# Patient Record
Sex: FEMALE | Race: BLACK OR AFRICAN AMERICAN | Employment: PART TIME
[De-identification: names, ages, dates, MRNs, and addresses within clinical notes are randomized per-mention and may not be internally consistent; named-entity substitution may affect disease eponyms.]

---

## 2017-01-12 ENCOUNTER — OFFICE VISIT (OUTPATIENT)
Dept: BARIATRICS/WEIGHT MGMT | Facility: CLINIC | Age: 57
End: 2017-01-12

## 2017-01-12 VITALS
WEIGHT: 231 LBS | HEART RATE: 67 BPM | BODY MASS INDEX: 46.57 KG/M2 | SYSTOLIC BLOOD PRESSURE: 136 MMHG | HEIGHT: 59 IN | DIASTOLIC BLOOD PRESSURE: 78 MMHG

## 2017-01-12 DIAGNOSIS — I10 ESSENTIAL HYPERTENSION: Primary | ICD-10-CM

## 2017-01-12 DIAGNOSIS — J45.20 MILD INTERMITTENT ASTHMA WITHOUT COMPLICATION: ICD-10-CM

## 2017-01-12 PROCEDURE — 99204 OFFICE O/P NEW MOD 45 MIN: CPT | Performed by: SURGERY

## 2017-01-12 RX ORDER — FLUTICASONE PROPIONATE 50 MCG
1 SPRAY, SUSPENSION (ML) NASAL DAILY
COMMUNITY
End: 2019-05-07 | Stop reason: SDUPTHER

## 2017-01-12 RX ORDER — SULFAMETHOXAZOLE AND TRIMETHOPRIM 800; 160 MG/1; MG/1
TABLET ORAL
Refills: 0 | COMMUNITY
Start: 2016-12-15 | End: 2017-01-12 | Stop reason: ALTCHOICE

## 2017-01-23 ENCOUNTER — TELEPHONE (OUTPATIENT)
Dept: BARIATRICS/WEIGHT MGMT | Facility: CLINIC | Age: 57
End: 2017-01-23

## 2017-01-23 DIAGNOSIS — G47.33 OSA (OBSTRUCTIVE SLEEP APNEA): Primary | ICD-10-CM

## 2017-01-24 ENCOUNTER — NURSE ONLY (OUTPATIENT)
Dept: BARIATRICS/WEIGHT MGMT | Facility: CLINIC | Age: 57
End: 2017-01-24

## 2017-01-24 VITALS — BODY MASS INDEX: 47.26 KG/M2 | WEIGHT: 234 LBS

## 2017-01-24 DIAGNOSIS — E66.01 MORBID OBESITY DUE TO EXCESS CALORIES (HCC): Primary | ICD-10-CM

## 2017-01-27 ENCOUNTER — NURSE ONLY (OUTPATIENT)
Dept: BARIATRICS/WEIGHT MGMT | Facility: CLINIC | Age: 57
End: 2017-01-27

## 2017-01-27 DIAGNOSIS — E66.01 MORBID OBESITY DUE TO EXCESS CALORIES (HCC): Primary | ICD-10-CM

## 2017-02-06 ENCOUNTER — NURSE ONLY (OUTPATIENT)
Dept: BARIATRICS/WEIGHT MGMT | Facility: CLINIC | Age: 57
End: 2017-02-06

## 2017-02-06 DIAGNOSIS — E66.01 MORBID OBESITY DUE TO EXCESS CALORIES (HCC): Primary | ICD-10-CM

## 2017-02-09 ENCOUNTER — TELEPHONE (OUTPATIENT)
Dept: BARIATRICS/WEIGHT MGMT | Facility: CLINIC | Age: 57
End: 2017-02-09

## 2017-02-22 ENCOUNTER — OFFICE VISIT (OUTPATIENT)
Dept: PODIATRY | Facility: CLINIC | Age: 57
End: 2017-02-22

## 2017-02-22 VITALS
RESPIRATION RATE: 19 BRPM | HEART RATE: 68 BPM | SYSTOLIC BLOOD PRESSURE: 171 MMHG | HEIGHT: 59 IN | BODY MASS INDEX: 46.57 KG/M2 | WEIGHT: 231 LBS | DIASTOLIC BLOOD PRESSURE: 80 MMHG

## 2017-02-22 DIAGNOSIS — B35.1 DERMATOPHYTOSIS OF NAIL: Primary | ICD-10-CM

## 2017-02-22 PROCEDURE — 11721 DEBRIDE NAIL 6 OR MORE: CPT | Performed by: PODIATRIST

## 2017-02-28 ENCOUNTER — OFFICE VISIT (OUTPATIENT)
Dept: BARIATRICS/WEIGHT MGMT | Facility: CLINIC | Age: 57
End: 2017-02-28

## 2017-02-28 VITALS
BODY MASS INDEX: 46.97 KG/M2 | HEART RATE: 68 BPM | RESPIRATION RATE: 16 BRPM | SYSTOLIC BLOOD PRESSURE: 118 MMHG | DIASTOLIC BLOOD PRESSURE: 74 MMHG | WEIGHT: 233 LBS | HEIGHT: 59 IN

## 2017-02-28 DIAGNOSIS — E66.01 OBESITY, MORBID, BMI 40.0-49.9 (HCC): ICD-10-CM

## 2017-02-28 DIAGNOSIS — G47.30 SLEEP APNEA, UNSPECIFIED TYPE: ICD-10-CM

## 2017-02-28 DIAGNOSIS — J45.909 UNCOMPLICATED ASTHMA, UNSPECIFIED ASTHMA SEVERITY: ICD-10-CM

## 2017-02-28 PROBLEM — L50.9 URTICARIA: Status: ACTIVE | Noted: 2017-02-28

## 2017-02-28 PROCEDURE — 99213 OFFICE O/P EST LOW 20 MIN: CPT | Performed by: NURSE PRACTITIONER

## 2017-02-28 RX ORDER — LORATADINE 10 MG/1
10 TABLET ORAL DAILY
Refills: 0 | COMMUNITY
Start: 2017-02-22 | End: 2019-05-07 | Stop reason: SDUPTHER

## 2017-03-02 DIAGNOSIS — G47.33 OSA (OBSTRUCTIVE SLEEP APNEA): Primary | ICD-10-CM

## 2017-03-28 ENCOUNTER — OFFICE VISIT (OUTPATIENT)
Dept: BARIATRICS/WEIGHT MGMT | Age: 57
End: 2017-03-28
Payer: MEDICARE

## 2017-03-28 VITALS
RESPIRATION RATE: 18 BRPM | HEART RATE: 82 BPM | BODY MASS INDEX: 47.17 KG/M2 | DIASTOLIC BLOOD PRESSURE: 78 MMHG | HEIGHT: 59 IN | SYSTOLIC BLOOD PRESSURE: 126 MMHG | WEIGHT: 234 LBS

## 2017-03-28 DIAGNOSIS — E66.01 OBESITY, MORBID, BMI 40.0-49.9 (HCC): ICD-10-CM

## 2017-03-28 DIAGNOSIS — G47.30 SLEEP APNEA, UNSPECIFIED TYPE: ICD-10-CM

## 2017-03-28 DIAGNOSIS — J45.909 UNCOMPLICATED ASTHMA, UNSPECIFIED ASTHMA SEVERITY: ICD-10-CM

## 2017-03-28 DIAGNOSIS — I10 ESSENTIAL HYPERTENSION: Primary | ICD-10-CM

## 2017-03-28 PROCEDURE — 99213 OFFICE O/P EST LOW 20 MIN: CPT | Performed by: NURSE PRACTITIONER

## 2017-04-25 ENCOUNTER — TELEPHONE (OUTPATIENT)
Dept: BARIATRICS/WEIGHT MGMT | Age: 57
End: 2017-04-25

## 2017-05-03 ENCOUNTER — OFFICE VISIT (OUTPATIENT)
Dept: PODIATRY | Age: 57
End: 2017-05-03
Payer: MEDICARE

## 2017-05-03 VITALS
WEIGHT: 230 LBS | DIASTOLIC BLOOD PRESSURE: 80 MMHG | HEIGHT: 59 IN | SYSTOLIC BLOOD PRESSURE: 145 MMHG | BODY MASS INDEX: 46.37 KG/M2 | HEART RATE: 74 BPM

## 2017-05-03 DIAGNOSIS — M77.31 CALCANEAL SPUR OF RIGHT FOOT: ICD-10-CM

## 2017-05-03 DIAGNOSIS — M77.32 CALCANEAL SPUR OF LEFT FOOT: ICD-10-CM

## 2017-05-03 DIAGNOSIS — M79.605 BILATERAL LOWER EXTREMITY PAIN: Primary | ICD-10-CM

## 2017-05-03 DIAGNOSIS — M79.604 BILATERAL LOWER EXTREMITY PAIN: Primary | ICD-10-CM

## 2017-05-03 DIAGNOSIS — B35.1 DERMATOPHYTOSIS OF NAIL: ICD-10-CM

## 2017-05-03 PROCEDURE — 99213 OFFICE O/P EST LOW 20 MIN: CPT | Performed by: PODIATRIST

## 2017-05-03 PROCEDURE — 11721 DEBRIDE NAIL 6 OR MORE: CPT | Performed by: PODIATRIST

## 2017-05-17 ENCOUNTER — OFFICE VISIT (OUTPATIENT)
Dept: PODIATRY | Age: 57
End: 2017-05-17
Payer: MEDICARE

## 2017-05-17 VITALS
BODY MASS INDEX: 46.37 KG/M2 | DIASTOLIC BLOOD PRESSURE: 86 MMHG | HEIGHT: 59 IN | HEART RATE: 68 BPM | WEIGHT: 230 LBS | SYSTOLIC BLOOD PRESSURE: 177 MMHG

## 2017-05-17 DIAGNOSIS — S86.112A RUPTURE OF LEFT POSTERIOR TIBIALIS TENDON, INITIAL ENCOUNTER: Primary | ICD-10-CM

## 2017-05-17 PROCEDURE — 99213 OFFICE O/P EST LOW 20 MIN: CPT | Performed by: PODIATRIST

## 2017-05-26 ENCOUNTER — HOSPITAL ENCOUNTER (OUTPATIENT)
Dept: MRI IMAGING | Age: 57
Discharge: HOME | End: 2017-05-26
Payer: MEDICARE

## 2017-05-26 DIAGNOSIS — S86.112A RUPTURE OF LEFT POSTERIOR TIBIALIS TENDON, INITIAL ENCOUNTER: ICD-10-CM

## 2017-05-26 PROCEDURE — 73718 MRI LOWER EXTREMITY W/O DYE: CPT

## 2017-07-26 ENCOUNTER — OFFICE VISIT (OUTPATIENT)
Dept: PODIATRY | Age: 57
End: 2017-07-26
Payer: MEDICARE

## 2017-07-26 VITALS
HEART RATE: 57 BPM | DIASTOLIC BLOOD PRESSURE: 80 MMHG | HEIGHT: 59 IN | SYSTOLIC BLOOD PRESSURE: 161 MMHG | WEIGHT: 230 LBS | BODY MASS INDEX: 46.37 KG/M2

## 2017-07-26 DIAGNOSIS — S86.112A RUPTURE OF POSTERIOR TIBIALIS TENDON, LEFT, INITIAL ENCOUNTER: Primary | ICD-10-CM

## 2017-07-26 PROCEDURE — 99213 OFFICE O/P EST LOW 20 MIN: CPT | Performed by: PODIATRIST

## 2017-07-26 PROCEDURE — L2116 AFO TIBIAL FRACTURE RIGID: HCPCS | Performed by: PODIATRIST

## 2017-08-09 ENCOUNTER — OFFICE VISIT (OUTPATIENT)
Dept: PODIATRY | Age: 57
End: 2017-08-09
Payer: MEDICARE

## 2017-08-09 VITALS
SYSTOLIC BLOOD PRESSURE: 154 MMHG | HEART RATE: 78 BPM | DIASTOLIC BLOOD PRESSURE: 82 MMHG | BODY MASS INDEX: 46.37 KG/M2 | HEIGHT: 59 IN | WEIGHT: 230 LBS

## 2017-08-09 DIAGNOSIS — S86.112A RUPTURE OF POSTERIOR TIBIALIS TENDON, LEFT, INITIAL ENCOUNTER: Primary | ICD-10-CM

## 2017-08-09 PROCEDURE — 99213 OFFICE O/P EST LOW 20 MIN: CPT | Performed by: PODIATRIST

## 2017-08-25 ENCOUNTER — HOSPITAL ENCOUNTER (OUTPATIENT)
Dept: PREADMISSION TESTING | Age: 57
Discharge: HOME | End: 2017-08-25
Payer: MEDICARE

## 2017-08-25 ENCOUNTER — HOSPITAL ENCOUNTER (OUTPATIENT)
Age: 57
Discharge: HOME | End: 2017-08-25
Payer: MEDICARE

## 2017-08-25 VITALS
SYSTOLIC BLOOD PRESSURE: 156 MMHG | DIASTOLIC BLOOD PRESSURE: 73 MMHG | BODY MASS INDEX: 48.89 KG/M2 | RESPIRATION RATE: 18 BRPM | HEIGHT: 59 IN | HEART RATE: 73 BPM | OXYGEN SATURATION: 97 % | WEIGHT: 242.51 LBS

## 2017-08-25 LAB
ABSOLUTE EOS #: 0.3 K/UL (ref 0–0.4)
ABSOLUTE LYMPH #: 2.2 K/UL (ref 1–4.8)
ABSOLUTE MONO #: 0.4 K/UL (ref 0.2–0.8)
ANION GAP SERPL CALCULATED.3IONS-SCNC: 9 MMOL/L (ref 9–17)
BASOPHILS # BLD: 1 %
BASOPHILS ABSOLUTE: 0.1 K/UL (ref 0–0.2)
BUN BLDV-MCNC: 12 MG/DL (ref 6–20)
CHLORIDE BLD-SCNC: 100 MMOL/L (ref 98–107)
CO2: 31 MMOL/L (ref 20–31)
CREAT SERPL-MCNC: 0.62 MG/DL (ref 0.5–0.9)
DIFFERENTIAL TYPE: ABNORMAL
EOSINOPHILS RELATIVE PERCENT: 4 %
GFR AFRICAN AMERICAN: >60 ML/MIN
GFR NON-AFRICAN AMERICAN: >60 ML/MIN
GFR SERPL CREATININE-BSD FRML MDRD: NORMAL ML/MIN/{1.73_M2}
GFR SERPL CREATININE-BSD FRML MDRD: NORMAL ML/MIN/{1.73_M2}
HCT VFR BLD CALC: 40.9 % (ref 36–46)
HEMOGLOBIN: 13.6 G/DL (ref 12–16)
LYMPHOCYTES # BLD: 26 %
MCH RBC QN AUTO: 27.8 PG (ref 26–34)
MCHC RBC AUTO-ENTMCNC: 33.4 G/DL (ref 31–37)
MCV RBC AUTO: 83.2 FL (ref 80–100)
MONOCYTES # BLD: 5 %
PDW BLD-RTO: 16.3 % (ref 11.5–14.5)
PLATELET # BLD: 326 K/UL (ref 130–400)
PLATELET ESTIMATE: ABNORMAL
PMV BLD AUTO: 7.2 FL (ref 6–12)
POTASSIUM SERPL-SCNC: 4.4 MMOL/L (ref 3.7–5.3)
RBC # BLD: 4.92 M/UL (ref 4–5.2)
RBC # BLD: ABNORMAL 10*6/UL
SEG NEUTROPHILS: 64 %
SEGMENTED NEUTROPHILS ABSOLUTE COUNT: 5.5 K/UL (ref 1.8–7.7)
SODIUM BLD-SCNC: 140 MMOL/L (ref 135–144)
WBC # BLD: 8.6 K/UL (ref 3.5–11)
WBC # BLD: ABNORMAL 10*3/UL

## 2017-08-25 PROCEDURE — 36415 COLL VENOUS BLD VENIPUNCTURE: CPT

## 2017-08-25 PROCEDURE — 82565 ASSAY OF CREATININE: CPT

## 2017-08-25 PROCEDURE — 80051 ELECTROLYTE PANEL: CPT

## 2017-08-25 PROCEDURE — 85025 COMPLETE CBC W/AUTO DIFF WBC: CPT

## 2017-08-25 PROCEDURE — 84520 ASSAY OF UREA NITROGEN: CPT

## 2017-08-25 PROCEDURE — 93005 ELECTROCARDIOGRAM TRACING: CPT

## 2017-08-25 ASSESSMENT — PAIN DESCRIPTION - PROGRESSION: CLINICAL_PROGRESSION: GRADUALLY WORSENING

## 2017-08-25 ASSESSMENT — PAIN DESCRIPTION - PAIN TYPE: TYPE: CHRONIC PAIN

## 2017-08-25 ASSESSMENT — PAIN DESCRIPTION - LOCATION: LOCATION: FOOT

## 2017-08-25 ASSESSMENT — PAIN SCALES - GENERAL: PAINLEVEL_OUTOF10: 10

## 2017-08-25 ASSESSMENT — PAIN DESCRIPTION - ORIENTATION: ORIENTATION: LEFT

## 2017-08-25 ASSESSMENT — PAIN DESCRIPTION - FREQUENCY: FREQUENCY: CONTINUOUS

## 2017-08-25 ASSESSMENT — PAIN DESCRIPTION - DESCRIPTORS: DESCRIPTORS: ACHING;SHOOTING;SHARP;DULL

## 2017-08-26 LAB
EKG ATRIAL RATE: 62 BPM
EKG P AXIS: 20 DEGREES
EKG P-R INTERVAL: 152 MS
EKG Q-T INTERVAL: 404 MS
EKG QRS DURATION: 88 MS
EKG QTC CALCULATION (BAZETT): 410 MS
EKG R AXIS: 0 DEGREES
EKG T AXIS: 21 DEGREES
EKG VENTRICULAR RATE: 62 BPM

## 2017-09-07 ENCOUNTER — ANESTHESIA EVENT (OUTPATIENT)
Dept: OPERATING ROOM | Age: 57
End: 2017-09-07
Payer: MEDICARE

## 2017-09-08 ENCOUNTER — ANESTHESIA (OUTPATIENT)
Dept: OPERATING ROOM | Age: 57
End: 2017-09-08
Payer: MEDICARE

## 2017-09-08 ENCOUNTER — HOSPITAL ENCOUNTER (OUTPATIENT)
Age: 57
Setting detail: OUTPATIENT SURGERY
Discharge: HOME | End: 2017-09-08
Attending: PODIATRIST | Admitting: PODIATRIST
Payer: MEDICARE

## 2017-09-08 VITALS — SYSTOLIC BLOOD PRESSURE: 114 MMHG | TEMPERATURE: 92.7 F | OXYGEN SATURATION: 95 % | DIASTOLIC BLOOD PRESSURE: 62 MMHG

## 2017-09-08 VITALS
SYSTOLIC BLOOD PRESSURE: 125 MMHG | RESPIRATION RATE: 16 BRPM | TEMPERATURE: 97.9 F | HEIGHT: 59 IN | DIASTOLIC BLOOD PRESSURE: 62 MMHG | WEIGHT: 242.51 LBS | BODY MASS INDEX: 48.89 KG/M2 | HEART RATE: 65 BPM | OXYGEN SATURATION: 98 %

## 2017-09-08 DIAGNOSIS — Z98.890 STATUS POST LEFT FOOT SURGERY: Primary | ICD-10-CM

## 2017-09-08 PROCEDURE — 3600000012 HC SURGERY LEVEL 2 ADDTL 15MIN: Performed by: PODIATRIST

## 2017-09-08 PROCEDURE — 2500000003 HC RX 250 WO HCPCS: Performed by: PODIATRIST

## 2017-09-08 PROCEDURE — 7100000000 HC PACU RECOVERY - FIRST 15 MIN: Performed by: PODIATRIST

## 2017-09-08 PROCEDURE — 6360000002 HC RX W HCPCS: Performed by: ANESTHESIOLOGY

## 2017-09-08 PROCEDURE — 2580000003 HC RX 258: Performed by: ANESTHESIOLOGY

## 2017-09-08 PROCEDURE — 7100000011 HC PHASE II RECOVERY - ADDTL 15 MIN: Performed by: PODIATRIST

## 2017-09-08 PROCEDURE — 6360000002 HC RX W HCPCS: Performed by: PODIATRIST

## 2017-09-08 PROCEDURE — 97116 GAIT TRAINING THERAPY: CPT

## 2017-09-08 PROCEDURE — 7100000001 HC PACU RECOVERY - ADDTL 15 MIN: Performed by: PODIATRIST

## 2017-09-08 PROCEDURE — 3700000001 HC ADD 15 MINUTES (ANESTHESIA): Performed by: PODIATRIST

## 2017-09-08 PROCEDURE — C9290 INJ, BUPIVACAINE LIPOSOME: HCPCS | Performed by: PODIATRIST

## 2017-09-08 PROCEDURE — 7100000010 HC PHASE II RECOVERY - FIRST 15 MIN: Performed by: PODIATRIST

## 2017-09-08 PROCEDURE — G8979 MOBILITY GOAL STATUS: HCPCS

## 2017-09-08 PROCEDURE — 6360000002 HC RX W HCPCS: Performed by: NURSE ANESTHETIST, CERTIFIED REGISTERED

## 2017-09-08 PROCEDURE — 2500000003 HC RX 250 WO HCPCS: Performed by: NURSE ANESTHETIST, CERTIFIED REGISTERED

## 2017-09-08 PROCEDURE — 3600000002 HC SURGERY LEVEL 2 BASE: Performed by: PODIATRIST

## 2017-09-08 PROCEDURE — 2580000003 HC RX 258: Performed by: NURSE ANESTHETIST, CERTIFIED REGISTERED

## 2017-09-08 PROCEDURE — 3700000000 HC ANESTHESIA ATTENDED CARE: Performed by: PODIATRIST

## 2017-09-08 PROCEDURE — 97162 PT EVAL MOD COMPLEX 30 MIN: CPT

## 2017-09-08 PROCEDURE — G8978 MOBILITY CURRENT STATUS: HCPCS

## 2017-09-08 RX ORDER — SODIUM CHLORIDE, SODIUM LACTATE, POTASSIUM CHLORIDE, CALCIUM CHLORIDE 600; 310; 30; 20 MG/100ML; MG/100ML; MG/100ML; MG/100ML
INJECTION, SOLUTION INTRAVENOUS CONTINUOUS PRN
Status: DISCONTINUED | OUTPATIENT
Start: 2017-09-08 | End: 2017-09-08 | Stop reason: SDUPTHER

## 2017-09-08 RX ORDER — CLINDAMYCIN HYDROCHLORIDE 300 MG/1
300 CAPSULE ORAL 3 TIMES DAILY
Qty: 15 CAPSULE | Refills: 0 | Status: SHIPPED | OUTPATIENT
Start: 2017-09-08 | End: 2017-09-18

## 2017-09-08 RX ORDER — DEXAMETHASONE SODIUM PHOSPHATE 10 MG/ML
INJECTION INTRAMUSCULAR; INTRAVENOUS PRN
Status: DISCONTINUED | OUTPATIENT
Start: 2017-09-08 | End: 2017-09-08 | Stop reason: SDUPTHER

## 2017-09-08 RX ORDER — ONDANSETRON 2 MG/ML
4 INJECTION INTRAMUSCULAR; INTRAVENOUS
Status: COMPLETED | OUTPATIENT
Start: 2017-09-08 | End: 2017-09-08

## 2017-09-08 RX ORDER — SODIUM CHLORIDE 9 MG/ML
INJECTION, SOLUTION INTRAVENOUS CONTINUOUS
Status: DISCONTINUED | OUTPATIENT
Start: 2017-09-09 | End: 2017-09-08

## 2017-09-08 RX ORDER — CLINDAMYCIN PHOSPHATE 900 MG/50ML
900 INJECTION INTRAVENOUS ONCE
Status: COMPLETED | OUTPATIENT
Start: 2017-09-08 | End: 2017-09-08

## 2017-09-08 RX ORDER — FENTANYL CITRATE 50 UG/ML
INJECTION, SOLUTION INTRAMUSCULAR; INTRAVENOUS PRN
Status: DISCONTINUED | OUTPATIENT
Start: 2017-09-08 | End: 2017-09-08 | Stop reason: SDUPTHER

## 2017-09-08 RX ORDER — FENTANYL CITRATE 50 UG/ML
25 INJECTION, SOLUTION INTRAMUSCULAR; INTRAVENOUS EVERY 5 MIN PRN
Status: DISCONTINUED | OUTPATIENT
Start: 2017-09-08 | End: 2017-09-08 | Stop reason: HOSPADM

## 2017-09-08 RX ORDER — DIPHENHYDRAMINE HYDROCHLORIDE 50 MG/ML
12.5 INJECTION INTRAMUSCULAR; INTRAVENOUS
Status: DISCONTINUED | OUTPATIENT
Start: 2017-09-08 | End: 2017-09-08 | Stop reason: HOSPADM

## 2017-09-08 RX ORDER — MIDAZOLAM HYDROCHLORIDE 1 MG/ML
INJECTION INTRAMUSCULAR; INTRAVENOUS PRN
Status: DISCONTINUED | OUTPATIENT
Start: 2017-09-08 | End: 2017-09-08 | Stop reason: SDUPTHER

## 2017-09-08 RX ORDER — LIDOCAINE HYDROCHLORIDE 10 MG/ML
INJECTION, SOLUTION EPIDURAL; INFILTRATION; INTRACAUDAL; PERINEURAL PRN
Status: DISCONTINUED | OUTPATIENT
Start: 2017-09-08 | End: 2017-09-08 | Stop reason: HOSPADM

## 2017-09-08 RX ORDER — HYDROMORPHONE HCL 110MG/55ML
0.5 PATIENT CONTROLLED ANALGESIA SYRINGE INTRAVENOUS EVERY 5 MIN PRN
Status: DISCONTINUED | OUTPATIENT
Start: 2017-09-08 | End: 2017-09-08 | Stop reason: HOSPADM

## 2017-09-08 RX ORDER — MEPERIDINE HYDROCHLORIDE 25 MG/ML
12.5 INJECTION INTRAMUSCULAR; INTRAVENOUS; SUBCUTANEOUS EVERY 5 MIN PRN
Status: DISCONTINUED | OUTPATIENT
Start: 2017-09-08 | End: 2017-09-08 | Stop reason: HOSPADM

## 2017-09-08 RX ORDER — ONDANSETRON 2 MG/ML
INJECTION INTRAMUSCULAR; INTRAVENOUS PRN
Status: DISCONTINUED | OUTPATIENT
Start: 2017-09-08 | End: 2017-09-08 | Stop reason: SDUPTHER

## 2017-09-08 RX ORDER — SODIUM CHLORIDE 0.9 % (FLUSH) 0.9 %
10 SYRINGE (ML) INJECTION PRN
Status: DISCONTINUED | OUTPATIENT
Start: 2017-09-08 | End: 2017-09-08 | Stop reason: HOSPADM

## 2017-09-08 RX ORDER — LIDOCAINE HYDROCHLORIDE 10 MG/ML
1 INJECTION, SOLUTION EPIDURAL; INFILTRATION; INTRACAUDAL; PERINEURAL
Status: DISCONTINUED | OUTPATIENT
Start: 2017-09-09 | End: 2017-09-08 | Stop reason: HOSPADM

## 2017-09-08 RX ORDER — SODIUM CHLORIDE, SODIUM LACTATE, POTASSIUM CHLORIDE, CALCIUM CHLORIDE 600; 310; 30; 20 MG/100ML; MG/100ML; MG/100ML; MG/100ML
INJECTION, SOLUTION INTRAVENOUS CONTINUOUS
Status: DISCONTINUED | OUTPATIENT
Start: 2017-09-09 | End: 2017-09-08 | Stop reason: HOSPADM

## 2017-09-08 RX ORDER — LIDOCAINE HYDROCHLORIDE 20 MG/ML
INJECTION, SOLUTION INFILTRATION; PERINEURAL PRN
Status: DISCONTINUED | OUTPATIENT
Start: 2017-09-08 | End: 2017-09-08 | Stop reason: SDUPTHER

## 2017-09-08 RX ORDER — SODIUM CHLORIDE 0.9 % (FLUSH) 0.9 %
10 SYRINGE (ML) INJECTION EVERY 12 HOURS SCHEDULED
Status: DISCONTINUED | OUTPATIENT
Start: 2017-09-08 | End: 2017-09-08 | Stop reason: HOSPADM

## 2017-09-08 RX ORDER — PROPOFOL 10 MG/ML
INJECTION, EMULSION INTRAVENOUS PRN
Status: DISCONTINUED | OUTPATIENT
Start: 2017-09-08 | End: 2017-09-08 | Stop reason: SDUPTHER

## 2017-09-08 RX ADMIN — FENTANYL CITRATE 100 MCG: 50 INJECTION, SOLUTION INTRAMUSCULAR; INTRAVENOUS at 11:30

## 2017-09-08 RX ADMIN — CLINDAMYCIN PHOSPHATE 900 MG: 18 INJECTION, SOLUTION INTRAMUSCULAR; INTRAVENOUS at 11:34

## 2017-09-08 RX ADMIN — FENTANYL CITRATE 50 MCG: 50 INJECTION, SOLUTION INTRAMUSCULAR; INTRAVENOUS at 12:30

## 2017-09-08 RX ADMIN — SODIUM CHLORIDE, POTASSIUM CHLORIDE, SODIUM LACTATE AND CALCIUM CHLORIDE: 600; 310; 30; 20 INJECTION, SOLUTION INTRAVENOUS at 08:06

## 2017-09-08 RX ADMIN — SODIUM CHLORIDE, POTASSIUM CHLORIDE, SODIUM LACTATE AND CALCIUM CHLORIDE: 600; 310; 30; 20 INJECTION, SOLUTION INTRAVENOUS at 11:24

## 2017-09-08 RX ADMIN — FENTANYL CITRATE 50 MCG: 50 INJECTION, SOLUTION INTRAMUSCULAR; INTRAVENOUS at 11:48

## 2017-09-08 RX ADMIN — MIDAZOLAM HYDROCHLORIDE 2 MG: 1 INJECTION, SOLUTION INTRAMUSCULAR; INTRAVENOUS at 11:24

## 2017-09-08 RX ADMIN — FENTANYL CITRATE 50 MCG: 50 INJECTION, SOLUTION INTRAMUSCULAR; INTRAVENOUS at 12:45

## 2017-09-08 RX ADMIN — ONDANSETRON 4 MG: 2 INJECTION, SOLUTION INTRAMUSCULAR; INTRAVENOUS at 12:20

## 2017-09-08 RX ADMIN — PROPOFOL 20 MG: 10 INJECTION, EMULSION INTRAVENOUS at 11:48

## 2017-09-08 RX ADMIN — PROPOFOL 180 MG: 10 INJECTION, EMULSION INTRAVENOUS at 11:30

## 2017-09-08 RX ADMIN — HYDROMORPHONE HYDROCHLORIDE 0.5 MG: 2 INJECTION, SOLUTION INTRAMUSCULAR; INTRAVENOUS; SUBCUTANEOUS at 13:46

## 2017-09-08 RX ADMIN — DEXAMETHASONE SODIUM PHOSPHATE 4 MG: 10 INJECTION INTRAMUSCULAR; INTRAVENOUS at 11:43

## 2017-09-08 RX ADMIN — ONDANSETRON 4 MG: 2 INJECTION INTRAMUSCULAR; INTRAVENOUS at 14:49

## 2017-09-08 RX ADMIN — LIDOCAINE HYDROCHLORIDE 60 MG: 20 INJECTION, SOLUTION INFILTRATION; PERINEURAL at 11:30

## 2017-09-08 RX ADMIN — FENTANYL CITRATE 50 MCG: 50 INJECTION, SOLUTION INTRAMUSCULAR; INTRAVENOUS at 11:36

## 2017-09-08 ASSESSMENT — PAIN DESCRIPTION - ORIENTATION: ORIENTATION: LEFT

## 2017-09-08 ASSESSMENT — PAIN SCALES - GENERAL
PAINLEVEL_OUTOF10: 9
PAINLEVEL_OUTOF10: 10

## 2017-09-08 ASSESSMENT — PAIN - FUNCTIONAL ASSESSMENT: PAIN_FUNCTIONAL_ASSESSMENT: 0-10

## 2017-09-08 ASSESSMENT — PAIN DESCRIPTION - DESCRIPTORS: DESCRIPTORS: SHARP

## 2017-09-08 ASSESSMENT — PAIN DESCRIPTION - LOCATION: LOCATION: FOOT

## 2017-09-08 ASSESSMENT — PAIN DESCRIPTION - PAIN TYPE: TYPE: SURGICAL PAIN

## 2017-09-08 NOTE — BRIEF OP NOTE
Brief Postoperative Note  ______________________________________________________________    Patient: Ivis Orzoco  YOB: 1960  MRN: [id number]  Date of Procedure: 9/8/2017    Pre-Op Diagnosis: POSTERIOR TIBIAl tenosynovitis    Post-Op Diagnosis: Same       Procedure(s):  LEFT POSTERIOR TIBIAL TENDON REPAIR WITH LEFT TENOLYSIS   Application posterior splint, L    Anesthesia: General and local, 10 cc 1% lidocaine plain. Surgeon(s):  Tiesha Guerra DPM     Assistant:  MARIELA [address] PGY3  KATHY Dotson PGY2    Staff:  Adolfo Scrub: Joon Franks  Scrub Person First: Vanessa Gomez     Estimated Blood Loss: < 5 cc    Complications: None    Specimens:   * No specimens in log *    Implants:  * No implants in log *      Drains:           Findings: Degenerative tendon proximal to insertion. Injectables: 12 cc 1.3% Exparel    Materials: 2-0 fiberwire, 4-0 monocryl, 3-0 prolene    Hemostasis: L PTT @ 300 mmHg x 70 minutes.      Lisbet Quintana DPM  Date: 9/8/2017  Time: 1:07 PM
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
## 2017-09-08 NOTE — IP AVS SNAPSHOT
After Visit Summary  (Discharge Instructions)    Medication List for Home    Based on the information you provided to us as well as any changes during this visit, the following is your updated medication list.  Compare this with your prescription bottles at home. If you have any questions or concerns, contact your primary care physician's office. Daily Medication List (This medication list can be shared with any healthcare provider who is helping you manage your medications)      There are NEW medications for you. START taking them after you leave the hospital        Last Dose    Next Dose Due AM NOON PM NIGHT    clindamycin 300 MG capsule   Commonly known as:  CLEOCIN   Take 1 capsule by mouth 3 times daily for 10 days                                           These are medications you told us you were taking at home, CONTINUE taking them after you leave the hospital        Last Dose    Next Dose Due AM NOON PM NIGHT    ACETAMINOPHEN-CODEINE #3 PO   Take 1 tablet by mouth every 12 hours                                         albuterol sulfate  (90 Base) MCG/ACT inhaler   Commonly known as:  PROVENTIL HFA   Inhale 2 puffs into the lungs every 4 hours as needed for Wheezing or Shortness of Breath (Space out to every 6 hours as symptoms improve). Space out to every 6 hours as symptoms improve.                                          antipyrine-benzocaine 54-14 MG/ML otic solution   Commonly known as:  AURALGAN   Place 3 drops into the left ear 4 times daily as needed for Pain                                         fluticasone 50 MCG/ACT nasal spray   Commonly known as:  FLONASE   1 spray by Nasal route daily                                         hydrOXYzine 25 MG tablet   Commonly known as:  ATARAX   Take 25 mg by mouth daily as needed for Itching                                         JOBST KNEE HIGH COMPRESSION SM Misc Dispense Bilateral Jobst Below Knee 20-30mmHg compression stockings                                         loratadine 10 MG tablet   Commonly known as:  CLARITIN   Take 10 mg by mouth daily                                         losartan-hydrochlorothiazide 100-25 MG per tablet   Commonly known as:  HYZAAR   Take 1 tablet by mouth daily 1/2 pill daily                                         QVAR 80 MCG/ACT inhaler   Generic drug:  beclomethasone   Inhale 2 puffs into the lungs as needed                                         vitamin D 53192 units Caps capsule   Commonly known as:  ERGOCALCIFEROL   Take 50,000 Units by mouth once a week                                              Where to Get Your Medications      You can get these medications from any pharmacy     Bring a paper prescription for each of these medications     clindamycin 300 MG capsule               Allergies as of 9/8/2017        Reactions    Latex     Asa [Aspirin]     Motrin [Ibuprofen]     Pcn [Penicillins]       Immunizations as of 9/8/2017     No immunizations on file. Last Vitals          Most Recent Value    Temp  98.1 °F (36.7 °C)    Pulse  64    Resp  21    BP  137/72         After Visit Summary    This summary was created for you. Thank you for entrusting your care to us. The following information includes details about your hospital/visit stay along with steps you should take to help with your recovery once you leave the hospital.  In this packet, you will find information about the topics listed below:    · Instructions about your medications including a list of your home medications  · A summary of your hospital visit  · Follow-up appointments once you have left the hospital  · Your care plan at home      You may receive a survey regarding the care you received during your stay. Your input is valuable to us. We encourage you to complete and return your survey in the envelope provided. · Do not lift or move heavy objects  · Do not drive until cleared by your physician    Bandage and Wound Care Instructions:  · Keep bandage clean and dry  · Do not shower or bathe the operative extremity  · Do not remove the bandage (unless otherwise directed)   · Do not attempt to put anything between the cast or dressing and your skin, some itching is normal.    Ice and Elevation:  · Elevate operative extremity as much as possible to reduce swelling and discomfort. · Elevate with 2 pillows at or above the level of the heart for the first 72 hours. · Ice:  [facility] dispensed insulated ice bag over the bandage 20 minutes of every hour while awake for the first 72 hours. You may ice behind the knee as well. Special Instructions: Call your doctor immediately if you develop any of the following. · Fever over 100 degrees by mouth - take your temperature daily until your first follow up visit. · Pain not relieved by medication ordered  · Swelling, increased redness, warmth, or hardness around operative area. · Numb, tingling or cold toes. · Toe(s) become white or bluish  · Bandage becomes wet, soiled, or blood soaked (small amount of bleeding may be normal)  · Increased or progressive drainage from surgical area. Follow up instructions: You will need to follow up with your doctor in the next 5 to 7 days. Call  when you get home to make an appointment. Call your doctor's office if you have any questions or concerns. Discharge Instructions following Anesthesia or Sedation  Do not drive, use any equipment or machinery, make any important decisions or sign any legal papers for 24 hours following anesthesia/sedation. Do not drink any alcoholic beverages for 24 hours following anesthesia/sedation and while taking prescription pain medication. If you develop uncontrolled vomiting or nausea or a severe headache notify your physician.           Labs and Other Follow-ups after Discharge

## 2017-09-08 NOTE — PROGRESS NOTES
Physical Therapy    Facility/Department: [facility]  Initial Assessment    NAME: Hector Chin  : 1960  MRN: [id number]    Date of Service: 2017    Patient Diagnosis(es):   Patient Active Problem List    Diagnosis Date Noted    Obesity, morbid, BMI 40.0-49.9 ([facility] 2017    Urticaria 2017    Hypertension     Sleep apnea     Asthma        Past Medical History:   Diagnosis Date    Asthma     Chest pain     occurs every other day, pt states they occur when she doesn't eat right or forget to take her bp med, occurs left side of chest with no radiation, pt states \"it's new\" and has not had any testing done or seen anyone for it.  Constipation     History of kidney stones     Hypertension     Obesity, morbid, BMI 40.0-49.9 ([facility] 2017    Sleep apnea     does not use a machine     Past Surgical History:   Procedure Laterality Date    CHOLECYSTECTOMY      FOOT TENDON SURGERY Left 2017    posterior repair    LITHOTRIPSY      REPAIR TENDONS LEG Left 2017    LEFT POSTERIOR TIBIAL TENDON REPAIR WITH LEFT TENOLYSIS  performed by Sherman Han DPM at [address] [place] [address]         Restrictions     Vision/Hearing        Subjective             Orientation       Social/Functional History  Social/Functional History  Lives With: Family (aunt; sister next door)  Type of Home: House  Home Layout: Multi-level (Pt. lives in basement;  4 steps to enter from outside with one rail. She has her own apt.  there with kitchenette, full bathroom, etc.  States she has purchased food for 2 months.  )  Home Access: Stairs to enter with rails  Entrance Stairs - Number of Steps: 4  Entrance Stairs - Rails: Left  Bathroom Shower/Tub: Walk-in shower  Bathroom Equipment: Shower chair  Home Equipment: Rolling walker (picking up after d/c)  ADL Assistance: Independent  Homemaking Assistance: Independent  Ambulation Assistance: Independent  Transfer Assistance: Independent  Objective          AROM RLE (degrees)  RLE AROM: WFL  AROM LLE (degrees)  LLE AROM : WFL  AROM RUE (degrees)  RUE AROM : WFL  AROM LUE (degrees)  LUE AROM : WFL  Strength RLE  Strength RLE: WFL  Strength LLE  Strength LLE: WFL  Comment: ankle casted  Strength RUE  Strength RUE: WFL  Strength LUE  Strength LUE: WFL        Bed mobility  Comment: up in chair  Transfers  Sit to Stand: Contact guard assistance  Stand to sit: Contact guard assistance  Bed to Chair: Contact guard assistance  Comment: toilet transfer with RW, CGA  Ambulation  Ambulation?: Yes  Ambulation 1  Surface: level tile  Device: Rolling Walker  Assistance: Contact guard assistance  Quality of Gait: good maintainance NWB LLE. Distance: 10 ft. x 3; transfers x 2     Balance  Posture: Good  Sitting - Static: Good  Sitting - Dynamic: Good  Standing - Static: Good; - (with RW)        Assessment   Body structures, Functions, Activity limitations: Decreased functional mobility ; Decreased endurance;Decreased balance  Prognosis: Excellent  Decision Making: Medium Complexity  REQUIRES PT FOLLOW UP: Yes  Activity Tolerance  Activity Tolerance: Patient Tolerated treatment well     Discharge Recommendations:  Home with assist PRN      Plan   Plan  Times per week: d.c  Safety Devices  Type of devices: Gait belt, Patient at risk for falls, Left in chair    G-Code  PT G-Codes  Functional Assessment Tool Used: [place] FOM  Score: 18  Functional Limitation: Mobility: Walking and moving around  Mobility: Walking and Moving Around Current Status ():  At least 20 percent but less than 40 percent impaired, limited or restricted  Mobility: Walking and Moving Around Goal Status (): 0 percent impaired, limited or restricted    OutComes Score                                             Goals  Patient Goals   Patient goals : d/c home       Therapy Time   Individual Concurrent Group Co-treatment   Time In 1365         Time Out [facility], PT

## 2017-09-08 NOTE — IP AVS SNAPSHOT
Patient Information     Patient Name DOB Duard Hodgkin 1960         This is your updated medication list to keep with you all times      TAKE these medications     ACETAMINOPHEN-CODEINE #3 PO       albuterol sulfate  (90 Base) MCG/ACT inhaler   Commonly known as:  PROVENTIL HFA   Inhale 2 puffs into the lungs every 4 hours as needed for Wheezing or Shortness of Breath (Space out to every 6 hours as symptoms improve). Space out to every 6 hours as symptoms improve.        antipyrine-benzocaine 54-14 MG/ML otic solution   Commonly known as:  AURALGAN       clindamycin 300 MG capsule   Commonly known as:  CLEOCIN   Take 1 capsule by mouth 3 times daily for 10 days       fluticasone 50 MCG/ACT nasal spray   Commonly known as:  FLONASE       hydrOXYzine 25 MG tablet   Commonly known as:  ATARAX       JOBST KNEE HIGH COMPRESSION SM Misc   Dispense Bilateral Jobst Below Knee 20-30mmHg compression stockings       loratadine 10 MG tablet   Commonly known as:  CLARITIN       losartan-hydrochlorothiazide 100-25 MG per tablet   Commonly known as:  HYZAAR       QVAR 80 MCG/ACT inhaler   Generic drug:  beclomethasone       vitamin D 88498 units Caps capsule   Commonly known as:  ERGOCALCIFEROL

## 2017-10-09 ENCOUNTER — OFFICE VISIT (OUTPATIENT)
Dept: PODIATRY | Age: 57
End: 2017-10-09

## 2017-10-09 VITALS
WEIGHT: 230 LBS | TEMPERATURE: 98.1 F | BODY MASS INDEX: 46.37 KG/M2 | SYSTOLIC BLOOD PRESSURE: 169 MMHG | DIASTOLIC BLOOD PRESSURE: 81 MMHG | HEIGHT: 59 IN | HEART RATE: 68 BPM

## 2017-10-09 DIAGNOSIS — Z98.890 POSTOPERATIVE STATE: Primary | ICD-10-CM

## 2017-10-09 PROCEDURE — 99024 POSTOP FOLLOW-UP VISIT: CPT | Performed by: PODIATRIST

## 2017-10-24 ENCOUNTER — HOSPITAL ENCOUNTER (OUTPATIENT)
Dept: PHYSICAL THERAPY | Facility: CLINIC | Age: 57
Setting detail: THERAPIES SERIES
Discharge: HOME | End: 2017-10-24
Payer: MEDICARE

## 2017-10-24 PROCEDURE — 97016 VASOPNEUMATIC DEVICE THERAPY: CPT

## 2017-10-24 PROCEDURE — 97161 PT EVAL LOW COMPLEX 20 MIN: CPT

## 2017-10-24 PROCEDURE — 97110 THERAPEUTIC EXERCISES: CPT
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
## 2017-10-26 ENCOUNTER — HOSPITAL ENCOUNTER (OUTPATIENT)
Dept: PHYSICAL THERAPY | Facility: CLINIC | Age: 57
Setting detail: THERAPIES SERIES
Discharge: HOME | End: 2017-10-26
Payer: MEDICARE

## 2017-10-26 PROCEDURE — 97110 THERAPEUTIC EXERCISES: CPT

## 2017-10-26 PROCEDURE — 97016 VASOPNEUMATIC DEVICE THERAPY: CPT

## 2017-10-31 ENCOUNTER — HOSPITAL ENCOUNTER (OUTPATIENT)
Dept: PHYSICAL THERAPY | Facility: CLINIC | Age: 57
Setting detail: THERAPIES SERIES
Discharge: HOME | End: 2017-10-31
Payer: MEDICARE

## 2017-10-31 NOTE — FLOWSHEET NOTE
[] Reginald Mendoza        Outpatient Physical                Therapy       [address]       Phone: [phone]       Fax: [phone] [x] [facility] at [address] [place] [address]       Phone: [phone]       Fax: [phone] [] Fadi Lion [facility]      for [facility]     [address] [place] [address]      Phone: [phone]      Fax:  [phone]     Physical Therapy Cancel/No Show note    Date: 10/31/2017  Patient: Gato Jo  : 1960  MRN: [id number]    Cancels/No Shows to date:     For today's appointment patient:  [x]  Cancelled  []  Rescheduled appointment  []  No-show     Reason given by patient:  [x]  Patient ill  []  Conflicting appointment  []  No transportation    []  Conflict with work  []  No reason given  []  Weather related  []  Other:     Comments: Patient is sick but confirmed next appt.       Electronically signed by: Elly Kee

## 2017-11-02 ENCOUNTER — HOSPITAL ENCOUNTER (OUTPATIENT)
Dept: PHYSICAL THERAPY | Facility: CLINIC | Age: 57
Setting detail: THERAPIES SERIES
Discharge: HOME | End: 2017-11-02
Payer: MEDICARE

## 2017-11-02 PROCEDURE — 97016 VASOPNEUMATIC DEVICE THERAPY: CPT

## 2017-11-02 PROCEDURE — 97110 THERAPEUTIC EXERCISES: CPT

## 2017-11-06 ENCOUNTER — OFFICE VISIT (OUTPATIENT)
Dept: PODIATRY | Age: 57
End: 2017-11-06
Payer: MEDICARE

## 2017-11-06 VITALS
HEART RATE: 79 BPM | HEIGHT: 59 IN | DIASTOLIC BLOOD PRESSURE: 89 MMHG | BODY MASS INDEX: 46.37 KG/M2 | SYSTOLIC BLOOD PRESSURE: 172 MMHG | WEIGHT: 230 LBS

## 2017-11-06 DIAGNOSIS — B35.1 DERMATOPHYTOSIS OF NAIL: ICD-10-CM

## 2017-11-06 DIAGNOSIS — M76.822 POSTERIOR TIBIAL TENDONITIS, LEFT: ICD-10-CM

## 2017-11-06 DIAGNOSIS — Z98.890 POST-OPERATIVE STATE: Primary | ICD-10-CM

## 2017-11-06 PROCEDURE — 99024 POSTOP FOLLOW-UP VISIT: CPT | Performed by: PODIATRIST

## 2017-11-06 PROCEDURE — 11721 DEBRIDE NAIL 6 OR MORE: CPT | Performed by: PODIATRIST

## 2017-11-06 RX ORDER — ACETAMINOPHEN AND CODEINE PHOSPHATE 300; 30 MG/1; MG/1
TABLET ORAL
Refills: 0 | Status: ON HOLD | COMMUNITY
Start: 2017-08-17 | End: 2018-06-29 | Stop reason: HOSPADM

## 2017-11-06 RX ORDER — NEOMYCIN SULFATE, POLYMYXIN B SULFATE AND HYDROCORTISONE 10; 3.5; 1 MG/ML; MG/ML; [USP'U]/ML
SUSPENSION/ DROPS AURICULAR (OTIC)
Refills: 0 | COMMUNITY
Start: 2017-08-17 | End: 2021-09-28 | Stop reason: ALTCHOICE

## 2017-11-06 NOTE — PROGRESS NOTES
[facility] Podiatry  Post Operative Note  Chief Complaint   Patient presents with    Post-Op Check           Subjective: Sandy Tian is a 56 y.o. female who presents to the office today 6week(s)  S/P left posterior tibial tendon repair for correction of tear in that tendon. She is also complaining of toenail pain as they are long, thickened and discolored with pain in her shoes. Problem List Items Addressed This Visit     None      Visit Diagnoses    None. . Patient relates pain is Present and it has improved. Pain is rated 5 out of 10 and is described as constant. Currently denies F/C/N/V. Patient is taking pain medications as prescribed and is controlling pain. Physical Examination:  Incision is coapted, sutures/steri-strips are intact. Minimal bleeding post operatively. Edema present. No erythema. No Pus. Operative correction is satisfactory. :Nails 1-10 are brittle and thickened, elongated, yellow, dystrophic with subungual debris. Nails are painful 1-10 with direct palpation      Assessment: Sandy Tian is status post left posterior tibial tendon dysfuctino repair of tendon  Normal post operative course. Doing well    1. Post-operative state  27639 - [place] DEBRIDEMENT OF NAILS, 6 OR MORE    Handicap placard    CANCELED: Handicap placard   2. Posterior tibial tendonitis, left  97501 - [place] DEBRIDEMENT OF NAILS, 6 OR MORE    Handicap placard    CANCELED: Handicap placard   3. Dermatophytosis of nail  43408 - [place] DEBRIDEMENT OF NAILS, 6 OR MORE         Plan:  Patient examined and evaluated. Current condition and treatment options discussed in detail. Nails 1,2,3,4,5 Right and 1,2,3,4,5 Left were debrided and ground smooth with a dremmel. The patient tolerated the procedure well without apparent complications. Advised pt to she can go back to work for light shift as long as she is in her boot. Verbal and written instructions given to patient. Continue PT thearpy.   Handicap placard was given to the patient  Orders: No orders of the defined types were placed in this encounter. Contact office with any questions/problems/concerns. RTC in 4week(s).

## 2017-11-07 ENCOUNTER — HOSPITAL ENCOUNTER (OUTPATIENT)
Dept: PHYSICAL THERAPY | Facility: CLINIC | Age: 57
Setting detail: THERAPIES SERIES
Discharge: HOME | End: 2017-11-07
Payer: MEDICARE

## 2017-11-07 PROCEDURE — 97016 VASOPNEUMATIC DEVICE THERAPY: CPT

## 2017-11-07 PROCEDURE — 97110 THERAPEUTIC EXERCISES: CPT

## 2017-11-07 NOTE — FLOWSHEET NOTE
[] Cornel Prince       Outpatient Physical        Therapy       [address]       Phone: [phone]       Fax: [phone] [x] [facility] at [address]       Phone: [phone]       Fax: [phone] [] Jona. [address] [place] [address] Health Promotion  [address] [facility]   Phone: [phone]   Fax:  [phone]     Physical Therapy Daily Treatment Note    Date:  2017  Patient Name:  Sanjay Tolentino    :  1960  MRN: [id number]  Physician: Dr. Tracey Talbot: [employer] (30 visits per calendar year)  Medical Diagnosis: left posterior tibial tendon repair                                             Rehab Codes: Z07.131, M25.572, M25.672, R26.2  Onset date: 2017 date of recent repair surgery                                        Next Dr's appt. : 2017  Visit# / total visits:     Cancels/No Shows: 0 / 0    Subjective:    Pain:  [x] Yes  [] No Location: left lateral ankle   Pain Rating: (0-10 scale) 8/10, intermittent \"sharp\" pains  Pain altered Tx:  [] No  [x] Yes  Action:vaso end of treatment    Comments: pt reports she has been trying to stand on it more at home. Comes to PT using rolling walker. Objective:  Modalities:2. .   massage around incision, pt tender at onset of massage but improved as it continued. Also massage distal to proximal along plantar surgace of foot for edema  3. Vaso compression to left ankle, 34 degrees, 15 minutes, low compression, foot elevated     Precautions: WBAT as of   Able to remove the boot as PT feels is appropriate per physician (message in EPIC)    1.    Exercises:  Exercise Reps/ Time Weight/ Level Comments   NuStep 10 min L1 Seat at 7, no boot  started 10/26  Moved seat to 6 when removed the boot after 4 minutes         PROM to left ankle 15 min   DF< PF, IN, EV as tolerated Seferino Trujillo, PT

## 2017-11-09 ENCOUNTER — HOSPITAL ENCOUNTER (OUTPATIENT)
Dept: PHYSICAL THERAPY | Facility: CLINIC | Age: 57
Setting detail: THERAPIES SERIES
Discharge: HOME | End: 2017-11-09
Payer: MEDICARE

## 2017-11-09 PROCEDURE — 97110 THERAPEUTIC EXERCISES: CPT

## 2017-11-09 PROCEDURE — 97016 VASOPNEUMATIC DEVICE THERAPY: CPT

## 2017-11-09 NOTE — FLOWSHEET NOTE
[] [facility]       Outpatient Physical        Therapy       [address]       Phone: [phone]       Fax: [phone] [x] [facility] at [address]       Phone: [phone]       Fax: [phone] [] Jona. [facility] Prom[facility]  [address] [place] [address]   Phone: [phone]   Fax:  [phone]     Physical Therapy Daily Treatment Note    Date:  2017  Patient Name:  Siri Peoples    :  1960  MRN: [id number]  Physician: Dr. Parrish Obrien: [employer] (30 visits per calendar year)  Medical Diagnosis: left posterior tibial tendon repair                                             Rehab Codes: G95.094, M25.572, M25.672, R26.2  Onset date: 2017 date of recent repair surgery                                        Next Dr's appt. : 2017  Visit# / total visits:     Cancels/No Shows: 0 / 0    Subjective:    Pain:  [x] Yes  [] No Location: left lateral ankle   Pain Rating: (0-10 scale) 8/10  Pain altered Tx:  [] No  [x] Yes  Action:vaso end of treatment    Comments: pt reports still having pain and still swollen, enters with boot ans walker. Objective:  Modalities:2. .   massage around incision, pt tender at onset of massage but improved as it continued. Also massage distal to proximal along plantar surgace of foot for edema  3. Vaso compression to left ankle, 34 degrees, 15 minutes, low compression, foot elevated     Precautions: WBAT as of   Able to remove the boot as PT feels is appropriate per physician (message in EPIC)    1.    Exercises:  Exercise Reps/ Time Weight/ Level Comments   NuStep 6 min L1 Seat at 6, no boot  started 10/26  Moved seat to 6 when removed the boot after 4 minutes         PROM to left ankle 15 min   DF< PF, IN, EV as tolerated         BAPS sitting 10xea L3 Added , A with L only [place], PTA

## 2017-11-14 ENCOUNTER — HOSPITAL ENCOUNTER (OUTPATIENT)
Dept: PHYSICAL THERAPY | Facility: CLINIC | Age: 57
Setting detail: THERAPIES SERIES
Discharge: HOME | End: 2017-11-14
Payer: MEDICARE

## 2017-11-14 PROCEDURE — 97110 THERAPEUTIC EXERCISES: CPT

## 2017-11-14 PROCEDURE — 97016 VASOPNEUMATIC DEVICE THERAPY: CPT

## 2017-11-14 NOTE — FLOWSHEET NOTE
11/9, A/P, M/L, CW, CCW   circles                      Toe curls 15x       DF stretch with towel 5x 10 sec hold     Ankle alphabet  1x           T band yellow  Added 10/260   DF 20     PF 20     inv 20     ever  20             Walking in walker with no boot 12'x2  Added 11/14. Try to go farther each visit. Other:       Specific Instructions for next treatment:    Treatment Charges: Mins Units        [x]  Ther Exercise 40 3   []  Manual Therapy     []  Ther Activities     []  Aquatics     [x]  Vasocompression 15 1   []  Other     Total Treatment time 55 4       Assessment: [x] Progressing toward goals. Fair tolerance to exercises today, able to increased some reps and add waling with no boot in the walker as charted. [] No change. [] Other:     STG: (to be met in 9 treatments)  1. ? Pain: to 6/10 maximum  2. ? ROM: Dorsiflexion AROM to neutral, 15 degrees inversion, 5 degrees eversion  3. ? Strength: Ability to walk 150 feet with standard walker and boot on  4. ? Function: demonstrate up and down 3 steps with one railing  5. Independent with Home Exercise Programs     LTG: (to be met in 18 treatments)  1. Walk for 15 minutes without walker or boot. 2. No edema in left foot  3. Functional AROM of left ankle  4. Ability to go up and down 1 flight of steps reciprocal  5. Ability to stand for 15 minutes to cook, groom. 6. Resume driving.      Patient goals: ability to walk again, without a walker or medical boot       Pt. Education:  [x] Yes  [] No  [x] Reviewed Prior HEP/Ed  Method of Education: [x] Verbal  [x] Demo for charted exercises  [] Written  11-7-2017 weight bearing in standing in walker, to bring shoe to PT to start weight bearing in // bars and wt shifting  Comprehension of Education:  [x] Verbalizes understanding. [x] Demonstrates understanding. [x] Needs review. [x] Demonstrates/verbalizes HEP/Ed previously given. Plan: [x] Continue per plan of care.    [] Other:      Time In: 2:35 pm         Time Out: 3:45pm    Electronically signed by:  Ellyn Barrios PTA

## 2017-11-16 ENCOUNTER — HOSPITAL ENCOUNTER (OUTPATIENT)
Dept: PHYSICAL THERAPY | Facility: CLINIC | Age: 57
Setting detail: THERAPIES SERIES
Discharge: HOME | End: 2017-11-16
Payer: MEDICARE

## 2017-11-16 PROCEDURE — 97110 THERAPEUTIC EXERCISES: CPT

## 2017-11-16 PROCEDURE — 97016 VASOPNEUMATIC DEVICE THERAPY: CPT

## 2017-11-16 NOTE — FLOWSHEET NOTE
standing in walker, to bring shoe to PT to start weight bearing in // bars and wt shifting  Comprehension of Education:  [x] Verbalizes understanding. [x] Demonstrates understanding. [x] Needs review. [x] Demonstrates/verbalizes HEP/Ed previously given. Plan: [x] Continue per plan of care.    [] Other:      Time In: 2:15 pm         Time Out: 3:25pm    Electronically signed by:  Ryder Maier PTA

## 2017-11-21 ENCOUNTER — HOSPITAL ENCOUNTER (OUTPATIENT)
Dept: PHYSICAL THERAPY | Facility: CLINIC | Age: 57
Setting detail: THERAPIES SERIES
Discharge: HOME | End: 2017-11-21
Payer: MEDICARE

## 2017-11-21 PROCEDURE — 97110 THERAPEUTIC EXERCISES: CPT

## 2017-11-21 PROCEDURE — 97016 VASOPNEUMATIC DEVICE THERAPY: CPT

## 2017-11-27 ENCOUNTER — OFFICE VISIT (OUTPATIENT)
Dept: PODIATRY | Age: 57
End: 2017-11-27

## 2017-11-27 VITALS
HEIGHT: 59 IN | SYSTOLIC BLOOD PRESSURE: 130 MMHG | WEIGHT: 230 LBS | HEART RATE: 82 BPM | DIASTOLIC BLOOD PRESSURE: 72 MMHG | RESPIRATION RATE: 18 BRPM | BODY MASS INDEX: 46.37 KG/M2

## 2017-11-27 DIAGNOSIS — M76.822 POSTERIOR TIBIAL TENDONITIS, LEFT: ICD-10-CM

## 2017-11-27 DIAGNOSIS — Z98.890 POST-OPERATIVE STATE: Primary | ICD-10-CM

## 2017-11-27 PROCEDURE — 99024 POSTOP FOLLOW-UP VISIT: CPT | Performed by: PODIATRIST

## 2017-11-28 ENCOUNTER — HOSPITAL ENCOUNTER (OUTPATIENT)
Dept: PHYSICAL THERAPY | Facility: CLINIC | Age: 57
Setting detail: THERAPIES SERIES
Discharge: HOME | End: 2017-11-28
Payer: MEDICARE

## 2017-11-28 PROCEDURE — 97016 VASOPNEUMATIC DEVICE THERAPY: CPT

## 2017-11-28 PROCEDURE — 97110 THERAPEUTIC EXERCISES: CPT

## 2017-11-30 ENCOUNTER — HOSPITAL ENCOUNTER (OUTPATIENT)
Dept: PHYSICAL THERAPY | Facility: CLINIC | Age: 57
Setting detail: THERAPIES SERIES
Discharge: HOME | End: 2017-11-30
Payer: MEDICARE

## 2017-11-30 PROCEDURE — 97110 THERAPEUTIC EXERCISES: CPT

## 2017-11-30 PROCEDURE — 97016 VASOPNEUMATIC DEVICE THERAPY: CPT

## 2017-11-30 NOTE — FLOWSHEET NOTE
[] Avinash Kim       Outpatient Physical        Therapy       [address]       Phone: [phone]       Fax: [phone] [x] [facility] at [address]       Phone: [phone]       Fax: [phone] [] Le[place]ia. [facility] Promotion  [address] [place] [address]   Phone: [phone]   Fax:  [phone]     Physical Therapy Daily Treatment Note    Date:  2017  Patient Name:  Eduardo Blanca    :  1960  MRN: [id number]  Physician: Dr. Darin Ramirez: [employer] (30 visits per calendar year)  Medical Diagnosis: left posterior tibial tendon repair                                             Rehab Codes: Q70.721, M25.572, M25.672, R26.2  Onset date: 2017 date of recent repair surgery                                        Next Dr's appt. : ~17  Visit# / total visits: 10/18    Cancels/No Shows: 0 / 0    Subjective:    Pain:  [x] Yes  [] No Location: left lateral ankle   Pain Rating: (0-10 scale) 8.25/10  Pain altered Tx:  [] No  [x] Yes  Action: minimal exercises    Comments: pt reports she is getting pain at night that wakes her up. Enters with a straight cane and wearing the boot today. Objective:  Modalities:2. .   massage around incision, pt tender at onset of massage but improved as it continued. Also massage distal to proximal along plantar surgace of foot for edema-not today  3. Vaso compression to left ankle, 34 degrees, 15 minutes, low compression, foot elevated     Precautions: WBAT as of   Able to remove the boot as PT feels is appropriate per physician (message in EPIC)    1.    Exercises: bolded done   Exercise Reps/ Time Weight/ Level Comments   NuStep 7 min L1 Seat at 5, no boot  started 10/26  Moved seat to 6 when removed the boot after 4 minutes         PROM to left ankle 15 min   DF< PF, IN, EV as tolerated         BAPS sitting 20xea L3 Added 11/7, A with L only 11/9, A/P, M/L, CW, CCW   circles      gastroc stretch at wall 5x  Left foot only on wood slant, holding bars on wall             Toe curls 15x       DF stretch with towel 5x 10 sec hold     Ankle alphabet  1x           T band Red   Added 10/260   DF 20     PF 20     inv 20     ever  20             Walking  with no boot and cane 15'x2  Added 11/14. Try to go farther each visit. Other:  Add step ups next visit as able     11- - left ankle  AROM  RROM  Dorsiflexion   8 degree lag To neutral  Plantarflexion  135  142  Inversion   5  Eversion   20    Specific Instructions for next treatment:    Treatment Charges: Mins Units        [x]  Ther Exercise 30 2   []  Manual Therapy     []  Ther Activities     []  Aquatics     [x]  Vasocompression 15 1   []  Other     Total Treatment time 45 3       Assessment: [] Progressing toward goals. [] No change. [x] Other: pt states the pain is getting worse, she has been trying to walk in a shoe at night. This causes increased pain and swelling. She reports she has been soaking in warm water and salts, she was advised to not do that and elevate and ice instead. She is convinced she can feel the \"rubber band that he put in there\" moving, she is concerned that she \"ripped a stitch out\" when she twisted the ankle in the shower the other day. She reports it hurts when it is out of the boot. She was educated in weaning out of the boot and how being in the boot is halting progress with ROM, strength and function.      STG: (to be met in 9 treatments)   1. ? Pain: to 6/10 maximum, 11/28 unmet  2. ? ROM: Dorsiflexion AROM to neutral, 15 degrees inversion, 5 degrees eversion,11/28  improved, see ranges above  3. ? Strength: Ability to walk 150 feet with standard walker and boot on, met without walker 11/28  4. ? Function: demonstrate up and down 3 steps with one railing  Independent with Home Exercise Programs, met 11/28   LTG: (to be met in 18 treatments)  1. Walk for 15 minutes without walker or boot. 2. No edema in left foot  3. Functional AROM of left ankle  4. Ability to go up and down 1 flight of steps reciprocal  5. Ability to stand for 15 minutes to cook, groom. 6. Resume driving.      Patient goals: ability to walk again, without a walker or medical boot     Pt. Education:  [x] Yes  [] No  [x] Reviewed Prior HEP/Ed  Method of Education: [x] Verbal as charted above  [] Demo   [] Written  11-7-2017 weight bearing in standing in walker, to bring shoe to PT to start weight bearing in // bars and wt shifting  11- discussed increasing standing and walking time without boot, rec she wear athletic shoe on left for support and cushion. She has been walking in home with just sock on. Continue to encourage patient to walk at home, increasing time standing and walking without boot. 11- pt did not bring athletic shoe to PT today, continue to encourage patient to walk without boot at home. Comprehension of Education:  [x] Verbalizes understanding. [x] Demonstrates understanding. [x] Needs review. [x] Demonstrates/verbalizes HEP/Ed previously given. Plan: [x] Continue per plan of care.    [] Other:      Time In: 2:30 pm        Time Out: 3:25 pm    Electronically signed by:  Butch Feliz PTA

## 2017-12-04 ENCOUNTER — OFFICE VISIT (OUTPATIENT)
Dept: PODIATRY | Age: 57
End: 2017-12-04

## 2017-12-04 VITALS — WEIGHT: 230 LBS | BODY MASS INDEX: 46.37 KG/M2 | HEIGHT: 59 IN

## 2017-12-04 DIAGNOSIS — S86.112A RUPTURE OF LEFT POSTERIOR TIBIALIS TENDON, INITIAL ENCOUNTER: Primary | ICD-10-CM

## 2017-12-04 PROCEDURE — G8417 CALC BMI ABV UP PARAM F/U: HCPCS | Performed by: PODIATRIST

## 2017-12-04 PROCEDURE — 3017F COLORECTAL CA SCREEN DOC REV: CPT | Performed by: PODIATRIST

## 2017-12-04 PROCEDURE — 99213 OFFICE O/P EST LOW 20 MIN: CPT | Performed by: PODIATRIST

## 2017-12-04 PROCEDURE — 1036F TOBACCO NON-USER: CPT | Performed by: PODIATRIST

## 2017-12-04 PROCEDURE — G8484 FLU IMMUNIZE NO ADMIN: HCPCS | Performed by: PODIATRIST

## 2017-12-04 PROCEDURE — 3014F SCREEN MAMMO DOC REV: CPT | Performed by: PODIATRIST

## 2017-12-04 PROCEDURE — G8427 DOCREV CUR MEDS BY ELIG CLIN: HCPCS | Performed by: PODIATRIST

## 2017-12-04 NOTE — PROGRESS NOTES
[facility] Podiatry  Post Operative Note  Chief Complaint   Patient presents with    Post-op Problem    Fall    Foot Pain     Left Foot           Subjective: Mariella Miranda is a 56 y.o. female who presents to the office today 7week(s)  S/P post tib tendon repair for correction of torn tendon  States she fell in the shower recently and had pain and is scared that she hurt the surgery  Problem List Items Addressed This Visit     None      Visit Diagnoses    None. . Patient relates pain is Present and increased. Pain is rated 8 out of 10 and is described as constant, moderate. Currently denies F/C/N/V. Patient is taking pain medications as prescribed and is controlling pain. Physical Examination:  Incision is coapted,  Minimal bleeding post operatively. Edema present. No erythema. No Pus. Operative correction is satisfactory. Guarding pain to the left medial ankle and pain with inversion. Assessment: Mariella Miranda is status post 7 weeks post tib tendon repair with recent fall  Normal post operative course. Doing well  No diagnosis found. Plan:  Patient examined and evaluated. Current condition and treatment options discussed in detail. Advised pt to push herself at physical thearpy. She did not cause any new damage as there is no ecchymosis or tenderness when distracted. .  Verbal and written instructions given to patient. Orders: No orders of the defined types were placed in this encounter. Contact office with any questions/problems/concerns. RTC in 3week(s).

## 2017-12-05 ENCOUNTER — HOSPITAL ENCOUNTER (OUTPATIENT)
Dept: PHYSICAL THERAPY | Facility: CLINIC | Age: 57
Setting detail: THERAPIES SERIES
Discharge: HOME | End: 2017-12-05
Payer: MEDICARE

## 2017-12-05 PROCEDURE — 97016 VASOPNEUMATIC DEVICE THERAPY: CPT

## 2017-12-05 PROCEDURE — 97110 THERAPEUTIC EXERCISES: CPT

## 2017-12-05 NOTE — FLOWSHEET NOTE
EPIC)    1. Exercises: bolded done 12/5  Exercise Reps/ Time Weight/ Level Comments   NuStep 7 min L1 Seat at 5,  Wearing [employer] athletic shoes         PROM to left ankle 15 min   DF< PF, IN, EV as tolerated         BAPS sitting 20xea L3 Added 11/7, A with L only 11/9, A/P, M/L, CW, CCW   circles      gastroc stretch at wall 5x  Left foot only on wood slant, holding bars on wall             Toe curls 15x       DF stretch with towel 5x 10 sec hold     Ankle alphabet  1x           T band Red   Added 10/26   DF 20     PF 20     inv 20     ever  20             Walking  with no boot and cane 15'x2  Added 11/14. Try to go farther each visit. Walking with shoes 50 ft x 2  Added 12/5. Antalgic gait, cueing to stand upright, retract shoulders, heel strike observed, minimal toe off and asymmetric wt bearing onto left         // bars      Heel raises 15x15x  Added 12/5   Mini squats   Added 12/5   Other:  Add step ups next visit as able     11- - left ankle  AROM  RROM  Dorsiflexion   8 degree lag To neutral  Plantarflexion  135  142  Inversion   5  Eversion   20    Specific Instructions for next treatment:    Treatment Charges: Mins Units        [x]  Ther Exercise 30 2   []  Manual Therapy     []  Ther Activities     []  Aquatics     [x]  Vasocompression 15 1   []  Other     Total Treatment time 45 3       Assessment: [x] Progressing toward goals. Pt with shoe for left foot for PT today. Continue to educate on walking with shoe, strengthening and pacing self to increase time up on foot while at home. [] No change.       [] Other:     STG: (to be met in 9 treatments)   1. ? Pain: to 6/10 maximum, 11/28 unmet  2. ? ROM: Dorsiflexion AROM to neutral, 15 degrees inversion, 5 degrees eversion,11/28  improved, see ranges above  3. ? Strength: Ability to walk 150 feet with standard walker and boot on, met without walker 11/28  4. ? Function: demonstrate up and down 3 steps with one railing  Independent with Home Exercise Programs, met 11/28   LTG: (to be met in 18 treatments)  1. Walk for 15 minutes without walker or boot. 2. No edema in left foot  3. Functional AROM of left ankle  4. Ability to go up and down 1 flight of steps reciprocal  5. Ability to stand for 15 minutes to cook, groom. 6. Resume driving.      Patient goals: ability to walk again, without a walker or medical boot     Pt. Education:  [x] Yes  [] No  [] Reviewed Prior HEP/Ed  Method of Education: [x] Verbal   [] Demo   [] Written  11-7-2017 weight bearing in standing in walker, to bring shoe to PT to start weight bearing in // bars and wt shifting  11- discussed increasing standing and walking time without boot, rec she wear athletic shoe on left for support and cushion. She has been walking in home with just sock on. Continue to encourage patient to walk at home, increasing time standing and walking without boot. 11- pt did not bring athletic shoe to PT today, continue to encourage patient to walk without boot at home. 12-5-17 pt with shoe today for left foot, ,decreasing use of walker, did not bring to therapy today (it is in the car)    Comprehension of Education:  [x] Verbalizes understanding. [x] Demonstrates understanding. [x] Needs review. [] Demonstrates/verbalizes HEP/Ed previously given. Plan: [x] Continue per plan of care.    [] Other:      Time In: 2:00 pm        Time Out: 2: 55 pm    Electronically signed by:  Payton Robb, PT

## 2017-12-07 ENCOUNTER — HOSPITAL ENCOUNTER (OUTPATIENT)
Dept: PHYSICAL THERAPY | Facility: CLINIC | Age: 57
Setting detail: THERAPIES SERIES
Discharge: HOME | End: 2017-12-07
Payer: MEDICARE

## 2017-12-07 PROCEDURE — 97110 THERAPEUTIC EXERCISES: CPT

## 2017-12-07 PROCEDURE — 97016 VASOPNEUMATIC DEVICE THERAPY: CPT

## 2017-12-07 NOTE — FLOWSHEET NOTE
20xea L3 Added 11/7, A with L only 11/9, A/P, M/L, CW, CCW   circles      gastroc stretch at wall 5x  Left foot only on wood slant, holding bars on wall             Toe curls 15x       DF stretch with towel 5x 10 sec hold     Ankle alphabet  1x           T band Red   Added 10/26   DF 20     PF 20     inv 20     ever  20             Walking  with no boot and cane 15'x2  Added 11/14. Try to go farther each visit. Walking with shoes 50 ft x 2  Added 12/5. Antalgic gait, cueing to stand upright, retract shoulders, heel strike observed, minimal toe off and asymmetric wt bearing onto left, half done with cane today, pt using a step to pattern with the cane. // bars      Heel raises 15x15x  Added 12/5   Mini squats 15x  Added 12/5         step ups  15x 2\" In //   Step downs  15x 2\" Max compensation to avoid knee flexion          Other:  Add step ups next visit as able     11- - left ankle  AROM  RROM  Dorsiflexion   8 degree lag To neutral  Plantarflexion  135  142  Inversion   5  Eversion   20    Specific Instructions for next treatment:    Treatment Charges: Mins Units        [x]  Ther Exercise 40 3   []  Manual Therapy     []  Ther Activities     []  Aquatics     [x]  Vasocompression 15 1   []  Other     Total Treatment time 55 3       Assessment: [x] Progressing toward goals. Pt with minimal effort today with exercises, max encouragement to complete and to complete with corredt technique. She states she is not good with pain and complains that \"it is hurting\" mostly with FWB activities. [] No change.       [] Other:     STG: (to be met in 9 treatments)   1. ? Pain: to 6/10 maximum, 11/28 unmet  2. ? ROM: Dorsiflexion AROM to neutral, 15 degrees inversion, 5 degrees eversion,11/28  improved, see ranges above  3. ? Strength: Ability to walk 150 feet with standard walker and boot on, met without walker 11/28  4. ? Function: demonstrate up and down 3 steps with one railing  Independent with Home Exercise Programs, met 11/28   LTG: (to be met in 18 treatments)  1. Walk for 15 minutes without walker or boot. 2. No edema in left foot  3. Functional AROM of left ankle  4. Ability to go up and down 1 flight of steps reciprocal  5. Ability to stand for 15 minutes to cook, groom. 6. Resume driving.      Patient goals: ability to walk again, without a walker or medical boot     Pt. Education:  [x] Yes  [] No  [] Reviewed Prior HEP/Ed  Method of Education: [x] Verbal   [x] Demo  steps  [] Written  11-7-2017 weight bearing in standing in walker, to bring shoe to PT to start weight bearing in // bars and wt shifting  11- discussed increasing standing and walking time without boot, rec she wear athletic shoe on left for support and cushion. She has been walking in home with just sock on. Continue to encourage patient to walk at home, increasing time standing and walking without boot. 11- pt did not bring athletic shoe to PT today, continue to encourage patient to walk without boot at home. 12-5-17 pt with shoe today for left foot, ,decreasing use of walker, did not bring to therapy today (it is in the car)    Comprehension of Education:  [x] Verbalizes understanding. [x] Demonstrates understanding. [x] Needs review. [x] Demonstrates/verbalizes HEP/Ed previously given. Plan: [x] Continue per plan of care.    [] Other:      Time In: 2:30 pm        Time Out: 3:50 pm    Electronically signed by:  Eduardo Vernon PTA

## 2017-12-12 ENCOUNTER — HOSPITAL ENCOUNTER (OUTPATIENT)
Dept: PHYSICAL THERAPY | Facility: CLINIC | Age: 57
Setting detail: THERAPIES SERIES
Discharge: HOME | End: 2017-12-12
Payer: MEDICARE

## 2017-12-12 PROCEDURE — 97116 GAIT TRAINING THERAPY: CPT

## 2017-12-12 PROCEDURE — 97016 VASOPNEUMATIC DEVICE THERAPY: CPT

## 2017-12-12 PROCEDURE — 97110 THERAPEUTIC EXERCISES: CPT

## 2017-12-12 NOTE — FLOWSHEET NOTE
[] Humera Melvin       Outpatient Physical        Therapy       [address]       Phone: [phone]       Fax: [phone] [x] [facility] at [address]       Phone: [phone]       Fax: [phone] [] [facility]. [facility]  [address] [facility]   Phone: [phone]   Fax:  [phone]     Physical Therapy Daily Treatment Note    Date:  2017  Patient Name:  Queen Sarthak    :  1960  MRN: [id number]  Physician: Dr. Jony Ross: [employer] (30 visits per calendar year)  Medical Diagnosis: left posterior tibial tendon repair                                             Rehab Codes: N10.232, M25.572, M25.672, R26.2  Onset date: 2017 date of recent repair surgery                                        Next Dr's appt. : ~17  Visit# / total visits:     Cancels/No Shows: 0 / 0    Subjective:    Pain:  [x] Yes  [] No Location: left lateral ankle   Pain Rating: (0-10 scale) \"8.2\"/10  Pain altered Tx:  [] No  [x] Yes  Action: minimal exercises    Comments: pt reports she is still waking up at night due to pain. She now thinks she has nerve damage and she is not healing due to her age, and her bones are getting weaker. Objective:  Modalities:2. .   massage around incision, pt tender at onset of massage but improved as it continued. Also massage distal to proximal along plantar surgace of foot for edema-not today  3. Vaso compression to left ankle, 34 degrees, 15 minutes, low compression, foot elevated     Precautions: WBAT as of   Able to remove the boot as PT feels is appropriate per physician (message in EPIC)    1.    Exercises: bolded done   Exercise Reps/ Time Weight/ Level Comments   NuStep 5 min L1 Seat at 5,  Wearing [employer] athletic shoes         PROM to left ankle 15 min   DF< PF, IN, EV as edema in left foot  3. Functional AROM of left ankle  4. Ability to go up and down 1 flight of steps reciprocal  5. Ability to stand for 15 minutes to cook, groom. 6. Resume driving.      Patient goals: ability to walk again, without a walker or medical boot     Pt. Education:  [x] Yes  [] No  [] Reviewed Prior HEP/Ed  Method of Education: [x] Verbal   [x] Demo  WB activities   [] Written  11-7-2017 weight bearing in standing in walker, to bring shoe to PT to start weight bearing in // bars and wt shifting  11- discussed increasing standing and walking time without boot, rec she wear athletic shoe on left for support and cushion. She has been walking in home with just sock on. Continue to encourage patient to walk at home, increasing time standing and walking without boot. 11- pt did not bring athletic shoe to PT today, continue to encourage patient to walk without boot at home. 12-5-17 pt with shoe today for left foot, ,decreasing use of walker, did not bring to therapy today (it is in the car)    Comprehension of Education:  [x] Verbalizes understanding. [x] Demonstrates understanding. [x] Needs review. [x] Demonstrates/verbalizes HEP/Ed previously given. Plan: [x] Continue per plan of care.    [] Other:      Time In: 2:00 pm        Time Out: 3:15 pm    Electronically signed by:  Jaylyn Nash PTA

## 2017-12-21 ENCOUNTER — APPOINTMENT (OUTPATIENT)
Dept: PHYSICAL THERAPY | Facility: CLINIC | Age: 57
End: 2017-12-21
Payer: MEDICARE

## 2017-12-26 ENCOUNTER — HOSPITAL ENCOUNTER (OUTPATIENT)
Dept: PHYSICAL THERAPY | Facility: CLINIC | Age: 57
Setting detail: THERAPIES SERIES
Discharge: HOME | End: 2017-12-26
Payer: MEDICARE

## 2017-12-26 NOTE — FLOWSHEET NOTE
[] Ying Whittaker        Outpatient Physical                Therapy       [address]       Phone: [phone]       Fax: [phone] [] [facility] at [address]       Phone: [phone]       Fax: [phone] [x] [place]ona. [address] [place] [address]     [address] [facility]      Phone: [phone]      Fax:  [phone]     Physical Therapy Cancel/No Show note    Date: 2017  Patient: Sparkle Bell  : 1960  MRN: [id number]    Cancels/No Shows to date:    For today's appointment patient:  [x]  Cancelled  []  Rescheduled appointment  []  No-show     Reason given by patient:  []  Patient ill  []  Conflicting appointment  []  No transportation    []  Conflict with work  []  No reason given  []  Weather related  [x]  Other:      Comments: Pt arrived to therapy and decided she was not going to do the aquatic therapy d/t her extreme fear of water. She stated was not happy about the recommendation but also did not voice her fear concerns to her PT. Explained to pt that I would be by her side the entire time and explained the benefits of aquatic therapy from her injury stand point. She cont to deny tx with statement \" I do not feel comfortable and will not do this\". She has a doctors appt tomorrow which she plans on voicing her concerns.      []  Next appointment was confirmed    Electronically signed by: Linda Trejo PTA

## 2017-12-27 ENCOUNTER — OFFICE VISIT (OUTPATIENT)
Dept: PODIATRY | Age: 57
End: 2017-12-27

## 2017-12-27 VITALS — HEIGHT: 59 IN | WEIGHT: 230 LBS | BODY MASS INDEX: 46.37 KG/M2

## 2017-12-27 DIAGNOSIS — S86.112A RUPTURE OF POSTERIOR TIBIALIS TENDON, LEFT, INITIAL ENCOUNTER: ICD-10-CM

## 2017-12-27 DIAGNOSIS — Z98.890 POSTOPERATIVE STATE: Primary | ICD-10-CM

## 2017-12-27 PROCEDURE — 99024 POSTOP FOLLOW-UP VISIT: CPT | Performed by: PODIATRIST

## 2017-12-28 ENCOUNTER — APPOINTMENT (OUTPATIENT)
Dept: PHYSICAL THERAPY | Facility: CLINIC | Age: 57
End: 2017-12-28
Payer: MEDICARE

## 2018-01-04 ENCOUNTER — HOSPITAL ENCOUNTER (OUTPATIENT)
Dept: PHYSICAL THERAPY | Facility: CLINIC | Age: 58
Setting detail: THERAPIES SERIES
Discharge: HOME | End: 2018-01-04
Payer: MEDICARE

## 2018-01-04 NOTE — FLOWSHEET NOTE
[] [facility]        Outpatient Physical                Therapy       [address]       Phone: [phone]       Fax: [phone] [x] [facility] at [address]       Phone: [phone]       Fax: [phone] [] [facility].  [address] [place] [address]      Phone: [phone]      Fax:  [phone]     Physical Therapy Cancel/No Show note    Date: 2018  Patient: Marylu Estrella  : 1960  MRN: [id number]    Cancels/No Shows to date: 3/0- updated total 18    For today's appointment patient:  [x]  Cancelled  []  Rescheduled appointment  []  No-show     Reason given by patient:  [x]  Patient ill  []  Conflicting appointment  []  No transportation    []  Conflict with work  []  No reason given  []  Weather related  []  Other:      Comments:  Pt will call to reschedule    []  Next appointment was confirmed    Electronically signed by: Roceal Toscano PTA

## 2018-02-21 ENCOUNTER — HOSPITAL ENCOUNTER (OUTPATIENT)
Dept: MRI IMAGING | Age: 58
Discharge: HOME | End: 2018-02-23
Payer: MEDICARE

## 2018-02-21 ENCOUNTER — HOSPITAL ENCOUNTER (OUTPATIENT)
Dept: MAMMOGRAPHY | Age: 58
Discharge: HOME | End: 2018-02-23
Payer: MEDICARE

## 2018-02-21 DIAGNOSIS — S86.112A RUPTURE OF LEFT POSTERIOR TIBIALIS TENDON, INITIAL ENCOUNTER: ICD-10-CM

## 2018-02-21 DIAGNOSIS — Z12.39 SCREENING BREAST EXAMINATION: ICD-10-CM

## 2018-02-21 PROCEDURE — 73721 MRI JNT OF LWR EXTRE W/O DYE: CPT

## 2018-02-21 PROCEDURE — 77063 BREAST TOMOSYNTHESIS BI: CPT

## 2018-03-01 ENCOUNTER — HOSPITAL ENCOUNTER (OUTPATIENT)
Dept: PHYSICAL THERAPY | Facility: CLINIC | Age: 58
Setting detail: THERAPIES SERIES
Discharge: HOME | End: 2018-03-01

## 2018-03-01 NOTE — DISCHARGE SUMMARY
standard walker and boot on, met without walker 11/28  4. ? Function: demonstrate up and down 3 steps with one railing  Independent with Home Exercise Programs, met 11/28   LTG: (to be met in 18 treatments)  1. Walk for 15 minutes without walker or boot. 2. No edema in left foot  3. Functional AROM of left ankle  4. Ability to go up and down 1 flight of steps reciprocal  5. Ability to stand for 15 minutes to cook, groom. 6. Resume driving.      Patient goals: ability to walk again, without a walker or medical boot    Treatment to Date:  [x] Therapeutic Exercise    [] Modalities:  [] Therapeutic Activity    [] Ultrasound  [] Electrical Stimulation  [x] Gait Training     [x] Massage       [] Lumbar/Cervical Traction  [] Neuromuscular Re-education [x] Cold/hotpack [] Iontophoresis: 4 mg/mL  [x] Instruction in Home Exercise Program                     Dexamethasone Sodium  [x] Manual Therapy             Phosphate 40-80 mAmin  [] Aquatic Therapy                   [x] Vasocompression/    [] Other:             Game Ready    Discharge Status:     [] Pt recovered from conditions. Treatment goals were met. [] Pt received maximum benefit. No further therapy indicated at this time. [] Pt to continue exercise/home instructions independently. [] Therapy interrupted due to:    [] Pt has 2 or more no shows/cancels, is discontinued per our policy. [] Pt has completed prescribed number of treatment sessions. [x] Other: did not schedule further appointments after cancelling        Electronically signed by Niharika Campos PT on 3/1/2018 at 10:43 AM      If you have any questions or concerns, please don't hesitate to call.   Thank you for your referral.

## 2018-03-05 ENCOUNTER — HOSPITAL ENCOUNTER (EMERGENCY)
Age: 58
Discharge: HOME | End: 2018-03-05
Attending: EMERGENCY MEDICINE
Payer: MEDICARE

## 2018-03-05 VITALS
DIASTOLIC BLOOD PRESSURE: 87 MMHG | TEMPERATURE: 98.9 F | SYSTOLIC BLOOD PRESSURE: 183 MMHG | HEIGHT: 64 IN | WEIGHT: 238 LBS | BODY MASS INDEX: 40.63 KG/M2 | RESPIRATION RATE: 18 BRPM | HEART RATE: 82 BPM | OXYGEN SATURATION: 96 %

## 2018-03-05 DIAGNOSIS — S86.112A POSTERIOR TIBIAL TENDON TEAR, TRAUMATIC, LEFT, INITIAL ENCOUNTER: ICD-10-CM

## 2018-03-05 DIAGNOSIS — R42 DIZZINESS: Primary | ICD-10-CM

## 2018-03-05 LAB
-: ABNORMAL
ABSOLUTE EOS #: 0.2 K/UL (ref 0–0.4)
ABSOLUTE IMMATURE GRANULOCYTE: ABNORMAL K/UL (ref 0–0.3)
ABSOLUTE LYMPH #: 2.4 K/UL (ref 1–4.8)
ABSOLUTE MONO #: 0.6 K/UL (ref 0.2–0.8)
AMORPHOUS: ABNORMAL
ANION GAP SERPL CALCULATED.3IONS-SCNC: 14 MMOL/L (ref 9–17)
BACTERIA: ABNORMAL
BASOPHILS # BLD: 0 % (ref 0–2)
BASOPHILS ABSOLUTE: 0 K/UL (ref 0–0.2)
BILIRUBIN URINE: NEGATIVE
BUN BLDV-MCNC: 13 MG/DL (ref 6–20)
BUN/CREAT BLD: 27 (ref 9–20)
CALCIUM SERPL-MCNC: 9.4 MG/DL (ref 8.6–10.4)
CASTS UA: ABNORMAL /LPF
CHLORIDE BLD-SCNC: 100 MMOL/L (ref 98–107)
CO2: 24 MMOL/L (ref 20–31)
COLOR: YELLOW
COMMENT UA: ABNORMAL
CREAT SERPL-MCNC: 0.49 MG/DL (ref 0.5–0.9)
CRYSTALS, UA: ABNORMAL /HPF
DIFFERENTIAL TYPE: ABNORMAL
EKG ATRIAL RATE: 74 BPM
EKG P AXIS: -9 DEGREES
EKG P-R INTERVAL: 132 MS
EKG Q-T INTERVAL: 388 MS
EKG QRS DURATION: 90 MS
EKG QTC CALCULATION (BAZETT): 430 MS
EKG R AXIS: -6 DEGREES
EKG T AXIS: 23 DEGREES
EKG VENTRICULAR RATE: 74 BPM
EOSINOPHILS RELATIVE PERCENT: 2 % (ref 1–4)
EPITHELIAL CELLS UA: ABNORMAL /HPF (ref 0–5)
GFR AFRICAN AMERICAN: >60 ML/MIN
GFR NON-AFRICAN AMERICAN: >60 ML/MIN
GFR SERPL CREATININE-BSD FRML MDRD: ABNORMAL ML/MIN/{1.73_M2}
GFR SERPL CREATININE-BSD FRML MDRD: ABNORMAL ML/MIN/{1.73_M2}
GLUCOSE BLD-MCNC: 86 MG/DL (ref 70–99)
GLUCOSE URINE: NEGATIVE
HCT VFR BLD CALC: 44.3 % (ref 36–46)
HEMOGLOBIN: 14.4 G/DL (ref 12–16)
IMMATURE GRANULOCYTES: ABNORMAL %
KETONES, URINE: ABNORMAL
LEUKOCYTE ESTERASE, URINE: NEGATIVE
LYMPHOCYTES # BLD: 22 % (ref 24–44)
MCH RBC QN AUTO: 27.4 PG (ref 26–34)
MCHC RBC AUTO-ENTMCNC: 32.5 G/DL (ref 31–37)
MCV RBC AUTO: 84.4 FL (ref 80–100)
MONOCYTES # BLD: 5 % (ref 1–7)
MUCUS: ABNORMAL
MYOGLOBIN: 35 NG/ML (ref 25–58)
NITRITE, URINE: NEGATIVE
NRBC AUTOMATED: ABNORMAL PER 100 WBC
OTHER OBSERVATIONS UA: ABNORMAL
PDW BLD-RTO: 15.6 % (ref 11.5–14.5)
PH UA: 5.5 (ref 5–8)
PLATELET # BLD: 335 K/UL (ref 130–400)
PLATELET ESTIMATE: ABNORMAL
PMV BLD AUTO: 7.7 FL (ref 6–12)
POTASSIUM SERPL-SCNC: 4.1 MMOL/L (ref 3.7–5.3)
PROTEIN UA: NEGATIVE
RBC # BLD: 5.25 M/UL (ref 4–5.2)
RBC # BLD: ABNORMAL 10*6/UL
RBC UA: ABNORMAL /HPF (ref 0–2)
RENAL EPITHELIAL, UA: ABNORMAL /HPF
SEG NEUTROPHILS: 71 % (ref 36–66)
SEGMENTED NEUTROPHILS ABSOLUTE COUNT: 7.7 K/UL (ref 1.8–7.7)
SODIUM BLD-SCNC: 138 MMOL/L (ref 135–144)
SPECIFIC GRAVITY UA: 1.02 (ref 1–1.03)
TRICHOMONAS: ABNORMAL
TROPONIN INTERP: NORMAL
TROPONIN T: <0.03 NG/ML
TURBIDITY: ABNORMAL
URINE HGB: ABNORMAL
UROBILINOGEN, URINE: NORMAL
WBC # BLD: 10.9 K/UL (ref 3.5–11)
WBC # BLD: ABNORMAL 10*3/UL
WBC UA: ABNORMAL /HPF (ref 0–5)
YEAST: ABNORMAL

## 2018-03-05 PROCEDURE — 84484 ASSAY OF TROPONIN QUANT: CPT

## 2018-03-05 PROCEDURE — 80048 BASIC METABOLIC PNL TOTAL CA: CPT

## 2018-03-05 PROCEDURE — 83874 ASSAY OF MYOGLOBIN: CPT

## 2018-03-05 PROCEDURE — 93005 ELECTROCARDIOGRAM TRACING: CPT

## 2018-03-05 PROCEDURE — 87086 URINE CULTURE/COLONY COUNT: CPT

## 2018-03-05 PROCEDURE — 85025 COMPLETE CBC W/AUTO DIFF WBC: CPT

## 2018-03-05 PROCEDURE — 81001 URINALYSIS AUTO W/SCOPE: CPT

## 2018-03-05 PROCEDURE — 2580000003 HC RX 258: Performed by: NURSE PRACTITIONER

## 2018-03-05 PROCEDURE — 99284 EMERGENCY DEPT VISIT MOD MDM: CPT

## 2018-03-05 RX ORDER — MECLIZINE HCL 12.5 MG/1
25 TABLET ORAL ONCE
Status: DISCONTINUED | OUTPATIENT
Start: 2018-03-05 | End: 2018-03-05 | Stop reason: HOSPADM

## 2018-03-05 RX ORDER — 0.9 % SODIUM CHLORIDE 0.9 %
1000 INTRAVENOUS SOLUTION INTRAVENOUS ONCE
Status: COMPLETED | OUTPATIENT
Start: 2018-03-05 | End: 2018-03-05

## 2018-03-05 RX ORDER — ACETAMINOPHEN AND CODEINE PHOSPHATE 300; 30 MG/1; MG/1
1 TABLET ORAL 3 TIMES DAILY PRN
Qty: 10 TABLET | Refills: 0 | Status: SHIPPED | OUTPATIENT
Start: 2018-03-05 | End: 2018-03-09

## 2018-03-05 RX ORDER — MECLIZINE HYDROCHLORIDE 25 MG/1
25 TABLET ORAL 3 TIMES DAILY PRN
Qty: 15 TABLET | Refills: 0 | Status: SHIPPED | OUTPATIENT
Start: 2018-03-05 | End: 2018-03-10

## 2018-03-05 RX ADMIN — SODIUM CHLORIDE 1000 ML: 9 INJECTION, SOLUTION INTRAVENOUS at 17:19

## 2018-03-05 ASSESSMENT — PAIN DESCRIPTION - LOCATION: LOCATION: FOOT

## 2018-03-05 ASSESSMENT — ENCOUNTER SYMPTOMS
VOMITING: 0
SHORTNESS OF BREATH: 0
ABDOMINAL PAIN: 0
NAUSEA: 0

## 2018-03-05 ASSESSMENT — PAIN DESCRIPTION - DESCRIPTORS: DESCRIPTORS: ACHING;THROBBING

## 2018-03-05 ASSESSMENT — PAIN DESCRIPTION - PAIN TYPE: TYPE: CHRONIC PAIN

## 2018-03-05 ASSESSMENT — PAIN DESCRIPTION - ORIENTATION: ORIENTATION: LEFT

## 2018-03-05 ASSESSMENT — PAIN SCALES - GENERAL: PAINLEVEL_OUTOF10: 10

## 2018-03-05 NOTE — ED PROVIDER NOTES
oropharynx is clear and moist. Mucous membranes are dry. Eyes: Conjunctivae, EOM and lids are normal. Pupils are equal, round, and reactive to light. Neck: Trachea normal, normal range of motion and full passive range of motion without pain. Neck supple. Cardiovascular: Normal rate, regular rhythm, S1 normal, S2 normal, normal heart sounds, intact distal pulses and normal pulses. Pulses:       Dorsalis pedis pulses are 2+ on the left side. Posterior tibial pulses are 2+ on the left side. Pulmonary/Chest: Effort normal and breath sounds normal.   Abdominal: Soft. Normal appearance and bowel sounds are normal. There is no tenderness. Musculoskeletal: Normal range of motion. Left ankle: She exhibits normal range of motion, no swelling, no ecchymosis, no deformity and normal pulse. Tenderness. Feet:    Patient exhibits normal range of motion with her left ankle. She exhibits pain palpation to the posterior aspect of the ankle. There is no ecchymosis or erythema. DP and PT pulses palpable. Neurological: She is alert and oriented to person, place, and time. She has normal strength and normal reflexes. No cranial nerve deficit or sensory deficit. Skin: Skin is warm, dry and intact. No ecchymosis noted. Psychiatric: She has a normal mood and affect.  Her speech is normal and behavior is normal. Judgment and thought content normal. Cognition and memory are normal.         DIAGNOSTIC RESULTS     EKG: All EKG's are interpreted by the Emergency Department Physician who either signs or Co-signs this chart in the absence of a cardiologist.    EKG interpreted by attending physician    RADIOLOGY:   Non-plain film images such as CT, Ultrasound and MRI are read by the radiologist. Plain radiographic images are visualized and preliminarily interpreted by the emergency physician with the below findings:    Mri Ankle Left Wo Contrast    Result Date: 2/21/2018  EXAMINATION: MRI OF THE LEFT ANKLE

## 2018-03-06 LAB
CULTURE: NORMAL
CULTURE: NORMAL
Lab: NORMAL
SPECIMEN DESCRIPTION: NORMAL
SPECIMEN DESCRIPTION: NORMAL
STATUS: NORMAL

## 2018-03-07 ENCOUNTER — OFFICE VISIT (OUTPATIENT)
Dept: PODIATRY | Age: 58
End: 2018-03-07
Payer: MEDICARE

## 2018-03-07 VITALS — WEIGHT: 230 LBS | BODY MASS INDEX: 46.37 KG/M2 | HEIGHT: 59 IN

## 2018-03-07 DIAGNOSIS — R26.2 DIFFICULTY WALKING: ICD-10-CM

## 2018-03-07 DIAGNOSIS — M66.872 TIBIALIS POSTERIOR TENDON TEAR, NONTRAUMATIC, LEFT: Primary | ICD-10-CM

## 2018-03-07 DIAGNOSIS — B35.1 DERMATOPHYTOSIS OF NAIL: ICD-10-CM

## 2018-03-07 DIAGNOSIS — M79.662 PAIN OF LEFT LOWER LEG: ICD-10-CM

## 2018-03-07 DIAGNOSIS — R60.0 EDEMA OF LOWER EXTREMITY: ICD-10-CM

## 2018-03-07 PROCEDURE — 1036F TOBACCO NON-USER: CPT | Performed by: PODIATRIST

## 2018-03-07 PROCEDURE — 3014F SCREEN MAMMO DOC REV: CPT | Performed by: PODIATRIST

## 2018-03-07 PROCEDURE — G8484 FLU IMMUNIZE NO ADMIN: HCPCS | Performed by: PODIATRIST

## 2018-03-07 PROCEDURE — 3017F COLORECTAL CA SCREEN DOC REV: CPT | Performed by: PODIATRIST

## 2018-03-07 PROCEDURE — 99213 OFFICE O/P EST LOW 20 MIN: CPT | Performed by: PODIATRIST

## 2018-03-07 PROCEDURE — G8417 CALC BMI ABV UP PARAM F/U: HCPCS | Performed by: PODIATRIST

## 2018-03-07 PROCEDURE — G8427 DOCREV CUR MEDS BY ELIG CLIN: HCPCS | Performed by: PODIATRIST

## 2018-03-21 ENCOUNTER — OFFICE VISIT (OUTPATIENT)
Dept: PODIATRY | Age: 58
End: 2018-03-21
Payer: MEDICARE

## 2018-03-21 VITALS — HEIGHT: 59 IN | HEART RATE: 68 BPM | RESPIRATION RATE: 16 BRPM | BODY MASS INDEX: 46.37 KG/M2 | WEIGHT: 230 LBS

## 2018-03-21 DIAGNOSIS — M79.662 PAIN OF LEFT LOWER LEG: ICD-10-CM

## 2018-03-21 DIAGNOSIS — M66.872 TIBIALIS POSTERIOR TENDON TEAR, NONTRAUMATIC, LEFT: Primary | ICD-10-CM

## 2018-03-21 DIAGNOSIS — R60.0 EDEMA OF LOWER EXTREMITY: ICD-10-CM

## 2018-03-21 DIAGNOSIS — R26.2 DIFFICULTY WALKING: ICD-10-CM

## 2018-03-21 PROCEDURE — 3014F SCREEN MAMMO DOC REV: CPT | Performed by: PODIATRIST

## 2018-03-21 PROCEDURE — 99213 OFFICE O/P EST LOW 20 MIN: CPT | Performed by: PODIATRIST

## 2018-03-21 PROCEDURE — G8417 CALC BMI ABV UP PARAM F/U: HCPCS | Performed by: PODIATRIST

## 2018-03-21 PROCEDURE — G8427 DOCREV CUR MEDS BY ELIG CLIN: HCPCS | Performed by: PODIATRIST

## 2018-03-21 PROCEDURE — 1036F TOBACCO NON-USER: CPT | Performed by: PODIATRIST

## 2018-03-21 PROCEDURE — 3017F COLORECTAL CA SCREEN DOC REV: CPT | Performed by: PODIATRIST

## 2018-03-21 PROCEDURE — G8484 FLU IMMUNIZE NO ADMIN: HCPCS | Performed by: PODIATRIST

## 2018-03-21 NOTE — PROGRESS NOTES
[facility] Podiatry  Return Patient     Saskia Costa 56 y.o. female that presents for follow-up of   Chief Complaint   Patient presents with    Foot Injury    Foot Pain       Symptoms began several month(s) ago and are unchanged . Patient relates pain is Present. Pain is rated 8 out of 10 and is described as constant, moderate, severe. Treatments prior to today's visit include: previously podiatry treatment. She has h/o prior primary repair of PT tendon, left foot. She admits to falling again with repeat MRI indicating new tear in PT tendon. Currently denies F/C/N/V. Allergies   Allergen Reactions    Latex     Asa [Aspirin]     Motrin [Ibuprofen]     Pcn [Penicillins]        Past Medical History:   Diagnosis Date    Asthma     Chest pain     occurs every other day, pt states they occur when she doesn't eat right or forget to take her bp med, occurs left side of chest with no radiation, pt states \"it's new\" and has not had any testing done or seen anyone for it.  Constipation     History of kidney stones     Hypertension     Obesity, morbid, BMI 40.0-49.9 ([facility] [address] 2/28/2017    Sleep apnea     does not use a machine       Prior to Admission medications    Medication Sig Start Date End Date Taking?  Authorizing Provider   neomycin-polymyxin-hydrocortisone (CORTISPORIN) 3.5-04542-9 otic suspension instill 4 drops into affected ear  (S) three times a day 8/17/17  Yes Historical Provider, MD   acetaminophen-codeine (TYLENOL #3) 300-30 MG per tablet take 1 tablet by mouth every 12 hours 8/17/17  Yes Historical Provider, MD   ACETAMINOPHEN-CODEINE #3 PO Take 1 tablet by mouth every 12 hours OUT   Yes Historical Provider, MD   loratadine (CLARITIN) 10 MG tablet Take 10 mg by mouth daily 2/22/17  Yes Historical Provider, MD   fluticasone (FLONASE) 50 MCG/ACT nasal spray 1 spray by Nasal route daily   Yes Historical Provider, MD   QVAR 80 MCG/ACT inhaler Inhale 2 puffs into the lungs as needed

## 2018-04-24 DIAGNOSIS — M66.872 TIBIALIS POSTERIOR TENDON TEAR, NONTRAUMATIC, LEFT: Primary | ICD-10-CM

## 2018-04-24 RX ORDER — WHEELCHAIR
1 EACH MISCELLANEOUS ONCE
Qty: 1 EACH | Refills: 0 | Status: SHIPPED | OUTPATIENT
Start: 2018-04-24 | End: 2020-12-03

## 2018-04-27 ENCOUNTER — HOSPITAL ENCOUNTER (OUTPATIENT)
Dept: PREADMISSION TESTING | Age: 58
Discharge: HOME | End: 2018-05-01
Payer: MEDICARE

## 2018-04-27 VITALS
HEIGHT: 59 IN | WEIGHT: 239.64 LBS | TEMPERATURE: 97.9 F | BODY MASS INDEX: 48.31 KG/M2 | DIASTOLIC BLOOD PRESSURE: 86 MMHG | HEART RATE: 66 BPM | RESPIRATION RATE: 18 BRPM | SYSTOLIC BLOOD PRESSURE: 176 MMHG

## 2018-04-27 LAB
ABSOLUTE EOS #: 0.4 K/UL (ref 0–0.4)
ABSOLUTE IMMATURE GRANULOCYTE: ABNORMAL K/UL (ref 0–0.3)
ABSOLUTE LYMPH #: 2.2 K/UL (ref 1–4.8)
ABSOLUTE MONO #: 0.4 K/UL (ref 0.2–0.8)
ANION GAP SERPL CALCULATED.3IONS-SCNC: 9 MMOL/L (ref 9–17)
BASOPHILS # BLD: 1 % (ref 0–2)
BASOPHILS ABSOLUTE: 0 K/UL (ref 0–0.2)
BUN BLDV-MCNC: 14 MG/DL (ref 6–20)
CHLORIDE BLD-SCNC: 102 MMOL/L (ref 98–107)
CO2: 30 MMOL/L (ref 20–31)
CREAT SERPL-MCNC: 0.53 MG/DL (ref 0.5–0.9)
DIFFERENTIAL TYPE: ABNORMAL
EOSINOPHILS RELATIVE PERCENT: 6 % (ref 1–4)
GFR AFRICAN AMERICAN: >60 ML/MIN
GFR NON-AFRICAN AMERICAN: >60 ML/MIN
GFR SERPL CREATININE-BSD FRML MDRD: NORMAL ML/MIN/{1.73_M2}
GFR SERPL CREATININE-BSD FRML MDRD: NORMAL ML/MIN/{1.73_M2}
HCT VFR BLD CALC: 43.7 % (ref 36–46)
HEMOGLOBIN: 14.4 G/DL (ref 12–16)
IMMATURE GRANULOCYTES: ABNORMAL %
LYMPHOCYTES # BLD: 32 % (ref 24–44)
MCH RBC QN AUTO: 27.7 PG (ref 26–34)
MCHC RBC AUTO-ENTMCNC: 32.9 G/DL (ref 31–37)
MCV RBC AUTO: 84.2 FL (ref 80–100)
MONOCYTES # BLD: 6 % (ref 1–7)
NRBC AUTOMATED: ABNORMAL PER 100 WBC
PDW BLD-RTO: 15.9 % (ref 11.5–14.5)
PLATELET # BLD: 336 K/UL (ref 130–400)
PLATELET ESTIMATE: ABNORMAL
PMV BLD AUTO: 7.3 FL (ref 6–12)
POTASSIUM SERPL-SCNC: 4.5 MMOL/L (ref 3.7–5.3)
RBC # BLD: 5.19 M/UL (ref 4–5.2)
RBC # BLD: ABNORMAL 10*6/UL
SEG NEUTROPHILS: 55 % (ref 36–66)
SEGMENTED NEUTROPHILS ABSOLUTE COUNT: 4 K/UL (ref 1.8–7.7)
SODIUM BLD-SCNC: 141 MMOL/L (ref 135–144)
WBC # BLD: 7.1 K/UL (ref 3.5–11)
WBC # BLD: ABNORMAL 10*3/UL

## 2018-04-27 PROCEDURE — 85025 COMPLETE CBC W/AUTO DIFF WBC: CPT

## 2018-04-27 PROCEDURE — 82565 ASSAY OF CREATININE: CPT

## 2018-04-27 PROCEDURE — 36415 COLL VENOUS BLD VENIPUNCTURE: CPT

## 2018-04-27 PROCEDURE — 80051 ELECTROLYTE PANEL: CPT

## 2018-04-27 PROCEDURE — 84520 ASSAY OF UREA NITROGEN: CPT

## 2018-04-27 RX ORDER — MECLIZINE HYDROCHLORIDE 25 MG/1
25 TABLET ORAL 3 TIMES DAILY PRN
Status: ON HOLD | COMMUNITY
End: 2018-06-29 | Stop reason: HOSPADM

## 2018-04-27 ASSESSMENT — PAIN SCALES - GENERAL: PAINLEVEL_OUTOF10: 10

## 2018-04-27 ASSESSMENT — PAIN DESCRIPTION - DESCRIPTORS: DESCRIPTORS: CONSTANT;THROBBING;SHARP

## 2018-04-27 ASSESSMENT — PAIN DESCRIPTION - PAIN TYPE: TYPE: CHRONIC PAIN

## 2018-04-27 ASSESSMENT — PAIN DESCRIPTION - LOCATION: LOCATION: LEG

## 2018-04-27 ASSESSMENT — PAIN DESCRIPTION - PROGRESSION: CLINICAL_PROGRESSION: NOT CHANGED

## 2018-04-27 ASSESSMENT — PAIN DESCRIPTION - ORIENTATION: ORIENTATION: LEFT

## 2018-04-27 ASSESSMENT — PAIN DESCRIPTION - FREQUENCY: FREQUENCY: CONTINUOUS

## 2018-04-27 ASSESSMENT — PAIN DESCRIPTION - ONSET: ONSET: ON-GOING

## 2018-05-09 ENCOUNTER — OFFICE VISIT (OUTPATIENT)
Dept: PODIATRY | Age: 58
End: 2018-05-09
Payer: MEDICARE

## 2018-05-09 VITALS — BODY MASS INDEX: 46.37 KG/M2 | HEIGHT: 59 IN | WEIGHT: 230 LBS

## 2018-05-09 DIAGNOSIS — L30.9 DERMATITIS OF LEFT FOOT: ICD-10-CM

## 2018-05-09 DIAGNOSIS — S90.812A ABRASION OF LEFT FOOT, INITIAL ENCOUNTER: Primary | ICD-10-CM

## 2018-05-09 PROCEDURE — 3017F COLORECTAL CA SCREEN DOC REV: CPT | Performed by: PODIATRIST

## 2018-05-09 PROCEDURE — 1036F TOBACCO NON-USER: CPT | Performed by: PODIATRIST

## 2018-05-09 PROCEDURE — G8417 CALC BMI ABV UP PARAM F/U: HCPCS | Performed by: PODIATRIST

## 2018-05-09 PROCEDURE — G8427 DOCREV CUR MEDS BY ELIG CLIN: HCPCS | Performed by: PODIATRIST

## 2018-05-09 PROCEDURE — 99213 OFFICE O/P EST LOW 20 MIN: CPT | Performed by: PODIATRIST

## 2018-05-09 RX ORDER — EPINEPHRINE 0.3 MG/.3ML
INJECTION SUBCUTANEOUS
Refills: 0 | COMMUNITY
Start: 2018-04-13 | End: 2019-05-07 | Stop reason: SDUPTHER

## 2018-05-24 ENCOUNTER — OFFICE VISIT (OUTPATIENT)
Dept: PODIATRY | Age: 58
End: 2018-05-24
Payer: MEDICARE

## 2018-05-24 VITALS — BODY MASS INDEX: 46.37 KG/M2 | WEIGHT: 230 LBS | HEIGHT: 59 IN

## 2018-05-24 DIAGNOSIS — S86.112A POSTERIOR TIBIAL TENDON TEAR, TRAUMATIC, LEFT, INITIAL ENCOUNTER: Primary | ICD-10-CM

## 2018-05-24 PROCEDURE — 99213 OFFICE O/P EST LOW 20 MIN: CPT | Performed by: PODIATRIST

## 2018-05-24 PROCEDURE — G8427 DOCREV CUR MEDS BY ELIG CLIN: HCPCS | Performed by: PODIATRIST

## 2018-05-24 PROCEDURE — 3017F COLORECTAL CA SCREEN DOC REV: CPT | Performed by: PODIATRIST

## 2018-05-24 PROCEDURE — 1036F TOBACCO NON-USER: CPT | Performed by: PODIATRIST

## 2018-05-24 PROCEDURE — G8417 CALC BMI ABV UP PARAM F/U: HCPCS | Performed by: PODIATRIST

## 2018-05-30 DIAGNOSIS — S86.112A POSTERIOR TIBIAL TENDON TEAR, TRAUMATIC, LEFT, INITIAL ENCOUNTER: Primary | ICD-10-CM

## 2018-06-28 ENCOUNTER — ANESTHESIA EVENT (OUTPATIENT)
Dept: OPERATING ROOM | Age: 58
End: 2018-06-28
Payer: MEDICARE

## 2018-06-29 ENCOUNTER — ANESTHESIA (OUTPATIENT)
Dept: OPERATING ROOM | Age: 58
End: 2018-06-29
Payer: MEDICARE

## 2018-06-29 ENCOUNTER — HOSPITAL ENCOUNTER (OUTPATIENT)
Age: 58
Setting detail: OUTPATIENT SURGERY
Discharge: HOME | End: 2018-06-29
Attending: PODIATRIST | Admitting: PODIATRIST
Payer: MEDICARE

## 2018-06-29 VITALS
SYSTOLIC BLOOD PRESSURE: 112 MMHG | HEART RATE: 88 BPM | OXYGEN SATURATION: 94 % | DIASTOLIC BLOOD PRESSURE: 74 MMHG | TEMPERATURE: 97.9 F | HEIGHT: 59 IN | WEIGHT: 230 LBS | BODY MASS INDEX: 46.37 KG/M2 | RESPIRATION RATE: 18 BRPM

## 2018-06-29 VITALS — TEMPERATURE: 98.1 F | OXYGEN SATURATION: 98 % | DIASTOLIC BLOOD PRESSURE: 65 MMHG | SYSTOLIC BLOOD PRESSURE: 126 MMHG

## 2018-06-29 DIAGNOSIS — Z98.890 STATUS POST LEFT FOOT SURGERY: ICD-10-CM

## 2018-06-29 DIAGNOSIS — G89.18 POSTOPERATIVE PAIN: Primary | ICD-10-CM

## 2018-06-29 LAB
ANION GAP SERPL CALCULATED.3IONS-SCNC: 13 MMOL/L (ref 9–17)
BUN BLDV-MCNC: 14 MG/DL (ref 6–20)
CHLORIDE BLD-SCNC: 101 MMOL/L (ref 98–107)
CO2: 26 MMOL/L (ref 20–31)
CREAT SERPL-MCNC: 0.53 MG/DL (ref 0.5–0.9)
GFR AFRICAN AMERICAN: >60 ML/MIN
GFR NON-AFRICAN AMERICAN: >60 ML/MIN
GFR SERPL CREATININE-BSD FRML MDRD: NORMAL ML/MIN/{1.73_M2}
GFR SERPL CREATININE-BSD FRML MDRD: NORMAL ML/MIN/{1.73_M2}
HCT VFR BLD CALC: 42.4 % (ref 36–46)
HEMOGLOBIN: 13.8 G/DL (ref 12–16)
MCH RBC QN AUTO: 27.6 PG (ref 26–34)
MCHC RBC AUTO-ENTMCNC: 32.5 G/DL (ref 31–37)
MCV RBC AUTO: 85.2 FL (ref 80–100)
NRBC AUTOMATED: ABNORMAL PER 100 WBC
PDW BLD-RTO: 16 % (ref 11.5–14.5)
PLATELET # BLD: 337 K/UL (ref 130–400)
PMV BLD AUTO: 7.4 FL (ref 6–12)
POTASSIUM SERPL-SCNC: 3.8 MMOL/L (ref 3.7–5.3)
RBC # BLD: 4.98 M/UL (ref 4–5.2)
SODIUM BLD-SCNC: 140 MMOL/L (ref 135–144)
WBC # BLD: 9.5 K/UL (ref 3.5–11)

## 2018-06-29 PROCEDURE — 6360000002 HC RX W HCPCS: Performed by: ANESTHESIOLOGY

## 2018-06-29 PROCEDURE — 2500000003 HC RX 250 WO HCPCS: Performed by: NURSE ANESTHETIST, CERTIFIED REGISTERED

## 2018-06-29 PROCEDURE — C9290 INJ, BUPIVACAINE LIPOSOME: HCPCS | Performed by: PODIATRIST

## 2018-06-29 PROCEDURE — 84520 ASSAY OF UREA NITROGEN: CPT

## 2018-06-29 PROCEDURE — 27691 REVISE LOWER LEG TENDON: CPT | Performed by: PODIATRIST

## 2018-06-29 PROCEDURE — 27658 REPAIR OF LEG TENDON EACH: CPT | Performed by: PODIATRIST

## 2018-06-29 PROCEDURE — A6454 SELF-ADHER BAND W>=3" <5"/YD: HCPCS | Performed by: PODIATRIST

## 2018-06-29 PROCEDURE — 2500000003 HC RX 250 WO HCPCS: Performed by: PODIATRIST

## 2018-06-29 PROCEDURE — 3700000001 HC ADD 15 MINUTES (ANESTHESIA): Performed by: PODIATRIST

## 2018-06-29 PROCEDURE — 82565 ASSAY OF CREATININE: CPT

## 2018-06-29 PROCEDURE — 85027 COMPLETE CBC AUTOMATED: CPT

## 2018-06-29 PROCEDURE — 6360000002 HC RX W HCPCS

## 2018-06-29 PROCEDURE — 7100000001 HC PACU RECOVERY - ADDTL 15 MIN: Performed by: PODIATRIST

## 2018-06-29 PROCEDURE — 3700000000 HC ANESTHESIA ATTENDED CARE: Performed by: PODIATRIST

## 2018-06-29 PROCEDURE — 2720000010 HC SURG SUPPLY STERILE: Performed by: PODIATRIST

## 2018-06-29 PROCEDURE — 6360000002 HC RX W HCPCS: Performed by: PODIATRIST

## 2018-06-29 PROCEDURE — 6360000002 HC RX W HCPCS: Performed by: NURSE ANESTHETIST, CERTIFIED REGISTERED

## 2018-06-29 PROCEDURE — 3600000002 HC SURGERY LEVEL 2 BASE: Performed by: PODIATRIST

## 2018-06-29 PROCEDURE — 6370000000 HC RX 637 (ALT 250 FOR IP): Performed by: NURSE ANESTHETIST, CERTIFIED REGISTERED

## 2018-06-29 PROCEDURE — 7100000010 HC PHASE II RECOVERY - FIRST 15 MIN: Performed by: PODIATRIST

## 2018-06-29 PROCEDURE — 7100000000 HC PACU RECOVERY - FIRST 15 MIN: Performed by: PODIATRIST

## 2018-06-29 PROCEDURE — 2500000003 HC RX 250 WO HCPCS

## 2018-06-29 PROCEDURE — 2500000003 HC RX 250 WO HCPCS: Performed by: ANESTHESIOLOGY

## 2018-06-29 PROCEDURE — C1713 ANCHOR/SCREW BN/BN,TIS/BN: HCPCS | Performed by: PODIATRIST

## 2018-06-29 PROCEDURE — 2580000003 HC RX 258: Performed by: NURSE ANESTHETIST, CERTIFIED REGISTERED

## 2018-06-29 PROCEDURE — 76942 ECHO GUIDE FOR BIOPSY: CPT | Performed by: ANESTHESIOLOGY

## 2018-06-29 PROCEDURE — 2580000003 HC RX 258: Performed by: ANESTHESIOLOGY

## 2018-06-29 PROCEDURE — 7100000011 HC PHASE II RECOVERY - ADDTL 15 MIN: Performed by: PODIATRIST

## 2018-06-29 PROCEDURE — 3600000012 HC SURGERY LEVEL 2 ADDTL 15MIN: Performed by: PODIATRIST

## 2018-06-29 PROCEDURE — 80051 ELECTROLYTE PANEL: CPT

## 2018-06-29 DEVICE — ANCHOR SUT L19.1MM DIA5.5MM BIOCOMPOSITE FULL THRD KNOTLESS: Type: IMPLANTABLE DEVICE | Site: FOOT | Status: FUNCTIONAL

## 2018-06-29 DEVICE — IMPLANTABLE DEVICE: Type: IMPLANTABLE DEVICE | Site: FOOT | Status: FUNCTIONAL

## 2018-06-29 RX ORDER — HYDROMORPHONE HCL 110MG/55ML
0.25 PATIENT CONTROLLED ANALGESIA SYRINGE INTRAVENOUS EVERY 5 MIN PRN
Status: DISCONTINUED | OUTPATIENT
Start: 2018-06-29 | End: 2018-06-29 | Stop reason: HOSPADM

## 2018-06-29 RX ORDER — SODIUM CHLORIDE 0.9 % (FLUSH) 0.9 %
10 SYRINGE (ML) INJECTION PRN
Status: CANCELLED | OUTPATIENT
Start: 2018-06-29

## 2018-06-29 RX ORDER — LIDOCAINE HYDROCHLORIDE 20 MG/ML
INJECTION, SOLUTION EPIDURAL; INFILTRATION; INTRACAUDAL; PERINEURAL PRN
Status: DISCONTINUED | OUTPATIENT
Start: 2018-06-29 | End: 2018-06-29 | Stop reason: SDUPTHER

## 2018-06-29 RX ORDER — MIDAZOLAM HYDROCHLORIDE 1 MG/ML
2 INJECTION INTRAMUSCULAR; INTRAVENOUS ONCE
Status: COMPLETED | OUTPATIENT
Start: 2018-06-29 | End: 2018-06-29

## 2018-06-29 RX ORDER — SCOLOPAMINE TRANSDERMAL SYSTEM 1 MG/1
PATCH, EXTENDED RELEASE TRANSDERMAL PRN
Status: DISCONTINUED | OUTPATIENT
Start: 2018-06-29 | End: 2018-06-29 | Stop reason: SDUPTHER

## 2018-06-29 RX ORDER — SCOLOPAMINE TRANSDERMAL SYSTEM 1 MG/1
1 PATCH, EXTENDED RELEASE TRANSDERMAL
Status: DISCONTINUED | OUTPATIENT
Start: 2018-06-29 | End: 2018-06-29 | Stop reason: HOSPADM

## 2018-06-29 RX ORDER — ONDANSETRON 2 MG/ML
4 INJECTION INTRAMUSCULAR; INTRAVENOUS
Status: DISCONTINUED | OUTPATIENT
Start: 2018-06-29 | End: 2018-06-29 | Stop reason: HOSPADM

## 2018-06-29 RX ORDER — LIDOCAINE HYDROCHLORIDE 10 MG/ML
1 INJECTION, SOLUTION EPIDURAL; INFILTRATION; INTRACAUDAL; PERINEURAL
Status: DISCONTINUED | OUTPATIENT
Start: 2018-06-29 | End: 2018-06-29 | Stop reason: HOSPADM

## 2018-06-29 RX ORDER — SODIUM CHLORIDE 9 MG/ML
INJECTION, SOLUTION INTRAVENOUS CONTINUOUS
Status: DISCONTINUED | OUTPATIENT
Start: 2018-06-29 | End: 2018-06-29 | Stop reason: HOSPADM

## 2018-06-29 RX ORDER — ALBUTEROL SULFATE 2.5 MG/3ML
SOLUTION RESPIRATORY (INHALATION)
Status: COMPLETED
Start: 2018-06-29 | End: 2018-06-29

## 2018-06-29 RX ORDER — DOXYCYCLINE HYCLATE 100 MG
100 TABLET ORAL 2 TIMES DAILY
Qty: 14 TABLET | Refills: 0 | Status: SHIPPED | OUTPATIENT
Start: 2018-06-29 | End: 2018-07-06

## 2018-06-29 RX ORDER — FENTANYL CITRATE 50 UG/ML
INJECTION, SOLUTION INTRAMUSCULAR; INTRAVENOUS PRN
Status: DISCONTINUED | OUTPATIENT
Start: 2018-06-29 | End: 2018-06-29 | Stop reason: SDUPTHER

## 2018-06-29 RX ORDER — ONDANSETRON 2 MG/ML
INJECTION INTRAMUSCULAR; INTRAVENOUS PRN
Status: DISCONTINUED | OUTPATIENT
Start: 2018-06-29 | End: 2018-06-29 | Stop reason: SDUPTHER

## 2018-06-29 RX ORDER — SUCCINYLCHOLINE CHLORIDE 20 MG/ML
INJECTION INTRAMUSCULAR; INTRAVENOUS PRN
Status: DISCONTINUED | OUTPATIENT
Start: 2018-06-29 | End: 2018-06-29 | Stop reason: SDUPTHER

## 2018-06-29 RX ORDER — LIDOCAINE HYDROCHLORIDE 10 MG/ML
INJECTION, SOLUTION INFILTRATION; PERINEURAL PRN
Status: DISCONTINUED | OUTPATIENT
Start: 2018-06-29 | End: 2018-06-29 | Stop reason: SDUPTHER

## 2018-06-29 RX ORDER — ALBUTEROL SULFATE 2.5 MG/3ML
2.5 SOLUTION RESPIRATORY (INHALATION) ONCE
Status: COMPLETED | OUTPATIENT
Start: 2018-06-29 | End: 2018-06-29

## 2018-06-29 RX ORDER — ROPIVACAINE HYDROCHLORIDE 5 MG/ML
INJECTION, SOLUTION EPIDURAL; INFILTRATION; PERINEURAL PRN
Status: DISCONTINUED | OUTPATIENT
Start: 2018-06-29 | End: 2018-06-29 | Stop reason: SDUPTHER

## 2018-06-29 RX ORDER — SODIUM CHLORIDE 0.9 % (FLUSH) 0.9 %
10 SYRINGE (ML) INJECTION EVERY 12 HOURS SCHEDULED
Status: DISCONTINUED | OUTPATIENT
Start: 2018-06-29 | End: 2018-06-29 | Stop reason: HOSPADM

## 2018-06-29 RX ORDER — PROMETHAZINE HYDROCHLORIDE 25 MG/ML
6.25 INJECTION, SOLUTION INTRAMUSCULAR; INTRAVENOUS
Status: DISCONTINUED | OUTPATIENT
Start: 2018-06-29 | End: 2018-06-29 | Stop reason: HOSPADM

## 2018-06-29 RX ORDER — ALBUTEROL SULFATE 90 UG/1
AEROSOL, METERED RESPIRATORY (INHALATION) PRN
Status: DISCONTINUED | OUTPATIENT
Start: 2018-06-29 | End: 2018-06-29 | Stop reason: SDUPTHER

## 2018-06-29 RX ORDER — DEXAMETHASONE SODIUM PHOSPHATE 10 MG/ML
INJECTION INTRAMUSCULAR; INTRAVENOUS PRN
Status: DISCONTINUED | OUTPATIENT
Start: 2018-06-29 | End: 2018-06-29 | Stop reason: SDUPTHER

## 2018-06-29 RX ORDER — SODIUM CHLORIDE 0.9 % (FLUSH) 0.9 %
10 SYRINGE (ML) INJECTION EVERY 12 HOURS SCHEDULED
Status: CANCELLED | OUTPATIENT
Start: 2018-06-29

## 2018-06-29 RX ORDER — SODIUM CHLORIDE, SODIUM LACTATE, POTASSIUM CHLORIDE, CALCIUM CHLORIDE 600; 310; 30; 20 MG/100ML; MG/100ML; MG/100ML; MG/100ML
INJECTION, SOLUTION INTRAVENOUS CONTINUOUS PRN
Status: DISCONTINUED | OUTPATIENT
Start: 2018-06-29 | End: 2018-06-29 | Stop reason: SDUPTHER

## 2018-06-29 RX ORDER — SODIUM CHLORIDE, SODIUM LACTATE, POTASSIUM CHLORIDE, CALCIUM CHLORIDE 600; 310; 30; 20 MG/100ML; MG/100ML; MG/100ML; MG/100ML
INJECTION, SOLUTION INTRAVENOUS CONTINUOUS
Status: DISCONTINUED | OUTPATIENT
Start: 2018-06-29 | End: 2018-06-29 | Stop reason: HOSPADM

## 2018-06-29 RX ORDER — HYDROCODONE BITARTRATE AND ACETAMINOPHEN 5; 325 MG/1; MG/1
1 TABLET ORAL EVERY 6 HOURS PRN
Qty: 28 TABLET | Refills: 0 | Status: SHIPPED | OUTPATIENT
Start: 2018-06-29 | End: 2018-07-06

## 2018-06-29 RX ORDER — SODIUM CHLORIDE 0.9 % (FLUSH) 0.9 %
10 SYRINGE (ML) INJECTION PRN
Status: DISCONTINUED | OUTPATIENT
Start: 2018-06-29 | End: 2018-06-29 | Stop reason: HOSPADM

## 2018-06-29 RX ORDER — MIDAZOLAM HYDROCHLORIDE 1 MG/ML
INJECTION INTRAMUSCULAR; INTRAVENOUS PRN
Status: DISCONTINUED | OUTPATIENT
Start: 2018-06-29 | End: 2018-06-29 | Stop reason: SDUPTHER

## 2018-06-29 RX ORDER — EPHEDRINE SULFATE/0.9% NACL/PF 10MG/ML(1)
SYRINGE (ML) INTRAVENOUS PRN
Status: DISCONTINUED | OUTPATIENT
Start: 2018-06-29 | End: 2018-06-29 | Stop reason: SDUPTHER

## 2018-06-29 RX ORDER — FENTANYL CITRATE 50 UG/ML
25 INJECTION, SOLUTION INTRAMUSCULAR; INTRAVENOUS EVERY 5 MIN PRN
Status: DISCONTINUED | OUTPATIENT
Start: 2018-06-29 | End: 2018-06-29 | Stop reason: HOSPADM

## 2018-06-29 RX ORDER — CLINDAMYCIN PHOSPHATE 900 MG/50ML
900 INJECTION INTRAVENOUS ONCE
Status: COMPLETED | OUTPATIENT
Start: 2018-06-29 | End: 2018-06-29

## 2018-06-29 RX ORDER — HYDROMORPHONE HCL 110MG/55ML
0.5 PATIENT CONTROLLED ANALGESIA SYRINGE INTRAVENOUS EVERY 5 MIN PRN
Status: DISCONTINUED | OUTPATIENT
Start: 2018-06-29 | End: 2018-06-29 | Stop reason: HOSPADM

## 2018-06-29 RX ORDER — FENTANYL CITRATE 50 UG/ML
50 INJECTION, SOLUTION INTRAMUSCULAR; INTRAVENOUS EVERY 5 MIN PRN
Status: DISCONTINUED | OUTPATIENT
Start: 2018-06-29 | End: 2018-06-29 | Stop reason: HOSPADM

## 2018-06-29 RX ORDER — PROPOFOL 10 MG/ML
INJECTION, EMULSION INTRAVENOUS PRN
Status: DISCONTINUED | OUTPATIENT
Start: 2018-06-29 | End: 2018-06-29 | Stop reason: SDUPTHER

## 2018-06-29 RX ADMIN — SODIUM CHLORIDE, POTASSIUM CHLORIDE, SODIUM LACTATE AND CALCIUM CHLORIDE: 600; 310; 30; 20 INJECTION, SOLUTION INTRAVENOUS at 09:23

## 2018-06-29 RX ADMIN — LIDOCAINE HYDROCHLORIDE 3 ML: 10 INJECTION, SOLUTION INFILTRATION; PERINEURAL at 07:20

## 2018-06-29 RX ADMIN — SODIUM CHLORIDE, POTASSIUM CHLORIDE, SODIUM LACTATE AND CALCIUM CHLORIDE: 600; 310; 30; 20 INJECTION, SOLUTION INTRAVENOUS at 07:47

## 2018-06-29 RX ADMIN — Medication 2 PUFF: at 08:15

## 2018-06-29 RX ADMIN — CLINDAMYCIN PHOSPHATE 900 MG: 18 INJECTION, SOLUTION INTRAMUSCULAR; INTRAVENOUS at 08:00

## 2018-06-29 RX ADMIN — Medication 500 ML: at 10:28

## 2018-06-29 RX ADMIN — SCOPALAMINE 1 PATCH: 1 PATCH, EXTENDED RELEASE TRANSDERMAL at 08:05

## 2018-06-29 RX ADMIN — PHENYLEPHRINE HYDROCHLORIDE 200 MCG: 10 INJECTION INTRAVENOUS at 08:36

## 2018-06-29 RX ADMIN — ROPIVACAINE HYDROCHLORIDE 40 ML: 5 INJECTION, SOLUTION EPIDURAL; INFILTRATION; PERINEURAL at 07:20

## 2018-06-29 RX ADMIN — DEXAMETHASONE SODIUM PHOSPHATE 10 MG: 10 INJECTION INTRAMUSCULAR; INTRAVENOUS at 08:03

## 2018-06-29 RX ADMIN — PHENYLEPHRINE HYDROCHLORIDE 100 MCG: 10 INJECTION INTRAVENOUS at 08:45

## 2018-06-29 RX ADMIN — MIDAZOLAM HYDROCHLORIDE 2 MG: 1 INJECTION, SOLUTION INTRAMUSCULAR; INTRAVENOUS at 07:47

## 2018-06-29 RX ADMIN — ALBUTEROL SULFATE 2.5 MG: 2.5 SOLUTION RESPIRATORY (INHALATION) at 10:55

## 2018-06-29 RX ADMIN — ROPIVACAINE HYDROCHLORIDE 20 ML: 5 INJECTION, SOLUTION EPIDURAL; INFILTRATION; PERINEURAL at 07:25

## 2018-06-29 RX ADMIN — FENTANYL CITRATE 100 MCG: 50 INJECTION, SOLUTION INTRAMUSCULAR; INTRAVENOUS at 07:49

## 2018-06-29 RX ADMIN — PHENYLEPHRINE HYDROCHLORIDE 100 MCG: 10 INJECTION INTRAVENOUS at 08:33

## 2018-06-29 RX ADMIN — ONDANSETRON 4 MG: 2 INJECTION, SOLUTION INTRAMUSCULAR; INTRAVENOUS at 08:03

## 2018-06-29 RX ADMIN — LIDOCAINE HYDROCHLORIDE 80 MG: 20 INJECTION, SOLUTION EPIDURAL; INFILTRATION; INTRACAUDAL; PERINEURAL at 07:49

## 2018-06-29 RX ADMIN — PROPOFOL 150 MG: 10 INJECTION, EMULSION INTRAVENOUS at 07:49

## 2018-06-29 RX ADMIN — Medication 100 MG: at 07:49

## 2018-06-29 RX ADMIN — Medication 2 PUFF: at 08:16

## 2018-06-29 RX ADMIN — MIDAZOLAM HYDROCHLORIDE 2 MG: 1 INJECTION, SOLUTION INTRAMUSCULAR; INTRAVENOUS at 07:09

## 2018-06-29 RX ADMIN — SODIUM CHLORIDE, POTASSIUM CHLORIDE, SODIUM LACTATE AND CALCIUM CHLORIDE: 600; 310; 30; 20 INJECTION, SOLUTION INTRAVENOUS at 06:49

## 2018-06-29 RX ADMIN — LIDOCAINE HYDROCHLORIDE 2 ML: 10 INJECTION, SOLUTION INFILTRATION; PERINEURAL at 07:25

## 2018-06-29 RX ADMIN — PHENYLEPHRINE HYDROCHLORIDE 200 MCG: 10 INJECTION INTRAVENOUS at 08:10

## 2018-06-29 RX ADMIN — PHENYLEPHRINE HYDROCHLORIDE 200 MCG: 10 INJECTION INTRAVENOUS at 08:25

## 2018-06-29 RX ADMIN — Medication 10 MG: at 08:54

## 2018-06-29 ASSESSMENT — PAIN DESCRIPTION - PAIN TYPE: TYPE: ACUTE PAIN

## 2018-06-29 ASSESSMENT — PULMONARY FUNCTION TESTS
PIF_VALUE: 34
PIF_VALUE: 2
PIF_VALUE: 29
PIF_VALUE: 30
PIF_VALUE: 8
PIF_VALUE: 0
PIF_VALUE: 30
PIF_VALUE: 17
PIF_VALUE: 2
PIF_VALUE: 2
PIF_VALUE: 29
PIF_VALUE: 28
PIF_VALUE: 31
PIF_VALUE: 30
PIF_VALUE: 31
PIF_VALUE: 29
PIF_VALUE: 30
PIF_VALUE: 31
PIF_VALUE: 31
PIF_VALUE: 30
PIF_VALUE: 28
PIF_VALUE: 17
PIF_VALUE: 2
PIF_VALUE: 30
PIF_VALUE: 29
PIF_VALUE: 28
PIF_VALUE: 3
PIF_VALUE: 31
PIF_VALUE: 30
PIF_VALUE: 33
PIF_VALUE: 30
PIF_VALUE: 32
PIF_VALUE: 30
PIF_VALUE: 31
PIF_VALUE: 31
PIF_VALUE: 1
PIF_VALUE: 1
PIF_VALUE: 30
PIF_VALUE: 29
PIF_VALUE: 30
PIF_VALUE: 29
PIF_VALUE: 30
PIF_VALUE: 30
PIF_VALUE: 4
PIF_VALUE: 28
PIF_VALUE: 30
PIF_VALUE: 30
PIF_VALUE: 0
PIF_VALUE: 31
PIF_VALUE: 30
PIF_VALUE: 29
PIF_VALUE: 28
PIF_VALUE: 28
PIF_VALUE: 30
PIF_VALUE: 31
PIF_VALUE: 30
PIF_VALUE: 29
PIF_VALUE: 0
PIF_VALUE: 4
PIF_VALUE: 1
PIF_VALUE: 1
PIF_VALUE: 30
PIF_VALUE: 31
PIF_VALUE: 29
PIF_VALUE: 28
PIF_VALUE: 29
PIF_VALUE: 30
PIF_VALUE: 2
PIF_VALUE: 29
PIF_VALUE: 1
PIF_VALUE: 14
PIF_VALUE: 13
PIF_VALUE: 31
PIF_VALUE: 2
PIF_VALUE: 32
PIF_VALUE: 29
PIF_VALUE: 28
PIF_VALUE: 29
PIF_VALUE: 13
PIF_VALUE: 28
PIF_VALUE: 31
PIF_VALUE: 28
PIF_VALUE: 31
PIF_VALUE: 12
PIF_VALUE: 31
PIF_VALUE: 29
PIF_VALUE: 30
PIF_VALUE: 38
PIF_VALUE: 29
PIF_VALUE: 29
PIF_VALUE: 14
PIF_VALUE: 29
PIF_VALUE: 32
PIF_VALUE: 2
PIF_VALUE: 29
PIF_VALUE: 28
PIF_VALUE: 30
PIF_VALUE: 29
PIF_VALUE: 46
PIF_VALUE: 30
PIF_VALUE: 29
PIF_VALUE: 11
PIF_VALUE: 29
PIF_VALUE: 28
PIF_VALUE: 17
PIF_VALUE: 28
PIF_VALUE: 9
PIF_VALUE: 13
PIF_VALUE: 30
PIF_VALUE: 17
PIF_VALUE: 30
PIF_VALUE: 29
PIF_VALUE: 3
PIF_VALUE: 21
PIF_VALUE: 29
PIF_VALUE: 18
PIF_VALUE: 31
PIF_VALUE: 1
PIF_VALUE: 34
PIF_VALUE: 32
PIF_VALUE: 30
PIF_VALUE: 29
PIF_VALUE: 28
PIF_VALUE: 30
PIF_VALUE: 2
PIF_VALUE: 30
PIF_VALUE: 28
PIF_VALUE: 29
PIF_VALUE: 29
PIF_VALUE: 30
PIF_VALUE: 12
PIF_VALUE: 12
PIF_VALUE: 30
PIF_VALUE: 24
PIF_VALUE: 17
PIF_VALUE: 22
PIF_VALUE: 30
PIF_VALUE: 31
PIF_VALUE: 11
PIF_VALUE: 1
PIF_VALUE: 2
PIF_VALUE: 29
PIF_VALUE: 30
PIF_VALUE: 28
PIF_VALUE: 9
PIF_VALUE: 18
PIF_VALUE: 2
PIF_VALUE: 29

## 2018-06-29 ASSESSMENT — PAIN DESCRIPTION - LOCATION: LOCATION: FOOT;LEG

## 2018-06-29 ASSESSMENT — PAIN SCALES - GENERAL
PAINLEVEL_OUTOF10: 9
PAINLEVEL_OUTOF10: 0

## 2018-06-29 ASSESSMENT — PAIN DESCRIPTION - ORIENTATION: ORIENTATION: LEFT

## 2018-06-29 ASSESSMENT — PAIN DESCRIPTION - FREQUENCY: FREQUENCY: CONTINUOUS

## 2018-06-29 NOTE — PROGRESS NOTES
Pt states she feels like she needs a breathing treatment. Dr. Alena Williamson at bedside, lungs ausculted, clear, negative for wheezing. Order for albuterol nebulizer once. Please see new orders     [phone] Notified Dr. Alena Williamson of O2 sats. Okay to discharge. Patient is to wear cpap tonight and is to continue use of incentive spirometry at home.

## 2018-06-29 NOTE — BRIEF OP NOTE
PODIATRY BRIEF OP NOTE    PATIENT NAME: Kyara Gibbs  YOB: 1960  -  56 y.o. female  MRN: [id number]  DATE: 6/29/2018    BILLING #:[id number]      SURGEON: Ruchi James DPM    ASSISTANTS: TY Kong    PRE-OP DIAGNOSIS: 1) Posterior tibial tendon rupture, left leg. 2) Flexible pes plano valgus, left foot. POST-OP DIAGNOSIS: Same as above. PROCEDURE: 1) Posterior tibial tendon repair, left leg. 2) Flexor digitorum longus tendon transfer, left. ANESTHESIA: General. Popliteal block per anesthesia. HEMOSTASIS: Pneumatic thigh tourniquet @ 300 mmHg for 110 minutes. ESTIMATED BLOOD LOSS: Less than 10 cc. MATERIALS: 1 Arthrex tendon anchor 20 mm, 2-0 pds, 4-0 monocryl, 3-0 prolene, cast materials. INJECTABLES: 10 cc of exparel. SPECIMEN: None. COMPLICATIONS: FDL tendon short. FINDINGS: Posterior tibial tendon partial tears proximal and distal in tendon. Thickened posterior tibial tendon.        Rene Roach DPM  6/29/18, 7:06 AM

## 2018-07-05 ENCOUNTER — OFFICE VISIT (OUTPATIENT)
Dept: PODIATRY | Age: 58
End: 2018-07-05

## 2018-07-05 VITALS — BODY MASS INDEX: 46.37 KG/M2 | WEIGHT: 230 LBS | HEIGHT: 59 IN

## 2018-07-05 DIAGNOSIS — Z98.890 POST-OPERATIVE STATE: Primary | ICD-10-CM

## 2018-07-05 PROBLEM — F32.A DEPRESSIVE DISORDER: Status: ACTIVE | Noted: 2018-07-05

## 2018-07-05 PROBLEM — E55.9 VITAMIN D DEFICIENCY: Status: ACTIVE | Noted: 2018-07-05

## 2018-07-05 PROBLEM — S86.119A RUPTURE OF POSTERIOR TIBIALIS TENDON: Status: ACTIVE | Noted: 2017-08-17

## 2018-07-05 PROBLEM — Z91.018 FOOD ALLERGY: Status: ACTIVE | Noted: 2018-07-05

## 2018-07-05 PROBLEM — F41.9 ANXIETY: Status: ACTIVE | Noted: 2018-07-05

## 2018-07-05 PROBLEM — K59.00 CONSTIPATION: Status: ACTIVE | Noted: 2018-07-05

## 2018-07-05 PROBLEM — J45.909 REACTIVE AIRWAY DISEASE: Status: ACTIVE | Noted: 2018-07-05

## 2018-07-05 PROCEDURE — 99024 POSTOP FOLLOW-UP VISIT: CPT | Performed by: PODIATRIST

## 2018-07-12 ENCOUNTER — OFFICE VISIT (OUTPATIENT)
Dept: PODIATRY | Age: 58
End: 2018-07-12

## 2018-07-12 VITALS — BODY MASS INDEX: 42.33 KG/M2 | HEIGHT: 62 IN | HEART RATE: 68 BPM | WEIGHT: 230 LBS | RESPIRATION RATE: 16 BRPM

## 2018-07-12 DIAGNOSIS — Z98.890 POST-OPERATIVE STATE: Primary | ICD-10-CM

## 2018-07-12 PROCEDURE — 99024 POSTOP FOLLOW-UP VISIT: CPT | Performed by: PODIATRIST

## 2018-07-26 ENCOUNTER — OFFICE VISIT (OUTPATIENT)
Dept: PODIATRY | Age: 58
End: 2018-07-26

## 2018-07-26 VITALS — HEART RATE: 72 BPM | HEIGHT: 59 IN | BODY MASS INDEX: 46.37 KG/M2 | WEIGHT: 230 LBS

## 2018-07-26 DIAGNOSIS — Z98.890 POST-OPERATIVE STATE: Primary | ICD-10-CM

## 2018-07-26 PROCEDURE — 99024 POSTOP FOLLOW-UP VISIT: CPT | Performed by: PODIATRIST

## 2018-08-21 ENCOUNTER — OFFICE VISIT (OUTPATIENT)
Dept: PODIATRY | Age: 58
End: 2018-08-21

## 2018-08-21 VITALS — HEIGHT: 59 IN | BODY MASS INDEX: 46.37 KG/M2 | RESPIRATION RATE: 16 BRPM | WEIGHT: 230 LBS

## 2018-08-21 DIAGNOSIS — Z98.890 POST-OPERATIVE STATE: Primary | ICD-10-CM

## 2018-08-21 PROCEDURE — 99024 POSTOP FOLLOW-UP VISIT: CPT | Performed by: PODIATRIST

## 2018-08-27 ENCOUNTER — HOSPITAL ENCOUNTER (OUTPATIENT)
Dept: PHYSICAL THERAPY | Facility: CLINIC | Age: 58
Setting detail: THERAPIES SERIES
Discharge: HOME | End: 2018-08-27
Payer: MEDICARE

## 2018-08-27 PROCEDURE — 97163 PT EVAL HIGH COMPLEX 45 MIN: CPT

## 2018-08-27 PROCEDURE — 97110 THERAPEUTIC EXERCISES: CPT

## 2018-08-27 NOTE — CONSULTS
surgery fell 5x at home. Home situation - Using wheel chair, potty chair, recliner. Has nurses aides 3 hours a day for 5 days a week. Trying to get some help on the weekends. Bathroom situation - sponge baths sitting on potty chair. Shower is upstairs - unable to get to it. Trying to get a shower chair for later. Front entrance , 5 steps into home, sitting on bottom to scoot up and crawls into other stairs then transfers to the wheelchair. Comes to PT in a wheelchair. Has a wheeled walker at home. Has a van service for transportation with a lift. (home aides wed till 2)    PMHx: [] Unremarkable [] Diabetes [x] HTN  [] Pacemaker   [] MI/Heart Problems [] Cancer [x] Arthritis [x] Other:asthma, circulation issues              [x] Refer to full medical chart  In EPIC   Tests: [x] X-Ray: [x] MRI:  [] Other:     Medications: [x] Refer to full medical record [] None [] Other:  Allergies:      [x] Refer to full medical record [] None [] Other:    Function:  Hand Dominance  [x] Right  [] Left  Working:     [x]  Disability        Pain:  [x] Yes  [] No Location: \"shooting\" up medial side of left ankle, awakes her at night Pain Rating: (0-10 scale) 9/10  Pain altered Tx:  [x] Yes  [] No  Action:  Symptoms:  [] Improving [] Worsening [] Same  Better:  [x] AM    [] PM    [x] Sit    [] Rise/Sit    []Stand    [] Walk    [] Lying    [] Other:  Worse: [] AM    [] PM    [] Sit    [] Rise/Sit    [x]Stand    [x] Walk    [] Lying    [] Bend                               Sleep: [] OK    [x] Disturbed- sleeping in recliner, unable to sleep in a bed, hasn't slept in a bed in ~ 29 years.     Objective:    ROM  ° A/P STRENGTH    Left Right Left Right   Hip Flex wfl wfl -4 5   Ext       ER       IR       ABD       ADD       Knee Flex wfl wfl -4 5   Ext wfl wfl -4 5   Ankle DF  wfl -2 5   PF  wfl -2 5   INV 0  1 4   EVER 0  1 4          Active flexion and extension of toes ~ 5 degrees of motion  Resting posture of left ankle in 27 degrees plantarflexion   From there, AROM dorsiflexion to 16 degree lag from neutral, PROM to +5 past neutral   Plantarflexion AROM, 2 degrees, PROM = + 14 degrees from resting posture   Inversion/eversion no active ROM           OBSERVATION No Deficit Deficit Not Tested Comments   Posture       Forward Head [] [x] []    Rounded Shoulders [] [x] []    Kyphosis [] [x] []    Lordosis [] [] [x]    Lateral Shift [] [] []    Scoliosis [] [] [x]    Genu Valgus [] [] [x]    Genu Varus [] [] [x]    Genu Recurvatum [] [] [x]    Pronation [] [] [x]    Supination [] [] [x]    Leg Length Discrp [x] [] []    Slumped Sitting [] [x] []    Palpation [] [x] []    Sensation [] [x] [] Numbness and tingling all toes, heel, feet   Edema [] [x] []    Neurological [x] [] []    Patellar Mobility [x] [] []    Patellar Orientation [x] [] []    Gait [] [x] [] Analysis:stand to pivot transfers wheel chair to chair   Transfers wheelchair to chair, able to stand and pivot with all weight on right leg if has UE assistance pushing/holding on  Incision - no redness or signs of infection, multiple areas with black eschar, healing over   Wearing a slip on compression sleeve - minimal edema in the foot and toes, wearing sleeve consistently which is helping keep edema to a minimum. Some edema proximal to the sleeve. Has not worn a shoe since surgery. Wearing boot that allows no active movement of left ankle. FUNCTION Normal Difficult Unable   Sitting [x] [] []   Standing [] [x] [x]   Ambulation [] [x] [x]   Groom/Dress [] [x] [x]nurse aide assistance   Lift/Carry [] [] [x]   Stairs [] [] [x]   Bending [] [] [x]   Squat [] [] [x]   Kneel [] [] [x]     BALANCE/PROPRIOCEPTION              [x] Not tested   Single leg stance       R                     L                                PAIN   Eyes open                             Sec. Sec                  . []    Eyes closed                          Sec.                   Sec foot on water bottle, glut squeezes, hip adduction pillow squeezes  Method of Education: [x] Verbal  [x] Demo  [x] Written  Comprehension of Education:  [] Verbalizes understanding. [] Demonstrates understanding. [x] Needs Review. [] Demonstrates/verbalizes understanding of HEP/Ed previously given. Treatment Plan:  [x] Therapeutic Exercise    [] Modalities:  [] Dry Needling  [] Therapeutic Activity     [] Ultrasound  [] Electrical Stimulation  [x] Gait Training     [x] Massage       [] Lumbar/Cervical Traction  [] Neuromuscular Re-education [x] Cold/hotpack [] Iontophoresis: 4 mg/mL  [x] Instruction in HEP             Dexamethasone Sodium  [] Manual Therapy             Phosphate 40-80 mAmin  [] Aquatic Therapy  [x] Vasocompression/    [] Other:       Game Ready    Frequency:  1 x/week for 20 visits (pt choice to start 1x per week due to having to crawl in and out of the 5 steps to her home, may increase frequency as able and patient desires)    Todays Treatment:  Modalities: gentle distal to proximal massage to left foot (avoided incision area)  Precautions:pt very guarded medial left ankle, tender to light tough  Exercises:  Exercise Reps/ Time Weight/ Level Comments   Sitting LAQ, then Active Dorsiflexion, flex knee, repeat 10     Sitting heel raises 10x     Sitting toe raises 10x     Rolling foot on water bottle   HEP   Glut squeezes sitting 10x     Sitting hip add pillow squeeze 10x                       Other:Introduced patient to the Alter G, explained the purpose, process for getting on, etc to help answer her questions    Specific Instructions for next treatment:Review HEP, may try some gentle weight bearing using parallel bars, Alter G as appropriate  Educate on fall prevention and issue handout. Does she have a Lifeline, call system if she falls at home. ??  Review    Evaluation Complexity:  History (Personal factors, comorbidities) [] 0 [] 1-2 [x] 3+   Exam (limitations, restrictions) [] 1-2 [] 3

## 2018-09-05 ENCOUNTER — HOSPITAL ENCOUNTER (OUTPATIENT)
Dept: PHYSICAL THERAPY | Facility: CLINIC | Age: 58
Setting detail: THERAPIES SERIES
Discharge: HOME | End: 2018-09-05
Payer: MEDICARE

## 2018-09-05 PROCEDURE — 97110 THERAPEUTIC EXERCISES: CPT

## 2018-09-05 NOTE — FLOWSHEET NOTE
[] Enzo [place]       Outpatient Physical        Therapy       [address]       Phone: [phone]       Fax: [phone] [x] [facility] at [address] [facility]       Phone: [phone]       Fax: [phone] [] [facility]  [address] [facility]   Phone: [phone]   Fax:  [phone]     Physical Therapy Daily Treatment Note    Date:  2018  Patient Name:  Kyara Gibbs    :  1960  MRN: [id number]  Physician: Dr. Cardona Countess: Corinna Melendrez (30 max visits per year)  Medical Diagnosis: left posterior tibial tendon repair with harvest of the flexor digitorum longus tendon                                      Rehab Codes: M25.475, M25.572, M25.675, R26.2, M62.562, R20.2  Onset date: 2018 most recent surgery            Next 's appt. : pt reports she has not made follow up appointment yet  Visit# / total visits:  Cancels/No Shows:0/0  Administer fall prevention handout next session    Subjective:    Pain:  [x] Yes  [] No Location: Medial L ankle and foot Pain Rating: (0-10 scale) 9/10  Pain altered Tx:  [x] No  [] Yes  Action:  Comments: Patient using wheelchair today and wearing boot on L ankle. Patient reports that she has not put any weight through LLE since surgery.      Objective:   Modalities: NOT TODAY: gentle distal to proximal massage to left foot (avoided incision area)  Precautions: pt very guarded medial left ankle, tender to light tough; WBAT  Exercises:  Exercise Reps/ Time Weight/ Level Comments   SEATED      Sitting LAQ, then active DF, knee flexion 10x       Heel raises 10x       Toe raises 10x       Rolling foot on water bottle     HEP   Glut squeezes 10x       Hip adduction  10\" x10 Ball Added 9/5   Hip abduction  15x  YTB \"   Inversion/eversion towel wipes  20x   \"            STANDING         Standing weight shifts Able to wear a shoe on left foot  Minimal edema in left foot and ankle. Fully closed incision left ankle     LTG: (to be met in 20 treatments)  1. 4/10 maximum pain in left ankle  2. Functional AROM of left ankle. 3. Ability to discontinue use of wheelchair in her home  4. Ability to go up and down 1 flight of steps, 1 rail to get up stairs to her bedroom. 5. Ability to walk 10 minutes with walker safely in clinic  6. Resume driving  7. Independent with dressing. 8.     Patient goals:Able to walk    Pt. Education:  [x] Yes  [] No  [x] Reviewed Prior HEP/Ed  Method of Education: [x] Verbal  [] Demo  [] Written  Comprehension of Education:  [x] Verbalizes understanding. [x] Demonstrates understanding. [] Needs review. [] Demonstrates/verbalizes HEP/Ed previously given. Plan: [x] Continue per plan of care.    [] Other:      Time In:  3:30 pm          Time Out: 4:30 pm    Electronically signed by:  Jeanine Tomlinson, PT

## 2018-09-12 ENCOUNTER — HOSPITAL ENCOUNTER (OUTPATIENT)
Dept: PHYSICAL THERAPY | Facility: CLINIC | Age: 58
Setting detail: THERAPIES SERIES
Discharge: HOME | End: 2018-09-12
Payer: MEDICARE

## 2018-09-12 PROCEDURE — 97016 VASOPNEUMATIC DEVICE THERAPY: CPT

## 2018-09-12 PROCEDURE — 97110 THERAPEUTIC EXERCISES: CPT

## 2018-09-12 NOTE — FLOWSHEET NOTE
[] Jared Corbin       Outpatient Physical        Therapy       [address]       Phone: [phone]       Fax: [phone] [x] [facility] at [address]       Phone: [phone]       Fax: [phone] [] [facility]. [address] [place] [address] [facility]  [address] [place] [address]   Phone: [phone]   Fax:  [phone]     Physical Therapy Daily Treatment Note    Date:  2018  Patient Name:  Rashaad Angel    :  1960  MRN: [id number]  Physician: Dr. Matthews Skipper: Cristin Peterson (30 max visits per year)  Medical Diagnosis: left posterior tibial tendon repair with harvest of the flexor digitorum longus tendon                                      Rehab Codes: M25.475, M25.572, M25.675, R26.2, M62.562, R20.2  Onset date: 2018 most recent surgery            Next 's appt. : pt reports she has not made follow up appointment yet  Visit# / total visits: 3/20 Cancels/No Shows:0/0  Administer fall prevention handout next session**    Subjective:    Pain:  [x] Yes  [] No Location: Medial L ankle and foot Pain Rating: (0-10 scale) 7/10  Pain altered Tx:  [x] No  [] Yes  Action:  Comments: Patient using wheelchair today and wearing boot on L ankle. Patient reports that she went up and down basement stairs without using AD with patient wearing boot. Patient also reporting persistent tingling in all toes on L foot.      Objective:   Modalities: NOT TODAY: gentle distal to proximal massage to left foot (avoided incision area)  Precautions: pt very guarded medial left ankle, tender to light tough; WBAT  Exercises:  Exercise Reps/ Time Weight/ Level Comments   SEATED      Sitting LAQ, then active DF, knee flexion 10x       Heel raises 10x       Toe raises 10x       Rolling foot on water bottle     HEP, not today    Glut squeezes 10x       Hip adduction  10\" x10 Ball Added    Hip abduction  15x  RTB Advanced resistance 9/12   Inversion/eversion towel wipes  20x   \"   Towel scrunches  20x  Added 9/12            STANDING         Standing weight shifts in walker 20x ea  Performed with shoe on 9/12         ALTER G   Initiated 9/5; size 4XL pants, requires min of 2 people to get patient in and out of Alter G; performed with shoe on 9/12   Standing weight shifts 15x  35% BW   Hamstring curls 10 x2 ea  35% BW   Toe raises 15x  35% BW   Heel raises 20x   35% BW   Walking 10 mins  35% BW for first 3 minutes then decreased to 30% BW due to increased L ankle pain, 0.3 mph at 0% incline; patient WB 15-30% through LLE with ambulation    Decreased to 20% WB in attempts to facilitate greater WB through LLE but patient only putting 15-25% BW through limb during last two minutes         Other      Specific Instructions for next treatment::Review HEP, may try some gentle weight bearing using parallel bars, Alter G as appropriate. Educate on fall prevention and issue handout. Does she have a [employer], call system if she falls at home. ?? Review       Treatment Charges: Mins Units   []  Modalities     [x]  Ther Exercise 60 4   []  Manual Therapy     []  Ther Activities     []  Aquatics     []  Vasocompression 10 1   []  Other     Total Treatment time 70 5       Assessment: [x] Progressing toward goals. Patient demonstrated moderate tolerance to all exercises today. Patient brought L tennis shoe to session today so therapist assisted patient in donning shoe for standing and Alter G exercises. Educated patient that she is not ready for independent stair negotiation at home and highly advised her to allow home health aide to perform laundry in basement due to significant fall risk with patient demonstrating moderate understanding.  Patient able to perform standing Alter G exercises and ambulation training with 5% increase in WB in comparison to previous session, but patient continues to only put 20-30% of BW through LLE. Continue to progress ankle strengthening, standing, and Alter G exercises as tolerated. Added towel scrunches and inversion/eversion towel wipes to HEP. [] No change. [] Other:    STG: (to be met in 10 treatments)  1. ? Pain: to 6/10 maximum  2. ? ROM: AROM left ankle dorsiflexion to neutral, plantarflexion + 30 degrees from neutral  3. ? Function: Gait with walker, 300 ft in clinic safely  4. Independent with Home Exercise Programs  5. Initiate gait training with Alter G due to state of debility. 6. Demonstrate Knowledge of fall prevention  7. Able to wear a shoe on left foot  Minimal edema in left foot and ankle. Fully closed incision left ankle     LTG: (to be met in 20 treatments)  1. 4/10 maximum pain in left ankle  2. Functional AROM of left ankle. 3. Ability to discontinue use of wheelchair in her home  4. Ability to go up and down 1 flight of steps, 1 rail to get up stairs to her bedroom. 5. Ability to walk 10 minutes with walker safely in clinic  6. Resume driving  7. Independent with dressing. 8.     Patient goals:Able to walk    Pt. Education:  [x] Yes  [] No  [x] Reviewed Prior HEP/Ed  Method of Education: [x] Verbal  [x] Demo  [x] Written   9/12: Towel scrunches and inversion/eversion towel wipes  Comprehension of Education:  [x] Verbalizes understanding. [x] Demonstrates understanding. [x] Needs review. [] Demonstrates/verbalizes HEP/Ed previously given. Plan: [x] Continue per plan of care.    [] Other:      Time In:  3:30 pm          Time Out: 4:45 pm    Electronically signed by:  Jo Ann Xie PT

## 2018-09-19 ENCOUNTER — HOSPITAL ENCOUNTER (OUTPATIENT)
Dept: PHYSICAL THERAPY | Facility: CLINIC | Age: 58
Setting detail: THERAPIES SERIES
Discharge: HOME | End: 2018-09-19
Payer: MEDICARE

## 2018-09-19 PROCEDURE — 97110 THERAPEUTIC EXERCISES: CPT

## 2018-09-19 NOTE — FLOWSHEET NOTE
DF, knee flexion 10x       Heel raises 10x       Toe raises 10x       Rolling foot on water bottle     HEP, not today    Glut squeezes 10x       Hip adduction  10\" x10 Ball Added 9/5   Hip abduction  15x  RTB Advanced resistance 9/12   Inversion/eversion towel wipes  20x   \"   Towel scrunches  20x  Added 9/12            STANDING         Standing weight shifts in parallel bars 20x ea  Performed with shoe on 9/12  Performed with boot on 9/19         ALTER G   Initiated 9/5; size 4XL pants, requires min of 2 people to get patient in and out of [facility]; performed with shoe on 9/12   Standing weight shifts 2x10  40% BW; 50% BW for second set   Hamstring curls 10 x2 ea  50% BW   Toe raises 15x  50% BW   Heel raises 20x   50% BW   Walking 10 mins  Between 40-45% BW throughout ambulation, 0.3 mph at 0% incline; patient WB 15-30% through LLE with ambulation despite verbal/visual cues to improve       Other      Specific Instructions for next treatment[de-identified] Review HEP with addition of weight shifts, ankle DF/PF stretching. Consider desensitization treatment - light touch to ankle, progressing to towel (rougher touch), etc       Treatment Charges: Mins Units   []  Modalities     [x]  Ther Exercise 60 4   []  Manual Therapy     []  Ther Activities     []  Aquatics     []  Vasocompression     []  Other     Total: 60 4       Assessment: [x] Progressing toward goals. Pt with very limited active DF/PF AROM, may benefit from manual stretching/mobs to improve ROM required for normalized gait pattern. Pt with improving tolerance to WB in [facility] - able to progress to 50% BW support this date as compared to 35% previously. By end of session, pt demonstrating reduced pain tolerance to weightbearing and walking - VC's for increased R step length resulted in pt attempting to terminate exercise.  Pt demonstrates reduced weightbearing tolerance through LLE after 15 minutes in [facility], but is observed descending from [facility] using LLE as stance limb without complaint of pain. May consider setting weekly goals for pt to address psychosocial limiting factors in  attempt to progress more quickly (ex. 70% weightbearing next week, 90% weightbearing following week, 10 forward steps in //bars next week, etc). [] No change. [] Other:    STG: (to be met in 10 treatments)  1. ? Pain: to 6/10 maximum  2. ? ROM: AROM left ankle dorsiflexion to neutral, plantarflexion + 30 degrees from neutral  3. ? Function: Gait with walker, 300 ft in clinic safely  4. Independent with Home Exercise Programs  5. Initiate gait training with Alter G due to state of debility. 6. Demonstrate Knowledge of fall prevention  7. Able to wear a shoe on left foot  Minimal edema in left foot and ankle. Fully closed incision left ankle     LTG: (to be met in 20 treatments)  1. 4/10 maximum pain in left ankle  2. Functional AROM of left ankle. 3. Ability to discontinue use of wheelchair in her home  4. Ability to go up and down 1 flight of steps, 1 rail to get up stairs to her bedroom. 5. Ability to walk 10 minutes with walker safely in clinic  6. Resume driving  7. Independent with dressing. 8.     Patient goals:Able to walk    Pt. Education:  [x] Yes  [] No  [x] Reviewed Prior HEP/Ed  Method of Education: [x] Verbal  [x] Demo  [x] Written   9/12: Towel scrunches and inversion/eversion towel wipes  Comprehension of Education:  [x] Verbalizes understanding. [x] Demonstrates understanding. [x] Needs review. [] Demonstrates/verbalizes HEP/Ed previously given. Plan: [x] Continue per plan of care.    [] Other:      Time In:  3:30 pm          Time Out: 4:30 pm    Electronically signed by:  Jade Millan PT

## 2018-09-26 ENCOUNTER — HOSPITAL ENCOUNTER (OUTPATIENT)
Dept: PHYSICAL THERAPY | Facility: CLINIC | Age: 58
Setting detail: THERAPIES SERIES
Discharge: HOME | End: 2018-09-26
Payer: MEDICARE

## 2018-09-26 PROCEDURE — 97016 VASOPNEUMATIC DEVICE THERAPY: CPT

## 2018-09-26 PROCEDURE — 97110 THERAPEUTIC EXERCISES: CPT

## 2018-09-26 PROCEDURE — 97140 MANUAL THERAPY 1/> REGIONS: CPT

## 2018-09-26 NOTE — FLOWSHEET NOTE
Assessment: [x] Progressing toward goals. Patient demonstrated improved tolerance to therapeutic exercises at this date. Initiated AP talocrural glides to assist with pain control and facilitate improved DF/PF AROM with patient demonstrating good tolerance and improved range after performing. Patient tolerated progression to 60% BW for standing exercises and 55% BW with ambulation. Patient continues to demonstrate greater weightbearing through RLE with Alter G ambulation, but does achieve almost full weight-bearing through LLE when in stance phase. Patient also demonstrated ability to perform 40 ft ambulation in clinic with 2-wheeled walker and CGA with minimal complaints of pain with moderate L antalgic gait. Continue with AP talocrural mobilizations as indicated and progress WB activities next session. [] No change. [] Other:    STG: (to be met in 10 treatments)  1. ? Pain: to 6/10 maximum  2. ? ROM: AROM left ankle dorsiflexion to neutral, plantarflexion + 30 degrees from neutral  3. ? Function: Gait with walker, 300 ft in clinic safely  4. Independent with Home Exercise Programs  5. Initiate gait training with Alter G due to state of debility. 6. Demonstrate Knowledge of fall prevention   7. Able to wear a shoe on left foot  Minimal edema in left foot and ankle. Fully closed incision left ankle     LTG: (to be met in 20 treatments)  1. 4/10 maximum pain in left ankle  2. Functional AROM of left ankle. 3. Ability to discontinue use of wheelchair in her home  4. Ability to go up and down 1 flight of steps, 1 rail to get up stairs to her bedroom. 5. Ability to walk 10 minutes with walker safely in clinic  6. Resume driving  7. Independent with dressing. 8.     Patient goals:Able to walk    Pt. Education:  [x] Yes  [] No  [x] Reviewed Prior HEP/Ed  Method of Education: [x] Verbal  [x] Demo  [] Written   9/12:  Towel scrunches and inversion/eversion towel wipes  9/26: Standing AP and lateral

## 2018-10-03 ENCOUNTER — HOSPITAL ENCOUNTER (OUTPATIENT)
Dept: PHYSICAL THERAPY | Facility: CLINIC | Age: 58
Setting detail: THERAPIES SERIES
Discharge: HOME | End: 2018-10-03
Payer: MEDICARE

## 2018-10-03 PROCEDURE — 97016 VASOPNEUMATIC DEVICE THERAPY: CPT

## 2018-10-03 PROCEDURE — 97140 MANUAL THERAPY 1/> REGIONS: CPT

## 2018-10-03 PROCEDURE — 97110 THERAPEUTIC EXERCISES: CPT

## 2018-10-10 ENCOUNTER — HOSPITAL ENCOUNTER (OUTPATIENT)
Dept: PHYSICAL THERAPY | Facility: CLINIC | Age: 58
Setting detail: THERAPIES SERIES
Discharge: HOME | End: 2018-10-10
Payer: MEDICARE

## 2018-10-10 PROCEDURE — 97110 THERAPEUTIC EXERCISES: CPT

## 2018-10-10 PROCEDURE — 97016 VASOPNEUMATIC DEVICE THERAPY: CPT

## 2018-10-10 PROCEDURE — 97140 MANUAL THERAPY 1/> REGIONS: CPT

## 2018-10-10 NOTE — FLOWSHEET NOTE
Exercise 40 3   [x]  Manual Therapy 10 1   []  Ther Activities     []  Aquatics     [x]  Vasocompression 15 1   [x]  Other: Gait training 10 0   Total: 75 5       Assessment: [x] Progressing toward goals. Patient continuing to demonstrate improved active ankle ROM and tolerance to WB through LLE during stance and ambulation. Patient able to tolerate 3 seconds with LLE in SLS with BUE support on // bars to perform standing hip extension at this date. Patient required increased timing to perform and increased timing in between standing exercises today. Initiated balance exercises on foam to work on balance reactions and ankle strategies with patient only requiring intermittent tapping on // bars for balance support. Patient demonstrated improved ambulation skills when cued for heel strike. Patient demonstrating good carry over of ambulation training at end of session with consistent heel strike with LLE when using walker, prior to donning walking boot before leaving clinic. All standing and ambulation exercises were performed with tennis shoes on both feet today. [] No change. [] Other:    STG: (to be met in 10 treatments)  1. ? Pain: to 6/10 maximum  2. ? ROM: AROM left ankle dorsiflexion to neutral, plantarflexion + 30 degrees from neutral  3. ? Function: Gait with walker, 300 ft in clinic safely  4. Independent with Home Exercise Programs- MET 10/10/18  5. Initiate gait training with Alter G due to state of debility. -MET 9/5/18   6. Demonstrate Knowledge of fall prevention -MET at initial evaluation  7. Able to wear a shoe on left foot -MET 9/12/18  Minimal edema in left foot and ankle. Fully closed incision left ankle     LTG: (to be met in 20 treatments)  1. 4/10 maximum pain in left ankle  2. Functional AROM of left ankle. 3. Ability to discontinue use of wheelchair in her home  4. Ability to go up and down 1 flight of steps, 1 rail to get up stairs to her bedroom.    5. Ability to walk 10 minutes with walker safely in clinic  6. Resume driving  7. Independent with dressing. 8.     Patient goals:Able to walk    Pt. Education:  [x] Yes  [] No  [x] Reviewed Prior HEP/Ed  Method of Education: [x] Verbal  [x] Demo  [x] Written   9/12: Towel scrunches and inversion/eversion towel wipes  9/26: Standing AP and lateral weight shifts in walker, see edema control edu charted above  10/10: 4-way ankle AROM, gastroc towel stretch   Comprehension of Education:  [x] Verbalizes understanding. [x] Demonstrates understanding. [x] Needs review. [] Demonstrates/verbalizes HEP/Ed previously given. Plan: [x] Continue per plan of care.    [] Other:      Time In:  3:45 pm          Time Out: 5:05 pm    Electronically signed by:  Hubert Angel, PT

## 2018-10-17 ENCOUNTER — HOSPITAL ENCOUNTER (OUTPATIENT)
Dept: PHYSICAL THERAPY | Facility: CLINIC | Age: 58
Setting detail: THERAPIES SERIES
Discharge: HOME | End: 2018-10-17
Payer: MEDICARE

## 2018-10-17 PROCEDURE — 97110 THERAPEUTIC EXERCISES: CPT

## 2018-10-17 PROCEDURE — 97116 GAIT TRAINING THERAPY: CPT

## 2018-10-17 PROCEDURE — 97140 MANUAL THERAPY 1/> REGIONS: CPT

## 2018-10-17 PROCEDURE — 97016 VASOPNEUMATIC DEVICE THERAPY: CPT

## 2018-10-23 DIAGNOSIS — S86.112A POSTERIOR TIBIAL TENDON TEAR, TRAUMATIC, LEFT, INITIAL ENCOUNTER: ICD-10-CM

## 2018-10-23 DIAGNOSIS — R26.2 DISABILITY OF WALKING: Primary | ICD-10-CM

## 2018-10-24 ENCOUNTER — HOSPITAL ENCOUNTER (OUTPATIENT)
Dept: PHYSICAL THERAPY | Facility: CLINIC | Age: 58
Setting detail: THERAPIES SERIES
Discharge: HOME | End: 2018-10-24
Payer: MEDICARE

## 2018-10-24 DIAGNOSIS — S86.112A POSTERIOR TIBIAL TENDON TEAR, TRAUMATIC, LEFT, INITIAL ENCOUNTER: ICD-10-CM

## 2018-10-24 DIAGNOSIS — R26.2 DISABILITY OF WALKING: Primary | ICD-10-CM

## 2018-10-24 RX ORDER — WHEELCHAIR
EACH MISCELLANEOUS
Qty: 1 EACH | Refills: 0 | Status: SHIPPED | OUTPATIENT
Start: 2018-10-24 | End: 2020-09-28

## 2018-10-24 NOTE — FLOWSHEET NOTE
[] [facility] [facility]  [address]    [phone]  F: [phone] [x] [address]  [address]   [address]  P: [phone]  F: [phone] [] [place]  [address] [facility]  P: [phone]  F: [phone] [] [address]  [facility]   [address]   [place]: [phone]  F: [phone] [] [address]  [place]: [phone]   F: [phone]      Physical Therapy Cancel/No Show note    Date: 10/24/2018  Patient: Jonathan Ann  : 1960  MRN: [id number]    Cancels/No Shows to date:     For today's appointment patient:  [x]  Cancelled  []  Rescheduled appointment  []  No-show     Reason given by patient:  []  Patient ill  []  Conflicting appointment  []  No transportation    []  Conflict with work  [x]  No reason given  []  Weather related  []  Other:      Comments:      [x]  Next appointment was confirmed    Electronically signed by: Sadia Zapata PT

## 2018-10-31 ENCOUNTER — HOSPITAL ENCOUNTER (OUTPATIENT)
Dept: PHYSICAL THERAPY | Facility: CLINIC | Age: 58
Setting detail: THERAPIES SERIES
Discharge: HOME | End: 2018-10-31
Payer: MEDICARE

## 2018-10-31 PROCEDURE — 97116 GAIT TRAINING THERAPY: CPT

## 2018-10-31 PROCEDURE — 97110 THERAPEUTIC EXERCISES: CPT

## 2018-10-31 PROCEDURE — 97016 VASOPNEUMATIC DEVICE THERAPY: CPT

## 2018-10-31 NOTE — FLOWSHEET NOTE
[] Henry Pérez       Outpatient Physical        Therapy       [address]       Phone: [phone]       Fax: [phone] [x] [facility] at [address] [facility]       Phone: [phone]       Fax: [phone] [] [facility]. [address] [place] [address]   Phone: [phone]   Fax:  [phone]     Physical Therapy Daily Treatment Note    Date:  10/31/2018  Patient Name:  Gabbie Sow    :  1960  MRN: [id number]  Physician: Dr. Higinio Lay: Norval Heimlich (30 max visits per year)  Medical Diagnosis: left posterior tibial tendon repair with harvest of the flexor digitorum longus tendon                                      Rehab Codes: M25.475, M25.572, M25.675, R26.2, M62.562, R20.2  Onset date: 2018 most recent surgery            Next 's appt. : pt reports she has not made follow up appointment yet  Visit# / total visits:  Cancels/No Shows: 1/0    Subjective:    Pain:  [x] Yes  [] No Location: Medial L ankle and foot Pain Rating: (0-10 scale) 8/10  Pain altered Tx:  [x] No  [] Yes  Action:    Comments:  Patient reports that she has been in contact with insurance company in regards to getting home therapy because she if fearful of getting out of house as winter approaches. Patient continues to express concerns of increased pain and numbness in distal L foot. Educated patient to contact surgeon for follow-up appointment with patient reporting she will call tomorrow.      Objective:   Modalities: Vasocompression of L ankle with mod compression for 15 minutes after exercises with patient in long-sitting per patient request  Precautions: pt very guarded medial left ankle, tender to light touch; WBAT  Exercises:   Exercise Reps/ Time Weight/ Level Comments   LONG-SITTING      L ankle PROM  x5 mins     AP talocrural glides  x10 mins  Grade II and III MVM DF/PF         SEATED      4 way ankle 20x ea A- ev/inv  YTB-PF/DF Added 10/3   Sitting LAQ, then active DF, knee flexion 20x      Heel raises 10 x2   Performed in standing 10/31   Toe raises 10 x2    \"   Rolling foot on water bottle        Glut squeezes 10x      Hip adduction  10\" x10 Ball    Hip abduction  15x  RTB    Inversion/eversion towel wipes 20x ea      Towel scrunches  20 x2  Added 9/12   Towel calf stretch 30\" x3  Added 10/10            STANDING      // bars with CGA for all exercises   Lateral weight shifts 20x ea     AP weight shifts 20x ea   Added 10/3; performed 20x with ea LE in front   Heel taps  10x ea 2\"    Hamstring curls 10x ea     Gait training 130' x2    50' x3  Performed using rolling walker with CGA with pt demo'ing moderate antalgic gait and improved ability to achieve heel strike with min verbal cueing; significantly slowed gait speed- 5:27 for 1 lap (x130 ft)    Standing hip extension 15x ea  Added reps 10/31   NBOS on foam 30\" x2  \"   Tandem stance on foam 30\" x2 ea  \"   Heel raises 20x  \"   Calf stretch on slant board 30\" x2  \", pt reports she is unable 10/17   ALTER G   NOT TODAY: Initiated 9/5; size 4XL pants, performed with shoe on   Standing lateral weight shifts 20x ea  60% BW    Standing AP weight shifts 12x   60% BW; added 9/26   Hamstring curls 15x ea  60% BW   Toe raises 15x  60% BW   Marches  10x ea   60% BW; added 9/26   Heel raises 20x   50% BW   Walking 10 mins  55% BW throughout ambulation, 0.4 mph at 0% incline; patient WB 15-30% through LLE with ambulation despite verbal/visual cues to improve       Other:        Specific Instructions for next treatment[de-identified] Review HEP with addition of weight shifts, ankle DF/PF stretching.  Consider desensitization treatment - light touch to ankle, progressing to towel (rougher touch), etc       Treatment Charges: Mins Units   []  Modalities     [x]  Ther Exercise 20 1   [x]  Manual Therapy 10 1   []  Ther Activities     []  Aquatics

## 2018-11-05 ENCOUNTER — HOSPITAL ENCOUNTER (OUTPATIENT)
Dept: PHYSICAL THERAPY | Facility: CLINIC | Age: 58
Setting detail: THERAPIES SERIES
Discharge: HOME | End: 2018-11-05
Payer: MEDICARE

## 2018-11-07 ENCOUNTER — APPOINTMENT (OUTPATIENT)
Dept: PHYSICAL THERAPY | Facility: CLINIC | Age: 58
End: 2018-11-07
Payer: MEDICARE

## 2018-11-14 ENCOUNTER — HOSPITAL ENCOUNTER (OUTPATIENT)
Dept: PHYSICAL THERAPY | Facility: CLINIC | Age: 58
Setting detail: THERAPIES SERIES
Discharge: HOME | End: 2018-11-14
Payer: MEDICARE

## 2018-11-14 PROCEDURE — 97116 GAIT TRAINING THERAPY: CPT

## 2018-11-14 PROCEDURE — 97110 THERAPEUTIC EXERCISES: CPT

## 2018-11-14 PROCEDURE — 97016 VASOPNEUMATIC DEVICE THERAPY: CPT

## 2018-11-14 NOTE — FLOWSHEET NOTE
light touch; WBAT  Exercises:   Exercise Reps/ Time Weight/ Level Comments   LONG-SITTING      L ankle PROM  x5 mins     AP talocrural glides  x6 mins  Grade II and III MVM DF/PF         SEATED      4 way ankle 20x ea A- ev/inv  YTB-PF/DF Added 10/3   Sitting LAQ, then active DF, knee flexion 20x      Heel raises 10 x2   Performed in standing 10/31   Toe raises 10 x2    \"   Desensitization training x4 min  Added 11/14: Hand contact, terrycloth towel contact   Rolling foot on water bottle        Glut squeezes 10x      Hip adduction  10\" x10 Ball    Hip abduction  15x  RTB    Inversion/eversion towel wipes 20x ea      Towel scrunches  20 x2  Added 9/12   Towel calf stretch 30\" x3  Added 10/10            STANDING      // bars with CGA for all exercises   Lateral weight shifts 20x ea     AP weight shifts 20x ea   Added 10/3; performed 20x with ea LE in front   Heel taps  10x ea 2\"    Hamstring curls 10x ea     Gait training 175' x1    50' x3  Performed using rolling walker with CGA with pt demo'ing moderate antalgic gait and improved ability to achieve heel strike with min verbal cueing; significantly slowed gait, anterior trunk lean from decreased LE weightbearing   previous speed- 5:27 for 1 lap (x130 ft)    Standing hip extension 15x ea  Added reps 10/31   NBOS on foam 30\" x2  \"   Tandem stance on foam 30\" x2 ea  \"   Heel raises 20x  \"   Calf stretch on slant board 30\" x2  \", pt reports she is unable 10/17   ALTER G   NOT TODAY: Initiated 9/5; size 4XL pants, performed with shoe on   Standing lateral weight shifts 20x ea  60% BW    Standing AP weight shifts 12x   60% BW; added 9/26   Hamstring curls 15x ea  60% BW   Toe raises 15x  60% BW   Marches  10x ea   60% BW; added 9/26   Heel raises 20x   50% BW   Walking 10 mins  55% BW throughout ambulation, 0.4 mph at 0% incline; patient WB 15-30% through LLE with ambulation despite verbal/visual cues to improve       Step ups 2x10  4\", R up, R down Added 11/14   Lunge DF stretch on 4\" step 2x10  Added 11/14   Other: Decreased exercise completion this date d/t time required for goal assessment on 11/14        Specific Instructions for next treatment[de-identified] Review HEP with addition of weight shifts, ankle DF/PF stretching. Consider desensitization treatment - light touch to ankle, progressing to towel (rougher touch), etc       Treatment Charges: Mins Units   []  Modalities     [x]  Ther Exercise 27 2   [x]  Manual Therapy 6 --   []  Ther Activities     []  Aquatics     [x]  Vasocompression 15 1   [x]  Other: Gait training 12 1   Total: 55 4       Assessment: [x] Progressing toward goals. Continued deficits in gait involving decreased stance time on LLE d/t pain. Pt has greatly improved mobility since evaluation - able to ambulate in walker for up to 175' but limited by mental focus on pain in L foot, poor tolerance. L ankle stiffness persistent, evident with anterior \"pinching\" pain reported with standing lunge DF stretch. Initiated desensitization therapy with pt in order to reduce random pains and hyperesthesias - pt able to tolerate terrycloth rubbing at medial malleolus for 2 minutes - pt to begin at home. Pt will continue to benefit from skilled therapy to progress mobility, decrease stiffness in ankle. [] No change. [x] Other: Pt attempting to find different PCP for follow up, states she also is working on podiatrist follow up. STG: (to be met in 10 treatments)   1. ? Pain: to 6/10 maximum - NOT MET, continued high pain levels reaching up to 10/10 per pt on 11/14  2. ? ROM: AROM left ankle dorsiflexion to neutral, plantarflexion + 30 degrees from neutral - MET on 11/14  3. ? Function: Gait with walker, 300 ft in clinic safely - Making progress, ambulates 175' in walker on 11/14  4. Independent with Home Exercise Programs- MET 10/10/18  5. Initiate gait training with Alter G due to state of debility. -MET 9/5/18   6.  Demonstrate Knowledge of fall prevention -MET at initial evaluation  7. Able to wear a shoe on left foot -MET 9/12/18  Minimal edema in left foot and ankle. - NOT MET 11/14; significant edema in L foot/ankle persistent, pt continues to wear compression stocking  Fully closed incision left ankle - did not assess on 11/14 d/t therapist error; will assess at next session     LTG: (to be met in 20 treatments)  1. 4/10 maximum pain in left ankle  2. Functional AROM of left ankle. 3. Ability to discontinue use of wheelchair in her home  4. Ability to go up and down 1 flight of steps, 1 rail to get up stairs to her bedroom. 5. Ability to walk 10 minutes with walker safely in clinic  6. Resume driving  7. Independent with dressing. 8.     Patient goals:Able to walk    Pt. Education:  [x] Yes  [] No  [x] Reviewed Prior HEP/Ed  Method of Education: [x] Verbal  [x] Demo  [] Written   9/12: Towel scrunches and inversion/eversion towel wipes  9/26: Standing AP and lateral weight shifts in walker, see edema control edu charted above  10/10: 4-way ankle AROM, gastroc towel stretch   10/31: ankle PF/DF with YTB   Comprehension of Education:  [x] Verbalizes understanding. [x] Demonstrates understanding. [x] Needs review. [] Demonstrates/verbalizes HEP/Ed previously given. Plan: [x] Continue per plan of care.    [] Other:      Time In:  3:35 pm          Time Out: 4:35 pm    Electronically signed by:  Jamil Liz PT

## 2018-11-26 ENCOUNTER — OFFICE VISIT (OUTPATIENT)
Dept: FAMILY MEDICINE CLINIC | Age: 58
End: 2018-11-26
Payer: MEDICARE

## 2018-11-26 VITALS
DIASTOLIC BLOOD PRESSURE: 78 MMHG | TEMPERATURE: 98.1 F | HEIGHT: 59 IN | BODY MASS INDEX: 50.6 KG/M2 | RESPIRATION RATE: 16 BRPM | WEIGHT: 251 LBS | OXYGEN SATURATION: 98 % | SYSTOLIC BLOOD PRESSURE: 138 MMHG | HEART RATE: 83 BPM

## 2018-11-26 DIAGNOSIS — Z00.00 ENCOUNTER FOR MEDICAL EXAMINATION TO ESTABLISH CARE: ICD-10-CM

## 2018-11-26 DIAGNOSIS — E55.9 VITAMIN D DEFICIENCY: ICD-10-CM

## 2018-11-26 DIAGNOSIS — M79.672 CHRONIC FOOT PAIN, LEFT: ICD-10-CM

## 2018-11-26 DIAGNOSIS — E66.01 MORBID OBESITY WITH BMI OF 50.0-59.9, ADULT (HCC): ICD-10-CM

## 2018-11-26 DIAGNOSIS — G89.29 CHRONIC FOOT PAIN, LEFT: ICD-10-CM

## 2018-11-26 DIAGNOSIS — J45.40 MODERATE PERSISTENT ASTHMA WITHOUT COMPLICATION: Primary | ICD-10-CM

## 2018-11-26 DIAGNOSIS — I10 ESSENTIAL HYPERTENSION: ICD-10-CM

## 2018-11-26 PROCEDURE — 99205 OFFICE O/P NEW HI 60 MIN: CPT | Performed by: NURSE PRACTITIONER

## 2018-11-26 PROCEDURE — G8417 CALC BMI ABV UP PARAM F/U: HCPCS | Performed by: NURSE PRACTITIONER

## 2018-11-26 PROCEDURE — 3017F COLORECTAL CA SCREEN DOC REV: CPT | Performed by: NURSE PRACTITIONER

## 2018-11-26 PROCEDURE — G8484 FLU IMMUNIZE NO ADMIN: HCPCS | Performed by: NURSE PRACTITIONER

## 2018-11-26 PROCEDURE — 1036F TOBACCO NON-USER: CPT | Performed by: NURSE PRACTITIONER

## 2018-11-26 PROCEDURE — G8427 DOCREV CUR MEDS BY ELIG CLIN: HCPCS | Performed by: NURSE PRACTITIONER

## 2018-11-26 RX ORDER — CHOLECALCIFEROL (VITAMIN D3) 125 MCG
1 CAPSULE ORAL DAILY
Qty: 90 TABLET | Refills: 1 | Status: SHIPPED | OUTPATIENT
Start: 2018-11-26 | End: 2019-08-30 | Stop reason: SDUPTHER

## 2018-11-26 ASSESSMENT — PATIENT HEALTH QUESTIONNAIRE - PHQ9
SUM OF ALL RESPONSES TO PHQ QUESTIONS 1-9: 0
1. LITTLE INTEREST OR PLEASURE IN DOING THINGS: 0
2. FEELING DOWN, DEPRESSED OR HOPELESS: 0
SUM OF ALL RESPONSES TO PHQ9 QUESTIONS 1 & 2: 0
SUM OF ALL RESPONSES TO PHQ QUESTIONS 1-9: 0

## 2018-11-26 NOTE — PROGRESS NOTES
Creatinine monitoring  06/29/2019    Breast cancer screen  02/21/2020    Lipid screen  02/08/2022    HIV screen  Completed

## 2018-12-04 ENCOUNTER — HOSPITAL ENCOUNTER (OUTPATIENT)
Dept: PAIN MANAGEMENT | Age: 58
Discharge: HOME | End: 2018-12-04
Payer: MEDICARE

## 2018-12-04 VITALS
WEIGHT: 251 LBS | BODY MASS INDEX: 50.6 KG/M2 | HEART RATE: 82 BPM | TEMPERATURE: 97.6 F | SYSTOLIC BLOOD PRESSURE: 160 MMHG | RESPIRATION RATE: 16 BRPM | HEIGHT: 59 IN | DIASTOLIC BLOOD PRESSURE: 82 MMHG

## 2018-12-04 DIAGNOSIS — G47.33 OSA (OBSTRUCTIVE SLEEP APNEA): ICD-10-CM

## 2018-12-04 DIAGNOSIS — Z98.890 STATUS POST LEFT FOOT SURGERY: ICD-10-CM

## 2018-12-04 DIAGNOSIS — M66.872 NONTRAUMATIC RUPTURE OF LEFT POSTERIOR TIBIAL TENDON: ICD-10-CM

## 2018-12-04 DIAGNOSIS — M25.572 CHRONIC PAIN OF LEFT ANKLE: Primary | ICD-10-CM

## 2018-12-04 DIAGNOSIS — G89.29 CHRONIC PAIN OF LEFT ANKLE: Primary | ICD-10-CM

## 2018-12-04 DIAGNOSIS — E66.01 MORBID OBESITY WITH BMI OF 50.0-59.9, ADULT (HCC): ICD-10-CM

## 2018-12-04 PROCEDURE — 99243 OFF/OP CNSLTJ NEW/EST LOW 30: CPT | Performed by: ANESTHESIOLOGY

## 2018-12-04 PROCEDURE — 99203 OFFICE O/P NEW LOW 30 MIN: CPT

## 2018-12-04 ASSESSMENT — PAIN DESCRIPTION - PAIN TYPE: TYPE: CHRONIC PAIN

## 2018-12-04 ASSESSMENT — PAIN DESCRIPTION - LOCATION: LOCATION: BACK;LEG

## 2018-12-04 ASSESSMENT — PAIN DESCRIPTION - FREQUENCY: FREQUENCY: CONTINUOUS

## 2018-12-04 ASSESSMENT — ENCOUNTER SYMPTOMS
BACK PAIN: 1
ABDOMINAL PAIN: 0
SHORTNESS OF BREATH: 0

## 2018-12-04 ASSESSMENT — PAIN DESCRIPTION - ORIENTATION: ORIENTATION: LEFT

## 2018-12-04 ASSESSMENT — PAIN DESCRIPTION - DIRECTION: RADIATING_TOWARDS: NO

## 2018-12-04 ASSESSMENT — PAIN SCALES - GENERAL: PAINLEVEL_OUTOF10: 8

## 2018-12-04 ASSESSMENT — PAIN DESCRIPTION - ONSET: ONSET: ON-GOING

## 2018-12-04 ASSESSMENT — PAIN DESCRIPTION - PROGRESSION: CLINICAL_PROGRESSION: NOT CHANGED

## 2018-12-04 NOTE — PROGRESS NOTES
BowelControl: No  3. BMI 50.8      Previous management history:  1. Previous diagnostic workup: MRI   Lt foot and ankle  2. Previous non interventionaltreatments tried:                  Physical Therapy:Yes recently 2 months   No help               Chiropractic Treatments: No  3. Previous Medications tried:  - NSAID's: no  - Neurontin: No  -Lyrica :No  - Trycyclic antidepressant:(Ellavil / Pamelor): No   - Cymbalta:No  - Opioids (Ultram / Vicodin / Percocet / Morphine / Dilaudid / Oramorph/ Fentanyl etc.):No  4. Previous Interventional pain procedures tried: steroid injection 2016   No help  5. Legal Issues related to pain complaint:Yes  This is a work injury  5 years ago  But has been denied  6. Last UDS :na  7. Was it as anticipated?:NA      Past Medical History      Diagnosis Date    Allergic rhinitis 12/4/2014    Anxiety 7/5/2018    Arthritis     Asthma     Chest pain     occurs every other day, pt states they occur when she doesn't eat right or forget to take her bp med, occurs left side of chest with no radiation, pt states \"it's new\" and has not had any testing done or seen anyone for it.     Constipation     Depressive disorder 7/5/2018    Generalized osteoarthritis 6/26/2014    History of kidney stones     Hypertension     Obesity, morbid, BMI 40.0-49.9 ([facility] [address] 2/28/2017    PONV (postoperative nausea and vomiting)     Sleep apnea     does not use a machine       Surgical History  Past Surgical History:   Procedure Laterality Date    CHOLECYSTECTOMY      FOOT TENDON SURGERY Left 09/08/2017    posterior repair    FOOT TENDON SURGERY Left 06/29/2018    LEFT POSTERIOR  TENDON REPAIR FLEXOR TENDON TRANSFER (Left Foot)    LITHOTRIPSY      [place] OFFICE/OUTPT VISIT,PROCEDURE ONLY Left 6/29/2018    LEFT POSTERIOR  TENDON REPAIR FLEXOR TENDON TRANSFER performed by Ev Rasmussen DPM at [address] [place] [address] Drive Left 9/8/2017    LEFT POSTERIOR TIBIAL TENDON REPAIR WITH LEFT TENOLYSIS  performed by nAgela Jacinto, DPM at [facility] OR       Medications  Current Outpatient Prescriptions   Medication Sig Dispense Refill    Spacer/Aero-Holding Chambers PRICE 1 Device by Does not apply route daily as needed (wheezing) 1 Device 0    fluticasone-salmeterol (ADVAIR) 250-50 MCG/DOSE AEPB Inhale 1 puff into the lungs every 12 hours 60 each 3    Cholecalciferol (VITAMIN D3) 2000 units TABS Take 1 tablet by mouth daily 90 tablet 1    Misc. Devices [facility]) MISC Use as directed 1 each 0    Misc. Devices (WALL GRAB BAR) MISC GRAB BARS FOR BOTH SHOWER AND TOILET, USE AS DIRECTED 6 each 0    Misc. Devices MISC OUTSIDE RAMP THAT INCLUDES RAILINGS, USE AS DIRECTED 1 Device 0    Misc. Devices (RAISED TOILET SEAT) MISC USE AS DIRECTED 1 each 0    hydrocortisone 2.5 % cream apply to affected area twice a day 30 g 1    EPINEPHrine (EPIPEN) 0.3 MG/0.3ML SOAJ injection INJECT AS DIRECTED IF NEEDED  0    neomycin-polymyxin-hydrocortisone (CORTISPORIN) 3.5-79021-3 otic suspension instill 4 drops into affected ear  (S) three times a day  0    ACETAMINOPHEN-CODEINE #3 PO Take 1 tablet by mouth every 12 hours OUT      loratadine (CLARITIN) 10 MG tablet Take 10 mg by mouth daily  0    fluticasone (FLONASE) 50 MCG/ACT nasal spray 1 spray by Nasal route daily      Antipyrine-Benzocaine (AURALGAN) 54-14 MG/ML otic solution Place 3 drops into the left ear 4 times daily as needed for Pain      albuterol (PROVENTIL HFA) 108 (90 BASE) MCG/ACT inhaler Inhale 2 puffs into the lungs every 4 hours as needed for Wheezing or Shortness of Breath (Space out to every 6 hours as symptoms improve). Space out to every 6 hours as symptoms improve.  1 Inhaler 0    losartan-hydrochlorothiazide (HYZAAR) 100-25 MG per tablet Take 1 tablet by mouth daily 1/2 pill daily      hydrOXYzine (ATARAX) 25 MG tablet Take 25 mg by mouth daily as needed for Itching       Elastic Bandages & Supports (JOBST KNEE HIGH COMPRESSION SM) MISC Dispense Bilateral Jobst Below Knee 20-30mmHg compression stockings 3 each 3    Misc. Devices [facility]) MISC 1 Device by Does not apply route once for 1 dose 1 each 0     No current facility-administered medications for this encounter. Allergies  Latex; Asa [aspirin]; Codeine; Lisinopril-hydrochlorothiazide; Motrin [ibuprofen]; Other; Pcn [penicillins]; and Sulfa antibiotics    Family History  family history includes Depression in her brother, mother, and sister; Diabetes in her mother and sister; High Blood Pressure in her mother and sister; Mental Illness in her brother, mother, and sister; Substance Abuse in her brother and father. Social History  Social History     Social History    Marital status: Single     Spouse name: N/A    Number of children: N/A    Years of education: N/A     Social History Main Topics    Smoking status: Former Smoker     Packs/day: 3.00     Years: 20.00     Types: Cigarettes     Start date: 11/26/1981    Smokeless tobacco: Never Used      Comment: quit smoking 15-20 yrs ago    Alcohol use No    Drug use: No    Sexual activity: Not Asked     Other Topics Concern    None     Social History Narrative    None      reports that she does not use drugs. REVIEW OF SYSTEMS:  Review of Systems   Constitutional: Negative for fever. HENT: Negative for congestion. Respiratory: Negative for shortness of breath. Cardiovascular: Negative for chest pain. Gastrointestinal: Negative for abdominal pain. Endocrine: Positive for cold intolerance. Musculoskeletal: Positive for back pain. Skin: Negative for rash. Neurological: Negative for headaches. Hematological: Does not bruise/bleed easily. Objective:  General Appearance:  Uncomfortable and in pain (morbidly obese). Vital signs: (most recent): Blood pressure (!) 160/82, pulse 82, temperature 97.6 °F (36.4 °C), resp. rate 16, height 4' 11\" (1.499 m), weight 251 lb (113.9 kg), not currently breastfeeding.   Vital post left foot surgery      She is requesting opioids for pain  She is high risk for opioid considering severe obesity and obstructive sleep apnea  I will recommend non-opioid neuropathic pain medication such as Neurontin, tricyclic antidepressant and baclofen. Patient is obviously unhappy and left the clinic    Controlled Substances Monitoring:     RX Monitoring 12/4/2018   Attestation The Prescription Monitoring Report for this patient was reviewed today. Documentation Possible medication side effects, risk of tolerance/dependence & alternative treatments discussed. This note was created using voice recognition software. There may be inaccuracies of transcription  that are inadvertently overlooked prior to the signature. There is anyquestions about the transcription please contact me.

## 2018-12-13 ENCOUNTER — OFFICE VISIT (OUTPATIENT)
Dept: FAMILY MEDICINE CLINIC | Age: 58
End: 2018-12-13
Payer: MEDICARE

## 2018-12-13 VITALS
HEART RATE: 86 BPM | SYSTOLIC BLOOD PRESSURE: 130 MMHG | OXYGEN SATURATION: 96 % | DIASTOLIC BLOOD PRESSURE: 78 MMHG | WEIGHT: 251.6 LBS | BODY MASS INDEX: 50.82 KG/M2

## 2018-12-13 DIAGNOSIS — J45.40 MODERATE PERSISTENT ASTHMA WITHOUT COMPLICATION: Primary | ICD-10-CM

## 2018-12-13 PROCEDURE — 1036F TOBACCO NON-USER: CPT | Performed by: NURSE PRACTITIONER

## 2018-12-13 PROCEDURE — G8484 FLU IMMUNIZE NO ADMIN: HCPCS | Performed by: NURSE PRACTITIONER

## 2018-12-13 PROCEDURE — 3017F COLORECTAL CA SCREEN DOC REV: CPT | Performed by: NURSE PRACTITIONER

## 2018-12-13 PROCEDURE — G8427 DOCREV CUR MEDS BY ELIG CLIN: HCPCS | Performed by: NURSE PRACTITIONER

## 2018-12-13 PROCEDURE — 99214 OFFICE O/P EST MOD 30 MIN: CPT | Performed by: NURSE PRACTITIONER

## 2018-12-13 PROCEDURE — G8417 CALC BMI ABV UP PARAM F/U: HCPCS | Performed by: NURSE PRACTITIONER

## 2018-12-13 NOTE — PROGRESS NOTES
Negative for abdominal pain, blood in stool, constipation,diarrhea, nausea and vomiting. · Endocrine: Negative for cold intolerance, heat intolerance, polydipsia, polyphagia and polyuria. · Genitourinary: Negative for difficulty urinating, dysuria, flank pain, frequency, hematuria and urgency. · Musculoskeletal: Negative for arthralgias, back pain, joint swelling, myalgias, neck pain and neck stiffness. · Skin: Negative for rash and wound. · Allergic/Immunologic: Negative for environmental allergies and food allergies. · Neurological:  Negative for dizziness, light-headedness, numbness and headaches. · Hematological:  Negative for adenopathy. Does not bruise/bleed easily. · Psychiatric/Behavioral: Negative for self-injury, sleep disturbance and suicidal ideas. Objective     PHYSICAL EXAM:   · Constitutional: Mahnaz Thakur is oriented to person, place, and time. Vital signs are normal. Appears well-developed and well-nourished. · HEENT:   · Head: Normocephalic and atraumatic. Right Ear: Hearing and external ear normal. TM and Canal normal  Left Ear: Hearing and external ear normal. TM and Canal normal  · Nose: Nares normal. Septum midline. Mucosa normal. No drainage or sinus tenderness. · Mouth/Throat: Oropharynx-no erythema, no exudate. Uvula midline, no erythema, no edema. Mucous membranes are pink and moist.   · Eyes:PERRL, EOMI, Conjunctiva normal, No discharge. · Neck: Trachea normal, full passive range of motion. Non-tender on palpation. Neck supple. No thyromegaly present. · Cardiovascular: Normal rate, regular rhythm, S1, S2, no murmur, no gallop, no friction rub, intact distal pulses. · Pulmonary/Chest: Breath sounds are clear throughout, No respiratory distress, No wheezing, No chest tenderness. Effort normal  · Abdominal: Soft. Normal appearance, bowel sounds are present and normoactive. There is no hepatosplenomegaly. There is no tenderness. There is no CVA tenderness. exercise. 7. Discussed use, benefit, and side effects of prescribed medications. Barriers to medication compliance addressed. All her questions were answered. Pt voiced understanding. Davis Salazar will continue current medications, diet and exercise. Completed Orders/Prescriptions   No orders of the defined types were placed in this encounter. Patient given educational materials on asthma    Of the 25 minute duration appointment visit, Kaela Peterson CNP spent at least 50% of the face-to-face time in counseling, explanation of diagnosis, planning of further management, and answering all questions. Signed:  Kaela Peterson CNP    This note is created with the assistance of a speech-recognition program.  While intending to generate a document that actually reflects the content of the visit, no guarantees can be provided that every mistake has been identified and corrected by editing.

## 2018-12-17 DIAGNOSIS — M66.872 NONTRAUMATIC RUPTURE OF LEFT POSTERIOR TIBIAL TENDON: ICD-10-CM

## 2018-12-17 DIAGNOSIS — G89.29 CHRONIC PAIN OF LEFT ANKLE: ICD-10-CM

## 2018-12-17 DIAGNOSIS — G89.18 POST-OPERATIVE PAIN: Primary | ICD-10-CM

## 2018-12-17 DIAGNOSIS — M25.572 CHRONIC PAIN OF LEFT ANKLE: ICD-10-CM

## 2018-12-17 DIAGNOSIS — S86.112A RUPTURE OF LEFT POSTERIOR TIBIALIS TENDON, INITIAL ENCOUNTER: ICD-10-CM

## 2018-12-17 DIAGNOSIS — M66.362 SPONTANEOUS RUPTURE OF FLEXOR TENDON OF LEFT LOWER LEG: ICD-10-CM

## 2018-12-21 ENCOUNTER — TELEPHONE (OUTPATIENT)
Dept: PODIATRY | Age: 58
End: 2018-12-21

## 2019-01-08 ENCOUNTER — OFFICE VISIT (OUTPATIENT)
Dept: PODIATRY | Age: 59
End: 2019-01-08
Payer: MEDICARE

## 2019-01-08 VITALS — HEIGHT: 59 IN | BODY MASS INDEX: 46.37 KG/M2 | WEIGHT: 230 LBS

## 2019-01-08 DIAGNOSIS — M25.572 CHRONIC PAIN OF LEFT ANKLE: ICD-10-CM

## 2019-01-08 DIAGNOSIS — G89.29 CHRONIC PAIN OF LEFT ANKLE: ICD-10-CM

## 2019-01-08 DIAGNOSIS — S86.112S RUPTURE OF LEFT POSTERIOR TIBIALIS TENDON, SEQUELA: ICD-10-CM

## 2019-01-08 DIAGNOSIS — R26.2 TROUBLE WALKING: Primary | ICD-10-CM

## 2019-01-08 PROCEDURE — G8417 CALC BMI ABV UP PARAM F/U: HCPCS | Performed by: PODIATRIST

## 2019-01-08 PROCEDURE — 1036F TOBACCO NON-USER: CPT | Performed by: PODIATRIST

## 2019-01-08 PROCEDURE — G8484 FLU IMMUNIZE NO ADMIN: HCPCS | Performed by: PODIATRIST

## 2019-01-08 PROCEDURE — 3017F COLORECTAL CA SCREEN DOC REV: CPT | Performed by: PODIATRIST

## 2019-01-08 PROCEDURE — G8427 DOCREV CUR MEDS BY ELIG CLIN: HCPCS | Performed by: PODIATRIST

## 2019-01-08 PROCEDURE — 99213 OFFICE O/P EST LOW 20 MIN: CPT | Performed by: PODIATRIST

## 2019-01-29 ENCOUNTER — OFFICE VISIT (OUTPATIENT)
Dept: PODIATRY | Age: 59
End: 2019-01-29
Payer: MEDICARE

## 2019-01-29 VITALS — BODY MASS INDEX: 46.37 KG/M2 | HEIGHT: 59 IN | WEIGHT: 230 LBS

## 2019-01-29 DIAGNOSIS — R26.2 TROUBLE WALKING: ICD-10-CM

## 2019-01-29 DIAGNOSIS — S86.112D RUPTURE OF LEFT POSTERIOR TIBIALIS TENDON, SUBSEQUENT ENCOUNTER: ICD-10-CM

## 2019-01-29 DIAGNOSIS — B35.1 DERMATOPHYTOSIS OF NAIL: ICD-10-CM

## 2019-01-29 DIAGNOSIS — M79.671 PAIN IN BOTH FEET: Primary | ICD-10-CM

## 2019-01-29 DIAGNOSIS — I73.9 PERIPHERAL VASCULAR DISEASE (HCC): ICD-10-CM

## 2019-01-29 DIAGNOSIS — M79.672 PAIN IN BOTH FEET: Primary | ICD-10-CM

## 2019-01-29 PROCEDURE — 99213 OFFICE O/P EST LOW 20 MIN: CPT | Performed by: PODIATRIST

## 2019-01-29 PROCEDURE — G8484 FLU IMMUNIZE NO ADMIN: HCPCS | Performed by: PODIATRIST

## 2019-01-29 PROCEDURE — 11721 DEBRIDE NAIL 6 OR MORE: CPT | Performed by: PODIATRIST

## 2019-01-29 PROCEDURE — G8427 DOCREV CUR MEDS BY ELIG CLIN: HCPCS | Performed by: PODIATRIST

## 2019-01-29 PROCEDURE — 3017F COLORECTAL CA SCREEN DOC REV: CPT | Performed by: PODIATRIST

## 2019-01-29 PROCEDURE — G8417 CALC BMI ABV UP PARAM F/U: HCPCS | Performed by: PODIATRIST

## 2019-01-29 PROCEDURE — 1036F TOBACCO NON-USER: CPT | Performed by: PODIATRIST

## 2019-01-29 RX ORDER — CLINDAMYCIN HYDROCHLORIDE 300 MG/1
150 CAPSULE ORAL 4 TIMES DAILY
COMMUNITY
Start: 2019-01-28 | End: 2019-10-08 | Stop reason: ALTCHOICE

## 2019-01-30 ENCOUNTER — HOSPITAL ENCOUNTER (OUTPATIENT)
Age: 59
Setting detail: SPECIMEN
Discharge: HOME | End: 2019-01-30
Payer: MEDICARE

## 2019-01-30 ENCOUNTER — OFFICE VISIT (OUTPATIENT)
Dept: FAMILY MEDICINE CLINIC | Age: 59
End: 2019-01-30
Payer: MEDICARE

## 2019-01-30 VITALS
SYSTOLIC BLOOD PRESSURE: 130 MMHG | WEIGHT: 255.2 LBS | OXYGEN SATURATION: 95 % | TEMPERATURE: 98.2 F | BODY MASS INDEX: 51.54 KG/M2 | HEART RATE: 76 BPM | DIASTOLIC BLOOD PRESSURE: 78 MMHG

## 2019-01-30 DIAGNOSIS — Z13.29 THYROID DISORDER SCREENING: ICD-10-CM

## 2019-01-30 DIAGNOSIS — Z11.4 SCREENING FOR HIV (HUMAN IMMUNODEFICIENCY VIRUS): ICD-10-CM

## 2019-01-30 DIAGNOSIS — Z11.59 NEED FOR HEPATITIS C SCREENING TEST: ICD-10-CM

## 2019-01-30 DIAGNOSIS — Z12.11 SCREENING FOR COLORECTAL CANCER: ICD-10-CM

## 2019-01-30 DIAGNOSIS — Z12.12 SCREENING FOR COLORECTAL CANCER: ICD-10-CM

## 2019-01-30 DIAGNOSIS — E66.01 MORBID OBESITY WITH BMI OF 50.0-59.9, ADULT (HCC): ICD-10-CM

## 2019-01-30 DIAGNOSIS — I10 ESSENTIAL HYPERTENSION: ICD-10-CM

## 2019-01-30 DIAGNOSIS — Z71.3 DIETARY COUNSELING: ICD-10-CM

## 2019-01-30 DIAGNOSIS — M26.621 TENDERNESS OF RIGHT TEMPOROMANDIBULAR JOINT: Primary | ICD-10-CM

## 2019-01-30 DIAGNOSIS — Z13.220 SCREENING CHOLESTEROL LEVEL: ICD-10-CM

## 2019-01-30 LAB
ALBUMIN SERPL-MCNC: 4.1 G/DL (ref 3.5–5.2)
ALBUMIN/GLOBULIN RATIO: 1.3 (ref 1–2.5)
ALP BLD-CCNC: 90 U/L (ref 35–104)
ALT SERPL-CCNC: 26 U/L (ref 5–33)
ANION GAP SERPL CALCULATED.3IONS-SCNC: 11 MMOL/L (ref 9–17)
AST SERPL-CCNC: 20 U/L
BILIRUB SERPL-MCNC: 0.44 MG/DL (ref 0.3–1.2)
BUN BLDV-MCNC: 12 MG/DL (ref 6–20)
BUN/CREAT BLD: ABNORMAL (ref 9–20)
CALCIUM SERPL-MCNC: 9.3 MG/DL (ref 8.6–10.4)
CHLORIDE BLD-SCNC: 104 MMOL/L (ref 98–107)
CHOLESTEROL/HDL RATIO: 2.5
CHOLESTEROL: 163 MG/DL
CO2: 26 MMOL/L (ref 20–31)
CREAT SERPL-MCNC: 0.51 MG/DL (ref 0.5–0.9)
ESTIMATED AVERAGE GLUCOSE: 120 MG/DL
GFR AFRICAN AMERICAN: >60 ML/MIN
GFR NON-AFRICAN AMERICAN: >60 ML/MIN
GFR SERPL CREATININE-BSD FRML MDRD: ABNORMAL ML/MIN/{1.73_M2}
GFR SERPL CREATININE-BSD FRML MDRD: ABNORMAL ML/MIN/{1.73_M2}
GLUCOSE BLD-MCNC: 100 MG/DL (ref 70–99)
HBA1C MFR BLD: 5.8 % (ref 4–6)
HCT VFR BLD CALC: 46.5 % (ref 36.3–47.1)
HDLC SERPL-MCNC: 64 MG/DL
HEMOGLOBIN: 14.4 G/DL (ref 11.9–15.1)
HEPATITIS C ANTIBODY: NONREACTIVE
HIV AG/AB: NONREACTIVE
LDL CHOLESTEROL: 85 MG/DL (ref 0–130)
MCH RBC QN AUTO: 27.7 PG (ref 25.2–33.5)
MCHC RBC AUTO-ENTMCNC: 31 G/DL (ref 28.4–34.8)
MCV RBC AUTO: 89.6 FL (ref 82.6–102.9)
NRBC AUTOMATED: 0 PER 100 WBC
PDW BLD-RTO: 15.1 % (ref 11.8–14.4)
PLATELET # BLD: 329 K/UL (ref 138–453)
PMV BLD AUTO: 9.8 FL (ref 8.1–13.5)
POTASSIUM SERPL-SCNC: 4.1 MMOL/L (ref 3.7–5.3)
RBC # BLD: 5.19 M/UL (ref 3.95–5.11)
SODIUM BLD-SCNC: 141 MMOL/L (ref 135–144)
TOTAL PROTEIN: 7.2 G/DL (ref 6.4–8.3)
TRIGL SERPL-MCNC: 70 MG/DL
TSH SERPL DL<=0.05 MIU/L-ACNC: 1.05 MIU/L (ref 0.3–5)
VLDLC SERPL CALC-MCNC: NORMAL MG/DL (ref 1–30)
WBC # BLD: 6.8 K/UL (ref 3.5–11.3)

## 2019-01-30 PROCEDURE — G8484 FLU IMMUNIZE NO ADMIN: HCPCS | Performed by: NURSE PRACTITIONER

## 2019-01-30 PROCEDURE — 99215 OFFICE O/P EST HI 40 MIN: CPT | Performed by: NURSE PRACTITIONER

## 2019-01-30 PROCEDURE — G8417 CALC BMI ABV UP PARAM F/U: HCPCS | Performed by: NURSE PRACTITIONER

## 2019-01-30 PROCEDURE — G0447 BEHAVIOR COUNSEL OBESITY 15M: HCPCS | Performed by: NURSE PRACTITIONER

## 2019-01-30 PROCEDURE — 1036F TOBACCO NON-USER: CPT | Performed by: NURSE PRACTITIONER

## 2019-01-30 PROCEDURE — 3017F COLORECTAL CA SCREEN DOC REV: CPT | Performed by: NURSE PRACTITIONER

## 2019-01-30 PROCEDURE — G8427 DOCREV CUR MEDS BY ELIG CLIN: HCPCS | Performed by: NURSE PRACTITIONER

## 2019-02-08 ENCOUNTER — HOSPITAL ENCOUNTER (OUTPATIENT)
Age: 59
Setting detail: SPECIMEN
Discharge: HOME | End: 2019-02-08
Payer: MEDICARE

## 2019-02-08 ENCOUNTER — OFFICE VISIT (OUTPATIENT)
Dept: FAMILY MEDICINE CLINIC | Age: 59
End: 2019-02-08
Payer: MEDICARE

## 2019-02-08 VITALS
HEART RATE: 76 BPM | BODY MASS INDEX: 51.67 KG/M2 | DIASTOLIC BLOOD PRESSURE: 84 MMHG | SYSTOLIC BLOOD PRESSURE: 124 MMHG | OXYGEN SATURATION: 96 % | TEMPERATURE: 98.3 F | WEIGHT: 255.8 LBS

## 2019-02-08 DIAGNOSIS — N63.20 LEFT BREAST MASS: Primary | ICD-10-CM

## 2019-02-08 DIAGNOSIS — Z71.3 DIETARY COUNSELING: ICD-10-CM

## 2019-02-08 DIAGNOSIS — E66.01 MORBID OBESITY WITH BMI OF 50.0-59.9, ADULT (HCC): ICD-10-CM

## 2019-02-08 DIAGNOSIS — Z12.4 CERVICAL CANCER SCREENING: ICD-10-CM

## 2019-02-08 PROCEDURE — G8484 FLU IMMUNIZE NO ADMIN: HCPCS | Performed by: NURSE PRACTITIONER

## 2019-02-08 PROCEDURE — 3017F COLORECTAL CA SCREEN DOC REV: CPT | Performed by: NURSE PRACTITIONER

## 2019-02-08 PROCEDURE — 1036F TOBACCO NON-USER: CPT | Performed by: NURSE PRACTITIONER

## 2019-02-08 PROCEDURE — G8417 CALC BMI ABV UP PARAM F/U: HCPCS | Performed by: NURSE PRACTITIONER

## 2019-02-08 PROCEDURE — 99215 OFFICE O/P EST HI 40 MIN: CPT | Performed by: NURSE PRACTITIONER

## 2019-02-08 PROCEDURE — G0447 BEHAVIOR COUNSEL OBESITY 15M: HCPCS | Performed by: NURSE PRACTITIONER

## 2019-02-08 PROCEDURE — G8427 DOCREV CUR MEDS BY ELIG CLIN: HCPCS | Performed by: NURSE PRACTITIONER

## 2019-02-19 ENCOUNTER — HOSPITAL ENCOUNTER (EMERGENCY)
Age: 59
Discharge: HOME | End: 2019-02-19
Attending: EMERGENCY MEDICINE
Payer: MEDICARE

## 2019-02-19 VITALS
SYSTOLIC BLOOD PRESSURE: 154 MMHG | HEART RATE: 102 BPM | WEIGHT: 243.31 LBS | TEMPERATURE: 98.4 F | BODY MASS INDEX: 49.05 KG/M2 | HEIGHT: 59 IN | DIASTOLIC BLOOD PRESSURE: 80 MMHG | OXYGEN SATURATION: 98 % | RESPIRATION RATE: 18 BRPM

## 2019-02-19 DIAGNOSIS — K08.89 PAIN, DENTAL: Primary | ICD-10-CM

## 2019-02-19 PROCEDURE — 99282 EMERGENCY DEPT VISIT SF MDM: CPT

## 2019-02-19 RX ORDER — HYDROCODONE BITARTRATE AND ACETAMINOPHEN 5; 325 MG/1; MG/1
1 TABLET ORAL EVERY 8 HOURS PRN
Qty: 15 TABLET | Refills: 0 | Status: SHIPPED | OUTPATIENT
Start: 2019-02-19 | End: 2019-02-26

## 2019-02-19 ASSESSMENT — PAIN SCALES - GENERAL: PAINLEVEL_OUTOF10: 10

## 2019-02-19 ASSESSMENT — PAIN DESCRIPTION - ORIENTATION: ORIENTATION: RIGHT

## 2019-02-19 ASSESSMENT — PAIN DESCRIPTION - LOCATION: LOCATION: JAW

## 2019-02-19 ASSESSMENT — PAIN DESCRIPTION - PAIN TYPE: TYPE: ACUTE PAIN

## 2019-02-20 ENCOUNTER — HOSPITAL ENCOUNTER (OUTPATIENT)
Dept: MAMMOGRAPHY | Age: 59
Discharge: HOME | End: 2019-02-22
Payer: MEDICARE

## 2019-02-20 ENCOUNTER — HOSPITAL ENCOUNTER (OUTPATIENT)
Dept: ULTRASOUND IMAGING | Age: 59
Discharge: HOME | End: 2019-02-22
Payer: MEDICARE

## 2019-02-20 ENCOUNTER — TELEPHONE (OUTPATIENT)
Dept: FAMILY MEDICINE CLINIC | Age: 59
End: 2019-02-20

## 2019-02-20 DIAGNOSIS — R92.8 ABNORMAL MAMMOGRAM: ICD-10-CM

## 2019-02-20 DIAGNOSIS — N63.20 LEFT BREAST MASS: ICD-10-CM

## 2019-02-20 DIAGNOSIS — R92.8 ABNORMAL MAMMOGRAM OF LEFT BREAST: Primary | ICD-10-CM

## 2019-02-20 PROCEDURE — 77066 DX MAMMO INCL CAD BI: CPT

## 2019-02-20 PROCEDURE — 76642 ULTRASOUND BREAST LIMITED: CPT

## 2019-02-21 ENCOUNTER — OFFICE VISIT (OUTPATIENT)
Dept: PODIATRY | Age: 59
End: 2019-02-21
Payer: MEDICARE

## 2019-02-21 VITALS — WEIGHT: 235 LBS | BODY MASS INDEX: 47.37 KG/M2 | HEIGHT: 59 IN

## 2019-02-21 DIAGNOSIS — R26.2 TROUBLE WALKING: ICD-10-CM

## 2019-02-21 DIAGNOSIS — M79.672 PAIN IN BOTH FEET: Primary | ICD-10-CM

## 2019-02-21 DIAGNOSIS — G89.29 CHRONIC PAIN OF LEFT ANKLE: ICD-10-CM

## 2019-02-21 DIAGNOSIS — M25.572 CHRONIC PAIN OF LEFT ANKLE: ICD-10-CM

## 2019-02-21 DIAGNOSIS — M79.671 PAIN IN BOTH FEET: Primary | ICD-10-CM

## 2019-02-21 PROCEDURE — G8484 FLU IMMUNIZE NO ADMIN: HCPCS | Performed by: PODIATRIST

## 2019-02-21 PROCEDURE — G8417 CALC BMI ABV UP PARAM F/U: HCPCS | Performed by: PODIATRIST

## 2019-02-21 PROCEDURE — 99213 OFFICE O/P EST LOW 20 MIN: CPT | Performed by: PODIATRIST

## 2019-02-21 PROCEDURE — 1036F TOBACCO NON-USER: CPT | Performed by: PODIATRIST

## 2019-02-21 PROCEDURE — G8427 DOCREV CUR MEDS BY ELIG CLIN: HCPCS | Performed by: PODIATRIST

## 2019-02-21 PROCEDURE — 3017F COLORECTAL CA SCREEN DOC REV: CPT | Performed by: PODIATRIST

## 2019-02-22 LAB — CYTOLOGY REPORT: NORMAL

## 2019-04-02 ENCOUNTER — OFFICE VISIT (OUTPATIENT)
Dept: PODIATRY | Age: 59
End: 2019-04-02
Payer: MEDICARE

## 2019-04-02 VITALS — HEIGHT: 63 IN | WEIGHT: 247 LBS | BODY MASS INDEX: 43.77 KG/M2

## 2019-04-02 DIAGNOSIS — I73.9 PERIPHERAL VASCULAR DISEASE (HCC): ICD-10-CM

## 2019-04-02 DIAGNOSIS — B35.1 DERMATOPHYTOSIS OF NAIL: ICD-10-CM

## 2019-04-02 DIAGNOSIS — R26.2 TROUBLE WALKING: ICD-10-CM

## 2019-04-02 DIAGNOSIS — M79.671 PAIN IN BOTH FEET: Primary | ICD-10-CM

## 2019-04-02 DIAGNOSIS — M79.672 PAIN IN BOTH FEET: Primary | ICD-10-CM

## 2019-04-02 PROCEDURE — 3017F COLORECTAL CA SCREEN DOC REV: CPT | Performed by: PODIATRIST

## 2019-04-02 PROCEDURE — G8427 DOCREV CUR MEDS BY ELIG CLIN: HCPCS | Performed by: PODIATRIST

## 2019-04-02 PROCEDURE — G8417 CALC BMI ABV UP PARAM F/U: HCPCS | Performed by: PODIATRIST

## 2019-04-02 PROCEDURE — 99213 OFFICE O/P EST LOW 20 MIN: CPT | Performed by: PODIATRIST

## 2019-04-02 PROCEDURE — 11721 DEBRIDE NAIL 6 OR MORE: CPT | Performed by: PODIATRIST

## 2019-04-02 PROCEDURE — 1036F TOBACCO NON-USER: CPT | Performed by: PODIATRIST

## 2019-04-08 ENCOUNTER — TELEPHONE (OUTPATIENT)
Dept: PODIATRY | Age: 59
End: 2019-04-08

## 2019-04-08 NOTE — TELEPHONE ENCOUNTER
Pt called today to inform the Dr. Estuardo Ibanez the tape he used at last visit hurt her feet, so she took it off.

## 2019-05-05 ENCOUNTER — HOSPITAL ENCOUNTER (EMERGENCY)
Age: 59
Discharge: HOME | End: 2019-05-05
Attending: EMERGENCY MEDICINE
Payer: MEDICARE

## 2019-05-05 VITALS
HEIGHT: 59 IN | TEMPERATURE: 98.2 F | BODY MASS INDEX: 46.37 KG/M2 | SYSTOLIC BLOOD PRESSURE: 179 MMHG | DIASTOLIC BLOOD PRESSURE: 88 MMHG | RESPIRATION RATE: 16 BRPM | WEIGHT: 230 LBS | HEART RATE: 96 BPM | OXYGEN SATURATION: 96 %

## 2019-05-05 DIAGNOSIS — L50.9 URTICARIA: Primary | ICD-10-CM

## 2019-05-05 PROCEDURE — 96372 THER/PROPH/DIAG INJ SC/IM: CPT

## 2019-05-05 PROCEDURE — 99282 EMERGENCY DEPT VISIT SF MDM: CPT

## 2019-05-05 PROCEDURE — 6360000002 HC RX W HCPCS: Performed by: NURSE PRACTITIONER

## 2019-05-05 RX ORDER — PREDNISONE 10 MG/1
TABLET ORAL
Qty: 20 TABLET | Refills: 0 | Status: SHIPPED | OUTPATIENT
Start: 2019-05-05 | End: 2019-06-13 | Stop reason: SDUPTHER

## 2019-05-05 RX ORDER — DEXAMETHASONE SODIUM PHOSPHATE 10 MG/ML
10 INJECTION, SOLUTION INTRAMUSCULAR; INTRAVENOUS ONCE
Status: COMPLETED | OUTPATIENT
Start: 2019-05-05 | End: 2019-05-05

## 2019-05-05 RX ADMIN — DEXAMETHASONE SODIUM PHOSPHATE 10 MG: 10 INJECTION, SOLUTION INTRAMUSCULAR; INTRAVENOUS at 21:19

## 2019-05-05 ASSESSMENT — PAIN SCALES - GENERAL: PAINLEVEL_OUTOF10: 8

## 2019-05-05 ASSESSMENT — ENCOUNTER SYMPTOMS
SORE THROAT: 0
FACIAL SWELLING: 0
TROUBLE SWALLOWING: 0
SHORTNESS OF BREATH: 0
COLOR CHANGE: 0
COUGH: 0
VOICE CHANGE: 0
SINUS PRESSURE: 0
CHEST TIGHTNESS: 0

## 2019-05-06 ENCOUNTER — TELEPHONE (OUTPATIENT)
Dept: FAMILY MEDICINE CLINIC | Age: 59
End: 2019-05-06

## 2019-05-06 NOTE — ED PROVIDER NOTES
[address] ED  eMERGENCY dEPARTMENT eNCOUnter      Pt Name: Clayton Taveras  MRN: [id number]  [place] 1960  Date of evaluation: 5/5/2019  Provider: JJ Harris CNP    CHIEF COMPLAINT       Chief Complaint   Patient presents with    Urticaria     x week         HISTORY OF PRESENT ILLNESS  (Location/Symptom, Timing/Onset, Context/Setting, Quality, Duration, Modifying Factors, Severity.)   Clayton Taveras is a 66 y.o. female who presents to the emergency department via private auto for an itchy rash to her upper extremities. Onset was within the past week. Denies fever, chills, injury, SOB, oral swelling, difficulty swallowing. She has many allergies. Reports she believes it is from something in the air. Rates her pain 8/10 at this time. She has atarax. Nursing Notes were reviewed. ALLERGIES     Latex; Asa [aspirin]; Codeine; Lisinopril-hydrochlorothiazide; Motrin [ibuprofen]; Other; Pcn [penicillins]; and Sulfa antibiotics    CURRENT MEDICATIONS       Previous Medications    ALBUTEROL (PROVENTIL HFA) 108 (90 BASE) MCG/ACT INHALER    Inhale 2 puffs into the lungs every 4 hours as needed for Wheezing or Shortness of Breath (Space out to every 6 hours as symptoms improve). Space out to every 6 hours as symptoms improve.     ANTIPYRINE-BENZOCAINE (AURALGAN) 54-14 MG/ML OTIC SOLUTION    Place 3 drops into the left ear 4 times daily as needed for Pain    CHOLECALCIFEROL (VITAMIN D3) 2000 UNITS TABS    Take 1 tablet by mouth daily    CLINDAMYCIN (CLEOCIN) 300 MG CAPSULE    Take 150 mg by mouth 4 times daily     ELASTIC BANDAGES & SUPPORTS (JOBST KNEE HIGH COMPRESSION SM) MISC    Dispense Bilateral Jobst Below Knee 20-30mmHg compression stockings    EPINEPHRINE (EPIPEN) 0.3 MG/0.3ML SOAJ INJECTION    INJECT AS DIRECTED IF NEEDED    FLUTICASONE (FLONASE) 50 MCG/ACT NASAL SPRAY    1 spray by Nasal route daily    FLUTICASONE-SALMETEROL (ADVAIR) 250-50 MCG/DOSE AEPB    Inhale 1 puff into the lungs every 12 hours    HYDROCORTISONE 2.5 % CREAM    apply to affected area twice a day    HYDROXYZINE (ATARAX) 25 MG TABLET    Take 25 mg by mouth daily as needed for Itching     LORATADINE (CLARITIN) 10 MG TABLET    Take 10 mg by mouth daily    LOSARTAN-HYDROCHLOROTHIAZIDE (HYZAAR) 100-25 MG PER TABLET    Take 1 tablet by mouth daily 1/2 pill daily    MISC. DEVICES (BATHTUB SAFETY RAIL) MISC    4 each by Does not apply route daily    MISC. DEVICES (EXTENDABLE BEDSIDE RAIL) MISC    4 each by Does not apply route daily    MISC. DEVICES (RAISED TOILET SEAT) MISC    USE AS DIRECTED    MISC. DEVICES (WALL GRAB BAR) MISC    GRAB BARS FOR BOTH SHOWER AND TOILET, USE AS DIRECTED    MISC. DEVICES [facility]) MISC    1 Device by Does not apply route once for 1 dose    MISC. DEVICES [facility]) MISC    Use as directed    MISC. DEVICES MISC    OUTSIDE RAMP THAT INCLUDES RAILINGS, USE AS DIRECTED    NEOMYCIN-POLYMYXIN-HYDROCORTISONE (CORTISPORIN) 3.5-65590-4 OTIC SUSPENSION    instill 4 drops into affected ear  (S) three times a day    SPACER/AERO-HOLDING CHAMBERS PRICE    1 Device by Does not apply route daily as needed (wheezing)       PAST MEDICAL HISTORY         Diagnosis Date    Allergic rhinitis 12/4/2014    Anxiety 7/5/2018    Arthritis     Asthma     Chest pain     occurs every other day, pt states they occur when she doesn't eat right or forget to take her bp med, occurs left side of chest with no radiation, pt states \"it's new\" and has not had any testing done or seen anyone for it.     Constipation     Depressive disorder 7/5/2018    Generalized osteoarthritis 6/26/2014    History of kidney stones     Hypertension     Obesity, morbid, BMI 40.0-49.9 ([facility] [address] 2/28/2017    PONV (postoperative nausea and vomiting)     Sleep apnea     does not use a machine       SURGICAL HISTORY           Procedure Laterality Date    CHOLECYSTECTOMY      FOOT TENDON SURGERY Left 09/08/2017    posterior repair    FOOT PHYSICAL EXAM    (up to 7 for level 4, 8 or more for level 5)     ED Triage Vitals   BP Temp Temp Source Pulse Resp SpO2 Height Weight   05/05/19 2105 05/05/19 2104 05/05/19 2104 05/05/19 2104 05/05/19 2104 05/05/19 2104 05/05/19 2104 05/05/19 2104   (!) 179/88 98.2 °F (36.8 °C) Oral 96 16 96 % 4' 11\" (1.499 m) 230 lb (104.3 kg)     Physical Exam   Constitutional: She is oriented to person, place, and time. She appears well-developed and well-nourished. No distress. HENT:   Right Ear: External ear normal.   Left Ear: External ear normal.   Mouth/Throat: Oropharynx is clear and moist.   No visible oral or facial swelling noted. Patient is speaking in full sentences, handling her secretions well. Eyes: Conjunctivae are normal.   Cardiovascular: Normal rate, regular rhythm and normal heart sounds. Pulmonary/Chest: Effort normal and breath sounds normal. No respiratory distress. She has no wheezes. She has no rales. Neurological: She is alert and oriented to person, place, and time. Skin: Skin is warm and dry. Capillary refill takes less than 2 seconds. Rash noted. Rash is urticarial (to bilateral forearms. ). She is not diaphoretic. Psychiatric: She has a normal mood and affect. Her behavior is normal.   Vitals reviewed. EMERGENCY DEPARTMENT COURSE and DIFFERENTIAL DIAGNOSIS/MDM:   Vitals:    Vitals:    05/05/19 2104 05/05/19 2105   BP:  (!) 179/88   Pulse: 96    Resp: 16    Temp: 98.2 °F (36.8 °C)    TempSrc: Oral    SpO2: 96%    Weight: 230 lb (104.3 kg)    Height: 4' 11\" (1.499 m)          MEDICATIONS GIVEN IN THE ED:  Medications   dexamethasone (PF) (DECADRON) injection 10 mg (has no administration in time range)       CLINICAL DECISION MAKING:  The patient presented alert with a nontoxic appearance and was seen in conjunction with Dr. Bibiana Darling. A prescription was written for prednisone. Follow up with pcp, return to ED if condition worsens. FINAL IMPRESSION      1.  Urticaria

## 2019-05-07 ENCOUNTER — OFFICE VISIT (OUTPATIENT)
Dept: FAMILY MEDICINE CLINIC | Age: 59
End: 2019-05-07
Payer: MEDICARE

## 2019-05-07 VITALS
DIASTOLIC BLOOD PRESSURE: 64 MMHG | SYSTOLIC BLOOD PRESSURE: 146 MMHG | WEIGHT: 256.4 LBS | BODY MASS INDEX: 51.79 KG/M2 | OXYGEN SATURATION: 98 % | HEART RATE: 69 BPM

## 2019-05-07 DIAGNOSIS — Z91.09 ENVIRONMENTAL ALLERGIES: Primary | ICD-10-CM

## 2019-05-07 DIAGNOSIS — Z12.11 SCREENING FOR COLORECTAL CANCER: ICD-10-CM

## 2019-05-07 DIAGNOSIS — J45.20 MILD INTERMITTENT ASTHMA WITHOUT COMPLICATION: ICD-10-CM

## 2019-05-07 DIAGNOSIS — I10 ESSENTIAL HYPERTENSION: ICD-10-CM

## 2019-05-07 DIAGNOSIS — Z12.12 SCREENING FOR COLORECTAL CANCER: ICD-10-CM

## 2019-05-07 PROCEDURE — 1036F TOBACCO NON-USER: CPT | Performed by: NURSE PRACTITIONER

## 2019-05-07 PROCEDURE — 3017F COLORECTAL CA SCREEN DOC REV: CPT | Performed by: NURSE PRACTITIONER

## 2019-05-07 PROCEDURE — 99214 OFFICE O/P EST MOD 30 MIN: CPT | Performed by: NURSE PRACTITIONER

## 2019-05-07 PROCEDURE — G8427 DOCREV CUR MEDS BY ELIG CLIN: HCPCS | Performed by: NURSE PRACTITIONER

## 2019-05-07 PROCEDURE — G8417 CALC BMI ABV UP PARAM F/U: HCPCS | Performed by: NURSE PRACTITIONER

## 2019-05-07 RX ORDER — ALBUTEROL SULFATE 90 UG/1
2 AEROSOL, METERED RESPIRATORY (INHALATION) EVERY 6 HOURS PRN
Qty: 1 INHALER | Refills: 3 | Status: SHIPPED | OUTPATIENT
Start: 2019-05-07 | End: 2019-10-02 | Stop reason: SDUPTHER

## 2019-05-07 RX ORDER — ACETAMINOPHEN AND CODEINE PHOSPHATE 300; 30 MG/1; MG/1
1 TABLET ORAL EVERY 4 HOURS PRN
COMMUNITY
End: 2019-10-08 | Stop reason: ALTCHOICE

## 2019-05-07 RX ORDER — EPINEPHRINE 0.3 MG/.3ML
0.3 INJECTION SUBCUTANEOUS ONCE
Qty: 1 EACH | Refills: 0 | Status: SHIPPED | OUTPATIENT
Start: 2019-05-07 | End: 2020-12-03

## 2019-05-07 RX ORDER — FLUTICASONE PROPIONATE 50 MCG
SPRAY, SUSPENSION (ML) NASAL
Qty: 16 G | Refills: 5 | Status: SHIPPED | OUTPATIENT
Start: 2019-05-07 | End: 2020-03-10

## 2019-05-07 RX ORDER — LOSARTAN POTASSIUM AND HYDROCHLOROTHIAZIDE 25; 100 MG/1; MG/1
TABLET ORAL
Qty: 45 TABLET | Refills: 5 | Status: SHIPPED | OUTPATIENT
Start: 2019-05-07 | End: 2019-10-02 | Stop reason: SDUPTHER

## 2019-05-07 RX ORDER — LOSARTAN POTASSIUM AND HYDROCHLOROTHIAZIDE 25; 100 MG/1; MG/1
1 TABLET ORAL DAILY
Qty: 30 TABLET | Refills: 1 | Status: SHIPPED | OUTPATIENT
Start: 2019-05-07 | End: 2019-05-07 | Stop reason: SDUPTHER

## 2019-05-07 RX ORDER — ALCOHOL 62 ML/100ML
LIQUID TOPICAL
Qty: 30 TABLET | Refills: 5 | Status: SHIPPED | OUTPATIENT
Start: 2019-05-07 | End: 2020-07-13

## 2019-05-07 NOTE — PROGRESS NOTES
Visit Information    Have you changed or started any medications since your last visit including any over-the-counter medicines, vitamins, or herbal medicines? no   Have you stopped taking any of your medications? Is so, why? -  no  Are you having any side effects from any of your medications? - no    Have you seen any other physician or provider since your last visit? yes - Dr. Kuldip Alonzo   Have you had any other diagnostic tests since your last visit?  no   Have you been seen in the emergency room and/or had an admission in a hospital since we last saw you?  yes - 5/5/19   Have you had your routine dental cleaning in the past 6 months? Do you have an active MyChart account? If no, what is the barrier?   No: inactive    Patient Care Team:  JJ Victoria - CNP as PCP - General (Nurse Practitioner)  Tawanna Das DPM as Physician (Podiatry)    Medical History Review  Past Medical, Family, and Social History reviewed and contribute to the patient presenting condition    Health Maintenance   Topic Date Due    Pneumococcal 0-64 years Vaccine (1 of 1 - PPSV23) 09/02/1968    Shingles Vaccine (1 of 2) 09/02/2012    Colon Cancer Screen FIT/FOBT  08/22/2014    Flu vaccine (Season Ended) 09/01/2019    A1C test (Diabetic or Prediabetic)  01/30/2020    Potassium monitoring  01/30/2020    Creatinine monitoring  01/30/2020    Breast cancer screen  02/20/2021    Cervical cancer screen  02/08/2022    Lipid screen  01/30/2024    DTaP/Tdap/Td vaccine (2 - Td) 06/03/2026    Hepatitis C screen  Completed    HIV screen  Completed

## 2019-05-07 NOTE — PROGRESS NOTES
Cornel Rich, EDWIGEP-C  [address]  [id number] [facility]. [address] [place] [address]  [phone]  [phone]    Brandy Kawasaki is a 66 y.o. female who is here with c/o of:    Chief Complaint: ED Follow-up and Rash      Patient Accompanied by: patient    HPI - Brandy Kawasaki is here today with c/o:    Patient is here today with c/o rash that she states she gets \"all of the time\". She reports she has been seen by ENT and allergist. She reports that she gets hives every time it rains because of \"what the government is doing\". She reports she has to go to the ED for steroid injections all of the time. She also reports carrying an epi-pen that she states is ready to . She reports \"operation [employer] is putting something in the rain that is causing hives all over her and causing her throat to swell up\"    Treatment Adherence:   Medication compliance:  noncompliant: patient is taking half of antihypertensive medication as she does not feel that she needs a full dose  Diet compliance:  noncompliant: patient does not eat a low fat, low sodium diet  Weight trend: stable  Current exercise: no regular exercise  Barriers: lack of motivation    Hypertension:  Home blood pressure monitoring: No.  She is not adherent to a low sodium diet. Patient denies chest pain, shortness of breath, headache, lightheadedness, blurred vision, peripheral edema, palpitations, dry cough and fatigue. Antihypertensive medication side effects: no medication side effects noted. Use of agents associated with hypertension: none.        Lab Results   Component Value Date    LABA1C 5.8 2019    LABA1C 5.6 2017    LABA1C 6.0 2012     Lab Results   Component Value Date    LABMICR 5 2013    CREATININE 0.51 2019     Lab Results   Component Value Date    ALT 26 2019    AST 20 2019     Lab Results   Component Value Date    CHOL 163 2019    TRIG 70 2019    HDL 64 01/30/2019            Patient Active Problem List:     Essential hypertension     YAMEL (obstructive sleep apnea)     Allergic asthma     Obesity, morbid, BMI 40.0-49.9 ([facility])     Urticaria     Contracture of left ankle     Chronic pain of left ankle     Spontaneous rupture of flexor tendon of left lower leg     Allergic rhinitis     Anxiety     Benign neoplasm of skin     Constipation     Cyst of ovary     Generalized osteoarthritis     Depressive disorder     Edema     Food allergy     Osteoarthrosis involving multiple sites but not designated as generalized     Reactive airway disease     Rupture of posterior tibialis tendon     Vitamin D deficiency     Morbid obesity with BMI of 50.0-59.9, adult ([facility] [address]     Nontraumatic rupture of left posterior tibial tendon     Status post left foot surgery     Past Medical History:   Diagnosis Date    Allergic rhinitis 12/4/2014    Anxiety 7/5/2018    Arthritis     Asthma     Chest pain     occurs every other day, pt states they occur when she doesn't eat right or forget to take her bp med, occurs left side of chest with no radiation, pt states \"it's new\" and has not had any testing done or seen anyone for it.     Constipation     Depressive disorder 7/5/2018    Generalized osteoarthritis 6/26/2014    History of kidney stones     Hypertension     Obesity, morbid, BMI 40.0-49.9 ([facility] [address] 2/28/2017    PONV (postoperative nausea and vomiting)     Sleep apnea     does not use a machine      Past Surgical History:   Procedure Laterality Date    CHOLECYSTECTOMY      FOOT TENDON SURGERY Left 09/08/2017    posterior repair    FOOT TENDON SURGERY Left 06/29/2018    LEFT POSTERIOR  TENDON REPAIR FLEXOR TENDON TRANSFER (Left Foot)    LITHOTRIPSY      [place] OFFICE/OUTPT VISIT,PROCEDURE ONLY Left 6/29/2018    LEFT POSTERIOR  TENDON REPAIR FLEXOR TENDON TRANSFER performed by Lobo Guillermo DPM at [address] [place] [address] Drive Left 9/8/2017    LEFT POSTERIOR TIBIAL TENDON REPAIR myalgias, neck pain and neck stiffness. · Skin: Negative for wound. Positive for rash  · Allergic/Immunologic: Negative for environmental allergies and food allergies. · Neurological:  Negative for dizziness, light-headedness, numbness and headaches. · Hematological:  Negative for adenopathy. Does not bruise/bleed easily. · Psychiatric/Behavioral: Negative for self-injury, sleep disturbance and suicidal ideas. Objective     PHYSICAL EXAM:   · Constitutional: Eva Ricardo is oriented to person, place, and time. Vital signs are normal. Appears well-developed and well-nourished. · HEENT:   · Head: Normocephalic and atraumatic. Right Ear: Hearing and external ear normal.     · Left Ear: Hearing and external ear normal.    · Nose: Nares normal. Septum midline. No drainage or sinus tenderness. Mucosa pink and moist  · Mouth/Throat: Oropharynx- No erythema, no exudate. Uvula midline, no erythema, no edema. Mucous membranes are pink and moist.   · Eyes:PERRL, EOMI, Conjunctiva normal, No discharge. · Neck: Full passive range of motion. Non-tender on palpation. Neck supple. No thyromegaly present. Trachea normal.  · Cardiovascular: Normal rate, regular rhythm, S1, S2, no murmur, no gallop, no friction rub, intact distal pulses. · Pulmonary/Chest: Breath sounds are clear throughout, No respiratory distress, No wheezing, No chest tenderness. Effort normal  · Abdominal: Soft. Normal appearance, bowel sounds are present and normoactive. There is no hepatosplenomegaly. There is no tenderness. There is no CVA tenderness. · Musculoskeletal: Extremities appear regular and symmetric. No evident masses, lesions, foreign bodies, or other abnormalities. No edema. No tenderness on palpation. Joints are stable. Full ROM, strength and tone are within normal limits. · Lymphadenopathy: No lymphadenopathy noted. · Neurological: Alert and oriented to person, place, and time.  Normal motor function, Normal sensory function, No focal deficits noted. He has normal strength. · Skin: Skin is warm, dry and intact. No obvious lesions on exposed skin  · Psychiatric: Normal mood and affect. Speech is normal and behavior is normal.       Nursing note and vitals reviewed. Blood pressure (!) 146/64, pulse 69, weight 256 lb 6.4 oz (116.3 kg), SpO2 98 %, not currently breastfeeding. Body mass index is 51.79 kg/m². Wt Readings from Last 3 Encounters:   05/07/19 256 lb 6.4 oz (116.3 kg)   05/05/19 230 lb (104.3 kg)   04/02/19 247 lb (112 kg)     BP Readings from Last 3 Encounters:   05/07/19 (!) 146/64   05/05/19 (!) 179/88   02/19/19 (!) 154/80       No results found for this visit on 05/07/19. Completed Orders/Prescriptions   Orders Placed This Encounter   Medications    EPINEPHrine (EPIPEN) 0.3 MG/0.3ML SOAJ injection     Sig: Inject 0.3 mLs into the muscle once for 1 dose Use as directed for allergic reaction     Dispense:  1 each     Refill:  0    DISCONTD: losartan-hydrochlorothiazide (HYZAAR) 100-25 MG per tablet     Sig: Take 1 tablet by mouth daily 1/2 pill daily     Dispense:  30 tablet     Refill:  1    fluticasone-salmeterol (ADVAIR) 250-50 MCG/DOSE AEPB     Sig: Inhale 1 puff into the lungs every 12 hours     Dispense:  60 each     Refill:  3    albuterol sulfate HFA (VENTOLIN HFA) 108 (90 Base) MCG/ACT inhaler     Sig: Inhale 2 puffs into the lungs every 6 hours as needed for Wheezing     Dispense:  1 Inhaler     Refill:  3               AssessmentPlan/Medical Decision Making     1. Environmental allergies  - Physical assessment of skin NORMAL - no rash or lesions to any part of her body. Mild erythema to bilateral forearms after she scratched them  - EPINEPHrine (EPIPEN) 0.3 MG/0.3ML SOAJ injection; Inject 0.3 mLs into the muscle once for 1 dose Use as directed for allergic reaction  Dispense: 1 each; Refill: 0    2.  Mild intermittent asthma without complication  - fluticasone-salmeterol (ADVAIR) 250-50 MCG/DOSE AEPB; Inhale 1 puff into the lungs every 12 hours  Dispense: 60 each; Refill: 3  - albuterol sulfate HFA (VENTOLIN HFA) 108 (90 Base) MCG/ACT inhaler; Inhale 2 puffs into the lungs every 6 hours as needed for Wheezing  Dispense: 1 Inhaler; Refill: 3    3. Essential hypertension  - not controlled  - I have stressed the importance and consequences of lack of medication compliance as well as diet and exercise. Patient states understanding    4. Screening for colorectal cancer  - COLOGUARD (For external results only)      Return in about 1 month (around 6/7/2019) for HTN. 1.  Ev received counseling on the following healthy behaviors: nutrition, exercise and medication adherence  2. Patient given educational materials - see patient instructions  3. Was a self-tracking handout given in paper form or via [employer]? No  If yes, see orders or list here. 4.  Discussed use, benefit, and side effects of prescribed medications. Barriers to medication compliance addressed. All patient questions answered. Pt voiced understanding. 5.  Reviewed prior labs, imaging, consultation, follow up, and health maintenance  6. Continue current medications, diet and exercise. 7. Discussed use, benefit, and side effects of prescribed medications. Barriers to medication compliance addressed. All her questions were answered. Pt voiced understanding. Melody Martell will continue current medications, diet and exercise. Patient given educational materials on DASH diet    Of the 25 minute duration appointment visit, Gifty Canada CNP spent at least 50% of the face-to-face time in counseling, explanation of diagnosis, planning of further management, and answering all questions.     Signed:  Gifty Canada CNP    This note is created with the assistance of a speech-recognition program.  While intending to generate a document that actually reflects the content of the visit, no guarantees can be provided that every mistake has been identified and corrected by editing.

## 2019-05-07 NOTE — TELEPHONE ENCOUNTER
osteoarthritis     Depressive disorder     Edema     Food allergy     Osteoarthrosis involving multiple sites but not designated as generalized     Reactive airway disease     Rupture of posterior tibialis tendon     Vitamin D deficiency     Morbid obesity with BMI of 50.0-59.9, adult ([facility] [address]     Nontraumatic rupture of left posterior tibial tendon     Status post left foot surgery

## 2019-06-04 ENCOUNTER — OFFICE VISIT (OUTPATIENT)
Dept: PODIATRY | Age: 59
End: 2019-06-04
Payer: MEDICARE

## 2019-06-04 VITALS — RESPIRATION RATE: 16 BRPM | HEIGHT: 62 IN | BODY MASS INDEX: 47.11 KG/M2 | WEIGHT: 256 LBS

## 2019-06-04 DIAGNOSIS — M25.572 CHRONIC PAIN OF LEFT ANKLE: ICD-10-CM

## 2019-06-04 DIAGNOSIS — M79.672 PAIN IN BOTH FEET: ICD-10-CM

## 2019-06-04 DIAGNOSIS — B35.1 DERMATOPHYTOSIS OF NAIL: ICD-10-CM

## 2019-06-04 DIAGNOSIS — R26.2 TROUBLE WALKING: Primary | ICD-10-CM

## 2019-06-04 DIAGNOSIS — G89.29 CHRONIC PAIN OF LEFT ANKLE: ICD-10-CM

## 2019-06-04 DIAGNOSIS — M79.671 PAIN IN BOTH FEET: ICD-10-CM

## 2019-06-04 PROCEDURE — 3017F COLORECTAL CA SCREEN DOC REV: CPT | Performed by: PODIATRIST

## 2019-06-04 PROCEDURE — 99213 OFFICE O/P EST LOW 20 MIN: CPT | Performed by: PODIATRIST

## 2019-06-04 PROCEDURE — 11721 DEBRIDE NAIL 6 OR MORE: CPT | Performed by: PODIATRIST

## 2019-06-04 PROCEDURE — G8427 DOCREV CUR MEDS BY ELIG CLIN: HCPCS | Performed by: PODIATRIST

## 2019-06-04 PROCEDURE — 1036F TOBACCO NON-USER: CPT | Performed by: PODIATRIST

## 2019-06-04 PROCEDURE — G8417 CALC BMI ABV UP PARAM F/U: HCPCS | Performed by: PODIATRIST

## 2019-06-06 ENCOUNTER — OFFICE VISIT (OUTPATIENT)
Dept: FAMILY MEDICINE CLINIC | Age: 59
End: 2019-06-06
Payer: MEDICARE

## 2019-06-06 VITALS
OXYGEN SATURATION: 97 % | BODY MASS INDEX: 47.19 KG/M2 | DIASTOLIC BLOOD PRESSURE: 82 MMHG | TEMPERATURE: 98.1 F | SYSTOLIC BLOOD PRESSURE: 138 MMHG | WEIGHT: 258 LBS | HEART RATE: 87 BPM

## 2019-06-06 DIAGNOSIS — G89.29 CHRONIC FOOT PAIN, LEFT: ICD-10-CM

## 2019-06-06 DIAGNOSIS — I10 ESSENTIAL HYPERTENSION: Primary | ICD-10-CM

## 2019-06-06 DIAGNOSIS — L98.9 SKIN LESION OF LEFT LEG: ICD-10-CM

## 2019-06-06 DIAGNOSIS — M79.672 CHRONIC FOOT PAIN, LEFT: ICD-10-CM

## 2019-06-06 PROCEDURE — 99214 OFFICE O/P EST MOD 30 MIN: CPT | Performed by: NURSE PRACTITIONER

## 2019-06-06 PROCEDURE — 3017F COLORECTAL CA SCREEN DOC REV: CPT | Performed by: NURSE PRACTITIONER

## 2019-06-06 PROCEDURE — G8427 DOCREV CUR MEDS BY ELIG CLIN: HCPCS | Performed by: NURSE PRACTITIONER

## 2019-06-06 PROCEDURE — 1036F TOBACCO NON-USER: CPT | Performed by: NURSE PRACTITIONER

## 2019-06-06 PROCEDURE — G8417 CALC BMI ABV UP PARAM F/U: HCPCS | Performed by: NURSE PRACTITIONER

## 2019-06-06 NOTE — PROGRESS NOTES
08/22/2013    CREATININE 0.51 01/30/2019     Lab Results   Component Value Date    ALT 26 01/30/2019    AST 20 01/30/2019     Lab Results   Component Value Date    CHOL 163 01/30/2019    TRIG 70 01/30/2019    HDL 64 01/30/2019            Patient Active Problem List:     Essential hypertension     YAMEL (obstructive sleep apnea)     Allergic asthma     Obesity, morbid, BMI 40.0-49.9 ([facility])     Urticaria     Contracture of left ankle     Chronic pain of left ankle     Spontaneous rupture of flexor tendon of left lower leg     Allergic rhinitis     Anxiety     Benign neoplasm of skin     Constipation     Cyst of ovary     Generalized osteoarthritis     Depressive disorder     Edema     Food allergy     Osteoarthrosis involving multiple sites but not designated as generalized     Reactive airway disease     Rupture of posterior tibialis tendon     Vitamin D deficiency     Morbid obesity with BMI of 50.0-59.9, adult ([facility] [address]     Nontraumatic rupture of left posterior tibial tendon     Status post left foot surgery     Mild intermittent asthma without complication     Past Medical History:   Diagnosis Date    Allergic rhinitis 12/4/2014    Anxiety 7/5/2018    Arthritis     Asthma     Chest pain     occurs every other day, pt states they occur when she doesn't eat right or forget to take her bp med, occurs left side of chest with no radiation, pt states \"it's new\" and has not had any testing done or seen anyone for it.     Constipation     Depressive disorder 7/5/2018    Generalized osteoarthritis 6/26/2014    History of kidney stones     Hypertension     Obesity, morbid, BMI 40.0-49.9 ([facility] [address] 2/28/2017    PONV (postoperative nausea and vomiting)     Sleep apnea     does not use a machine      Past Surgical History:   Procedure Laterality Date    CHOLECYSTECTOMY      FOOT TENDON SURGERY Left 09/08/2017    posterior repair    FOOT TENDON SURGERY Left 06/29/2018    LEFT POSTERIOR  TENDON REPAIR FLEXOR TENDON TRANSFER (Left Foot)    LITHOTRIPSY      [place] OFFICE/OUTPT VISIT,PROCEDURE ONLY Left 2018    LEFT POSTERIOR  TENDON REPAIR FLEXOR TENDON TRANSFER performed by Gloria Rodrigez DPM at [address] Left 2017    LEFT POSTERIOR TIBIAL TENDON REPAIR WITH LEFT TENOLYSIS  performed by Gloria Rodrigez DPM at [address] [place] [address] History   Problem Relation Age of Onset    Depression Mother     Diabetes Mother     High Blood Pressure Mother     Mental Illness Mother     Substance Abuse Father     Depression Sister     Diabetes Sister     High Blood Pressure Sister     Mental Illness Sister     Depression Brother     Mental Illness Brother     Substance Abuse Brother      Social History     Tobacco Use    Smoking status: Former Smoker     Packs/day: 3.00     Years: 20.00     Pack years: 60.00     Types: Cigarettes     Start date: 1981     Last attempt to quit: 1998     Years since quittin.4    Smokeless tobacco: Never Used    Tobacco comment: quit smoking 15-20 yrs ago   Substance Use Topics    Alcohol use: No     ALLERGIES:    Allergies   Allergen Reactions    Latex Hives     Latex gloves    Asa [Aspirin] Hives    Codeine Hives    Lisinopril-Hydrochlorothiazide Hives    Motrin [Ibuprofen] Hives    Other Hives and Swelling     Cherries, almonds    Pcn [Penicillins] Hives    Sulfa Antibiotics           Subjective     · Constitutional:  Negative for activity change, appetite change,unexpected weight change, chills, fever, and fatigue. · HENT: Negative for ear pain, sore throat,  Rhinorrhea, sinus pain, sinus pressure, congestion. · Eyes:  Negative for pain and discharge. · Respiratory:  Negative for chest tightness, shortness of breath, wheezing, and cough. · Cardiovascular:  Negative for chest pain, palpitations and leg swelling. · Gastrointestinal: Negative for abdominal pain, blood in stool, constipation,diarrhea, nausea and vomiting.    · Endocrine: Negative for cold intolerance, heat intolerance, polydipsia, polyphagia and polyuria. · Genitourinary: Negative for difficulty urinating, dysuria, flank pain, frequency, hematuria and urgency. · Musculoskeletal: Negative for arthralgias, back pain, joint swelling, myalgias, neck pain and neck stiffness. Positive for left foot pain  · Skin: Negative for rash and wound. Positive for left lower leg skin lesion  · Allergic/Immunologic: Negative for environmental allergies and food allergies. · Neurological:  Negative for dizziness, light-headedness, numbness and headaches. · Hematological:  Negative for adenopathy. Does not bruise/bleed easily. · Psychiatric/Behavioral: Negative for self-injury, sleep disturbance and suicidal ideas. Objective     PHYSICAL EXAM:   · Constitutional: Surendra Pham is oriented to person, place, and time. Vital signs are normal. Appears well-developed and well-nourished. · HEENT:   · Head: Normocephalic and atraumatic. Right Ear: Hearing and external ear normal.     · Left Ear: Hearing and external ear normal.    · Nose: Nares normal. Septum midline. No drainage or sinus tenderness. Mucosa pink and moist  · Mouth/Throat: Oropharynx- No erythema, no exudate. Uvula midline, no erythema, no edema. Mucous membranes are pink and moist.   · Eyes:PERRL, EOMI, Conjunctiva normal, No discharge. · Neck: Full passive range of motion. Non-tender on palpation. Neck supple. No thyromegaly present. Trachea normal.  · Cardiovascular: Normal rate, regular rhythm, S1, S2, no murmur, no gallop, no friction rub, intact distal pulses. Minimal bilateral lower extremity edema, less on the left as patient has compression stocking on  · Pulmonary/Chest: Breath sounds are clear throughout, No respiratory distress, No wheezing, No chest tenderness. Effort normal  · Abdominal: Soft. Normal appearance, bowel sounds are present and normoactive. There is no hepatosplenomegaly. There is no tenderness. There is no CVA tenderness. · Musculoskeletal: Extremities appear regular and symmetric. No evident masses, lesions, foreign bodies, or other abnormalities. No edema. No tenderness on palpation. Joints are stable. Full ROM, strength and tone are within normal limits. Left foot- tenderness, ltd ROM of ankle and toes due to reported discomfort during exam, tenderness to ankle and foot  · Lymphadenopathy: No lymphadenopathy noted. · Neurological: Alert and oriented to person, place, and time. Normal motor function, Normal sensory function, No focal deficits noted. He has normal strength. · Skin: Skin is warm, dry and intact. No obvious lesions on exposed skin. Left later mid lower leg - 1cm circular, flat, brown, dry lesion  · Psychiatric: Normal mood and affect. Speech is normal and behavior is normal.       Nursing note and vitals reviewed. Blood pressure 138/82, pulse 87, temperature 98.1 °F (36.7 °C), temperature source Temporal, weight 258 lb (117 kg), SpO2 97 %, not currently breastfeeding. Body mass index is 47.19 kg/m². Wt Readings from Last 3 Encounters:   06/06/19 258 lb (117 kg)   06/04/19 256 lb (116.1 kg)   05/07/19 256 lb 6.4 oz (116.3 kg)     BP Readings from Last 3 Encounters:   06/06/19 138/82   05/07/19 (!) 146/64   05/05/19 (!) 179/88       No results found for this visit on 06/06/19. Completed Orders/Prescriptions   No orders of the defined types were placed in this encounter. AssessmentPlan/Medical Decision Making     1. Essential hypertension  - reviewed DASH diet  - continue losartan-hydrochlorothiazide 100-25mg daily  - reviewed importance of medication compliance, diet and exercise    2. Skin lesion of left leg  - Juan Carias MD, Dermatology, [place]    3.  Chronic foot pain, left  - follow up with podiatry as scheduled  - patient will contact me with a provider for pain management - will provide referral      Return in about 3 months (around 9/6/2019) for Routine follow up of chronic conditions. 1.  Ev received counseling on the following healthy behaviors: nutrition, exercise and medication adherence  2. Patient given educational materials - see patient instructions  3. Was a self-tracking handout given in paper form or via [employer]? No  If yes, see orders or list here. 4.  Discussed use, benefit, and side effects of prescribed medications. Barriers to medication compliance addressed. All patient questions answered. Pt voiced understanding. 5.  Reviewed prior labs, imaging, consultation, follow up, and health maintenance  6. Continue current medications, diet and exercise. 7. Discussed use, benefit, and side effects of prescribed medications. Barriers to medication compliance addressed. All her questions were answered. Pt voiced understanding. Cristopher Darby will continue current medications, diet and exercise. Patient given educational materials on DASH diet    Of the 25 minute duration appointment visit, Gonzalez Barlow CNP spent at least 50% of the face-to-face time in counseling, explanation of diagnosis, planning of further management, and answering all questions. Signed:  Gonzalez Barlow CNP    This note is created with the assistance of a speech-recognition program.  While intending to generate a document that actually reflects the content of the visit, no guarantees can be provided that every mistake has been identified and corrected by editing.

## 2019-06-10 ENCOUNTER — HOSPITAL ENCOUNTER (EMERGENCY)
Age: 59
Discharge: HOME | End: 2019-06-10
Attending: EMERGENCY MEDICINE
Payer: MEDICARE

## 2019-06-10 VITALS
HEIGHT: 60 IN | OXYGEN SATURATION: 96 % | WEIGHT: 250 LBS | DIASTOLIC BLOOD PRESSURE: 70 MMHG | RESPIRATION RATE: 17 BRPM | BODY MASS INDEX: 49.08 KG/M2 | HEART RATE: 71 BPM | SYSTOLIC BLOOD PRESSURE: 146 MMHG | TEMPERATURE: 97.9 F

## 2019-06-10 DIAGNOSIS — R03.0 ELEVATED BLOOD PRESSURE READING: ICD-10-CM

## 2019-06-10 DIAGNOSIS — S39.012A STRAIN OF LUMBAR REGION, INITIAL ENCOUNTER: Primary | ICD-10-CM

## 2019-06-10 PROCEDURE — 99282 EMERGENCY DEPT VISIT SF MDM: CPT

## 2019-06-10 PROCEDURE — 6370000000 HC RX 637 (ALT 250 FOR IP): Performed by: PHYSICIAN ASSISTANT

## 2019-06-10 PROCEDURE — 96372 THER/PROPH/DIAG INJ SC/IM: CPT

## 2019-06-10 PROCEDURE — 6360000002 HC RX W HCPCS: Performed by: PHYSICIAN ASSISTANT

## 2019-06-10 RX ORDER — METHOCARBAMOL 750 MG/1
750 TABLET, FILM COATED ORAL 4 TIMES DAILY
Qty: 40 TABLET | Refills: 0 | Status: SHIPPED | OUTPATIENT
Start: 2019-06-10 | End: 2019-06-20

## 2019-06-10 RX ORDER — METAXALONE 800 MG/1
800 TABLET ORAL ONCE
Status: COMPLETED | OUTPATIENT
Start: 2019-06-10 | End: 2019-06-10

## 2019-06-10 RX ORDER — DEXAMETHASONE SODIUM PHOSPHATE 10 MG/ML
10 INJECTION INTRAMUSCULAR; INTRAVENOUS ONCE
Status: COMPLETED | OUTPATIENT
Start: 2019-06-10 | End: 2019-06-10

## 2019-06-10 RX ADMIN — METAXALONE 800 MG: 800 TABLET ORAL at 12:47

## 2019-06-10 RX ADMIN — DEXAMETHASONE SODIUM PHOSPHATE 10 MG: 10 INJECTION INTRAMUSCULAR; INTRAVENOUS at 12:47

## 2019-06-10 ASSESSMENT — PAIN DESCRIPTION - PAIN TYPE: TYPE: CHRONIC PAIN

## 2019-06-10 ASSESSMENT — PAIN SCALES - GENERAL: PAINLEVEL_OUTOF10: 10

## 2019-06-10 ASSESSMENT — PAIN DESCRIPTION - LOCATION: LOCATION: BACK

## 2019-06-10 NOTE — ED PROVIDER NOTES
[address] [place] [address] ED  [facility]      Pt Name: Liliana Shelby  MRN: [id number]  [place] 1960  Date ofevaluation: 6/10/2019  Provider: Christal Talbot PA-C    CHIEF COMPLAINT       Chief Complaint   Patient presents with    Back Pain         HISTORY OF PRESENT ILLNESS  (Location/Symptom, Timing/Onset, Context/Setting, Quality, Duration, Modifying Factors, Severity.)   Liliana Shelby is a 66 y.o. female who presents to the emergency department with back pain. This is not a new issue. This up with it happens periodically. She states that she stepped wrong in a recliner and reports some pain and tightness to the lower part of her back particularly right side. Denies any tingling. Pain described as mild, sore, constant. Does not want any narcotics or pain pills. She states in the past she did well with the steroid shot and is requesting this. Nursing Notes were reviewed. ALLERGIES     Latex; Asa [aspirin]; Codeine; Lisinopril-hydrochlorothiazide; Motrin [ibuprofen]; Other; Pcn [penicillins]; and Sulfa antibiotics    CURRENT MEDICATIONS       Discharge Medication List as of 6/10/2019 12:55 PM      CONTINUE these medications which have NOT CHANGED    Details   acetaminophen-codeine (TYLENOL #3) 300-30 MG per tablet Take 1 tablet by mouth every 4 hours as needed for Pain. Historical Med      EPINEPHrine (EPIPEN) 0.3 MG/0.3ML SOAJ injection Inject 0.3 mLs into the muscle once for 1 dose Use as directed for allergic reaction, Disp-1 each, R-0Normal      fluticasone-salmeterol (ADVAIR) 250-50 MCG/DOSE AEPB Inhale 1 puff into the lungs every 12 hours, Disp-60 each, R-3Normal      albuterol sulfate HFA (VENTOLIN HFA) 108 (90 Base) MCG/ACT inhaler Inhale 2 puffs into the lungs every 6 hours as needed for Wheezing, Disp-1 Inhaler, R-3Normal      fluticasone (FLONASE) 50 MCG/ACT nasal spray instill 1 spray into each nostril once daily, Disp-16 g, R-5Normal      RA LORATADINE 10 MG tablet take 1 tablet by mouth once daily, Disp-30 tablet, R-5Normal      losartan-hydrochlorothiazide (HYZAAR) 100-25 MG per tablet take 1/2 tablet by mouth once daily, Disp-45 tablet, R-5Normal      predniSONE (DELTASONE) 10 MG tablet Take three tablets on days 1-3, two tablets on days 4-7, one tablet on days 8-10., Disp-20 tablet, R-0Print      clindamycin (CLEOCIN) 300 MG capsule Take 150 mg by mouth 4 times daily Historical Med      !! Misc. Devices (BATHTUB SAFETY RAIL) MISC DAILY Starting Mon 12/17/2018, Disp-4 each, R-0, Print      !! Misc. Devices (EXTENDABLE BEDSIDE RAIL) MIS 4 each by Does not apply route daily, Disp-4 each, R-0Print      Spacer/Aero-Holding Chambers PRICE DAILY PRN Starting Mon 11/26/2018, Disp-1 Device, R-0, Normal      Cholecalciferol (VITAMIN D3) 2000 units TABS Take 1 tablet by mouth daily, Disp-90 tablet, R-1Normal      !! Misc. Devices [facility]) MISC Disp-1 each, R-0, PrintUse as directed      !! Misc. Devices (WALL GRAB BAR) MISC Disp-6 each, R-0, PrintGRAB BARS FOR BOTH SHOWER AND TOILET, USE AS DIRECTED      !! Misc. Devices MISC Disp-1 Device, R-0, PrintOUTSIDE RAMP THAT INCLUDES RAILINGS, USE AS DIRECTED      !! Misc. Devices (RAISED TOILET SEAT) MISC Disp-1 each, R-0, PrintUSE AS DIRECTED      hydrocortisone 2.5 % cream apply to affected area twice a day, Disp-30 g, R-1, Normal      neomycin-polymyxin-hydrocortisone (CORTISPORIN) 3.5-55369-3 otic suspension instill 4 drops into affected ear  (S) three times a day, R-0Historical Med      Antipyrine-Benzocaine (AURALGAN) 54-14 MG/ML otic solution Place 3 drops into the left ear 4 times daily as needed for Pain      hydrOXYzine (ATARAX) 25 MG tablet Take 25 mg by mouth daily as needed for Itching Historical Med      Elastic Bandages & Supports (JOBST KNEE HIGH COMPRESSION SM) MISC Disp-3 each, R-3, PrintDispense Bilateral Jobst Below Knee 20-30mmHg compression stockings       ! ! - Potential duplicate medications found.  Please discuss with provider. PAST MEDICAL HISTORY         Diagnosis Date    Allergic rhinitis 2014    Anxiety 2018    Arthritis     Asthma     Chest pain     occurs every other day, pt states they occur when she doesn't eat right or forget to take her bp med, occurs left side of chest with no radiation, pt states \"it's new\" and has not had any testing done or seen anyone for it.  Constipation     Depressive disorder 2018    Generalized osteoarthritis 2014    History of kidney stones     Hypertension     Obesity, morbid, BMI 40.0-49.9 ([address] 2017    PONV (postoperative nausea and vomiting)     Sleep apnea     does not use a machine       SURGICAL HISTORY           Procedure Laterality Date    CHOLECYSTECTOMY      FOOT TENDON SURGERY Left 2017    posterior repair    FOOT TENDON SURGERY Left 2018    LEFT POSTERIOR  TENDON REPAIR FLEXOR TENDON TRANSFER (Left Foot)    LITHOTRIPSY      [place] OFFICE/OUTPT VISIT,PROCEDURE ONLY Left 2018    LEFT POSTERIOR  TENDON REPAIR FLEXOR TENDON TRANSFER performed by Keyla Maguire DPM at [address] [place] [address] Drive Left 2017    LEFT POSTERIOR TIBIAL TENDON REPAIR WITH LEFT TENOLYSIS  performed by Keyla Maguire DPM at [place] [address]           Problem Relation Age of Onset    Depression Mother     Diabetes Mother     High Blood Pressure Mother     Mental Illness Mother     Substance Abuse Father     Depression Sister     Diabetes Sister     High Blood Pressure Sister     Mental Illness Sister     Depression Brother     Mental Illness Brother     Substance Abuse Brother      Family Status   Relation Name Status    Mother      Father      Sister  Alive    Brother  [address]      reports that she quit smoking about 21 years ago. Her smoking use included cigarettes. She started smoking about 37 years ago. She has a 60.00 pack-year smoking history.  She DIAGNOSIS/MDM:   Vitals:    Vitals:    06/10/19 1222   BP: (!) 146/70   Pulse: 71   Resp: 17   Temp: 97.9 °F (36.6 °C)   TempSrc: Oral   SpO2: 96%   Weight: 250 lb (113.4 kg)   Height: 5' (1.524 m)     X-rays discussed in with patient. She declined. She states she feels like she has a poor muscle and denies any injury. Risk and benefits discussed. States she will follow with her doctor for outpatient imaging. Patient given Decadron and discharged home with muscle relaxants  Patient allergic to some NSAIDs. CONSULTS:  None    PROCEDURES:  Procedures        FINAL IMPRESSION      1. Strain of lumbar region, initial encounter    2.  Elevated blood pressure reading          DISPOSITION/PLAN   DISPOSITION Decision To Discharge 06/10/2019 12:54:29 PM      PATIENTREFERRED TO:   JJ Last  [address]    In 3 days        DISCHARGE MEDICATIONS:     Discharge Medication List as of 6/10/2019 12:55 PM      START taking these medications    Details   methocarbamol (ROBAXIN-750) 750 MG tablet Take 1 tablet by mouth 4 times daily for 10 days, Disp-40 tablet, R-0Print                 (Please note that portions of this note were completed with a voice recognition program.  Efforts were made to edit thedictations but occasionally words are mis-transcribed.)    TREY Eaton PA-C  06/10/19 5949

## 2019-06-10 NOTE — ED PROVIDER NOTES
The patient was seen and examined by me in conjunction with the mid-level provider. I agree with his/her assessment and treatment plan. The patient is being treated symptomatically. She does not want to have x-rays done.      Layla Ramirez MD  06/10/19 4787

## 2019-06-11 ENCOUNTER — APPOINTMENT (OUTPATIENT)
Dept: GENERAL RADIOLOGY | Age: 59
End: 2019-06-11
Payer: MEDICARE

## 2019-06-11 ENCOUNTER — TELEPHONE (OUTPATIENT)
Dept: FAMILY MEDICINE CLINIC | Age: 59
End: 2019-06-11

## 2019-06-11 ENCOUNTER — HOSPITAL ENCOUNTER (EMERGENCY)
Age: 59
Discharge: HOME | End: 2019-06-11
Attending: EMERGENCY MEDICINE
Payer: MEDICARE

## 2019-06-11 VITALS
BODY MASS INDEX: 45.02 KG/M2 | HEIGHT: 59 IN | OXYGEN SATURATION: 94 % | SYSTOLIC BLOOD PRESSURE: 149 MMHG | DIASTOLIC BLOOD PRESSURE: 62 MMHG | RESPIRATION RATE: 16 BRPM | WEIGHT: 223.3 LBS | TEMPERATURE: 97.9 F | HEART RATE: 71 BPM

## 2019-06-11 DIAGNOSIS — G89.29 CHRONIC BILATERAL LOW BACK PAIN WITHOUT SCIATICA: Primary | ICD-10-CM

## 2019-06-11 DIAGNOSIS — M54.50 CHRONIC BILATERAL LOW BACK PAIN WITHOUT SCIATICA: Primary | ICD-10-CM

## 2019-06-11 PROCEDURE — 72100 X-RAY EXAM L-S SPINE 2/3 VWS: CPT

## 2019-06-11 PROCEDURE — 99283 EMERGENCY DEPT VISIT LOW MDM: CPT

## 2019-06-11 PROCEDURE — 6360000002 HC RX W HCPCS: Performed by: EMERGENCY MEDICINE

## 2019-06-11 PROCEDURE — 96372 THER/PROPH/DIAG INJ SC/IM: CPT

## 2019-06-11 RX ORDER — MORPHINE SULFATE 4 MG/ML
10 INJECTION, SOLUTION INTRAMUSCULAR; INTRAVENOUS ONCE
Status: COMPLETED | OUTPATIENT
Start: 2019-06-11 | End: 2019-06-11

## 2019-06-11 RX ADMIN — MORPHINE SULFATE 10 MG: 4 INJECTION INTRAVENOUS at 23:40

## 2019-06-11 ASSESSMENT — ENCOUNTER SYMPTOMS
EYE DISCHARGE: 0
BACK PAIN: 1
DIARRHEA: 0
ABDOMINAL PAIN: 0
CONSTIPATION: 0
COUGH: 0
SHORTNESS OF BREATH: 0
EYE REDNESS: 0
COLOR CHANGE: 0
VOMITING: 0
FACIAL SWELLING: 0

## 2019-06-11 ASSESSMENT — PAIN DESCRIPTION - PAIN TYPE: TYPE: CHRONIC PAIN

## 2019-06-11 ASSESSMENT — PAIN SCALES - GENERAL
PAINLEVEL_OUTOF10: 10

## 2019-06-11 NOTE — TELEPHONE ENCOUNTER
[facility]) ED Follow up Call    Reason for ED visit:  Back pain     6/11/2019     Travon Fletcherdenisaguy Watson , this is Ese from Dr. Kimberley Argueta office, just calling to see how you are doing after your recent ED visit. Did you receive discharge instructions? Yes  Do you understand the discharge instructions? Yes  Did the ED give you any new prescriptions? Yes  Were you able to fill your prescriptions? Yes      Do you have one of our red, yellow and green  Zone sheets that help you to determine when you should go to the ED? Not Applicable      Do you need or want to make a follow up appt with your PCP? No    Do you have any further needs in the home, e.g. equipment?   No        FU appts/Provider:    Future Appointments   Date Time Provider uZrdo Phillips   6/13/2019  2:30 PM Dorthey Frankel, MD  derm [facility]   7/8/2019  1:15 PM JJ Last - CNP [facility]   8/6/2019  1:15 PM LEONOR Jacobson Podiatry Baljeet Coates

## 2019-06-12 NOTE — ED NOTES
Patient drove self to ER c/o low back pain. Patient was here yesterday for same issue, received medication, and sent home. Patient states the medication she was given a prescription for was not helping her and she flushed it down the toilet. Patient states she is returning back to ER today for pain medication injection. RN(writer) educated patient that she can not be given narcotic pain medication while she is driving, patient states she understands. Patient rates pain a 10 on a 0-10 scale at this time. Patient took 2 OTC tylenol at home around 2000 with no relief. Patient sitting in wheel chair at this time, states it is too painful to walk to ER cart. Patient call light within reach and instructed to call out for assistance as needed.       Chelly Armstrong RN  06/11/19 8767

## 2019-06-12 NOTE — ED NOTES
Report given to [facility]. Patient aware that she needs to notify RN of when the cab will be arriving to ER so she can get her pain medication.       Lillian Pete RN  06/11/19 5109

## 2019-06-12 NOTE — ED PROVIDER NOTES
[address] [place] [address] ED  eMERGENCY dEPARTMENT eNCOUnter      Pt Name: Alesia Patricia  MRN: [id number]  [place] 1960  Date of evaluation: 6/11/2019  Provider: Luis Kellogg MD    CHIEF COMPLAINT       Chief Complaint   Patient presents with    Back Pain         HISTORY OF PRESENT ILLNESS  (Location/Symptom, Timing/Onset, Context/Setting, Quality, Duration, Modifying Factors, Severity.)   Alesia Patricia is a 66 y.o. female who presents to the emergency department for low back pain. She was seen here yesterday for similar circumstances and was offered x-rays but did not want them. She states she still has the pain. No new injury and she rates the pain as a 10. She took some Tylenol at home and states that the medicine she was given yesterday she flushed down the toilet. It is worse with movement. Nursing Notes were reviewed. ALLERGIES     Latex; Asa [aspirin]; Codeine; Lisinopril-hydrochlorothiazide; Motrin [ibuprofen]; Other; Pcn [penicillins]; and Sulfa antibiotics    CURRENT MEDICATIONS       Previous Medications    ACETAMINOPHEN-CODEINE (TYLENOL #3) 300-30 MG PER TABLET    Take 1 tablet by mouth every 4 hours as needed for Pain.     ALBUTEROL SULFATE HFA (VENTOLIN HFA) 108 (90 BASE) MCG/ACT INHALER    Inhale 2 puffs into the lungs every 6 hours as needed for Wheezing    ANTIPYRINE-BENZOCAINE (AURALGAN) 54-14 MG/ML OTIC SOLUTION    Place 3 drops into the left ear 4 times daily as needed for Pain    CHOLECALCIFEROL (VITAMIN D3) 2000 UNITS TABS    Take 1 tablet by mouth daily    CLINDAMYCIN (CLEOCIN) 300 MG CAPSULE    Take 150 mg by mouth 4 times daily     ELASTIC BANDAGES & SUPPORTS (JOBST KNEE HIGH COMPRESSION SM) MISC    Dispense Bilateral Jobst Below Knee 20-30mmHg compression stockings    EPINEPHRINE (EPIPEN) 0.3 MG/0.3ML SOAJ INJECTION    Inject 0.3 mLs into the muscle once for 1 dose Use as directed for allergic reaction    FLUTICASONE (FLONASE) 50 MCG/ACT NASAL SPRAY    instill 1 spray into each  Hypertension     Obesity, morbid, BMI 40.0-49.9 ([facility] 2017    PONV (postoperative nausea and vomiting)     Sleep apnea     does not use a machine       SURGICAL HISTORY           Procedure Laterality Date    CHOLECYSTECTOMY      FOOT TENDON SURGERY Left 2017    posterior repair    FOOT TENDON SURGERY Left 2018    LEFT POSTERIOR  TENDON REPAIR FLEXOR TENDON TRANSFER (Left Foot)    LITHOTRIPSY      SC OFFICE/OUTPT VISIT,PROCEDURE ONLY Left 2018    LEFT POSTERIOR  TENDON REPAIR FLEXOR TENDON TRANSFER performed by Gilda Garvin DPM at [address] [place] [address] Drive Left 2017    LEFT POSTERIOR TIBIAL TENDON REPAIR WITH LEFT TENOLYSIS  performed by Gilda Garvin DPM at [place] [address]           Problem Relation Age of Onset    Depression Mother     Diabetes Mother     High Blood Pressure Mother     Mental Illness Mother     Substance Abuse Father     Depression Sister     Diabetes Sister     High Blood Pressure Sister     Mental Illness Sister     Depression Brother     Mental Illness Brother     Substance Abuse Brother      Family Status   Relation Name Status    Mother     Ana Roy Father      Sister  Alive    Brother  Alive        SOCIAL HISTORY      reports that she quit smoking about 21 years ago. Her smoking use included cigarettes. She started smoking about 37 years ago. She has a 60.00 pack-year smoking history. She has never used smokeless tobacco. She reports that she does not drink alcohol or use drugs. REVIEW OF SYSTEMS    (2-9 systems for level 4, 10 or more for level 5)     Review of Systems   Constitutional: Negative for chills, fatigue and fever. HENT: Negative for congestion, ear discharge and facial swelling. Eyes: Negative for discharge and redness. Respiratory: Negative for cough and shortness of breath. Cardiovascular: Negative for chest pain.    Gastrointestinal: Negative for abdominal pain, constipation, diarrhea and vomiting. Genitourinary: Negative for dysuria and hematuria. Musculoskeletal: Positive for back pain. Negative for arthralgias. Skin: Negative for color change and rash. Neurological: Negative for syncope, numbness and headaches. Hematological: Negative for adenopathy. Psychiatric/Behavioral: Negative for confusion. The patient is not nervous/anxious. Except as noted above the remainder of the review of systems was reviewed and negative. PHYSICAL EXAM    (up to 7 for level 4, 8 or more for level 5)     Vitals:    06/11/19 2158 06/11/19 2207   BP: (!) 149/62    Pulse: 71    Resp: 16    Temp: 97.9 °F (36.6 °C)    TempSrc: Oral    SpO2: 94%    Weight:  223 lb 4.8 oz (101.3 kg)   Height:  4' 11\" (1.499 m)       Physical Exam   Constitutional: She is oriented to person, place, and time. She appears well-developed and well-nourished. No distress. HENT:   Head: Normocephalic and atraumatic. Eyes: Right eye exhibits no discharge. Left eye exhibits no discharge. No scleral icterus. Neck: Neck supple. Cardiovascular: Normal rate and regular rhythm. Pulmonary/Chest: Effort normal and breath sounds normal. No stridor. No respiratory distress. She has no wheezes. She has no rales. Abdominal: Soft. She exhibits no distension. There is no tenderness. Musculoskeletal: Normal range of motion. No bruise or rash on her back. No palpable tenderness. She walks with a cane. Lymphadenopathy:     She has no cervical adenopathy. Neurological: She is alert and oriented to person, place, and time. Skin: Skin is warm and dry. No rash noted. She is not diaphoretic. No erythema. Psychiatric: She has a normal mood and affect. Her behavior is normal.   Vitals reviewed.           DIAGNOSTIC RESULTS     EKG: All EKG's are interpreted by the Emergency Department Physician who either signs or Co-signs this chart in the absence of a cardiologist.    Not indicated    RADIOLOGY: Non-plain film images such as CT, Ultrasound and MRI are read by the radiologist. Plain radiographic images are visualized and preliminarily interpreted by the emergency physician with the below findings:    Interpretation per the Radiologist below, if available at the time of this note:    Xr Lumbar Spine (2-3 Views)    Result Date: 6/11/2019  EXAMINATION: 3 XRAY VIEWS OF THE LUMBAR SPINE 6/11/2019 10:28 pm COMPARISON: None. HISTORY: ORDERING SYSTEM PROVIDED HISTORY: pain TECHNOLOGIST PROVIDED HISTORY: pain Ordering Physician Provided Reason for Exam: Low back pain, NKI Acuity: Acute Type of Exam: Initial FINDINGS: The vertebral body heights are maintained. There is degenerative disc disease at the lumbosacral junction. There is multilevel degenerative facet hypertrophy. There is no spondylolisthesis. The visualized bony pelvis is intact. There is degenerative change of the SI joints. The surrounding soft tissues are unremarkable. Degenerative disc disease and facet hypertrophy in the lower lumbar spine without acute abnormality. LABS:  Labs Reviewed - No data to display    All other labs were within normal range or not returned as of this dictation. EMERGENCY DEPARTMENT COURSE and DIFFERENTIAL DIAGNOSIS/MDM:   Vitals:    Vitals:    06/11/19 2158 06/11/19 2207   BP: (!) 149/62    Pulse: 71    Resp: 16    Temp: 97.9 °F (36.6 °C)    TempSrc: Oral    SpO2: 94%    Weight:  223 lb 4.8 oz (101.3 kg)   Height:  4' 11\" (1.499 m)       Orders Placed This Encounter   Medications    morphine sulfate (PF) injection 10 mg       Medical Decision Making: X-ray findings are discussed with the patient. She is asking for a pain shot and this is being accomplished. She states she has Tylenol 3 at home and she will take that. She with codeine as an allergy. Treatment diagnosis and follow-up were discussed with the patient. CONSULTS:  None    PROCEDURES:  None    FINAL IMPRESSION      1.  Chronic bilateral low back pain without sciatica          DISPOSITION/PLAN   DISPOSITION Decision To Discharge 06/11/2019 11:11:35 PM      PATIENT REFERRED TO:   JJ Crowley CNP  [address] [place] [phone]      As needed    [facility] ED  [address]  [phone]    If symptoms worsen      DISCHARGE MEDICATIONS:     New Prescriptions    No medications on file         (Please note that portions of this note were completed with a voice recognition program.  Efforts were made to edit the dictations but occasionally words are mis-transcribed.)    Gracy aClle MD  Attending Emergency Physician           Gracy Calle MD  06/11/19 0917

## 2019-06-13 ENCOUNTER — OFFICE VISIT (OUTPATIENT)
Dept: FAMILY MEDICINE CLINIC | Age: 59
End: 2019-06-13
Payer: MEDICARE

## 2019-06-13 ENCOUNTER — OFFICE VISIT (OUTPATIENT)
Dept: DERMATOLOGY | Age: 59
End: 2019-06-13
Payer: MEDICARE

## 2019-06-13 VITALS
SYSTOLIC BLOOD PRESSURE: 138 MMHG | OXYGEN SATURATION: 97 % | HEART RATE: 77 BPM | WEIGHT: 223 LBS | BODY MASS INDEX: 45.04 KG/M2 | DIASTOLIC BLOOD PRESSURE: 84 MMHG | TEMPERATURE: 98.4 F

## 2019-06-13 VITALS
SYSTOLIC BLOOD PRESSURE: 127 MMHG | WEIGHT: 245 LBS | DIASTOLIC BLOOD PRESSURE: 74 MMHG | HEIGHT: 59 IN | HEART RATE: 85 BPM | OXYGEN SATURATION: 94 % | BODY MASS INDEX: 49.39 KG/M2

## 2019-06-13 DIAGNOSIS — G89.29 CHRONIC LEFT-SIDED LOW BACK PAIN WITH LEFT-SIDED SCIATICA: Primary | ICD-10-CM

## 2019-06-13 DIAGNOSIS — L82.1 DERMATOSIS PAPULOSA NIGRA: ICD-10-CM

## 2019-06-13 DIAGNOSIS — L82.0 INFLAMED SEBORRHEIC KERATOSIS: ICD-10-CM

## 2019-06-13 DIAGNOSIS — L82.1 SEBORRHEIC KERATOSES: ICD-10-CM

## 2019-06-13 DIAGNOSIS — D23.9 DERMATOFIBROMA: Primary | ICD-10-CM

## 2019-06-13 DIAGNOSIS — M54.42 CHRONIC LEFT-SIDED LOW BACK PAIN WITH LEFT-SIDED SCIATICA: Primary | ICD-10-CM

## 2019-06-13 PROCEDURE — G8417 CALC BMI ABV UP PARAM F/U: HCPCS | Performed by: DERMATOLOGY

## 2019-06-13 PROCEDURE — G8417 CALC BMI ABV UP PARAM F/U: HCPCS | Performed by: NURSE PRACTITIONER

## 2019-06-13 PROCEDURE — 3017F COLORECTAL CA SCREEN DOC REV: CPT | Performed by: NURSE PRACTITIONER

## 2019-06-13 PROCEDURE — 1036F TOBACCO NON-USER: CPT | Performed by: NURSE PRACTITIONER

## 2019-06-13 PROCEDURE — G8427 DOCREV CUR MEDS BY ELIG CLIN: HCPCS | Performed by: NURSE PRACTITIONER

## 2019-06-13 PROCEDURE — 3017F COLORECTAL CA SCREEN DOC REV: CPT | Performed by: DERMATOLOGY

## 2019-06-13 PROCEDURE — G8427 DOCREV CUR MEDS BY ELIG CLIN: HCPCS | Performed by: DERMATOLOGY

## 2019-06-13 PROCEDURE — 99213 OFFICE O/P EST LOW 20 MIN: CPT | Performed by: NURSE PRACTITIONER

## 2019-06-13 PROCEDURE — 1036F TOBACCO NON-USER: CPT | Performed by: DERMATOLOGY

## 2019-06-13 PROCEDURE — 99202 OFFICE O/P NEW SF 15 MIN: CPT | Performed by: DERMATOLOGY

## 2019-06-13 RX ORDER — PREDNISONE 10 MG/1
10 TABLET ORAL
Qty: 15 TABLET | Refills: 0 | Status: SHIPPED | OUTPATIENT
Start: 2019-06-13 | End: 2019-06-18

## 2019-06-13 RX ORDER — METHYLPREDNISOLONE SODIUM SUCCINATE 125 MG/2ML
125 INJECTION, POWDER, LYOPHILIZED, FOR SOLUTION INTRAMUSCULAR; INTRAVENOUS ONCE
Status: COMPLETED | OUTPATIENT
Start: 2019-06-13 | End: 2019-06-13

## 2019-06-13 RX ADMIN — METHYLPREDNISOLONE SODIUM SUCCINATE 125 MG: 125 INJECTION, POWDER, LYOPHILIZED, FOR SOLUTION INTRAMUSCULAR; INTRAVENOUS at 12:39

## 2019-06-13 NOTE — PATIENT INSTRUCTIONS
Dermatofibroma (superficial benign fibrous histiocytoma) is a common cutaneous nodule of unknown etiology that occurs more often in women. Dermatofibroma frequently develops on the extremities (mostly the lower legs) and is usually asymptomatic, although pruritus and tenderness can be present. Seborrheic Keratosis  Seborrheic keratoses are common benign growths of unknown cause seen in adults due to a thickening of an area of the top skin layer. Who's At Risk  Although they can occur anytime after puberty, almost everyone over 48 has one or more of these and they increase in number with age. Some families have an inherited tendency to grow multiple lesions. Men and women are equally as likely to develop seborrheic keratoses. Dark-skinned people are less affected than those with light skin; a variant seen in blacks is called dermatosis papulosa nigra. Signs & Symptoms  One or more spots can occur anywhere on the body, except for palms, soles, and mucous membranes (eg, in the mouth or rectum). They do not go away. They do not turn into cancers, but some cancers resemble seborrheic keratosis. They start as light brown to skin-colored, flat areas, which are round to oval and of varying size (usually less than a half inch, but sometimes much larger). As they grow thicker and rise above the skin surface, seborrheic keratoses may become dark brown to almost black with a \"stuck on\" appearance. The surface may feel smooth or rough. Self-Care Guidelines  No treatment is needed unless there is irritation from clothing with itching or bleeding. There is no way to prevent new spots from forming. Some lotions with alpha hydroxyl acids may make the areas feel smoother with regular use but will not eliminate them. OTC freezing techniques are available but usually not effective.   When to [facility]  If a spot on the skin is growing, bleeding, painful, or itchy, or any other concerning changes, then see your doctor. Cryotherapy    Liquid Nitrogen - \"freeze\" (Cryotherapy)  Your doctor has treated your skin lesions with a very cold substance. The liquid nitrogen is so cold that it may feel like the skin is burning during application. A clear blister or blood blister may form after treatment and may later form a scab. Leave the area alone. Usually this scab will fall of within 1-2 weeks. The area should be kept clean and can be covered with Vaseline and a Band-Aid if needed. If a large blister develops it is ok to use a clean needle to gently pop the blister. Please call our office with any concerns at [phone].

## 2019-06-13 NOTE — PROGRESS NOTES
Visit Information    Have you changed or started any medications since your last visit including any over-the-counter medicines, vitamins, or herbal medicines? no   Are you having any side effects from any of your medications? -  no  Have you stopped taking any of your medications? Is so, why? -  no    Have you seen any other physician or provider since your last visit? No  Have you had any other diagnostic tests since your last visit? Yes - Records Requested  Have you been seen in the emergency room and/or had an admission to a hospital since we last saw you? Yes - Records Requested  Have you had your routine dental cleaning in the past 6 months? yes -     Have you activated your [email or web address] account? If not, what are your barriers?  No:      Patient Care Team:  JJ Blunt CNP as PCP - General (Nurse Practitioner)  JJ [facility]  Hattie Sandhoff, DPM as Physician (Podiatry)    Medical History Review  Past Medical, Family, and Social History reviewed and does not contribute to the patient presenting condition    Health Maintenance   Topic Date Due    Shingles Vaccine (1 of 2) 09/02/2012    Colon Cancer Screen FIT/FOBT  08/22/2014    Pneumococcal 0-64 years Vaccine (1 of 1 - PPSV23) 09/01/2019 (Originally 9/2/1966)    Flu vaccine (Season Ended) 09/01/2019    A1C test (Diabetic or Prediabetic)  01/30/2020    Potassium monitoring  01/30/2020    Creatinine monitoring  01/30/2020    Breast cancer screen  02/20/2021    Cervical cancer screen  02/08/2022    Lipid screen  01/30/2024    DTaP/Tdap/Td vaccine (2 - Td) 06/03/2026    Hepatitis C screen  Completed    HIV screen  Completed
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
## 2019-06-13 NOTE — PROGRESS NOTES
Paris Mustafa, BREE-C  [address]  [address] [facility]. [address]  [phone]  [phone]    George Germain is a 66 y.o. female who is here with c/o of:    Chief Complaint: Follow-Up from Hospital (6-10-19 and 6-11-19 for back pain )      Patient Accompanied by: patient    HPI - George Germain is here today with c/o:    Patient is here today with c/o generalized low back pain with radiation down the left leg. The pain started about 2 weeks ago. She denies any bowel or bladder disruption. She denies any known injury during this time. This is a chronic condition of many years and reportedly the result of her left injury. She has had steroid injections in the past and this has helped somewhat. She was seen in the ED on 6/10/19 and 6/11/19 for this problem and was prescribed a muscle relaxer and she has not taken. She was given an injection of MS and this helped, but has worn off.       Patient Active Problem List:     Essential hypertension     YAMEL (obstructive sleep apnea)     Allergic asthma     Obesity, morbid, BMI 40.0-49.9 ([facility])     Urticaria     Contracture of left ankle     Chronic pain of left ankle     Spontaneous rupture of flexor tendon of left lower leg     Allergic rhinitis     Anxiety     Benign neoplasm of skin     Constipation     Cyst of ovary     Generalized osteoarthritis     Depressive disorder     Edema     Food allergy     Osteoarthrosis involving multiple sites but not designated as generalized     Reactive airway disease     Rupture of posterior tibialis tendon     Vitamin D deficiency     Morbid obesity with BMI of 50.0-59.9, adult ([facility] [address]     Nontraumatic rupture of left posterior tibial tendon     Status post left foot surgery     Mild intermittent asthma without complication     Past Medical History:   Diagnosis Date    Allergic rhinitis 12/4/2014    Anxiety 7/5/2018    Arthritis     Asthma     Chest pain     occurs every other day, pt states they materials on back exercises/stretches    Of the 15 minute duration appointment visit, Robbi Lopez CNP spent at least 50% of the face-to-face time in counseling, explanation of diagnosis, planning of further management, and answering all questions. Signed:  Robbi Lopez CNP    This note is created with the assistance of a speech-recognition program.  While intending to generate a document that actually reflects the content of the visit, no guarantees can be provided that every mistake has been identified and corrected by editing.

## 2019-06-18 ENCOUNTER — HOSPITAL ENCOUNTER (OUTPATIENT)
Dept: PHYSICAL THERAPY | Facility: CLINIC | Age: 59
Setting detail: THERAPIES SERIES
Discharge: HOME | End: 2019-06-18
Payer: MEDICARE

## 2019-06-18 PROCEDURE — 97161 PT EVAL LOW COMPLEX 20 MIN: CPT

## 2019-06-18 PROCEDURE — 97110 THERAPEUTIC EXERCISES: CPT

## 2019-06-18 NOTE — CONSULTS
[] [facility]) - [facility] &  Therapy  [address]  [phone]  F: [phone] [x] [address]  [address]   [address]  P: [phone]  F: [phone] [] [place]  [address] [facility]  P: [phone]  F: [phone] [] [address]  [facility]   [address]  [place]: [phone]  F: [phone]     Physical Therapy Spine Evaluation    Date:  2019  Patient: Lawrence Hills  : 1960  MRN: [id number]  Physician: JJ Pelaez - CNP   Insurance: [place] advantage, 30 visits   Medical Diagnosis: M54.42, G89.29 (ICD-10-CM) - Chronic left-sided low back pain with left-sided sciatica  Rehab Codes: M54.5, M54.9, M54.42, R26.2, M62.81  Onset Date: 3/17/17  Next 's appt. : 19    Subjective:   CC: low back pain with LLE radicular symptoms     HPI: Pt with chronic history of LBP with multiple reports of falls. States that she falls frequently d/t L foot pain and residing balance deficits. Pt also reports onset of LLE radicular pain is fairly new beginning approximately 6 months ago. Pt underwent multiple left foot surgeries (most recent was a left posterior tibial tendon repair with harvest of the flexor digitorum longus tendon in 2018); reports decreased mobility, balance, and overall increased issues since foot surgeries. Pt reports having a low pain tolerance; pain is worse with prolonged sitting, standing, and walking. Reports that nothing seems to help the pain despite admitting that recent steroid injection did provide some relief. Pt says that she is looking to set up an appointment with pain management in regards to receiving pain medication (says that current doctor will not give her medication \"because of all the drug addicts\").  Pt presents today using cane and demonstrates antalgic gait, reports that she must use cane at all times. Pt lives alone in a 2 story home; has stairs to climb and reports this is difficult but she is able to do it with use of handrails. PMHx: [x] HTN  [x] Arthritis   [x] Other: left posterior tibial tendon repair with harvest of the flexor digitorum longus tendon 2539                           [x] Refer to full medical chart  In EPIC     Tests: [x] X-Ray:  Impression   Degenerative disc disease and facet hypertrophy in the lower lumbar spine   without acute abnormality. Medications: [x] Refer to full medical record   Allergies: [x] Other: latex       Working:   [x]  Disability   Job/ADL Description: homemaker- requires frequent rest.   Pain:  [x] Yes  [] No Location: low back Pain Rating: (0-10 scale) 9/10 average     Symptoms:  [x] Worsening   Better: N/A  Worse:     [x] Sit   [x]Stand    [x] Walk   Sleep:   [x] Disturbed    Objective:   STRENGTH  ROM    Left Right     L1-2 Hip Flex 4- 4     Hip Abd 4- 4-     L3-4 Knee Ext 4- 4+      L4 Ankle DF 3+ 4      L5 EHL       S1 Plant. Flex 3 3 Lumbar    Abdominals STANTON  Flexion 90 (pain with extension from full  flexion)   Erector Spinae STANTON  Extension Neutral * (limited lumbar motion)      Rotation L WFL * R WFL*      Sidebend L WFL R WFL*   Pt unwilling to lay supine or prone- cannot tolerate     TESTS (+/-) LEFT RIGHT Not Tested   SLR [x] sit [] supine + - []   Hamstring (SLR) tight  []   SKTC   []   DKTC   []   Slump/Dural - - []   SI JT   []   AMY   []   Joint Mobility   []   Noam Tests ? Pain ?  Pain No Change Not Tested   RFIS [] [] [x] []   ALEX [] [] [x] []   RFIL [] [] [] []   REIL [] [] [] []   Pt reports decreased pain with full flexion LB stretch and extension      OBSERVATION No Deficit Deficit Not Tested Comments   Posture       Forward Head [] [x] []    Rounded Shoulders [] [x] []    Kyphosis [x] [] []    Lordosis [] [x] [] Reversed lordotic curve   Lateral Shift [x] [] [] [x] Plans/Goals, Risks/Benefits discussed  [x] Home exercise program  Method of Education: [x] Verbal  [x] Demo  [x] Written  Comprehension of Education:  [x] Verbalizes understanding. [x] Demonstrates understanding. [x] Needs Review. [] Demonstrates/verbalizes understanding of HEP/Ed previously given. Treatment Plan:  [x] Therapeutic Exercise    [x] Modalities:  [x] Therapeutic Activity    [] Ultrasound  [x] Electrical Stimulation  [x] Gait Training     []Massage       [] Lumbar/Cervical Traction  [x] Neuromuscular Re-education [x] Cold/hotpack [] Iontophoresis: 4 mg/mL  [x] Instruction in HEP             Dexamethasone Sodium  [x] Manual Therapy             Phosphate  mAmin  [] Aquatic Therapy   [] Dry Needling [] Other:    []  Medication allergies reviewed for use of    Dexamethasone Sodium Phosphate 4mg/ml     with iontophoresis treatments. Pt is not allergic. Frequency:  2 x/week for 12 visits    Todays Treatment:  Modalities:   Precautions: LATEX ALLERGY   Exercises:  Exercise Reps/ Time Weight/ Level Comments   SITTING       HS stretch 3 x 20\"     Low back stretch 10 x 5\"     Pelvic tilts 10 x 5\"                             Other:     Specific Instructions for next treatment: trial supine lying with head elevated- then perform LTRS, DKTC, etc. And abdominal bracing exercises. Hamstring stretching.        Evaluation Complexity:  History (Personal factors, comorbidities) [] 0 [] 1-2 [x] 3+   Exam (limitations, restrictions) [] 1-2 [] 3 [x] 4+   Clinical presentation (progression) [x] Stable [] Evolving  [] Unstable   Decision Making [x] Low [] Moderate [] High    [x] Low Complexity [] Moderate Complexity [] High Complexity       Treatment Charges: Mins Units   [x] Evaluation       [x]  Low       []  Moderate       []  High 50 1   []  Modalities     [x]  Ther Exercise 10 1   []  Manual Therapy     []  Ther Activities     []  Aquatics     []  Vasocompression     []  Other       TOTAL TREATMENT TIME: 60 minutes     Time in: 3:45 pm       Time out: 4:45 pm    Electronically signed by: Luis Armando Schuler PT        Physician Signature:________________________________Date:__________________  By signing above or cosigning this note, I have reviewed this plan of care and certify a need for medically necessary rehabilitation services.      *PLEASE SIGN ABOVE AND FAX BACK ALL PAGES*

## 2019-06-25 ENCOUNTER — HOSPITAL ENCOUNTER (OUTPATIENT)
Dept: PHYSICAL THERAPY | Facility: CLINIC | Age: 59
Setting detail: THERAPIES SERIES
Discharge: HOME | End: 2019-06-25
Payer: MEDICARE

## 2019-06-25 PROCEDURE — 97110 THERAPEUTIC EXERCISES: CPT

## 2019-06-25 NOTE — FLOWSHEET NOTE
[] [facility] [address] [facility]  [address]  [phone]  F: [phone] [x] [address]  [facility]   [address]  P: [phone]  F: [phone] [] [address]  [address] [facility]  P: [phone]  F: [phone] [] [address]  [place]   [address]  [place]: [phone]  F: [phone]     Physical Therapy Daily Treatment Note    Date:  2019  Patient Name:  Barbara Osuna    :  1960  MRN: [id number]  Physician: JJ Palmer CNP                                   Insurance: [place] advantage, 30 visits   Medical Diagnosis: M54.42, G89.29 (ICD-10-CM) - Chronic left-sided low back pain with left-sided sciatica  Rehab Codes: M54.5, M54.9, M54.42, R26.2, M62.81  Onset Date: 3/17/17               Next 's appt. : 19  Visit# / total visits: 2/  Cancels/No Shows: 0/0    Subjective:    Pain:  [x] Yes  [] No Location: B LBP- \"and up my spine\" Pain Rating: (0-10 scale) 7.5/10  Pain altered Tx:  [x] No  [] Yes  Action: N/A   Comments: Reports good tolerance to initial evaluation, however, poor compliance to current HEP interventions. Denies LOB, falls since initial evaluation. Requests US treatment for \"muscle relaxation\" in future. Reports, \"thinks I'll have to do pain management too. \"      Pt approximately 30 minutes late to treatment appointment.      Todays Treatment:  Modalities: HP PRN- consider next visit   Precautions: LATEX ALLERGY; UNABLE TO LAY SUPINE- EVEN WITH ELEVATION; FALL RISK- ambulates with SPC- falls caused this current LBP and symptoms  Exercises: h/o vertigo for approximately 45 years- unable to lay supine- sleeps in recliner   Exercise Reps/ Time Weight/ Level Comments   SITTING          HS stretch 3 x 20\"   DECLINED- \"won't do anything for my back.\"   Low back stretch 10 x 5\"   Seated \"divers\"- hands overlapped and reaching toward floor   Pelvic tilts 10 x 5\"; x2 sets   Sync with breathing- inhale anterior tilt, exhale posterior tilt   Pelvic tilt with march 10 x 5\"; x2 sets   Within anterior tilt   Pelvic tilt LAQ 10 x 5\"; x2 sets   \" \"   Seated \"divers\" 10 x5\"; x2 sets   To R, L   Sit<>stand X10, 5\" High mat table UE A to rise and lower                                     Other:     Treatment performed in a private room d/t pt donning a dress- for pt bryan.      Specific Instructions for next treatment: Consider US treatment for pain management next visit if no relief with active interventions during previous treatment session. Trial supine lying with head elevated- then perform LTRS, DKTC, etc.- UNABLE. And abdominal bracing exercises. Hamstring stretching. Treatment Charges: Mins Units   []  Modalities     [x]  Ther Exercise 28 2   []  Manual Therapy     []  Ther Activities     []  Aquatics     []  Vasocompression     []  Other     Total Treatment time 28 2     Assessment: [x] Progressing toward goals. Pt presents approximately 30 minutes late to treatment appointment, and reports fair tolerance to initial evaluation. Also reports poor compliance with current HEP interventions. Therefore, began session with review of HEP interventions- declines to perform seated HS stretches- feels no benefit to her LBP. Proceeded with pelvic tilts- able to progress to performing with marches and LAQs. Reviewed seated lumbar flexion- able to progress to biasing the R and L for more of a side-body and QL stretch. Finished with sit<>stand re-training from high mat table. Unable to lay supine, despite significant elevation, for LTR, DKTC etc. Requests US treatment for \"muscle relaxation\"- consider next visit if no relief with active interventions. Educated as detailed below. Consider ideas above and below next visit. [] No change.      [x] Other:    STG/LTG (to be met in 12 treatments)  1. ? Pain: <7/10 LBP pain on average for improved QOL  2. ? ROM: Pt to improve lumbar extension to 3-5 ° past neutral for improved mobility   3. ? Strength: Pt to improve BLE strength by 1/2 grade or more to improve ability to climb stairs, squat, etc  4. ? Function:  i. Pt will report ability to ambulate 500 ft without increased LBP for improved community ambulation  ii. Pt will report <65% impairment on the Oswestry to indicate improved functional mobility  iii. Pt will report 2 or less nightly sleep disturbances d/t pain for improved sleep patterns   5. Independent with Home Exercise Programs  6. Demonstrate Knowledge of fall prevention     Patient goals: \"whatever they can do\"    Pt. Education:  [x] Yes  [] No  [] Reviewed Prior HEP/Ed  Method of Education: [x] Verbal  [] Demo  [] Written  Comprehension of Education:  [x] Verbalizes understanding. [] Demonstrates understanding. [] Needs review. [] Demonstrates/verbalizes HEP/Ed previously given. Emphasized timeliness to future treatment appointments- verbalized understanding to all. Plan: [x] Continue per plan of care. [x] Other: Consider US treatment for pain management next visit if no relief with active interventions during previous treatment session.         Time In: 2:30PM            Time Out: 2:58PM    Electronically signed by:  Sobia Scherer, PT

## 2019-06-26 ENCOUNTER — OFFICE VISIT (OUTPATIENT)
Dept: DERMATOLOGY | Age: 59
End: 2019-06-26

## 2019-06-26 VITALS
BODY MASS INDEX: 49.8 KG/M2 | HEIGHT: 59 IN | SYSTOLIC BLOOD PRESSURE: 141 MMHG | WEIGHT: 247 LBS | DIASTOLIC BLOOD PRESSURE: 79 MMHG | HEART RATE: 78 BPM | OXYGEN SATURATION: 95 %

## 2019-06-26 DIAGNOSIS — L91.8 ACROCHORDON: Primary | ICD-10-CM

## 2019-06-26 PROCEDURE — 1120000 COSMETIC-REMOVAL OF SKIN TAGS <W/15: Performed by: DERMATOLOGY

## 2019-06-26 RX ORDER — LIDOCAINE 40 MG/G
CREAM TOPICAL
Qty: 15 G | Refills: 0 | Status: SHIPPED | OUTPATIENT
Start: 2019-06-26

## 2019-06-26 NOTE — PROGRESS NOTES
Dermatology Procedure Note   [facility] DERMATOLOGY  [address] [facility]  [address] [place] [address]  Dept: [phone]  Dept Fax: [phone]      Procedure Date: 6/26/2019  Procedure Time: 12:43 PM    Procedure Practitioner: Sai Dumont MD    Procedure: Lesion Destruction - cosmetic    Pre-Procedure Diagnosis: seborrheic keratoses/dpns/skin tags    Post-Procedure Diagnosis: Same as Pre-Procedure Diagnosis    Informed Consent: The procedure and its risks were explained including but not limited to pain, bleeding, infection, permanent scar, permanent pigment alteration and recurrence. Consent to proceed with the procedure was obtained from the patient or the parent by the practitioner    Time Out:  A time out was conducted immediately before starting the procedure that confirmed a final verification of the correct patient, correct procedure, and correct site. Procedure Details:  Cryotherapy with Cryo Tweezers: After verbal consent was obtained including discussion of the risks (lesion persistence, lesion recurrence and hypo/hyperpigmentation) and benefits (resolution of the lesion) 15 total DPNs on the neck and lateral face were treated. The lesions were grasped with the cryo tweezers and held for 5-10 seconds to achieve adequate freeze without affecting the surrounding skin. Curettage of SKs: After verbal consent and anesthetizing with lidocaine 1% with epinephrine, approximately 6 seborrheic keratoses on the breasts were removed with a curette.     Procedure Performed By: Sai Dumont MD    Estimated Blood Loss: Minimal    Procedure Tolerance: Good    Complication(s): None    Electronically signed by Sai Dumont MD on 6/26/19 at 12:43 PM

## 2019-06-27 ENCOUNTER — HOSPITAL ENCOUNTER (OUTPATIENT)
Dept: PHYSICAL THERAPY | Facility: CLINIC | Age: 59
Setting detail: THERAPIES SERIES
Discharge: HOME | End: 2019-06-27
Payer: MEDICARE

## 2019-06-27 PROCEDURE — 97110 THERAPEUTIC EXERCISES: CPT

## 2019-06-27 PROCEDURE — 97012 MECHANICAL TRACTION THERAPY: CPT

## 2019-06-27 NOTE — FLOWSHEET NOTE
[] [facility] [address]  [address]  [phone]  F: [phone] [x] [address]  [address] [facility]   [address]  P: [phone]  F: [phone] [] [place]  [address]  P: [phone]  F: [phone] [] [address]  [facility]   [address]  [place]: [phone]  F: [phone]     Physical Therapy Daily Treatment Note    Date:  2019  Patient Name:  Barbara Osuna    :  1960  MRN: [id number]  Physician: JJ Palmer CNP                                   Insurance: [employer] advantage, 30 visits   Medical Diagnosis: M54.42, G89.29 (ICD-10-CM) - Chronic left-sided low back pain with left-sided sciatica  Rehab Codes: M54.5, M54.9, M54.42, R26.2, M62.81  Onset Date: 3/17/17               Next 's appt. : 19  Visit# / total visits: 3/12  Cancels/No Shows: 0/0    Subjective:    Pain:  [x] Yes  [] No Location: B LBP- \"and up my spine\" Pain Rating: (0-10 scale) 8/10  Pain altered Tx:  [x] No  [] Yes  Action: N/A   Comments: Pt reports she was sore after last visit. Reports compliant with some of her HEP. Todays Treatment:  Modalities: HP with IFC lumbar spine in sitting x 15min end of treatment. Precautions: LATEX ALLERGY; UNABLE TO LAY SUPINE- EVEN WITH ELEVATION; FALL RISK- ambulates with [facility]- falls caused this current LBP and symptoms  Exercises: h/o vertigo for approximately 45 years- unable to lay supine- sleeps in recliner   Exercise Reps/ Time Weight/ Level Comments   scifit 10 min  lv 1 - slow paced   \"Felt good on back\"               SITTING          HS stretch 3 x 20\"   DECLINED- \"won't do anything for my back. \"   Low back stretch 10 x 5\"  with ball  Seated \"divers\"- hands overlapped and reaching toward floor   Pelvic tilts 10 x 5\"; x2 sets   Sync with breathing- inhale anterior tilt, exhale posterior tilt   Pelvic tilt with march 10 x 5\"; x2 sets   Within anterior tilt   Pelvic tilt LAQ 10 x 5\"; x2 sets   \" \"   Seated \"divers\" 10 x5\"; x2 sets   with ball To R, L   Sit<>stand 10x3 High mat table UE A to rise and lower   Seated hip add 15x5\" ball    Seated hip abd 20x red                      3 way hip  10x ea A Bilateral   Step up  10x ea 4\"                    Other:     Treatment performed in a private room d/t pt donning a dress- for pt modesty.      Specific Instructions for next treatment:     Trial supine lying with head elevated- then perform LTRS, DKTC, etc.- UNABLE. And abdominal bracing exercises. Hamstring stretching. Treatment Charges: Mins Units   []  Modalities IFC/HP 15/15 1/0   [x]  Ther Exercise 30 2   []  Manual Therapy     []  Ther Activities     []  Aquatics     []  Vasocompression     []  Other     Total Treatment time 45 3     Assessment: [x] Progressing toward goals. Frequent rest breaks through out session due to fatigue however able to progress patient with moderate tolerance. Added in IFC with HP end of session for pain control with great relief reported upon completion. Max education on importance of being active through out the day while at home; even if she is just walking around her house. Reviewed use of Lumbar roll for home and while driving. Will continue to progress as tolerated in up coming visits. [] No change. [] Other:    STG/LTG (to be met in 12 treatments)  1. ? Pain: <7/10 LBP pain on average for improved QOL  2. ? ROM: Pt to improve lumbar extension to 3-5 ° past neutral for improved mobility   3. ? Strength: Pt to improve BLE strength by 1/2 grade or more to improve ability to climb stairs, squat, etc  4. ? Function:  i. Pt will report ability to ambulate 500 ft without increased LBP for improved community ambulation  ii.  Pt will report <65% impairment on the Oswestry to indicate improved functional mobility  iii. Pt will report 2 or less nightly sleep disturbances d/t pain for improved sleep patterns   5. Independent with Home Exercise Programs  6. Demonstrate Knowledge of fall prevention     Patient goals: \"whatever they can do\"    Pt. Education:  [x] Yes  [] No  [x] Reviewed Prior HEP/Ed  Method of Education: [x] Verbal  [] Demo  [] Written  Comprehension of Education:  [x] Verbalizes understanding. [x] Demonstrates understanding. [x] Needs review. [] Demonstrates/verbalizes HEP/Ed previously given. Emphasized timeliness to future treatment appointments- verbalized understanding to all. Plan: [x] Continue per plan of care.    [x] Other:       Time In: 155PM            Time Out: 310 PM    Electronically signed by:  Suzanne Suero PTA

## 2019-07-01 ENCOUNTER — HOSPITAL ENCOUNTER (OUTPATIENT)
Dept: PHYSICAL THERAPY | Facility: CLINIC | Age: 59
Setting detail: THERAPIES SERIES
Discharge: HOME | End: 2019-07-01
Payer: MEDICARE

## 2019-07-01 PROCEDURE — 97110 THERAPEUTIC EXERCISES: CPT

## 2019-07-01 PROCEDURE — G0283 ELEC STIM OTHER THAN WOUND: HCPCS

## 2019-07-01 NOTE — FLOWSHEET NOTE
2  [] [facility]  [facility]  [address]  [phone]  F: [phone] [x] [address]  [facility] 36   [address]  P: [phone]  F: [phone] [] [address]  [address] [facility]  P: [phone]  F: [phone] [] [address]  [facility]   [address]  [place]: [phone]  F: [phone]     Physical Therapy Daily Treatment Note    Date:  2019  Patient Name:  Jerrod Laguna    :  1960  MRN: [id number]  Physician: JJ Chavarria CNP                                   Insurance: [employer] advantage, 30 visits   Medical Diagnosis: M54.42, G89.29 (ICD-10-CM) - Chronic left-sided low back pain with left-sided sciatica  Rehab Codes: M54.5, M54.9, M54.42, R26.2, M62.81  Onset Date: 3/17/17               Next 's appt. : 19  Visit# / total visits:   Cancels/No Shows: 0/0    Subjective:    Pain:  [x] Yes  [] No Location: B LBP- \"and up my spine\" Pain Rating: (0-10 scale) 8/10  Pain altered Tx:  [x] No  [] Yes  Action: N/A   Comments: Pt reports she feels a little better. Todays Treatment:  Modalities: HP with IFC lumbar spine in sitting x 15min end of treatment. Precautions: LATEX ALLERGY; UNABLE TO LAY SUPINE- EVEN WITH ELEVATION; FALL RISK- ambulates with [facility]- falls caused this current LBP and symptoms  Exercises: h/o vertigo for approximately 45 years- unable to lay supine- sleeps in recliner   Exercise Reps/ Time Weight/ Level Comments   scifit 10 min  lv 1 - slow paced   \"Felt good on back\"               SITTING          HS stretch 3 x 20\"   DECLINED- \"won't do anything for my back. \"   Low back stretch 10 x 5\"  with ball  Seated \"divers\"- hands overlapped and reaching toward floor   Pelvic tilts 10 x 5\"; x2 sets   Sync with breathing- inhale anterior tilt, [x] Verbal  [] Demo  [] Written  Comprehension of Education:  [x] Verbalizes understanding. [x] Demonstrates understanding. [x] Needs review. [] Demonstrates/verbalizes HEP/Ed previously given. Emphasized timeliness to future treatment appointments- verbalized understanding to all. Plan: [x] Continue per plan of care.    [x] Other:       Time In: 157 PM            Time Out: 311 PM    Electronically signed by:  Joshua Person PTA

## 2019-07-03 ENCOUNTER — HOSPITAL ENCOUNTER (OUTPATIENT)
Dept: PHYSICAL THERAPY | Facility: CLINIC | Age: 59
Setting detail: THERAPIES SERIES
Discharge: HOME | End: 2019-07-03
Payer: MEDICARE

## 2019-07-03 PROCEDURE — 97110 THERAPEUTIC EXERCISES: CPT

## 2019-07-08 ENCOUNTER — OFFICE VISIT (OUTPATIENT)
Dept: FAMILY MEDICINE CLINIC | Age: 59
End: 2019-07-08
Payer: MEDICARE

## 2019-07-08 VITALS
OXYGEN SATURATION: 95 % | HEART RATE: 66 BPM | WEIGHT: 249 LBS | SYSTOLIC BLOOD PRESSURE: 142 MMHG | BODY MASS INDEX: 50.29 KG/M2 | TEMPERATURE: 97.8 F | DIASTOLIC BLOOD PRESSURE: 80 MMHG

## 2019-07-08 DIAGNOSIS — I10 ESSENTIAL HYPERTENSION: Primary | ICD-10-CM

## 2019-07-08 PROCEDURE — 1036F TOBACCO NON-USER: CPT | Performed by: NURSE PRACTITIONER

## 2019-07-08 PROCEDURE — 99214 OFFICE O/P EST MOD 30 MIN: CPT | Performed by: NURSE PRACTITIONER

## 2019-07-08 PROCEDURE — G8427 DOCREV CUR MEDS BY ELIG CLIN: HCPCS | Performed by: NURSE PRACTITIONER

## 2019-07-08 PROCEDURE — 3017F COLORECTAL CA SCREEN DOC REV: CPT | Performed by: NURSE PRACTITIONER

## 2019-07-08 PROCEDURE — G8417 CALC BMI ABV UP PARAM F/U: HCPCS | Performed by: NURSE PRACTITIONER

## 2019-07-08 RX ORDER — AMLODIPINE BESYLATE 5 MG/1
5 TABLET ORAL DAILY
Qty: 30 TABLET | Refills: 3 | Status: SHIPPED | OUTPATIENT
Start: 2019-07-08 | End: 2019-10-02 | Stop reason: SDUPTHER

## 2019-07-08 NOTE — PATIENT INSTRUCTIONS
of breath. ? Nausea or vomiting. ? Pain, pressure, or a strange feeling in the back, neck, jaw, or upper belly or in one or both shoulders or arms. ? Lightheadedness or sudden weakness. ? A fast or irregular heartbeat.     · You have symptoms of a stroke. These may include:  ? Sudden numbness, tingling, weakness, or loss of movement in your face, arm, or leg, especially on only one side of your body. ? Sudden vision changes. ? Sudden trouble speaking. ? Sudden confusion or trouble understanding simple statements. ? Sudden problems with walking or balance. ? A sudden, severe headache that is different from past headaches.     · You have severe back or belly pain.    Do not wait until your blood pressure comes down on its own. Get help right away.   Call your doctor now or seek immediate care if:    · Your blood pressure is much higher than normal (such as 180/120 or higher), but you don't have symptoms.     · You think high blood pressure is causing symptoms, such as:  ? Severe headache.  ? Blurry vision.    Watch closely for changes in your health, and be sure to contact your doctor if:    · Your blood pressure measures higher than your doctor recommends at least 2 times. That means the top number is higher or the bottom number is higher, or both.     · You think you may be having side effects from your blood pressure medicine. Where can you learn more? Go to [email or web address]://[email or web address].MetaCarta. org and sign in to your QRcao account. Enter V283 in the compropago box to learn more about \"High Blood Pressure: Care Instructions. \"     If you do not have an account, please click on the \"Sign Up Now\" link. Current as of: July 22, 2018  Content Version: 12.0  © 5209-7709 relocality. Care instructions adapted under license by [facility] ([facility]).  If you have questions about a medical condition or this instruction, always ask your healthcare professional. [address]

## 2019-07-08 NOTE — PROGRESS NOTES
LENNOX Holland-C  [address]  [address] [facility] [place]   [facility]  [phone]  [phone]    Hansa Echols is a 66 y.o. female who is here with c/o of:    Chief Complaint: Hypertension (1 month check up )      Patient Accompanied by: patient    HPI - Hansa Echols is here today with c/o:    Treatment Adherence:   Medication compliance:  compliant all of the time  Diet compliance:  noncompliant: she does not follow a low fat, low sodium diet  Weight trend: stable  Current exercise: no regular exercise  Barriers: lack of motivation and chronic pain of left foot and back    Hypertension:  Home blood pressure monitoring: No.  She is not adherent to a low sodium diet. Patient denies chest pain, shortness of breath, headache, lightheadedness, blurred vision, peripheral edema, palpitations, dry cough and fatigue. Antihypertensive medication side effects: no medication side effects noted. Use of agents associated with hypertension: none.           Lab Results   Component Value Date    LABA1C 5.8 01/30/2019    LABA1C 5.6 02/08/2017    LABA1C 6.0 09/17/2012     Lab Results   Component Value Date    LABMICR 5 08/22/2013    CREATININE 0.51 01/30/2019     Lab Results   Component Value Date    ALT 26 01/30/2019    AST 20 01/30/2019     Lab Results   Component Value Date    CHOL 163 01/30/2019    TRIG 70 01/30/2019    HDL 64 01/30/2019            Patient Active Problem List:     Essential hypertension     YAMEL (obstructive sleep apnea)     Allergic asthma     Obesity, morbid, BMI 40.0-49.9 ([facility])     Urticaria     Contracture of left ankle     Chronic pain of left ankle     Spontaneous rupture of flexor tendon of left lower leg     Allergic rhinitis     Anxiety     Benign neoplasm of skin     Constipation     Cyst of ovary     Generalized osteoarthritis     Depressive disorder     Edema     Food allergy     Osteoarthrosis involving multiple sites but not designated as generalized     Reactive

## 2019-07-09 ENCOUNTER — HOSPITAL ENCOUNTER (OUTPATIENT)
Dept: PHYSICAL THERAPY | Facility: CLINIC | Age: 59
Setting detail: THERAPIES SERIES
Discharge: HOME | End: 2019-07-09
Payer: MEDICARE

## 2019-07-09 NOTE — FLOWSHEET NOTE
[] [address]  [address]    [phone]  F: [phone]   [x] [address]  [facility] 36   [address]  P: [phone]  F: [phone]  [] [address]  [address] [facility]  P: [phone]  F: [phone]   [] [address]  [facility] [address]   [place]: [phone]  F: [phone]  [] [place] [address] [facility] [address]  [place]: [phone]   F: [phone]     Physical Therapy Cancel/No Show note    Date: 2019  Patient: Aaron Florence  : 1960  MRN: [id number]    Cancels/No Shows to date:     For today's appointment patient:    [x]  Cancelled    [] Rescheduled appointment    [] No-show     Reason given by patient:    []  Patient ill    []  Conflicting appointment    [] No transportation      [] Conflict with work    [x] No reason given    [] Weather related    [] Other:      Comments:        [x] Next appointment was confirmed    Electronically signed by: Vira Lanes, PT

## 2019-07-11 ENCOUNTER — HOSPITAL ENCOUNTER (OUTPATIENT)
Dept: PHYSICAL THERAPY | Facility: CLINIC | Age: 59
Setting detail: THERAPIES SERIES
Discharge: HOME | End: 2019-07-11
Payer: MEDICARE

## 2019-07-11 PROCEDURE — 97110 THERAPEUTIC EXERCISES: CPT

## 2019-07-11 NOTE — FLOWSHEET NOTE
x2 sets   Sync with breathing- inhale anterior tilt, exhale posterior tilt   Pelvic tilt with march 10 x  x2 sets   Within anterior tilt   Pelvic tilt LAQ 10 x   x2 sets   \" \"   Seated \"divers\" 10 x5\"; x2 sets   with ball To R, L   Sit<>stand 10x2 High mat table UE A to rise and lower   Seated hip add 15x5\" ball    Seated hip abd 20x red                      3 way hip  5x ea A Bilateral   Step up  0 6\" Progressed to 6\" step 7/1   Toe raise  10  Added 7/1   Calf stretch 30\"x2       squats 0   Added 7/1    Other:      Specific Instructions for next treatment:     Trial supine lying with head elevated- then perform LTRS, DKTC, etc.- UNABLE. Treatment Charges: Mins Units   [x]  Modalities HP 15 0   [x]  Ther Exercise 35 2   []  Manual Therapy     []  Ther Activities     []  Aquatics     []  Vasocompression     []  Other     Total Treatment time 35 2     Assessment: [x] Progressing toward goals. Patient tolerated sitting exercises well today no increase in pain in back during exercises however patient has complaints of pain in ankle and was unable to complete hip 3 way and heel toe raises. patient reports improved pain level overall and denied need for Estim but requested HP at end of session. [] No change. [] Other:    STG/LTG (to be met in 12 treatments)  1. ? Pain: <7/10 LBP pain on average for improved QOL  2. ? ROM: Pt to improve lumbar extension to 3-5 ° past neutral for improved mobility   3. ? Strength: Pt to improve BLE strength by 1/2 grade or more to improve ability to climb stairs, squat, etc  4. ? Function:  i. Pt will report ability to ambulate 500 ft without increased LBP for improved community ambulation  ii. Pt will report <65% impairment on the Oswestry to indicate improved functional mobility  iii. Pt will report 2 or less nightly sleep disturbances d/t pain for improved sleep patterns   5. Independent with Home Exercise Programs  6.  Demonstrate Knowledge of fall prevention     Patient goals: \"whatever they can do\"    Pt. Education:  [x] Yes  [] No  [x] Reviewed Prior HEP/Ed  Method of Education: [x] Verbal  [] Demo  [] Written  Comprehension of Education:  [x] Verbalizes understanding. [x] Demonstrates understanding. [x] Needs review. [] Demonstrates/verbalizes HEP/Ed previously given. Emphasized timeliness to future treatment appointments- verbalized understanding to all. Plan: [x] Continue per plan of care.    [x] Other:       Time In: 2:10 PM            Time Out: 3:00 PM  Electronically signed by:  Tati Ludwig PTA

## 2019-07-16 ENCOUNTER — PROCEDURE VISIT (OUTPATIENT)
Dept: DERMATOLOGY | Age: 59
End: 2019-07-16

## 2019-07-16 ENCOUNTER — HOSPITAL ENCOUNTER (OUTPATIENT)
Dept: PHYSICAL THERAPY | Facility: CLINIC | Age: 59
Setting detail: THERAPIES SERIES
Discharge: HOME | End: 2019-07-16
Payer: MEDICARE

## 2019-07-16 VITALS — HEART RATE: 78 BPM | SYSTOLIC BLOOD PRESSURE: 128 MMHG | OXYGEN SATURATION: 94 % | DIASTOLIC BLOOD PRESSURE: 81 MMHG

## 2019-07-16 DIAGNOSIS — L82.1 DERMATOSIS PAPULOSA NIGRA: Primary | ICD-10-CM

## 2019-07-16 PROCEDURE — 1120000 COSMETIC-REMOVAL OF SKIN TAGS <W/15: Performed by: DERMATOLOGY

## 2019-07-16 PROCEDURE — 97110 THERAPEUTIC EXERCISES: CPT

## 2019-07-16 NOTE — FLOWSHEET NOTE
understanding. [x] Needs review. [] Demonstrates/verbalizes HEP/Ed previously given. Emphasized timeliness to future treatment appointments- verbalized understanding to all. Plan: [x] Continue per plan of care.    [x] Other:       Time In: 2:00 PM            Time Out: 2:54 PM    Electronically signed by:  Nuris Rivers PTA

## 2019-07-16 NOTE — PROGRESS NOTES
Dermatology Procedure Note   [facility] PHYSICIANS  [facility] HEALTH DERMATOLOGY  [address] [address]  Dept: [phone]  Dept Fax: [phone]      Procedure Date: 7/16/2019  Procedure Time: 8:53 AM    Procedure Practitioner: Edy Beltrán MD    Procedure: Lesion Destruction/skin tag removal (cosmetic - charge collected at time of visit))    Pre-Procedure Diagnosis: dermatosis papulosa nigra    Post-Procedure Diagnosis: Same as Pre-Procedure Diagnosis    Informed Consent: The procedure and its risks were explained including but not limited to pain, bleeding, infection, permanent scar, permanent pigment alteration and recurrence. Consent to proceed with the procedure was obtained from the patient or the parent by the practitioner    Time Out:  A time out was conducted immediately before starting the procedure that confirmed a final verification of the correct patient, correct procedure, and correct site. Procedure Details:  Destruction of DPNs:  Topical lidocaine 4% cream was applied 30 min prior to procedure and occluded with saran wrap. After removing cream and cleansing with alcohol, approximately 20 DPNs on bilateral cheeks were cauterized to a light kayla at 0.6-0.8 power level (titrated to patient comfort) using a hyfrecator. A thin layer of vaseline was applied. Destruction of SKs:  After cleaning with alcohol, two seborrheic keratoses on left back were anesthetized with 1% lidocaine with epinephrine and removed with a curette. Hemostasis was achieved with aluminum chloride and Vaseline and a bandage were applied.     Procedure Performed By: Edy Beltrán MD    Estimated Blood Loss: Minimal    Procedure Tolerance: Good    Complication(s): None    Electronically signed by Edy Beltrán MD on 7/16/19 at 8:53 AM

## 2019-07-18 ENCOUNTER — HOSPITAL ENCOUNTER (OUTPATIENT)
Dept: PHYSICAL THERAPY | Facility: CLINIC | Age: 59
Setting detail: THERAPIES SERIES
Discharge: HOME | End: 2019-07-18
Payer: MEDICARE

## 2019-07-18 NOTE — FLOWSHEET NOTE
[] [address]  [address]    [phone]  F: [phone]   [x] [address] [facility] 36   [address]  P: [phone]  F: [phone]  [] [place]  [address] [facility]  P: [phone]  F: [phone]   [] [address] [facility] [address]   [place]: [phone]  F: [phone]  [] [facility]  [address] [facility] [address]  [place]: [phone]   F: [phone]     Physical Therapy Cancel/No Show note    Date: 2019  Patient: Adia Jones  : 1960  MRN: [id number]    Cancels/No Shows to date:     For today's appointment patient:    [x]  Cancelled    [] Rescheduled appointment    [] No-show     Reason given by patient:    []  Patient ill    []  Conflicting appointment    [] No transportation      [] Conflict with work    [x] No reason given    [] Weather related    [x] Other: correct visit count next visit     Comments:        [] Next appointment was confirmed    Electronically signed by: Lang Condon PTA

## 2019-07-23 ENCOUNTER — HOSPITAL ENCOUNTER (OUTPATIENT)
Dept: PHYSICAL THERAPY | Facility: CLINIC | Age: 59
Setting detail: THERAPIES SERIES
Discharge: HOME | End: 2019-07-23
Payer: MEDICARE

## 2019-07-23 PROCEDURE — 97110 THERAPEUTIC EXERCISES: CPT

## 2019-07-23 NOTE — FLOWSHEET NOTE
understanding. [x] Demonstrates understanding. [x] Needs review. [] Demonstrates/verbalizes HEP/Ed previously given. Emphasized timeliness to future treatment appointments- verbalized understanding to all. Plan: [x] Continue per plan of care.    [x] Other:       Time In: 1:55 PM            Time Out: 2:40 PM    Electronically signed by:  Mile Washburn PTA

## 2019-07-25 ENCOUNTER — HOSPITAL ENCOUNTER (OUTPATIENT)
Dept: PHYSICAL THERAPY | Facility: CLINIC | Age: 59
Setting detail: THERAPIES SERIES
Discharge: HOME | End: 2019-07-25
Payer: MEDICARE

## 2019-07-25 PROCEDURE — 97110 THERAPEUTIC EXERCISES: CPT

## 2019-07-30 ENCOUNTER — HOSPITAL ENCOUNTER (OUTPATIENT)
Dept: PHYSICAL THERAPY | Facility: CLINIC | Age: 59
Setting detail: THERAPIES SERIES
Discharge: HOME | End: 2019-07-30
Payer: MEDICARE

## 2019-07-30 PROCEDURE — 97110 THERAPEUTIC EXERCISES: CPT

## 2019-08-01 ENCOUNTER — HOSPITAL ENCOUNTER (OUTPATIENT)
Dept: PHYSICAL THERAPY | Facility: CLINIC | Age: 59
Setting detail: THERAPIES SERIES
Discharge: HOME | End: 2019-08-01
Payer: MEDICARE

## 2019-08-01 PROCEDURE — 97110 THERAPEUTIC EXERCISES: CPT

## 2019-08-05 ENCOUNTER — HOSPITAL ENCOUNTER (OUTPATIENT)
Dept: PHYSICAL THERAPY | Facility: CLINIC | Age: 59
Setting detail: THERAPIES SERIES
Discharge: HOME | End: 2019-08-05
Payer: MEDICARE

## 2019-08-05 PROCEDURE — 97110 THERAPEUTIC EXERCISES: CPT

## 2019-08-06 ENCOUNTER — OFFICE VISIT (OUTPATIENT)
Dept: PODIATRY | Age: 59
End: 2019-08-06
Payer: MEDICARE

## 2019-08-06 VITALS — HEIGHT: 59 IN | WEIGHT: 235 LBS | BODY MASS INDEX: 47.37 KG/M2

## 2019-08-06 DIAGNOSIS — M19.072 DEGENERATIVE JOINT DISEASE OF BOTH ANKLES AND FEET: ICD-10-CM

## 2019-08-06 DIAGNOSIS — R26.2 TROUBLE WALKING: Primary | ICD-10-CM

## 2019-08-06 DIAGNOSIS — M19.071 DEGENERATIVE JOINT DISEASE OF BOTH ANKLES AND FEET: ICD-10-CM

## 2019-08-06 DIAGNOSIS — I73.9 PERIPHERAL VASCULAR DISEASE (HCC): ICD-10-CM

## 2019-08-06 DIAGNOSIS — S86.112S POSTERIOR TIBIAL TENDON TEAR, TRAUMATIC, LEFT, SEQUELA: ICD-10-CM

## 2019-08-06 DIAGNOSIS — B35.1 DERMATOPHYTOSIS OF NAIL: ICD-10-CM

## 2019-08-06 PROCEDURE — 3017F COLORECTAL CA SCREEN DOC REV: CPT | Performed by: PODIATRIST

## 2019-08-06 PROCEDURE — 1036F TOBACCO NON-USER: CPT | Performed by: PODIATRIST

## 2019-08-06 PROCEDURE — 99213 OFFICE O/P EST LOW 20 MIN: CPT | Performed by: PODIATRIST

## 2019-08-06 PROCEDURE — 11721 DEBRIDE NAIL 6 OR MORE: CPT | Performed by: PODIATRIST

## 2019-08-06 PROCEDURE — G8427 DOCREV CUR MEDS BY ELIG CLIN: HCPCS | Performed by: PODIATRIST

## 2019-08-06 PROCEDURE — G8417 CALC BMI ABV UP PARAM F/U: HCPCS | Performed by: PODIATRIST

## 2019-08-08 ENCOUNTER — HOSPITAL ENCOUNTER (OUTPATIENT)
Dept: PHYSICAL THERAPY | Facility: CLINIC | Age: 59
Setting detail: THERAPIES SERIES
Discharge: HOME | End: 2019-08-08
Payer: MEDICARE

## 2019-08-08 PROCEDURE — 97110 THERAPEUTIC EXERCISES: CPT

## 2019-08-08 NOTE — FLOWSHEET NOTE
Sync with breathing- inhale anterior tilt, exhale posterior tilt   Pelvic tilt with march 10 x  x2 sets   Within anterior tilt   Pelvic tilt LAQ 10 x   x2 sets   \" \"   Sit<>stand 10x2 chair Minimal UE assist 8/5   Seated hip add 20x5\" ball Increased reps 7/25   Seated hip abd 30x blue Increased reps 7/25   Seated trunk flex/ext 10x black Added 8/5               3 way hip  10x ea 2# Increased 8/5   Step up  15xea 6\" Progressed to 6\" step 7/1 Progressed 7/30   heel raise  20  Minimal plantarflexion    Calf stretch 30\"x2      squats 15x      Marching  10x ea 2# Increased 8/5   Lateral walking 4xea // bars Added 8/5   Ambulation. 1 lap  Minimal cuing for technique with cane   Other:      Specific Instructions for next treatment:      STRENGTH     Left Right   L1-2 Hip Flex 4 4+   Hip Abd 4+ 4   L3-4 Knee Ext 4-* 5   L4 Ankle DF 3+ 5   L5 EHL       S1 Plant. Flex 3 3   Abdominals STANTON     Erector Spinae STANTON     * indicates pain       Treatment Charges: Mins Units   []  Modalities HP     [x]  Ther Exercise 45 3   []  Manual Therapy     []  Ther Activities     []  Aquatics     []  Vasocompression     []  Other     Total Treatment time 45 3     Assessment: [x] Progressing toward goals. Since beginning physical therapy, patient has made significant improvements in lumbar ROM, BLE strength, and overall functional mobility. Pt able to complete sit to stands with greater ease and no reports of increased LBP. Pt reports a 44% improvement in functional mobility as indicated by Oswestry low back pain questionnaire; also denies sleep disturbances d/t LBP (still notes that she has trouble sleeping d/t other issues). At this time, patient is independent in exercises and is discharged from physical therapy. Pt to continue to complete exercises issued for continued improvement. [] No change.      [] Other:    STG/LTG (to be met in 12 treatments)  1. ? Pain: <7/10 LBP pain on average for improved QOL- MET  2. ? ROM: Pt to improve

## 2019-08-20 ENCOUNTER — OFFICE VISIT (OUTPATIENT)
Dept: PAIN MANAGEMENT | Age: 59
End: 2019-08-20
Payer: MEDICARE

## 2019-08-20 VITALS
OXYGEN SATURATION: 95 % | DIASTOLIC BLOOD PRESSURE: 79 MMHG | HEIGHT: 59 IN | WEIGHT: 252 LBS | HEART RATE: 78 BPM | SYSTOLIC BLOOD PRESSURE: 133 MMHG | BODY MASS INDEX: 50.8 KG/M2

## 2019-08-20 DIAGNOSIS — M54.50 CHRONIC BILATERAL LOW BACK PAIN WITHOUT SCIATICA: ICD-10-CM

## 2019-08-20 DIAGNOSIS — G47.33 OSA (OBSTRUCTIVE SLEEP APNEA): ICD-10-CM

## 2019-08-20 DIAGNOSIS — G89.29 CHRONIC BILATERAL LOW BACK PAIN WITHOUT SCIATICA: ICD-10-CM

## 2019-08-20 DIAGNOSIS — M47.816 LUMBAR FACET ARTHROPATHY: ICD-10-CM

## 2019-08-20 DIAGNOSIS — M25.572 CHRONIC PAIN OF LEFT ANKLE: Primary | ICD-10-CM

## 2019-08-20 DIAGNOSIS — G89.29 CHRONIC PAIN OF LEFT ANKLE: Primary | ICD-10-CM

## 2019-08-20 PROCEDURE — 99213 OFFICE O/P EST LOW 20 MIN: CPT | Performed by: ANESTHESIOLOGY

## 2019-08-20 PROCEDURE — G8427 DOCREV CUR MEDS BY ELIG CLIN: HCPCS | Performed by: ANESTHESIOLOGY

## 2019-08-20 PROCEDURE — 3017F COLORECTAL CA SCREEN DOC REV: CPT | Performed by: ANESTHESIOLOGY

## 2019-08-20 PROCEDURE — 1036F TOBACCO NON-USER: CPT | Performed by: ANESTHESIOLOGY

## 2019-08-20 PROCEDURE — G8417 CALC BMI ABV UP PARAM F/U: HCPCS | Performed by: ANESTHESIOLOGY

## 2019-08-20 ASSESSMENT — ENCOUNTER SYMPTOMS
FACIAL SWELLING: 0
EYES NEGATIVE: 1
BACK PAIN: 1
ALLERGIC/IMMUNOLOGIC NEGATIVE: 1
RESPIRATORY NEGATIVE: 1

## 2019-08-20 NOTE — PROGRESS NOTES
The patient is a 66 y. o. Non-/non  female. Chief Complaint   Patient presents with    Foot Pain     left foot pain, ongoing pain 8/10 pain. shooting pain. can only walk for 1 hour then she has to sit down     Back Pain     fall X 4 times. last fall was 8/3/2019 fell foward most time fall on butlocks         HPI     -Back pain  Pain is chronic located in the lumbar area across midline affect both side with no radiation and leg  Describes the pain as aching nagging throbbing sensation  States that flared up after multiple recent falls  Had a recent x-ray that showed lumbar facet arthritis  Tried physical therapy recently  No previous lumbar spine injections or lumbar spine surgical history    Ankle pain  Located around the left ankle, pain is chronic onset many years ago, history of 3 left ankle surgery  Describes the pain is constant sharp shooting aching pain sensation    Patient was evaluated for similar issues last year, she was insisting for opioid        Past Medical History:   Diagnosis Date    Allergic rhinitis 12/4/2014    Anxiety 7/5/2018    Arthritis     Asthma     Chest pain     occurs every other day, pt states they occur when she doesn't eat right or forget to take her bp med, occurs left side of chest with no radiation, pt states \"it's new\" and has not had any testing done or seen anyone for it.     Constipation     Depressive disorder 7/5/2018    Generalized osteoarthritis 6/26/2014    History of kidney stones     Hypertension     Obesity, morbid, BMI 40.0-49.9 ([address] 2/28/2017    PONV (postoperative nausea and vomiting)     Sleep apnea     does not use a machine      Past Surgical History:   Procedure Laterality Date    CHOLECYSTECTOMY      FOOT TENDON SURGERY Left 09/08/2017    posterior repair    FOOT TENDON SURGERY Left 06/29/2018    LEFT POSTERIOR  TENDON REPAIR FLEXOR TENDON TRANSFER (Left Foot)    LITHOTRIPSY      VT OFFICE/OUTPT VISIT,PROCEDURE ONLY Left 2018    LEFT POSTERIOR  TENDON REPAIR FLEXOR TENDON TRANSFER performed by Jose Caba DPM at [address] Left 2017    LEFT POSTERIOR TIBIAL TENDON REPAIR WITH LEFT TENOLYSIS  performed by Jose Caba DPM at [address] [place] [address] History     Socioeconomic History    Marital status: Single     Spouse name: None    Number of children: None    Years of education: None    Highest education level: None   Occupational History    None   Social Needs    Financial resource strain: None    Food insecurity:     Worry: None     Inability: None    Transportation needs:     Medical: None     Non-medical: None   Tobacco Use    Smoking status: Former Smoker     Packs/day: 3.00     Years: 20.00     Pack years: 60.00     Types: Cigarettes     Start date: 1981     Last attempt to quit: 1998     Years since quittin.6    Smokeless tobacco: Never Used    Tobacco comment: quit smoking 15-20 yrs ago   Substance and Sexual Activity    Alcohol use: No    Drug use: No    Sexual activity: None   Lifestyle    Physical activity:     Days per week: None     Minutes per session: None    Stress: None   Relationships    Social connections:     Talks on phone: None     Gets together: None     Attends [employer] service: None     Active member of club or organization: None     Attends meetings of clubs or organizations: None     Relationship status: None    Intimate partner violence:     Fear of current or ex partner: None     Emotionally abused: None     Physically abused: None     Forced sexual activity: None   Other Topics Concern    None   Social History Narrative    None     Family History   Problem Relation Age of Onset    Depression Mother     Diabetes Mother     High Blood Pressure Mother     Mental Illness Mother     Substance Abuse Father     Depression Sister     Diabetes Sister     High Blood Pressure Sister     Mental Illness Sister     Depression Brother  Mental Illness Brother     Substance Abuse Brother      Allergies   Allergen Reactions    Latex Hives     Latex gloves    Asa [Aspirin] Hives    Codeine Hives    Lisinopril-Hydrochlorothiazide Hives    Motrin [Ibuprofen] Hives    Other Hives and Swelling     Cherries, almonds    Pcn [Penicillins] Hives    Sulfa Antibiotics      Latex; Asa [aspirin]; Codeine; Lisinopril-hydrochlorothiazide; Motrin [ibuprofen]; Other; Pcn [penicillins]; and Sulfa antibiotics   Vitals:    08/20/19 1429   BP: 133/79   Pulse: 78   SpO2: 95%     Current Outpatient Medications   Medication Sig Dispense Refill    amLODIPine (NORVASC) 5 MG tablet Take 1 tablet by mouth daily 30 tablet 3    lidocaine (L-M-X 4) 4 % cream Apply a thick layer 30 minutes prior to procedure, covering in saran wrap 15 g 0    acetaminophen-codeine (TYLENOL #3) 300-30 MG per tablet Take 1 tablet by mouth every 4 hours as needed for Pain.  fluticasone-salmeterol (ADVAIR) 250-50 MCG/DOSE AEPB Inhale 1 puff into the lungs every 12 hours 60 each 3    albuterol sulfate HFA (VENTOLIN HFA) 108 (90 Base) MCG/ACT inhaler Inhale 2 puffs into the lungs every 6 hours as needed for Wheezing 1 Inhaler 3    fluticasone (FLONASE) 50 MCG/ACT nasal spray instill 1 spray into each nostril once daily 16 g 5    RA LORATADINE 10 MG tablet take 1 tablet by mouth once daily 30 tablet 5    losartan-hydrochlorothiazide (HYZAAR) 100-25 MG per tablet take 1/2 tablet by mouth once daily 45 tablet 5    clindamycin (CLEOCIN) 300 MG capsule Take 150 mg by mouth 4 times daily       Misc. Devices (BATHTUB SAFETY RAIL) MISC 4 each by Does not apply route daily 4 each 0    Misc. Devices (EXTENDABLE BEDSIDE RAIL) MISC 4 each by Does not apply route daily 4 each 0    Spacer/Aero-Holding Chambers PRICE 1 Device by Does not apply route daily as needed (wheezing) 1 Device 0    Cholecalciferol (VITAMIN D3) 2000 units TABS Take 1 tablet by mouth daily 90 tablet 1    Misc.  Devices

## 2019-08-23 DIAGNOSIS — M19.072 DEGENERATIVE JOINT DISEASE OF BOTH ANKLES AND FEET: Primary | ICD-10-CM

## 2019-08-23 DIAGNOSIS — M19.071 DEGENERATIVE JOINT DISEASE OF BOTH ANKLES AND FEET: Primary | ICD-10-CM

## 2019-08-29 ENCOUNTER — OFFICE VISIT (OUTPATIENT)
Dept: PODIATRY | Age: 59
End: 2019-08-29
Payer: MEDICARE

## 2019-08-29 VITALS — HEIGHT: 59 IN | WEIGHT: 235 LBS | BODY MASS INDEX: 47.37 KG/M2 | RESPIRATION RATE: 18 BRPM

## 2019-08-29 DIAGNOSIS — R26.2 TROUBLE WALKING: ICD-10-CM

## 2019-08-29 DIAGNOSIS — M19.072 DEGENERATIVE JOINT DISEASE OF BOTH ANKLES AND FEET: Primary | ICD-10-CM

## 2019-08-29 DIAGNOSIS — M19.071 DEGENERATIVE JOINT DISEASE OF BOTH ANKLES AND FEET: Primary | ICD-10-CM

## 2019-08-29 DIAGNOSIS — M76.812 ANTERIOR TIBIAL TENDONITIS, LEFT: ICD-10-CM

## 2019-08-29 PROCEDURE — 99213 OFFICE O/P EST LOW 20 MIN: CPT | Performed by: PODIATRIST

## 2019-08-29 PROCEDURE — G8417 CALC BMI ABV UP PARAM F/U: HCPCS | Performed by: PODIATRIST

## 2019-08-29 PROCEDURE — 1036F TOBACCO NON-USER: CPT | Performed by: PODIATRIST

## 2019-08-29 PROCEDURE — 3017F COLORECTAL CA SCREEN DOC REV: CPT | Performed by: PODIATRIST

## 2019-08-29 PROCEDURE — G8427 DOCREV CUR MEDS BY ELIG CLIN: HCPCS | Performed by: PODIATRIST

## 2019-08-29 RX ORDER — TRAMADOL HYDROCHLORIDE 50 MG/1
50 TABLET ORAL EVERY 8 HOURS PRN
Qty: 21 TABLET | Refills: 0 | Status: SHIPPED | OUTPATIENT
Start: 2019-08-29 | End: 2019-09-05

## 2019-08-29 NOTE — PROGRESS NOTES
[facility] PHYSICIANS  [facility]  [address] [place] [address]  Dept: [phone]  Dept Fax: [phone]    RETURN PATIENT PROGRESS NOTE  Date of patient's visit: 8/29/2019  Patient's Name:  Michele Bacon YOB: 1960            Patient Care Team:  JJ Tsai CNP as PCP - General (Nurse Practitioner)  JJ Tsai CNP as PCP - [facility] Empaneled Provider  Nguyen Mariee DPM as Physician (Podiatry)       Michele Bacon 66 y.o. female that presents for follow-up of   Chief Complaint   Patient presents with    Foot Injury     3 week ago    Foot Pain     left foot     Pt's primary care physician is JJ Tsai CNP last seen 7/8/2019  Symptoms began 3 week(s) ago and are unchanged . Patient relates pain is Present. Pain is rated 8 out of 10 and is described as constant, severe. Treatments prior to today's visit include: immobliation and prevous surgery. Currently denies F/C/N/V. Allergies   Allergen Reactions    Latex Hives     Latex gloves    Asa [Aspirin] Hives    Codeine Hives    Lisinopril-Hydrochlorothiazide Hives    Motrin [Ibuprofen] Hives    Other Hives and Swelling     Cherries, almonds    Pcn [Penicillins] Hives    Sulfa Antibiotics        Past Medical History:   Diagnosis Date    Allergic rhinitis 12/4/2014    Anxiety 7/5/2018    Arthritis     Asthma     Chest pain     occurs every other day, pt states they occur when she doesn't eat right or forget to take her bp med, occurs left side of chest with no radiation, pt states \"it's new\" and has not had any testing done or seen anyone for it.     Constipation     Depressive disorder 7/5/2018    Generalized osteoarthritis 6/26/2014    History of kidney stones     Hypertension     Obesity, morbid, BMI 40.0-49.9 ([facility] 2/28/2017    PONV (postoperative nausea and vomiting)     Sleep apnea     does not use a machine       Prior to Admission medications    Medication Sig Start Date End Date Taking? Authorizing Provider   amLODIPine (NORVASC) 5 MG tablet Take 1 tablet by mouth daily 7/8/19  Yes JJ Chavarria CNP   lidocaine (L-M-X 4) 4 % cream Apply a thick layer 30 minutes prior to procedure, covering in saran wrap 6/26/19  Yes Kemar Christiansen MD   acetaminophen-codeine (TYLENOL #3) 300-30 MG per tablet Take 1 tablet by mouth every 4 hours as needed for Pain. Yes Historical Provider, [place]   fluticasone-salmeterol (ADVAIR) 250-50 MCG/DOSE AEPB Inhale 1 puff into the lungs every 12 hours 5/7/19  Yes JJ Cohen CNP   albuterol sulfate HFA (VENTOLIN HFA) 108 (90 Base) MCG/ACT inhaler Inhale 2 puffs into the lungs every 6 hours as needed for Wheezing 5/7/19  Yes JJ Cohen CNP   fluticasone (FLONASE) 50 MCG/ACT nasal spray instill 1 spray into each nostril once daily 5/7/19  Yes JJ Cohen CNP   RA LORATADINE 10 MG tablet take 1 tablet by mouth once daily 5/7/19  Yes JJ Cohen CNP   losartan-hydrochlorothiazide (HYZAAR) 100-25 MG per tablet take 1/2 tablet by mouth once daily 5/7/19  Yes JJ Cohen CNP   clindamycin (CLEOCIN) 300 MG capsule Take 150 mg by mouth 4 times daily  1/28/19  Yes Historical Provider, MD   Misc. Devices (BATHTUB SAFETY RAIL) MISC 4 each by Does not apply route daily 12/17/18  Yes LEONOR Stone. Devices (EXTENDABLE BEDSIDE RAIL) MISC 4 each by Does not apply route daily 12/17/18  Yes Gris Johnson DPM   Spacer/Aero-Holding Chambers PRICE 1 Device by Does not apply route daily as needed (wheezing) 11/26/18  Yes JJ Chavarria CNP   Cholecalciferol (VITAMIN D3) 2000 units TABS Take 1 tablet by mouth daily 11/26/18  Yes JJ Chavarria CNP   Misc. Devices [facility]) MISC Use as directed 10/24/18  Yes Gris Johnson DPM   Misc. Devices (WALL GRAB BAR) MISC GRAB BARS FOR BOTH SHOWER AND TOILET, USE AS DIRECTED 10/23/18  Yes Gris Johnson DPM   Misc.  Devices MISC OUTSIDE RAMP

## 2019-08-30 DIAGNOSIS — E55.9 VITAMIN D DEFICIENCY: ICD-10-CM

## 2019-09-01 NOTE — TELEPHONE ENCOUNTER
Next Visit Date:  Future Appointments   Date Time Provider Zurdo Phillips   9/12/2019  8:30 AM LEONOR Barrera Podiatry [facility]   10/8/2019  1:00 PM Ferdie Klinefelter, APRN - CNP [facility]   10/10/2019  1:15 PM LEONOR Barrera Podiatry [address] Maintenance   Topic Date Due    Shingles Vaccine (1 of 2) 09/02/2012    Colon Cancer Screen FIT/FOBT  08/22/2014    Flu vaccine (1) 09/01/2019    Pneumococcal 0-64 years Vaccine (1 of 1 - PPSV23) 09/01/2019 (Originally 9/2/1968)    A1C test (Diabetic or Prediabetic)  01/30/2020    Potassium monitoring  01/30/2020    Creatinine monitoring  01/30/2020    Breast cancer screen  02/20/2021    Cervical cancer screen  02/08/2022    Lipid screen  01/30/2024    DTaP/Tdap/Td vaccine (2 - Td) 06/03/2026    Hepatitis C screen  Completed    HIV screen  Completed       Hemoglobin A1C (%)   Date Value   01/30/2019 5.8   02/08/2017 5.6   09/17/2012 6.0             ( goal A1C is < 7)   Microalb/Crt.  Ratio (mcg/mg creat)   Date Value   08/22/2013 5     LDL Cholesterol (mg/dL)   Date Value   01/30/2019 85   02/08/2017 85       (goal LDL is <100)   AST (U/L)   Date Value   01/30/2019 20     ALT (U/L)   Date Value   01/30/2019 26     BUN (mg/dL)   Date Value   01/30/2019 12     BP Readings from Last 3 Encounters:   08/20/19 133/79   07/16/19 128/81   07/08/19 (!) 142/80          (goal 120/80)    All Future Testing planned in [facility]  Lab Frequency Next Occurrence   PAP Smear Once 02/08/2020   JUSTUS DIGITAL DIAGNOSTIC W OR WO CAD BILATERAL Once 08/20/2019               Patient Active Problem List:     Essential hypertension     YAMEL (obstructive sleep apnea)     Allergic asthma     Obesity, morbid, BMI 40.0-49.9 (HCC)     Urticaria     Contracture of left ankle     Chronic pain of left ankle     Spontaneous rupture of flexor tendon of left lower leg     Allergic rhinitis     Anxiety     Benign neoplasm of skin     Constipation     Cyst of ovary     Generalized osteoarthritis     Depressive disorder     Edema     Food allergy     Osteoarthrosis involving multiple sites but not designated as generalized     Reactive airway disease     Rupture of posterior tibialis tendon     Vitamin D deficiency     Morbid obesity with BMI of 50.0-59.9, adult ([facility] [address]     Nontraumatic rupture of left posterior tibial tendon     Status post left foot surgery     Mild intermittent asthma without complication

## 2019-09-03 RX ORDER — HYDROXYZINE HYDROCHLORIDE 25 MG/1
TABLET, FILM COATED ORAL
Qty: 30 TABLET | Refills: 5 | Status: SHIPPED | OUTPATIENT
Start: 2019-09-03 | End: 2021-02-03

## 2019-09-03 RX ORDER — CHOLECALCIFEROL (VITAMIN D3) 125 MCG
CAPSULE ORAL
Qty: 90 TABLET | Refills: 1 | Status: SHIPPED | OUTPATIENT
Start: 2019-09-03 | End: 2020-01-08 | Stop reason: SDUPTHER

## 2019-09-12 ENCOUNTER — OFFICE VISIT (OUTPATIENT)
Dept: PODIATRY | Age: 59
End: 2019-09-12
Payer: MEDICARE

## 2019-09-12 VITALS — HEIGHT: 59 IN | BODY MASS INDEX: 47.37 KG/M2 | WEIGHT: 235 LBS | RESPIRATION RATE: 18 BRPM

## 2019-09-12 DIAGNOSIS — S86.112D POSTERIOR TIBIAL TENDON TEAR, TRAUMATIC, LEFT, SUBSEQUENT ENCOUNTER: ICD-10-CM

## 2019-09-12 DIAGNOSIS — M25.572 CHRONIC PAIN OF LEFT ANKLE: ICD-10-CM

## 2019-09-12 DIAGNOSIS — R26.2 TROUBLE WALKING: ICD-10-CM

## 2019-09-12 DIAGNOSIS — G89.29 CHRONIC PAIN OF LEFT ANKLE: ICD-10-CM

## 2019-09-12 DIAGNOSIS — M19.071 DEGENERATIVE JOINT DISEASE OF BOTH ANKLES AND FEET: Primary | ICD-10-CM

## 2019-09-12 DIAGNOSIS — M19.072 DEGENERATIVE JOINT DISEASE OF BOTH ANKLES AND FEET: Primary | ICD-10-CM

## 2019-09-12 DIAGNOSIS — I73.9 PERIPHERAL VASCULAR DISEASE (HCC): ICD-10-CM

## 2019-09-12 PROCEDURE — G8417 CALC BMI ABV UP PARAM F/U: HCPCS | Performed by: PODIATRIST

## 2019-09-12 PROCEDURE — 1036F TOBACCO NON-USER: CPT | Performed by: PODIATRIST

## 2019-09-12 PROCEDURE — G8427 DOCREV CUR MEDS BY ELIG CLIN: HCPCS | Performed by: PODIATRIST

## 2019-09-12 PROCEDURE — 99213 OFFICE O/P EST LOW 20 MIN: CPT | Performed by: PODIATRIST

## 2019-09-12 PROCEDURE — 3017F COLORECTAL CA SCREEN DOC REV: CPT | Performed by: PODIATRIST

## 2019-09-12 NOTE — PROGRESS NOTES
[facility] PHYSICIANS  [facility]  [address] [place] [address]  Dept: [phone]  Dept Fax: [phone]    RETURN PATIENT PROGRESS NOTE  Date of patient's visit: 9/12/2019  Patient's Name:  Kyle Vu YOB: 1960            Patient Care Team:  JJ Rae CNP as PCP - General (Nurse Practitioner)  JJ Rae CNP as PCP - [facility] Empaneled Provider  Madison Marie DPM as Physician (Podiatry)       Kyle Vu 64 y.o. female that presents for follow-up of   Chief Complaint   Patient presents with    Foot Pain     left foot    Foot Injury    Follow-up     Pt's primary care physician is JJ Rae CNP last seen 7/8/2019  Symptoms began 5 week(s) ago and are unchanged . Patient relates pain is Present. Pain is rated 7 out of 10 and is described as constant, severe. Treatments prior to today's visit include: prevous left ankle and foot tendon surgery. .  Currently denies F/C/N/V. Allergies   Allergen Reactions    Latex Hives     Latex gloves    Asa [Aspirin] Hives    Codeine Hives    Lisinopril-Hydrochlorothiazide Hives    Motrin [Ibuprofen] Hives    Other Hives and Swelling     Cherries, almonds    Pcn [Penicillins] Hives    Sulfa Antibiotics        Past Medical History:   Diagnosis Date    Allergic rhinitis 12/4/2014    Anxiety 7/5/2018    Arthritis     Asthma     Chest pain     occurs every other day, pt states they occur when she doesn't eat right or forget to take her bp med, occurs left side of chest with no radiation, pt states \"it's new\" and has not had any testing done or seen anyone for it.     Constipation     Depressive disorder 7/5/2018    Generalized osteoarthritis 6/26/2014    History of kidney stones     Hypertension     Obesity, morbid, BMI 40.0-49.9 ([address] 2/28/2017    PONV (postoperative nausea and vomiting)     Sleep apnea     does not use a machine       Prior to Admission medications assess the area    Ordered a bedside commode since pt cannot weightbear to the left ankle with an elevated toilet seat. .  Verbal and written instructions given to patient. Contact office with any questions/problems/concerns. No orders of the defined types were placed in this encounter. No orders of the defined types were placed in this encounter. RTC in 1week(s) after MRI.     9/12/2019      Electronically signed by Tram Skaggs DPM on 9/12/2019 at 8:39 AM  9/12/2019

## 2019-09-16 ENCOUNTER — HOSPITAL ENCOUNTER (OUTPATIENT)
Dept: MAMMOGRAPHY | Age: 59
Discharge: HOME | End: 2019-09-18
Payer: MEDICARE

## 2019-09-16 DIAGNOSIS — R92.8 ABNORMAL MAMMOGRAM OF LEFT BREAST: ICD-10-CM

## 2019-09-16 PROCEDURE — G0279 TOMOSYNTHESIS, MAMMO: HCPCS

## 2019-09-20 ENCOUNTER — HOSPITAL ENCOUNTER (OUTPATIENT)
Dept: MRI IMAGING | Age: 59
Discharge: HOME | End: 2019-09-22
Payer: MEDICARE

## 2019-09-20 DIAGNOSIS — M25.572 CHRONIC PAIN OF LEFT ANKLE: ICD-10-CM

## 2019-09-20 DIAGNOSIS — G89.29 CHRONIC PAIN OF LEFT ANKLE: ICD-10-CM

## 2019-09-20 DIAGNOSIS — S86.112D POSTERIOR TIBIAL TENDON TEAR, TRAUMATIC, LEFT, SUBSEQUENT ENCOUNTER: ICD-10-CM

## 2019-09-20 PROCEDURE — 73721 MRI JNT OF LWR EXTRE W/O DYE: CPT

## 2019-10-02 DIAGNOSIS — I10 ESSENTIAL HYPERTENSION: ICD-10-CM

## 2019-10-02 RX ORDER — AMLODIPINE BESYLATE 5 MG/1
5 TABLET ORAL DAILY
Qty: 30 TABLET | Refills: 3 | Status: SHIPPED | OUTPATIENT
Start: 2019-10-02 | End: 2019-10-31 | Stop reason: SDUPTHER

## 2019-10-02 RX ORDER — LOSARTAN POTASSIUM AND HYDROCHLOROTHIAZIDE 25; 100 MG/1; MG/1
TABLET ORAL
Qty: 45 TABLET | Refills: 5 | Status: SHIPPED | OUTPATIENT
Start: 2019-10-02 | End: 2019-10-08 | Stop reason: SDUPTHER

## 2019-10-04 DIAGNOSIS — I10 ESSENTIAL HYPERTENSION: ICD-10-CM

## 2019-10-07 RX ORDER — LOSARTAN POTASSIUM AND HYDROCHLOROTHIAZIDE 25; 100 MG/1; MG/1
TABLET ORAL
Qty: 90 TABLET | Refills: 2 | OUTPATIENT
Start: 2019-10-07

## 2019-10-08 ENCOUNTER — OFFICE VISIT (OUTPATIENT)
Dept: FAMILY MEDICINE CLINIC | Age: 59
End: 2019-10-08
Payer: MEDICARE

## 2019-10-08 VITALS
TEMPERATURE: 97.9 F | HEART RATE: 86 BPM | WEIGHT: 271 LBS | DIASTOLIC BLOOD PRESSURE: 78 MMHG | OXYGEN SATURATION: 98 % | BODY MASS INDEX: 54.74 KG/M2 | SYSTOLIC BLOOD PRESSURE: 142 MMHG

## 2019-10-08 DIAGNOSIS — I10 ESSENTIAL HYPERTENSION: ICD-10-CM

## 2019-10-08 DIAGNOSIS — Z71.3 DIETARY COUNSELING: ICD-10-CM

## 2019-10-08 DIAGNOSIS — N64.4 BREAST PAIN, LEFT: Primary | ICD-10-CM

## 2019-10-08 DIAGNOSIS — E66.01 MORBID OBESITY WITH BMI OF 50.0-59.9, ADULT (HCC): ICD-10-CM

## 2019-10-08 PROCEDURE — G8417 CALC BMI ABV UP PARAM F/U: HCPCS | Performed by: NURSE PRACTITIONER

## 2019-10-08 PROCEDURE — 3017F COLORECTAL CA SCREEN DOC REV: CPT | Performed by: NURSE PRACTITIONER

## 2019-10-08 PROCEDURE — 1036F TOBACCO NON-USER: CPT | Performed by: NURSE PRACTITIONER

## 2019-10-08 PROCEDURE — 99401 PREV MED CNSL INDIV APPRX 15: CPT | Performed by: NURSE PRACTITIONER

## 2019-10-08 PROCEDURE — G8427 DOCREV CUR MEDS BY ELIG CLIN: HCPCS | Performed by: NURSE PRACTITIONER

## 2019-10-08 PROCEDURE — G8484 FLU IMMUNIZE NO ADMIN: HCPCS | Performed by: NURSE PRACTITIONER

## 2019-10-08 RX ORDER — LOSARTAN POTASSIUM AND HYDROCHLOROTHIAZIDE 25; 100 MG/1; MG/1
1 TABLET ORAL DAILY
Qty: 90 TABLET | Refills: 1 | Status: SHIPPED | OUTPATIENT
Start: 2019-10-08 | End: 2020-03-17

## 2019-10-10 ENCOUNTER — OFFICE VISIT (OUTPATIENT)
Dept: PODIATRY | Age: 59
End: 2019-10-10
Payer: MEDICARE

## 2019-10-10 VITALS — HEIGHT: 59 IN | BODY MASS INDEX: 54.63 KG/M2 | RESPIRATION RATE: 18 BRPM | WEIGHT: 271 LBS

## 2019-10-10 DIAGNOSIS — R26.2 TROUBLE WALKING: Primary | ICD-10-CM

## 2019-10-10 DIAGNOSIS — M79.671 PAIN IN BOTH FEET: ICD-10-CM

## 2019-10-10 DIAGNOSIS — M19.072 DEGENERATIVE JOINT DISEASE OF BOTH ANKLES AND FEET: ICD-10-CM

## 2019-10-10 DIAGNOSIS — B35.1 DERMATOPHYTOSIS OF NAIL: ICD-10-CM

## 2019-10-10 DIAGNOSIS — M19.071 DEGENERATIVE JOINT DISEASE OF BOTH ANKLES AND FEET: ICD-10-CM

## 2019-10-10 DIAGNOSIS — M79.672 PAIN IN BOTH FEET: ICD-10-CM

## 2019-10-10 DIAGNOSIS — I73.9 PERIPHERAL VASCULAR DISEASE (HCC): ICD-10-CM

## 2019-10-10 PROCEDURE — 11721 DEBRIDE NAIL 6 OR MORE: CPT | Performed by: PODIATRIST

## 2019-10-10 PROCEDURE — 99213 OFFICE O/P EST LOW 20 MIN: CPT | Performed by: PODIATRIST

## 2019-10-10 PROCEDURE — G8484 FLU IMMUNIZE NO ADMIN: HCPCS | Performed by: PODIATRIST

## 2019-10-10 PROCEDURE — G8417 CALC BMI ABV UP PARAM F/U: HCPCS | Performed by: PODIATRIST

## 2019-10-10 PROCEDURE — 3017F COLORECTAL CA SCREEN DOC REV: CPT | Performed by: PODIATRIST

## 2019-10-10 PROCEDURE — G8427 DOCREV CUR MEDS BY ELIG CLIN: HCPCS | Performed by: PODIATRIST

## 2019-10-10 PROCEDURE — 1036F TOBACCO NON-USER: CPT | Performed by: PODIATRIST

## 2019-10-31 DIAGNOSIS — I10 ESSENTIAL HYPERTENSION: ICD-10-CM

## 2019-10-31 DIAGNOSIS — J45.20 MILD INTERMITTENT ASTHMA WITHOUT COMPLICATION: ICD-10-CM

## 2019-10-31 RX ORDER — AMLODIPINE BESYLATE 5 MG/1
5 TABLET ORAL DAILY
Qty: 30 TABLET | Refills: 3 | Status: SHIPPED | OUTPATIENT
Start: 2019-10-31 | End: 2020-06-15

## 2019-10-31 RX ORDER — ALBUTEROL SULFATE 90 UG/1
AEROSOL, METERED RESPIRATORY (INHALATION)
Qty: 18 G | Refills: 0 | Status: SHIPPED | OUTPATIENT
Start: 2019-10-31 | End: 2020-01-08 | Stop reason: SDUPTHER

## 2019-11-14 ENCOUNTER — OFFICE VISIT (OUTPATIENT)
Dept: OBGYN CLINIC | Age: 59
End: 2019-11-14
Payer: MEDICARE

## 2019-11-14 ENCOUNTER — HOSPITAL ENCOUNTER (OUTPATIENT)
Age: 59
Setting detail: SPECIMEN
Discharge: HOME | End: 2019-11-14
Payer: MEDICARE

## 2019-11-14 VITALS
DIASTOLIC BLOOD PRESSURE: 82 MMHG | HEIGHT: 59 IN | WEIGHT: 248 LBS | BODY MASS INDEX: 50 KG/M2 | SYSTOLIC BLOOD PRESSURE: 140 MMHG

## 2019-11-14 DIAGNOSIS — Z01.419 ENCOUNTER FOR GYNECOLOGICAL EXAMINATION: Primary | ICD-10-CM

## 2019-11-14 DIAGNOSIS — L75.0 URINARY BODY ODOR: ICD-10-CM

## 2019-11-14 DIAGNOSIS — N60.02 BREAST CYST, LEFT: ICD-10-CM

## 2019-11-14 LAB
BILIRUBIN URINE: NEGATIVE
COLOR: YELLOW
COMMENT UA: NORMAL
GLUCOSE URINE: NEGATIVE
KETONES, URINE: NEGATIVE
LEUKOCYTE ESTERASE, URINE: NEGATIVE
NITRITE, URINE: NEGATIVE
PH UA: 7.5 (ref 5–8)
PROTEIN UA: NEGATIVE
SPECIFIC GRAVITY UA: 1.01 (ref 1–1.03)
TURBIDITY: CLEAR
URINE HGB: NEGATIVE
UROBILINOGEN, URINE: NORMAL

## 2019-11-14 PROCEDURE — 99203 OFFICE O/P NEW LOW 30 MIN: CPT | Performed by: OBSTETRICS & GYNECOLOGY

## 2019-11-14 PROCEDURE — 81003 URINALYSIS AUTO W/O SCOPE: CPT | Performed by: OBSTETRICS & GYNECOLOGY

## 2019-11-14 PROCEDURE — 3017F COLORECTAL CA SCREEN DOC REV: CPT | Performed by: OBSTETRICS & GYNECOLOGY

## 2019-11-14 PROCEDURE — G8484 FLU IMMUNIZE NO ADMIN: HCPCS | Performed by: OBSTETRICS & GYNECOLOGY

## 2019-11-14 PROCEDURE — 1036F TOBACCO NON-USER: CPT | Performed by: OBSTETRICS & GYNECOLOGY

## 2019-11-14 PROCEDURE — G8417 CALC BMI ABV UP PARAM F/U: HCPCS | Performed by: OBSTETRICS & GYNECOLOGY

## 2019-11-14 PROCEDURE — G8427 DOCREV CUR MEDS BY ELIG CLIN: HCPCS | Performed by: OBSTETRICS & GYNECOLOGY

## 2019-11-14 ASSESSMENT — ENCOUNTER SYMPTOMS
COUGH: 0
DIARRHEA: 0
ABDOMINAL PAIN: 0
VOMITING: 0
NAUSEA: 0
WHEEZING: 0
CONSTIPATION: 0

## 2019-11-21 DIAGNOSIS — J45.20 MILD INTERMITTENT ASTHMA WITHOUT COMPLICATION: ICD-10-CM

## 2019-11-21 RX ORDER — ALBUTEROL SULFATE 90 UG/1
2 AEROSOL, METERED RESPIRATORY (INHALATION) EVERY 6 HOURS PRN
Qty: 1 INHALER | Refills: 3 | Status: SHIPPED | OUTPATIENT
Start: 2019-11-21 | End: 2020-01-08 | Stop reason: SDUPTHER

## 2019-11-21 RX ORDER — ALBUTEROL SULFATE 90 UG/1
AEROSOL, METERED RESPIRATORY (INHALATION)
Qty: 18 G | Refills: 0 | OUTPATIENT
Start: 2019-11-21

## 2019-11-22 ENCOUNTER — HOSPITAL ENCOUNTER (OUTPATIENT)
Dept: ULTRASOUND IMAGING | Age: 59
Discharge: HOME | End: 2019-11-24
Payer: MEDICARE

## 2019-11-22 DIAGNOSIS — N60.02 BREAST CYST, LEFT: ICD-10-CM

## 2019-11-22 PROCEDURE — 76642 ULTRASOUND BREAST LIMITED: CPT

## 2019-12-12 ENCOUNTER — OFFICE VISIT (OUTPATIENT)
Dept: PODIATRY | Age: 59
End: 2019-12-12
Payer: MEDICARE

## 2019-12-12 VITALS — WEIGHT: 271 LBS | HEIGHT: 59 IN | RESPIRATION RATE: 18 BRPM | BODY MASS INDEX: 54.63 KG/M2

## 2019-12-12 DIAGNOSIS — M79.671 PAIN IN BOTH FEET: ICD-10-CM

## 2019-12-12 DIAGNOSIS — S86.112D POSTERIOR TIBIAL TENDON TEAR, TRAUMATIC, LEFT, SUBSEQUENT ENCOUNTER: ICD-10-CM

## 2019-12-12 DIAGNOSIS — R26.2 TROUBLE WALKING: Primary | ICD-10-CM

## 2019-12-12 DIAGNOSIS — M19.071 DEGENERATIVE JOINT DISEASE OF BOTH ANKLES AND FEET: ICD-10-CM

## 2019-12-12 DIAGNOSIS — B35.1 DERMATOPHYTOSIS OF NAIL: ICD-10-CM

## 2019-12-12 DIAGNOSIS — I73.9 PERIPHERAL VASCULAR DISEASE (HCC): ICD-10-CM

## 2019-12-12 DIAGNOSIS — M19.072 DEGENERATIVE JOINT DISEASE OF BOTH ANKLES AND FEET: ICD-10-CM

## 2019-12-12 DIAGNOSIS — M79.672 PAIN IN BOTH FEET: ICD-10-CM

## 2019-12-12 PROCEDURE — G8417 CALC BMI ABV UP PARAM F/U: HCPCS | Performed by: PODIATRIST

## 2019-12-12 PROCEDURE — 3017F COLORECTAL CA SCREEN DOC REV: CPT | Performed by: PODIATRIST

## 2019-12-12 PROCEDURE — 11721 DEBRIDE NAIL 6 OR MORE: CPT | Performed by: PODIATRIST

## 2019-12-12 PROCEDURE — 1036F TOBACCO NON-USER: CPT | Performed by: PODIATRIST

## 2019-12-12 PROCEDURE — G8427 DOCREV CUR MEDS BY ELIG CLIN: HCPCS | Performed by: PODIATRIST

## 2019-12-12 PROCEDURE — G8484 FLU IMMUNIZE NO ADMIN: HCPCS | Performed by: PODIATRIST

## 2019-12-12 PROCEDURE — 99213 OFFICE O/P EST LOW 20 MIN: CPT | Performed by: PODIATRIST

## 2020-01-08 ENCOUNTER — OFFICE VISIT (OUTPATIENT)
Dept: FAMILY MEDICINE CLINIC | Age: 60
End: 2020-01-08
Payer: MEDICARE

## 2020-01-08 VITALS
BODY MASS INDEX: 51.3 KG/M2 | WEIGHT: 254 LBS | HEART RATE: 69 BPM | SYSTOLIC BLOOD PRESSURE: 132 MMHG | DIASTOLIC BLOOD PRESSURE: 84 MMHG | OXYGEN SATURATION: 96 % | TEMPERATURE: 98.8 F

## 2020-01-08 PROCEDURE — G8427 DOCREV CUR MEDS BY ELIG CLIN: HCPCS | Performed by: NURSE PRACTITIONER

## 2020-01-08 PROCEDURE — 1036F TOBACCO NON-USER: CPT | Performed by: NURSE PRACTITIONER

## 2020-01-08 PROCEDURE — 3017F COLORECTAL CA SCREEN DOC REV: CPT | Performed by: NURSE PRACTITIONER

## 2020-01-08 PROCEDURE — G8417 CALC BMI ABV UP PARAM F/U: HCPCS | Performed by: NURSE PRACTITIONER

## 2020-01-08 PROCEDURE — G8484 FLU IMMUNIZE NO ADMIN: HCPCS | Performed by: NURSE PRACTITIONER

## 2020-01-08 PROCEDURE — 99214 OFFICE O/P EST MOD 30 MIN: CPT | Performed by: NURSE PRACTITIONER

## 2020-01-08 RX ORDER — CHOLECALCIFEROL (VITAMIN D3) 125 MCG
1 CAPSULE ORAL DAILY
Qty: 90 TABLET | Refills: 1 | Status: SHIPPED | OUTPATIENT
Start: 2020-01-08 | End: 2021-02-02 | Stop reason: SDUPTHER

## 2020-01-08 RX ORDER — KETOROLAC TROMETHAMINE 30 MG/ML
60 INJECTION, SOLUTION INTRAMUSCULAR; INTRAVENOUS ONCE
Status: COMPLETED | OUTPATIENT
Start: 2020-01-08 | End: 2020-01-08

## 2020-01-08 RX ORDER — ALBUTEROL SULFATE 90 UG/1
2 AEROSOL, METERED RESPIRATORY (INHALATION) EVERY 6 HOURS PRN
Qty: 1 INHALER | Refills: 3 | Status: SHIPPED | OUTPATIENT
Start: 2020-01-08 | End: 2021-03-26 | Stop reason: SDUPTHER

## 2020-01-08 RX ADMIN — KETOROLAC TROMETHAMINE 60 MG: 30 INJECTION, SOLUTION INTRAMUSCULAR; INTRAVENOUS at 13:14

## 2020-01-08 NOTE — PROGRESS NOTES
Faisal Lynn, FNP-C  [address]  [address] [facility] [address] [facility]  [phone]  [phone]    Marvin Kitchen is a 64 y.o. female who is here with c/o of:    Chief Complaint: Hypertension (3 month check up ); Foot Swelling;  Foot Pain; and Hip Pain (left from falling)      Patient Accompanied by: n/a    HPI - Marvin Kitchen is here today for f/u of chronic conditions and the following complaints    Chronic pain  Pain areas include left foot pain, lower back pain, and left hip pain  Patient reports falling several times over the past couple of years (none recently) and has left hip pain, left foot, and lower back pain from those falls  She has seen pain management for her chronic pain, but patient states that he scared her and she states she would never go back  She has had multiple images and there have been no findings on those images  She has had 2 surgeries on her left foot and has been instructed to have a 2nd opinion in regards to further surgery from her previous provider    HTN  She is not adherent to a low sodium diet and does not exercise regularly  She denies h/a, c/p, sob, palpitations, lower extremity edema  Current medications: amlodipine 5mg daily, losartan-hydrochlorothiazide 100-25mg daily    Asthma  Patient reports using rescue inhaler only 1-2 times per week  She denies sob, difficulty breathing      Patient Active Problem List:     Essential hypertension     YAEML (obstructive sleep apnea)     Allergic asthma     Obesity, morbid, BMI 40.0-49.9 ([facility])     Urticaria     Contracture of left ankle     Chronic pain of left ankle     Spontaneous rupture of flexor tendon of left lower leg     Allergic rhinitis     Anxiety     Benign neoplasm of skin     Constipation     Cyst of ovary     Generalized osteoarthritis     Depressive disorder     Edema     Food allergy     Osteoarthrosis involving multiple sites but not designated as generalized     Reactive airway disease Pack years: 60.00     Types: Cigarettes     Start date: 1981     Last attempt to quit: 1998     Years since quittin.0    Smokeless tobacco: Never Used    Tobacco comment: quit smoking 15-20 yrs ago   Substance Use Topics    Alcohol use: No     ALLERGIES:    Allergies   Allergen Reactions    Latex Hives     Latex gloves    Asa [Aspirin] Hives    Codeine Hives    Lisinopril-Hydrochlorothiazide Hives    Motrin [Ibuprofen] Hives    Other Hives and Swelling     Cherries, almonds    Pcn [Penicillins] Hives    Sulfa Antibiotics           Subjective     · Constitutional:  Negative for activity change, appetite change,unexpected weight change, chills, fever, and fatigue. · HENT: Negative for ear pain, sore throat,  Rhinorrhea, sinus pain, sinus pressure, congestion. · Eyes:  Negative for pain and discharge. · Respiratory:  Negative for chest tightness, shortness of breath, wheezing, and cough. · Cardiovascular:  Negative for chest pain, palpitations and leg swelling. · Gastrointestinal: Negative for abdominal pain, blood in stool, constipation,diarrhea, nausea and vomiting. · Endocrine: Negative for cold intolerance, heat intolerance, polydipsia, polyphagia and polyuria. · Genitourinary: Negative for difficulty urinating, dysuria, flank pain, frequency, hematuria and urgency. · Musculoskeletal: Negative for arthralgias, back pain, joint swelling, myalgias, neck pain and neck stiffness. Positive for left hip pain, left foot pain, low back pain  · Skin: Negative for rash and wound. · Allergic/Immunologic: Negative for environmental allergies and food allergies. · Neurological:  Negative for dizziness, light-headedness, numbness and headaches. · Hematological:  Negative for adenopathy. Does not bruise/bleed easily. · Psychiatric/Behavioral: Negative for self-injury, sleep disturbance and suicidal ideas.      Objective     PHYSICAL EXAM:   · Constitutional: Yajaira Saez is oriented to person, place, and time. Vital signs are normal. Appears well-developed and well-nourished. · HEENT:   · Head: Normocephalic and atraumatic. Right Ear: Hearing and external ear normal. TM normal  Canal normal  · Left Ear: Hearing and external ear normal. TM normal Canal normal  · Nose: Nares normal. Septum midline. No drainage or sinus tenderness. Mucosa pink and moist  · Mouth/Throat: Oropharynx- No erythema, no exudate. Uvula midline, no erythema, no edema. Mucous membranes are pink and moist.   · Eyes:PERRL, EOMI, Conjunctiva normal, No discharge. · Neck: Full passive range of motion. Non-tender on palpation. Neck supple. No thyromegaly present. Trachea normal.  · Cardiovascular: Normal rate, regular rhythm, S1, S2, no murmur, no gallop, no friction rub, intact distal pulses. · Pulmonary/Chest: Breath sounds are clear throughout, No respiratory distress, No wheezing, No chest tenderness. Effort normal  · Abdominal: Soft. Morbidly obese appearance, bowel sounds are present and normoactive. There is no hepatosplenomegaly. There is no tenderness. There is no CVA tenderness. · Musculoskeletal:   Physical Exam  Musculoskeletal:      Left hip: She exhibits tenderness. She exhibits normal range of motion, normal strength, no bony tenderness, no swelling, no crepitus and no deformity. Lumbar back: She exhibits tenderness and pain. She exhibits normal range of motion, no bony tenderness, no swelling, no edema, no deformity and no spasm. Left foot: Decreased range of motion. Tenderness present. No bony tenderness, swelling or deformity. steady gait with the use of a cane  · Lymphadenopathy: No lymphadenopathy noted. · Neurological: Alert and oriented to person, place, and time. Normal motor function, Normal sensory function, No focal deficits noted. He has normal strength. · Skin: Skin is warm, dry and intact. No obvious lesions on exposed skin  · Psychiatric: Normal mood and affect.  Speech albuterol sulfate HFA (VENTOLIN HFA) 108 (90 Base) MCG/ACT inhaler; Inhale 2 puffs into the lungs every 6 hours as needed for Wheezing  Dispense: 1 Inhaler; Refill: 3    3. Essential hypertension  - controlled  - continue current medications  - CBC; Future  - Comprehensive Metabolic Panel; Future    4. Chronic left-sided low back pain with left-sided sciatica  - patient declines further treatment plan    5. Thyroid disorder screening  - TSH; Future  - T4, Free; Future    6. Screening cholesterol level  - Lipid, Fasting; Future    7. Vitamin D deficiency  - Cholecalciferol (VITAMIN D3) 50 MCG (2000 UT) TABS; Take 1 tablet by mouth daily  Dispense: 90 tablet; Refill: 1      Return in about 6 months (around 7/8/2020) for Routine follow up of chronic conditions. 1.  Ev received counseling on the following healthy behaviors: nutrition, exercise and medication adherence  2. Patient given educational materials - see patient instructions  3. Was a self-tracking handout given in paper form or via [employer]? No  If yes, see orders or list here. 4.  Discussed use, benefit, and side effects of prescribed medications. Barriers to medication compliance addressed. All patient questions answered. Pt voiced understanding. 5.  Reviewed prior labs, imaging, consultation, follow up, and health maintenance  6. Continue current medications, diet and exercise. 7. Discussed use, benefit, and side effects of prescribed medications. Barriers to medication compliance addressed. All her questions were answered. Pt voiced understanding. Irais Mullins will continue current medications, diet and exercise. Patient given educational materials on DASH diet and weight loss    Of the 25 minute duration appointment visit, Faisal Lynn CNP spent at least 50% of the face-to-face time in counseling, explanation of diagnosis, planning of further management, and answering all questions.     Signed:  Faisal Lynn CNP    This note is created with the assistance of a speech-recognition program.  While intending to generate a document that actually reflects the content of the visit, no guarantees can be provided that every mistake has been identified and corrected by editing.

## 2020-01-27 ENCOUNTER — HOSPITAL ENCOUNTER (OUTPATIENT)
Age: 60
Setting detail: SPECIMEN
Discharge: HOME | End: 2020-01-27
Payer: MEDICARE

## 2020-01-27 LAB
ALBUMIN SERPL-MCNC: 4 G/DL (ref 3.5–5.2)
ALBUMIN/GLOBULIN RATIO: 1.2 (ref 1–2.5)
ALP BLD-CCNC: 83 U/L (ref 35–104)
ALT SERPL-CCNC: 25 U/L (ref 5–33)
ANION GAP SERPL CALCULATED.3IONS-SCNC: 17 MMOL/L (ref 9–17)
AST SERPL-CCNC: 20 U/L
BILIRUB SERPL-MCNC: 0.36 MG/DL (ref 0.3–1.2)
BUN BLDV-MCNC: 13 MG/DL (ref 6–20)
BUN/CREAT BLD: ABNORMAL (ref 9–20)
CALCIUM SERPL-MCNC: 9.6 MG/DL (ref 8.6–10.4)
CHLORIDE BLD-SCNC: 100 MMOL/L (ref 98–107)
CHOLESTEROL, FASTING: 156 MG/DL
CHOLESTEROL/HDL RATIO: 2.6
CO2: 26 MMOL/L (ref 20–31)
CREAT SERPL-MCNC: 0.55 MG/DL (ref 0.5–0.9)
GFR AFRICAN AMERICAN: >60 ML/MIN
GFR NON-AFRICAN AMERICAN: >60 ML/MIN
GFR SERPL CREATININE-BSD FRML MDRD: ABNORMAL ML/MIN/{1.73_M2}
GFR SERPL CREATININE-BSD FRML MDRD: ABNORMAL ML/MIN/{1.73_M2}
GLUCOSE BLD-MCNC: 105 MG/DL (ref 70–99)
HCT VFR BLD CALC: 44.7 % (ref 36.3–47.1)
HDLC SERPL-MCNC: 61 MG/DL
HEMOGLOBIN: 13.7 G/DL (ref 11.9–15.1)
LDL CHOLESTEROL: 78 MG/DL (ref 0–130)
MCH RBC QN AUTO: 27 PG (ref 25.2–33.5)
MCHC RBC AUTO-ENTMCNC: 30.6 G/DL (ref 28.4–34.8)
MCV RBC AUTO: 88.2 FL (ref 82.6–102.9)
NRBC AUTOMATED: 0 PER 100 WBC
PDW BLD-RTO: 15.6 % (ref 11.8–14.4)
PLATELET # BLD: 389 K/UL (ref 138–453)
PMV BLD AUTO: 9.8 FL (ref 8.1–13.5)
POTASSIUM SERPL-SCNC: 3.7 MMOL/L (ref 3.7–5.3)
RBC # BLD: 5.07 M/UL (ref 3.95–5.11)
SODIUM BLD-SCNC: 143 MMOL/L (ref 135–144)
THYROXINE, FREE: 1.23 NG/DL (ref 0.93–1.7)
TOTAL PROTEIN: 7.3 G/DL (ref 6.4–8.3)
TRIGLYCERIDE, FASTING: 84 MG/DL
TSH SERPL DL<=0.05 MIU/L-ACNC: 1.64 MIU/L (ref 0.3–5)
VLDLC SERPL CALC-MCNC: NORMAL MG/DL (ref 1–30)
WBC # BLD: 9.1 K/UL (ref 3.5–11.3)

## 2020-01-28 ENCOUNTER — OFFICE VISIT (OUTPATIENT)
Dept: FAMILY MEDICINE CLINIC | Age: 60
End: 2020-01-28
Payer: MEDICARE

## 2020-01-28 VITALS
HEART RATE: 82 BPM | WEIGHT: 250 LBS | DIASTOLIC BLOOD PRESSURE: 78 MMHG | BODY MASS INDEX: 50.49 KG/M2 | OXYGEN SATURATION: 96 % | SYSTOLIC BLOOD PRESSURE: 130 MMHG

## 2020-01-28 PROCEDURE — 3017F COLORECTAL CA SCREEN DOC REV: CPT | Performed by: NURSE PRACTITIONER

## 2020-01-28 PROCEDURE — G8484 FLU IMMUNIZE NO ADMIN: HCPCS | Performed by: NURSE PRACTITIONER

## 2020-01-28 PROCEDURE — 1036F TOBACCO NON-USER: CPT | Performed by: NURSE PRACTITIONER

## 2020-01-28 PROCEDURE — 99213 OFFICE O/P EST LOW 20 MIN: CPT | Performed by: NURSE PRACTITIONER

## 2020-01-28 PROCEDURE — G8427 DOCREV CUR MEDS BY ELIG CLIN: HCPCS | Performed by: NURSE PRACTITIONER

## 2020-01-28 PROCEDURE — G8417 CALC BMI ABV UP PARAM F/U: HCPCS | Performed by: NURSE PRACTITIONER

## 2020-01-31 ENCOUNTER — HOSPITAL ENCOUNTER (OUTPATIENT)
Dept: GENERAL RADIOLOGY | Age: 60
Discharge: HOME | End: 2020-02-02
Payer: MEDICARE

## 2020-01-31 ENCOUNTER — HOSPITAL ENCOUNTER (OUTPATIENT)
Age: 60
Discharge: HOME | End: 2020-02-02
Payer: MEDICARE

## 2020-01-31 PROCEDURE — 73610 X-RAY EXAM OF ANKLE: CPT

## 2020-01-31 PROCEDURE — 73560 X-RAY EXAM OF KNEE 1 OR 2: CPT

## 2020-02-27 NOTE — TELEPHONE ENCOUNTER
Next Visit Date:  Future Appointments   Date Time Provider Zurdo Phillips   3/5/2020  1:15 PM Florencia Tate [facility]   4/7/2020  1:00 PM JJ Bey - CNP [facility] [address] Maintenance   Topic Date Due    Colon Cancer Screen FIT/FOBT  08/22/2014    A1C test (Diabetic or Prediabetic)  01/30/2020    Shingles Vaccine (1 of 2) 10/08/2020 (Originally 9/2/2012)    Flu vaccine (1) 01/28/2021 (Originally 9/1/2019)    Pneumococcal 0-64 years Vaccine (1 of 1 - PPSV23) 09/01/2021 (Originally 9/2/1966)    Potassium monitoring  01/27/2021    Creatinine monitoring  01/27/2021    Breast cancer screen  09/16/2021    Cervical cancer screen  02/08/2022    Lipid screen  01/27/2025    DTaP/Tdap/Td vaccine (2 - Td) 06/03/2026    Hepatitis C screen  Completed    HIV screen  Completed    Hepatitis A vaccine  Aged Out    Hepatitis B vaccine  Aged Out    Hib vaccine  Aged Out    Meningococcal (ACWY) vaccine  Aged Out       Hemoglobin A1C (%)   Date Value   01/30/2019 5.8   02/08/2017 5.6   09/17/2012 6.0             ( goal A1C is < 7)   Microalb/Crt.  Ratio (mcg/mg creat)   Date Value   08/22/2013 5     LDL Cholesterol (mg/dL)   Date Value   01/27/2020 78   01/30/2019 85       (goal LDL is <100)   AST (U/L)   Date Value   01/27/2020 20     ALT (U/L)   Date Value   01/27/2020 25     BUN (mg/dL)   Date Value   01/27/2020 13     BP Readings from Last 3 Encounters:   01/28/20 130/78   01/08/20 132/84   11/14/19 (!) 140/82          (goal 120/80)    All Future Testing planned in [facility]  Lab Frequency Next Occurrence               Patient Active Problem List:     Essential hypertension     YAMEL (obstructive sleep apnea)     Allergic asthma     Obesity, morbid, BMI 40.0-49.9 (HCC)     Urticaria     Contracture of left ankle     Chronic pain of left ankle     Spontaneous rupture of flexor tendon of left lower leg     Allergic rhinitis     Anxiety     Benign neoplasm of skin

## 2020-03-05 ENCOUNTER — OFFICE VISIT (OUTPATIENT)
Dept: PODIATRY | Age: 60
End: 2020-03-05
Payer: MEDICARE

## 2020-03-05 VITALS — BODY MASS INDEX: 50.4 KG/M2 | HEIGHT: 59 IN | WEIGHT: 250 LBS | RESPIRATION RATE: 16 BRPM

## 2020-03-05 PROCEDURE — G8484 FLU IMMUNIZE NO ADMIN: HCPCS | Performed by: PODIATRIST

## 2020-03-05 PROCEDURE — 3017F COLORECTAL CA SCREEN DOC REV: CPT | Performed by: PODIATRIST

## 2020-03-05 PROCEDURE — G8417 CALC BMI ABV UP PARAM F/U: HCPCS | Performed by: PODIATRIST

## 2020-03-05 PROCEDURE — 1036F TOBACCO NON-USER: CPT | Performed by: PODIATRIST

## 2020-03-05 PROCEDURE — G8428 CUR MEDS NOT DOCUMENT: HCPCS | Performed by: PODIATRIST

## 2020-03-05 PROCEDURE — 11721 DEBRIDE NAIL 6 OR MORE: CPT | Performed by: PODIATRIST

## 2020-03-05 PROCEDURE — 99213 OFFICE O/P EST LOW 20 MIN: CPT | Performed by: PODIATRIST

## 2020-03-05 NOTE — PROGRESS NOTES
[facility]  [facility]  [address] [place] [address]  Dept: [phone]  Dept Fax: [phone]     PAIN PROGRESS NOTE  Date of patient's visit: 3/5/2020  Patient's Name:  Alan Leon YOB: 1960            Patient Care Team:  JJ Ruiz CNP as PCP - General (Nurse Practitioner)  JJ Ruiz CNP as PCP - [facility] Empaneled Provider  Robbin Reich DPM as Physician (Podiatry)      Chief Complaint   Patient presents with    Foot Pain       Subjective: This Alan Leon comes to clinic for foot and nail care. Pt currently has complaint of thickened, painful, elongated nails that he/she cannot manage by themselves. Pt. Relates pain to nails with shoe gear. Pt's primary care physician is JJ Ruiz CNP last seen  1/31/20  Pt has a new complaint of swelling to the right and left leg that is getting worse. Pt relates to being on the feet more. She relates to a recent fall and then the swelling started. It is on both feet    Past Medical History:   Diagnosis Date    Allergic rhinitis 12/4/2014    Anxiety 7/5/2018    Arthritis     Asthma     Chest pain     occurs every other day, pt states they occur when she doesn't eat right or forget to take her bp med, occurs left side of chest with no radiation, pt states \"it's new\" and has not had any testing done or seen anyone for it.     Constipation     Depressive disorder 7/5/2018    Generalized osteoarthritis 6/26/2014    History of kidney stones     Hypertension     Obesity, morbid, BMI 40.0-49.9 ([facility] [address] 2/28/2017    PONV (postoperative nausea and vomiting)     Sleep apnea     does not use a machine       Allergies   Allergen Reactions    Latex Hives     Latex gloves    Asa [Aspirin] Hives    Codeine Hives    Lisinopril-Hydrochlorothiazide Hives    Motrin [Ibuprofen] Hives    Other Hives and Swelling     Cherries, almonds    Pcn [Penicillins] Hives    Sulfa

## 2020-03-10 RX ORDER — FLUTICASONE PROPIONATE 50 MCG
SPRAY, SUSPENSION (ML) NASAL
Qty: 16 G | Refills: 5 | Status: SHIPPED | OUTPATIENT
Start: 2020-03-10 | End: 2021-09-28 | Stop reason: SDUPTHER

## 2020-03-17 RX ORDER — LOSARTAN POTASSIUM AND HYDROCHLOROTHIAZIDE 25; 100 MG/1; MG/1
TABLET ORAL
Qty: 90 TABLET | Refills: 1 | Status: SHIPPED | OUTPATIENT
Start: 2020-03-17 | End: 2020-09-10

## 2020-03-17 NOTE — TELEPHONE ENCOUNTER
Constipation     Cyst of ovary     Generalized osteoarthritis     Depressive disorder     Edema     Food allergy     Osteoarthrosis involving multiple sites but not designated as generalized     Reactive airway disease     Rupture of posterior tibialis tendon     Vitamin D deficiency     Morbid obesity with BMI of 50.0-59.9, adult ([address]     Nontraumatic rupture of left posterior tibial tendon     Status post left foot surgery     Mild intermittent asthma without complication

## 2020-06-10 ENCOUNTER — TELEPHONE (OUTPATIENT)
Dept: FAMILY MEDICINE CLINIC | Age: 60
End: 2020-06-10

## 2020-06-15 RX ORDER — AMLODIPINE BESYLATE 5 MG/1
TABLET ORAL
Qty: 30 TABLET | Refills: 3 | Status: SHIPPED | OUTPATIENT
Start: 2020-06-15 | End: 2020-10-13

## 2020-07-13 RX ORDER — ALCOHOL 62 ML/100ML
LIQUID TOPICAL
Qty: 30 TABLET | Refills: 5 | Status: SHIPPED | OUTPATIENT
Start: 2020-07-13 | End: 2020-09-28

## 2020-07-21 ENCOUNTER — OFFICE VISIT (OUTPATIENT)
Dept: FAMILY MEDICINE CLINIC | Age: 60
End: 2020-07-21
Payer: MEDICARE

## 2020-07-21 VITALS
WEIGHT: 251.2 LBS | HEART RATE: 87 BPM | DIASTOLIC BLOOD PRESSURE: 72 MMHG | TEMPERATURE: 99.1 F | SYSTOLIC BLOOD PRESSURE: 110 MMHG | BODY MASS INDEX: 50.74 KG/M2 | OXYGEN SATURATION: 96 %

## 2020-07-21 LAB — HBA1C MFR BLD: 6.1 %

## 2020-07-21 PROCEDURE — 83036 HEMOGLOBIN GLYCOSYLATED A1C: CPT | Performed by: NURSE PRACTITIONER

## 2020-07-21 PROCEDURE — 99213 OFFICE O/P EST LOW 20 MIN: CPT | Performed by: NURSE PRACTITIONER

## 2020-07-21 PROCEDURE — 96372 THER/PROPH/DIAG INJ SC/IM: CPT | Performed by: NURSE PRACTITIONER

## 2020-07-21 PROCEDURE — 1036F TOBACCO NON-USER: CPT | Performed by: NURSE PRACTITIONER

## 2020-07-21 PROCEDURE — 3017F COLORECTAL CA SCREEN DOC REV: CPT | Performed by: NURSE PRACTITIONER

## 2020-07-21 PROCEDURE — G8427 DOCREV CUR MEDS BY ELIG CLIN: HCPCS | Performed by: NURSE PRACTITIONER

## 2020-07-21 PROCEDURE — G8417 CALC BMI ABV UP PARAM F/U: HCPCS | Performed by: NURSE PRACTITIONER

## 2020-07-21 RX ORDER — PREDNISONE 10 MG/1
10 TABLET ORAL
Qty: 15 TABLET | Refills: 0 | Status: SHIPPED | OUTPATIENT
Start: 2020-07-21 | End: 2020-07-26

## 2020-07-21 RX ORDER — METHYLPREDNISOLONE SODIUM SUCCINATE 125 MG/2ML
125 INJECTION, POWDER, LYOPHILIZED, FOR SOLUTION INTRAMUSCULAR; INTRAVENOUS ONCE
Status: COMPLETED | OUTPATIENT
Start: 2020-07-21 | End: 2020-07-21

## 2020-07-21 RX ADMIN — METHYLPREDNISOLONE SODIUM SUCCINATE 125 MG: 125 INJECTION, POWDER, LYOPHILIZED, FOR SOLUTION INTRAMUSCULAR; INTRAVENOUS at 15:23

## 2020-07-21 ASSESSMENT — PATIENT HEALTH QUESTIONNAIRE - PHQ9
1. LITTLE INTEREST OR PLEASURE IN DOING THINGS: 0
SUM OF ALL RESPONSES TO PHQ QUESTIONS 1-9: 0
SUM OF ALL RESPONSES TO PHQ QUESTIONS 1-9: 0
2. FEELING DOWN, DEPRESSED OR HOPELESS: 0
SUM OF ALL RESPONSES TO PHQ9 QUESTIONS 1 & 2: 0

## 2020-07-21 NOTE — PROGRESS NOTES
Keary Galeazzi, FNP-C  [address]  [id number] [facility]. [address]  [address]  [phone]  [phone]    Renea Heimlich is a 64 y.o. female who is here with c/o of:    Chief Complaint: Rash (LT arm. X's 4months Cousin feeds stray cat and she thinks she has caught ringworm for it)      Patient Accompanied by: n/a    HPI - Renea Heimlich is here today with c/o:    RASH   - current rash started about 4 days ago   - small area on anterior left forearm that she reports is severely pruritic, small blisters and red   - she has used several OTC ointments and these have not helped      Patient Active Problem List:     Essential hypertension     YAMEL (obstructive sleep apnea)     Allergic asthma     Obesity, morbid, BMI 40.0-49.9 ([facility])     Urticaria     Contracture of left ankle     Chronic pain of left ankle     Spontaneous rupture of flexor tendon of left lower leg     Allergic rhinitis     Anxiety     Benign neoplasm of skin     Constipation     Cyst of ovary     Generalized osteoarthritis     Depressive disorder     Edema     Food allergy     Osteoarthrosis involving multiple sites but not designated as generalized     Reactive airway disease     Rupture of posterior tibialis tendon     Vitamin D deficiency     Morbid obesity with BMI of 50.0-59.9, adult ([facility] [address]     Nontraumatic rupture of left posterior tibial tendon     Status post left foot surgery     Mild intermittent asthma without complication     Past Medical History:   Diagnosis Date    Allergic rhinitis 12/4/2014    Anxiety 7/5/2018    Arthritis     Asthma     Chest pain     occurs every other day, pt states they occur when she doesn't eat right or forget to take her bp med, occurs left side of chest with no radiation, pt states \"it's new\" and has not had any testing done or seen anyone for it.     Constipation     Depressive disorder 7/5/2018    Generalized osteoarthritis 6/26/2014    History of kidney stones     Hypertension  Obesity, morbid, BMI 40.0-49.9 ([facility] [address] 2017    PONV (postoperative nausea and vomiting)     Sleep apnea     does not use a machine      Past Surgical History:   Procedure Laterality Date    CHOLECYSTECTOMY      FOOT TENDON SURGERY Left 2017    posterior repair    FOOT TENDON SURGERY Left 2018    LEFT POSTERIOR  TENDON REPAIR FLEXOR TENDON TRANSFER (Left Foot)    LITHOTRIPSY      LA OFFICE/OUTPT VISIT,PROCEDURE ONLY Left 2018    LEFT POSTERIOR  TENDON REPAIR FLEXOR TENDON TRANSFER performed by Rajeev Jordan DPM at [address] Left 2017    LEFT POSTERIOR TIBIAL TENDON REPAIR WITH LEFT TENOLYSIS  performed by Rajeev Jordan DPM at [address] [facility] History   Problem Relation Age of Onset    Depression Mother     Diabetes Mother     High Blood Pressure Mother     Mental Illness Mother     Substance Abuse Father     Depression Sister     Diabetes Sister     High Blood Pressure Sister     Mental Illness Sister     Depression Brother     Mental Illness Brother     Substance Abuse Brother      Social History     Tobacco Use    Smoking status: Former Smoker     Packs/day: 3.00     Years: 20.00     Pack years: 60.00     Types: Cigarettes     Start date: 1981     Last attempt to quit: 1998     Years since quittin.5    Smokeless tobacco: Never Used    Tobacco comment: quit smoking 15-20 yrs ago   Substance Use Topics    Alcohol use: No     ALLERGIES:    Allergies   Allergen Reactions    Latex Hives     Latex gloves    Asa [Aspirin] Hives    Codeine Hives    Lisinopril-Hydrochlorothiazide Hives    Motrin [Ibuprofen] Hives    Other Hives and Swelling     Cherries, almonds    Pcn [Penicillins] Hives    Sulfa Antibiotics           Subjective     · Constitutional:  Negative for activity change, appetite change,unexpected weight change, chills, fever, and fatigue.     · HENT: Negative for ear pain, sore throat,  Rhinorrhea, sinus pain, sinus pressure, congestion. · Eyes:  Negative for pain and discharge. · Respiratory:  Negative for chest tightness, shortness of breath, wheezing, and cough. · Cardiovascular:  Negative for chest pain, palpitations and leg swelling. · Gastrointestinal: Negative for abdominal pain, blood in stool, constipation,diarrhea, nausea and vomiting. · Endocrine: Negative for cold intolerance, heat intolerance, polydipsia, polyphagia and polyuria. · Genitourinary: Negative for difficulty urinating, dysuria, flank pain, frequency, hematuria and urgency. · Musculoskeletal: Negative for arthralgias, back pain, joint swelling, myalgias, neck pain and neck stiffness. · Skin: Negative for wound. Positive for rash  · Allergic/Immunologic: Negative for environmental allergies and food allergies. · Neurological:  Negative for dizziness, light-headedness, numbness and headaches. · Hematological:  Negative for adenopathy. Does not bruise/bleed easily. · Psychiatric/Behavioral: Negative for self-injury, sleep disturbance and suicidal ideas. Objective     PHYSICAL EXAM:   · Constitutional: Bella Guerrier is oriented to person, place, and time. Vital signs are normal. Appears well-developed and well-nourished. · HEENT:   · Head: Normocephalic and atraumatic. Right Ear: Hearing and external ear normal.  · Left Ear: Hearing and external ear normal.  · Eyes:PERRL, EOMI, Conjunctiva normal, No discharge. · Neck: Full passive range of motion. · Cardiovascular: Normal rate, regular rhythm, S1, S2, no murmur, no gallop, no friction rub, intact distal pulses. · Pulmonary/Chest: Breath sounds are clear throughout, No respiratory distress, No wheezing, No chest tenderness. Effort normal  · Neurological: Alert and oriented to person, place, and time. Normal motor function, Normal sensory function, No focal deficits noted. He has normal strength. · Skin: Skin is warm, dry and intact.   Physical Exam  Musculoskeletal:        Arms:        ·   · Psychiatric: Normal mood and affect. Speech is normal and behavior is normal.     Nursing note and vitals reviewed. Blood pressure 110/72, pulse 87, temperature 99.1 °F (37.3 °C), temperature source Temporal, weight 251 lb 3.2 oz (113.9 kg), SpO2 96 %, not currently breastfeeding. Body mass index is 50.74 kg/m². Wt Readings from Last 3 Encounters:   07/21/20 251 lb 3.2 oz (113.9 kg)   03/05/20 250 lb (113.4 kg)   01/28/20 250 lb (113.4 kg)     BP Readings from Last 3 Encounters:   07/21/20 110/72   01/28/20 130/78   01/08/20 132/84       Results for POC orders placed in visit on 07/21/20   POCT glycosylated hemoglobin (Hb A1C)   Result Value Ref Range    Hemoglobin A1C 6.1 %       Completed Orders/Prescriptions   Orders Placed This Encounter   Medications    methylPREDNISolone sodium (SOLU-MEDROL) injection 125 mg    predniSONE (DELTASONE) 10 MG tablet     Sig: Take 1 tablet by mouth 3 times daily (with meals) for 5 days     Dispense:  15 tablet     Refill:  0       Administrations This Visit     methylPREDNISolone sodium (SOLU-MEDROL) injection 125 mg     Admin Date  07/21/2020  15:23 Action  Given Dose  125 mg Route  Intravenous Site   Administered By  Priti Samano MA    Ordering Provider:  JJ Coronado CNP    NDC:  7709-7960-75    Lot#:  [id number]    :  [id number]. Patient Supplied?:  No                    AssessmentPlan/Medical Decision Making     1. Allergic dermatitis due to poison ivy  - calamine lotion to help dry the area  - methylPREDNISolone sodium (SOLU-MEDROL) injection 125 mg  - predniSONE (DELTASONE) 10 MG tablet; Take 1 tablet by mouth 3 times daily (with meals) for 5 days  Dispense: 15 tablet; Refill: 0    2. Morbid obesity with BMI of 50.0-59.9, adult ([facility] [address]  - reviewed diet and exercise  - POCT glycosylated hemoglobin (Hb A1C)    3.  Screening for colorectal cancer  - Cologuard (For External Results Only); Future      Return if symptoms worsen or fail to improve. 1.  Ev received counseling on the following healthy behaviors: nutrition, exercise and medication adherence  2. Patient given educational materials - see patient instructions  3. Was a self-tracking handout given in paper form or via [employer]? No  If yes, see orders or list here. 4.  Discussed use, benefit, and side effects of prescribed medications. Barriers to medication compliance addressed. All patient questions answered. Pt voiced understanding. 5.  Reviewed prior labs, imaging, consultation, follow up, and health maintenance  6. Continue current medications, diet and exercise. 7. Discussed use, benefit, and side effects of prescribed medications. Barriers to medication compliance addressed. All her questions were answered. Pt voiced understanding. Shira Weaver will continue current medications, diet and exercise. Patient given educational materials on poison ivy    Of the 15 minute duration appointment visit, Lamont Harmon CNP spent at least 50% of the face-to-face time in counseling, explanation of diagnosis, planning of further management, and answering all questions. Signed:  Lamont Harmon CNP    This note is created with the assistance of a speech-recognition program.  While intending to generate a document that actually reflects the content of the visit, no guarantees can be provided that every mistake has been identified and corrected by editing.

## 2020-07-31 ENCOUNTER — TELEPHONE (OUTPATIENT)
Dept: ADMINISTRATIVE | Age: 60
End: 2020-07-31

## 2020-08-18 ENCOUNTER — OFFICE VISIT (OUTPATIENT)
Dept: PODIATRY | Age: 60
End: 2020-08-18
Payer: MEDICARE

## 2020-08-18 VITALS — TEMPERATURE: 97.7 F

## 2020-08-18 PROBLEM — M66.872 NONTRAUMATIC RUPTURE OF LEFT POSTERIOR TIBIAL TENDON: Status: RESOLVED | Noted: 2018-12-04 | Resolved: 2020-08-18

## 2020-08-18 PROCEDURE — 1036F TOBACCO NON-USER: CPT | Performed by: PODIATRIST

## 2020-08-18 PROCEDURE — G8417 CALC BMI ABV UP PARAM F/U: HCPCS | Performed by: PODIATRIST

## 2020-08-18 PROCEDURE — 99213 OFFICE O/P EST LOW 20 MIN: CPT | Performed by: PODIATRIST

## 2020-08-18 PROCEDURE — 3017F COLORECTAL CA SCREEN DOC REV: CPT | Performed by: PODIATRIST

## 2020-08-18 PROCEDURE — G8428 CUR MEDS NOT DOCUMENT: HCPCS | Performed by: PODIATRIST

## 2020-08-18 NOTE — PROGRESS NOTES
Medication Sig Start Date End Date Taking? Authorizing Provider   RA LORATADINE 10 MG tablet take 1 tablet by mouth once daily 7/13/20   Emelina Gallop, APRN - CNP   amLODIPine (NORVASC) 5 MG tablet take 1 tablet by mouth once daily 6/15/20   Emelina Gallop, APRN - CNP   losartan-hydrochlorothiazide (HYZAAR) 100-25 MG per tablet take 1 tablet by mouth once daily 3/17/20   Emelina Gallop, APRN - CNP   fluticasone (FLONASE) 50 MCG/ACT nasal spray instill 1 spray into each nostril once daily 3/10/20   Emelina Gallop, APRN - CNP   fluticasone-salmeterol (WIXELA INHUB) 250-50 MCG/DOSE AEPB Inhale 1 puff into the lungs 2 times daily 1/8/20   Emelina Gallop, APRN - CNP   albuterol sulfate HFA (VENTOLIN HFA) 108 (90 Base) MCG/ACT inhaler Inhale 2 puffs into the lungs every 6 hours as needed for Wheezing 1/8/20   Emelina Gallop, APRN - CNP   Cholecalciferol (VITAMIN D3) 50 MCG (2000 UT) TABS Take 1 tablet by mouth daily 1/8/20   Emelina Gallop, APRN - CNP   hydrOXYzine (ATARAX) 25 MG tablet take 1 tablet by mouth once daily if needed for itching 9/3/19   Emelina Gallop, APRN - CNP   lidocaine (L-M-X 4) 4 % cream Apply a thick layer 30 minutes prior to procedure, covering in saran wrap 6/26/19   Jackie Martinez MD   EPINEPHrine (EPIPEN) 0.3 MG/0.3ML SOAJ injection Inject 0.3 mLs into the muscle once for 1 dose Use as directed for allergic reaction 5/7/19 5/7/19  Emelina Gallop, APRN - CNP   Misc. Devices (BATHTUB SAFETY RAIL) MISC 4 each by Does not apply route daily 12/17/18   Kathie Jose DPM   Misc. Devices (EXTENDABLE BEDSIDE RAIL) MISC 4 each by Does not apply route daily 12/17/18   Kathie Jose DPM   Spacer/Aero-Holding Laveta Breslow 1 Device by Does not apply route daily as needed (wheezing) 11/26/18   Emelina Gallop, APRN - CNP   Misc. Devices [facility]) MISC Use as directed 10/24/18   Kathie Jose DPM   Misc.  Devices (WALL GRAB BAR) MISC GRAB BARS FOR BOTH SHOWER AND TOILET, USE AS DIRECTED 10/23/18   Kathie Jose DPM tendon with increased pain with inversion and plantarflexion    Pitting edema present. Medial longitudinal arch, Bilateral WNL. Ankle ROM WNL,Bilateral.    Dorsally contracted digits absent digits 1-5 Bilateral.     Vascular: DP and PT pulses palpable 2/4, Bilateral.  CFT <3 seconds, Bilateral.  Hair growth present to the level of the digits, Bilateral.  Edema absent, Bilateral.  Varicosities absent, Bilateral. Erythema absent, Bilateral    Neurological: Sensation intact to light touch to level of digits, Bilateral.  Protective sensation intact 10/10 sites via 5.07/10g Chandler-Daniela Monofilament, Bilateral.  negative Tinel's, Bilateral.  negative Valleix sign, Bilateral.      Integument: Warm, dry, supple, Bilateral.  Open lesion absent, Bilateral.  Interdigital maceration absent to web spaces 1-4, Bilateral.  Nails are normal in length, thickness and color 1-5 bilateral.  Fissures absent, Bilateral.     Asessment: Patient is a 64 y.o. female with:    Diagnosis Orders   1. Posterior tibial tendon tear, traumatic, left, subsequent encounter     2. Degenerative joint disease of both ankles and feet     3. Edema of lower extremity             Plan: Patient examined and evaluated. Current condition and treatment options discussed in detail. Advised pt to her treatment options. Pt has failed surgery. Advised her on obtaining an AFO vs [place] brace  This devise was explained to the patient in detail and the benefits of the brace. rx provided. .  Verbal and written instructions given to patient. Contact office with any questions/problems/concerns. No orders of the defined types were placed in this encounter. No orders of the defined types were placed in this encounter. RTC in 4week(s).     8/18/2020      Electronically signed by Kaylee Sahu DPM on 8/18/2020 at 10:35 AM  8/18/2020

## 2020-09-01 ENCOUNTER — TELEPHONE (OUTPATIENT)
Dept: FAMILY MEDICINE CLINIC | Age: 60
End: 2020-09-01

## 2020-09-01 ENCOUNTER — OFFICE VISIT (OUTPATIENT)
Dept: PRIMARY CARE CLINIC | Age: 60
End: 2020-09-01
Payer: MEDICARE

## 2020-09-01 VITALS
WEIGHT: 240 LBS | DIASTOLIC BLOOD PRESSURE: 75 MMHG | TEMPERATURE: 99.1 F | HEART RATE: 77 BPM | SYSTOLIC BLOOD PRESSURE: 118 MMHG | HEIGHT: 59 IN | BODY MASS INDEX: 48.38 KG/M2 | OXYGEN SATURATION: 95 %

## 2020-09-01 PROCEDURE — 3017F COLORECTAL CA SCREEN DOC REV: CPT | Performed by: INTERNAL MEDICINE

## 2020-09-01 PROCEDURE — 99202 OFFICE O/P NEW SF 15 MIN: CPT | Performed by: INTERNAL MEDICINE

## 2020-09-01 PROCEDURE — 1036F TOBACCO NON-USER: CPT | Performed by: INTERNAL MEDICINE

## 2020-09-01 PROCEDURE — G8417 CALC BMI ABV UP PARAM F/U: HCPCS | Performed by: INTERNAL MEDICINE

## 2020-09-01 PROCEDURE — G8427 DOCREV CUR MEDS BY ELIG CLIN: HCPCS | Performed by: INTERNAL MEDICINE

## 2020-09-01 RX ORDER — METHYLPREDNISOLONE 4 MG/1
TABLET ORAL
Qty: 1 KIT | Refills: 0 | Status: SHIPPED | OUTPATIENT
Start: 2020-09-01 | End: 2020-09-07

## 2020-09-01 RX ORDER — TRAMADOL HYDROCHLORIDE 50 MG/1
50 TABLET ORAL EVERY 6 HOURS PRN
COMMUNITY
End: 2021-03-26

## 2020-09-01 NOTE — PROGRESS NOTES
[address] [facility] [place] [address]  Dept: [phone]  Dept Fax: [phone]    Lady Bellamy is a 64 y.o. female who presents to the urgent care today for her medicalconditions/complaints as noted below. Lady Bellamy is c/o of Rash (Recurring since April. Pt states that the rashes will flare up randomly on bilateral arms. Rash is described as itching and burning. She was seen by PCP in July for this problem and was given steroids but medication did not provide relief. )      HPI:     Rash   This is a recurrent problem. The current episode started yesterday (since March). The problem has been gradually worsening since onset. The affected locations include the left arm and right arm. The rash is characterized by itchiness, burning and pain. Past treatments include nothing. The treatment provided no relief. Past Medical History:   Diagnosis Date    Allergic rhinitis 12/4/2014    Anxiety 7/5/2018    Arthritis     Asthma     Chest pain     occurs every other day, pt states they occur when she doesn't eat right or forget to take her bp med, occurs left side of chest with no radiation, pt states \"it's new\" and has not had any testing done or seen anyone for it.  Constipation     Depressive disorder 7/5/2018    Generalized osteoarthritis 6/26/2014    History of kidney stones     Hypertension     Obesity, morbid, BMI 40.0-49.9 ([facility] [address] 2/28/2017    PONV (postoperative nausea and vomiting)     Sleep apnea     does not use a machine        Current Outpatient Medications   Medication Sig Dispense Refill    traMADol (ULTRAM) 50 MG tablet Take 50 mg by mouth every 6 hours as needed for Pain.  methylPREDNISolone (MEDROL DOSEPACK) 4 MG tablet Take by mouth.  1 kit 0    RA LORATADINE 10 MG tablet take 1 tablet by mouth once daily 30 tablet 5    amLODIPine (NORVASC) 5 MG tablet take 1 tablet by mouth once daily 30 tablet 3    losartan-hydrochlorothiazide (HYZAAR) 100-25 MG per tablet take 1 tablet by mouth once daily 90 tablet 1    fluticasone (FLONASE) 50 MCG/ACT nasal spray instill 1 spray into each nostril once daily 16 g 5    fluticasone-salmeterol (WIXELA INHUB) 250-50 MCG/DOSE AEPB Inhale 1 puff into the lungs 2 times daily 1 Inhaler 6    albuterol sulfate HFA (VENTOLIN HFA) 108 (90 Base) MCG/ACT inhaler Inhale 2 puffs into the lungs every 6 hours as needed for Wheezing 1 Inhaler 3    Cholecalciferol (VITAMIN D3) 50 MCG (2000 UT) TABS Take 1 tablet by mouth daily 90 tablet 1    hydrOXYzine (ATARAX) 25 MG tablet take 1 tablet by mouth once daily if needed for itching 30 tablet 5    lidocaine (L-M-X 4) 4 % cream Apply a thick layer 30 minutes prior to procedure, covering in saran wrap 15 g 0    EPINEPHrine (EPIPEN) 0.3 MG/0.3ML SOAJ injection Inject 0.3 mLs into the muscle once for 1 dose Use as directed for allergic reaction 1 each 0    Misc. Devices (BATHTUB SAFETY RAIL) MISC 4 each by Does not apply route daily 4 each 0    Misc. Devices (EXTENDABLE BEDSIDE RAIL) MISC 4 each by Does not apply route daily 4 each 0    Spacer/Aero-Holding Chambers PRICE 1 Device by Does not apply route daily as needed (wheezing) 1 Device 0    Misc. Devices [facility]) MISC Use as directed 1 each 0    Misc. Devices (WALL GRAB BAR) MISC GRAB BARS FOR BOTH SHOWER AND TOILET, USE AS DIRECTED 6 each 0    Misc. Devices MISC OUTSIDE RAMP THAT INCLUDES RAILINGS, USE AS DIRECTED 1 Device 0    Misc.  Devices (RAISED TOILET SEAT) MISC USE AS DIRECTED 1 each 0    hydrocortisone 2.5 % cream apply to affected area twice a day 30 g 1    neomycin-polymyxin-hydrocortisone (CORTISPORIN) 3.5-68829-8 otic suspension instill 4 drops into affected ear  (S) three times a day  0    Antipyrine-Benzocaine (AURALGAN) 54-14 MG/ML otic solution Place 3 drops into the left ear 4 times daily as needed for Pain      Elastic Bandages & Supports (JOBST KNEE HIGH 118/75 (Site: Left Upper Arm, Position: Sitting, Cuff Size: Large Adult)   Pulse 77   Temp 99.1 °F (37.3 °C) (Oral)   Ht 4' 11\" (1.499 m)   Wt 240 lb (108.9 kg)   SpO2 95%   BMI 48.47 kg/m²       Assessment:       Diagnosis Orders   1. Dermatitis  methylPREDNISolone (MEDROL DOSEPACK) 4 MG tablet    Quentin Nielsen MD, Dermatology, [facility]:      No follow-ups on file. Orders Placed This Encounter   Medications    methylPREDNISolone (MEDROL DOSEPACK) 4 MG tablet     Sig: Take by mouth. Dispense:  1 kit     Refill:  0     Orders Placed This Encounter   Procedures   Quentin Nielsen MD, Dermatology, Given     Referral Priority:   Routine     Referral Type:   Eval and Treat     Referral Reason:   Specialty Services Required     Referred to Provider:   Khloe Vázquez MD     Requested Specialty:   Dermatology     Number of Visits Requested:   1            Patient given educational materials - see patient instructions. Discussed use, benefit, and side effects of prescribed medications. All patientquestions answered. Pt voiced understanding.     Electronically signed by Rocael Rooney MD on 9/1/2020at 3:25 PM

## 2020-09-03 ENCOUNTER — VIRTUAL VISIT (OUTPATIENT)
Dept: FAMILY MEDICINE CLINIC | Age: 60
End: 2020-09-03
Payer: MEDICARE

## 2020-09-03 VITALS — RESPIRATION RATE: 18 BRPM | WEIGHT: 240 LBS | HEIGHT: 59 IN | BODY MASS INDEX: 48.38 KG/M2

## 2020-09-03 PROCEDURE — G0438 PPPS, INITIAL VISIT: HCPCS | Performed by: NURSE PRACTITIONER

## 2020-09-03 PROCEDURE — 3017F COLORECTAL CA SCREEN DOC REV: CPT | Performed by: NURSE PRACTITIONER

## 2020-09-03 PROCEDURE — 99497 ADVNCD CARE PLAN 30 MIN: CPT | Performed by: NURSE PRACTITIONER

## 2020-09-03 ASSESSMENT — PATIENT HEALTH QUESTIONNAIRE - PHQ9
SUM OF ALL RESPONSES TO PHQ QUESTIONS 1-9: 0
SUM OF ALL RESPONSES TO PHQ QUESTIONS 1-9: 0

## 2020-09-03 ASSESSMENT — LIFESTYLE VARIABLES: HOW OFTEN DO YOU HAVE A DRINK CONTAINING ALCOHOL: 0

## 2020-09-03 NOTE — PROGRESS NOTES
Medicare Annual Wellness Visit  Are Name: Nile Dobbs Date: 9/3/2020   MRN: [id number] Sex: Female   Age: 64 y.o. Ethnicity: Non-/Non    : 1960 Race: Nicola Lama is here for Medicare AWV    Screenings for behavioral, psychosocial and functional/safety risks, and cognitive dysfunction are all negative except as indicated below. These results, as well as other patient data from the [email or web address] form, are documented in Flowsheets linked to this Encounter. Allergies   Allergen Reactions    Latex Hives     Latex gloves    Asa [Aspirin] Hives    Codeine Hives    Lisinopril-Hydrochlorothiazide Hives    Motrin [Ibuprofen] Hives    Other Hives and Swelling     Cherries, almonds    Pcn [Penicillins] Hives    Sulfa Antibiotics          Prior to Visit Medications    Medication Sig Taking? Authorizing Provider   traMADol (ULTRAM) 50 MG tablet Take 50 mg by mouth every 6 hours as needed for Pain. Yes Historical Provider, MD   methylPREDNISolone (MEDROL DOSEPACK) 4 MG tablet Take by mouth.  Yes Jennifer Kinney MD RA LORATADINE 10 MG tablet take 1 tablet by mouth once daily Yes JJ Holliday CNP   amLODIPine (NORVASC) 5 MG tablet take 1 tablet by mouth once daily Yes JJ Holliday CNP   losartan-hydrochlorothiazide (HYZAAR) 100-25 MG per tablet take 1 tablet by mouth once daily Yes JJ Holliday CNP   fluticasone (FLONASE) 50 MCG/ACT nasal spray instill 1 spray into each nostril once daily Yes JJ Cohen CNP   fluticasone-salmeterol (WIXELA INHUB) 250-50 MCG/DOSE AEPB Inhale 1 puff into the lungs 2 times daily Yes JJ Cohen CNP   albuterol sulfate HFA (VENTOLIN HFA) 108 (90 Base) MCG/ACT inhaler Inhale 2 puffs into the lungs every 6 hours as needed for Wheezing Yes JJ Holliday CNP   Cholecalciferol (VITAMIN D3) 50 MCG ( UT) TABS Take 1 tablet by mouth daily Yes JJ Holliday CNP   hydrOXYzine (ATARAX) 25 MG pt states they occur when she doesn't eat right or forget to take her bp med, occurs left side of chest with no radiation, pt states \"it's new\" and has not had any testing done or seen anyone for it.  Constipation     Depressive disorder 7/5/2018    Generalized osteoarthritis 6/26/2014    History of kidney stones     Hypertension     Obesity, morbid, BMI 40.0-49.9 ([address] 2/28/2017    PONV (postoperative nausea and vomiting)     Sleep apnea     does not use a machine       Past Surgical History:   Procedure Laterality Date    CHOLECYSTECTOMY      FOOT TENDON SURGERY Left 09/08/2017    posterior repair    FOOT TENDON SURGERY Left 06/29/2018    LEFT POSTERIOR  TENDON REPAIR FLEXOR TENDON TRANSFER (Left Foot)    LITHOTRIPSY      SC OFFICE/OUTPT VISIT,PROCEDURE ONLY Left 6/29/2018    LEFT POSTERIOR  TENDON REPAIR FLEXOR TENDON TRANSFER performed by Kyler Watson DPM at [address] [place] [address] Drive Left 9/8/2017    LEFT POSTERIOR TIBIAL TENDON REPAIR WITH LEFT TENOLYSIS  performed by Kyler Watson DPM at [facility] History   Problem Relation Age of Onset    Depression Mother     Diabetes Mother     High Blood Pressure Mother     Mental Illness Mother     Substance Abuse Father     Depression Sister     Diabetes Sister     High Blood Pressure Sister     Mental Illness Sister     Depression Brother     Mental Illness Brother     Substance Abuse Brother        CareTeam (Including outside providers/suppliers regularly involved in providing care):   Patient Care Team:  JJ Mendoza CNP as PCP - General (Nurse Practitioner)  JJ Mendoza CNP as PCP - [facility] Empaneled Provider  Kyler Watson DPM as Physician (Podiatry)    Wt Readings from Last 3 Encounters:   09/03/20 240 lb (108.9 kg)   09/01/20 240 lb (108.9 kg)   07/21/20 251 lb 3.2 oz (113.9 kg)      No flowsheet data found. Body mass index is 48.47 kg/m².     Based upon direct observation of the patient, evaluation of cognition reveals recent and remote memory intact. Patient's complete Health Risk Assessment and screening values have been reviewed and are found in Flowsheets. The following problems were reviewed today and where indicated follow up appointments were made and/or referrals ordered. Positive Risk Factor Screenings with Interventions:     General Health:  General  In general, how would you say your health is?: Very Good  In the past 7 days, have you experienced any of the following? New or Increased Pain, New or Increased Fatigue, Loneliness, Social Isolation, Stress or Anger?: None of These  Do you get the social and emotional support that you need?: Yes  Do you have a Living Will?: (!) No  General Health Risk Interventions:  · No Living Will: additional information provided both written and verbally    Health Habits/Nutrition:  Health Habits/Nutrition  Do you exercise for at least 20 minutes 2-3 times per week?: Yes  Have you lost any weight without trying in the past 3 months?: No  Do you eat fewer than 2 meals per day?: No  Have you seen a dentist within the past year?: (!) No  Body mass index is 48.47 kg/m².   Health Habits/Nutrition Interventions:  · Dental exam overdue:  patient encouraged to make appointment with his/her dentist    Hearing/Vision:  No exam data present  Hearing/Vision  Do you or your family notice any trouble with your hearing?: No  Do you have difficulty driving, watching TV, or doing any of your daily activities because of your eyesight?: No  Have you had an eye exam within the past year?: (!) No  Hearing/Vision Interventions:  · Vision concerns:  patient encouraged to make appointment with his/her eye specialist    Personalized Preventive Plan   Current Health Maintenance Status  Immunization History   Administered Date(s) Administered    Influenza Vaccine, unspecified formulation 09/16/2015    Influenza Virus Vaccine 09/16/2015    Tdap (Boostrix, Adacel) 06/03/2016 Health Maintenance   Topic Date Due    Annual Wellness Visit (AWV)  05/14/2020    Flu vaccine (1) 09/01/2020    Shingles Vaccine (1 of 2) 10/08/2020 (Originally 9/2/2012)    Pneumococcal 0-64 years Vaccine (1 of 1 - PPSV23) 09/01/2021 (Originally [birth year]2/1966)    Potassium monitoring  01/27/2021    Creatinine monitoring  01/27/2021    A1C test (Diabetic or Prediabetic)  07/21/2021    Breast cancer screen  09/16/2021    Cervical cancer screen  02/08/2022    Colon cancer screen fecal DNA test (Cologuard)  07/29/2023    Lipid screen  01/27/2025    DTaP/Tdap/Td vaccine (2 - Td) 06/03/2026    Hepatitis C screen  Completed    HIV screen  Completed    Hepatitis A vaccine  Aged Out    Hepatitis B vaccine  Aged Out    Hib vaccine  Aged Out    Meningococcal (ACWY) vaccine  Aged Out     Recommendations for [employer] Due: see orders and patient instructions/AVS.  . Recommended screening schedule for the next 5-10 years is provided to the patient in written form: see Patient Racheal Huber was seen today for medicare awv. Diagnoses and all orders for this visit:    Routine general medical examination at a health care facility    ACP (advance care planning)  -     [address], [address] [facility] [id number]   Advance Care Planning   Advanced Care Planning: Discussed the patients choices for care and treatment in case of a health event that adversely affects decision-making abilities. Also discussed the patients long-term treatment options. Reviewed with the patient the [address] [place] [address] & [facility] Declaration forms  Reviewed the process of designating a competent adult as an Agent (or -in-fact) that could take make health care decisions for the patient if incompetent.  Patient was asked to complete the declaration forms, either acknowledge the forms by a public notary or an eligible witness and provide a signed copy to the practice office. Time spent (minutes): 5    Depression screening negative  -     ND DEPRESSION SCREEN ANNUAL        Zenon Calix is a 64 y.o. female being evaluated by a Virtual Visit (phone) encounter to address concerns as mentioned above. A caregiver was present when appropriate. Due to this being a TeleHealth encounter (During JVOG-12 public health emergency), evaluation of the following organ systems was limited: Vitals/Constitutional/EENT/Resp/CV/GI//MS/Neuro/Skin/Heme-Lymph-Imm. Pursuant to the emergency declaration under the [address] [place] [address], [address] [place] [address] authority and the [employer] Act, this Virtual Visit was conducted with patient's (and/or legal guardian's) consent, to reduce the patient's risk of exposure to COVID-19 and provide necessary medical care. The patient (and/or legal guardian) has also been advised to contact this office for worsening conditions or problems, and seek emergency medical treatment and/or call 911 if deemed necessary. Patient identification was verified at the start of the visit: Yes    Services were provided through phone to substitute for in-person clinic visit. Patient and provider were located at their individual homes. --JJ Zamudio CNP on 9/3/2020 at 11:48 AM    An electronic signature was used to authenticate this note.

## 2020-09-03 NOTE — PATIENT INSTRUCTIONS
Advance Directives: Care Instructions  Overview  An advance directive is a legal way to state your wishes at the end of your life. It tells your family and your doctor what to do if you can't say what you want. There are two main types of advance directives. You can change them any time your wishes change. Living will. This form tells your family and your doctor your wishes about life support and other treatment. The form is also called a declaration. Medical power of . This form lets you name a person to make treatment decisions for you when you can't speak for yourself. This person is called a health care agent (health care proxy, health care surrogate). The form is also called a durable power of  for health care. If you do not have an advance directive, decisions about your medical care may be made by a family member, or by a doctor or a  who doesn't know you. It may help to think of an advance directive as a gift to the people who care for you. If you have one, they won't have to make tough decisions by themselves. Follow-up care is a key part of your treatment and safety. Be sure to make and go to all appointments, and call your doctor if you are having problems. It's also a good idea to know your test results and keep a list of the medicines you take. What should you include in an advance directive? Many states have a unique advance directive form. (It may ask you to address specific issues.) Or you might use a universal form that's approved by many states. If your form doesn't tell you what to address, it may be hard to know what to include in your advance directive. Use the questions below to help you get started. · Who do you want to make decisions about your medical care if you are not able to? · What life-support measures do you want if you have a serious illness that gets worse over time or can't be cured? · What are you most afraid of that might happen? (Maybe you're afraid of having pain, losing your independence, or being kept alive by machines.)  · Where would you prefer to die? (Your home? A hospital? A nursing home?)  · Do you want to donate your organs when you die? · Do you want certain Mandaen practices performed before you die? When should you call for help? Be sure to contact your doctor if you have any questions. Where can you learn more? Go to [email or web address] and sign in to your [employer] account. Enter R264 in the [employer] box to learn more about \"Advance Directives: Care Instructions. \"     If you do not have an account, please click on the \"Sign Up Now\" link. Current as of: December 9, 2019               Content Version: 12.5  © 0968-5782 Healthwise, Incorporated. Care instructions adapted under license by [facility] ([facility]). If you have questions about a medical condition or this instruction, always ask your healthcare professional. [place] [address] any warranty or liability for your use of this information. Learning About Medical Power of   What is a medical power of ? A medical power of , also called a durable power of  for health care, is one type of the legal forms called advance directives. It lets you name the person you want to make treatment decisions for you if you can't speak or decide for yourself. The person you choose is called your health care agent. This person is also called a health care proxy or health care surrogate. A medical power of  may be called something else in your state. How do you choose a health care agent? Choose your health care agent carefully. This person may or may not be a family member. Talk to the person before you make your final decision. Make sure he or she is comfortable with this responsibility. It's a good idea to choose someone who:  · Is at least 25years old.   · Knows you well and understands what makes life meaningful for you. · Understands your Jehovah's witness and moral values. · Will do what you want, not what he or she wants. · Will be able to make difficult choices at a stressful time. · Will be able to refuse or stop treatment, if that is what you would want, even if you could die. · Will be firm and confident with health professionals if needed. · Will ask questions to get needed information. · Lives near you or agrees to travel to you if needed. Your family may help you make medical decisions while you can still be part of that process. But it's important to choose one person to be your health care agent in case you aren't able to make decisions for yourself. If you don't fill out the legal form and name a health care agent, the decisions your family can make may be limited. A health care agent may be called something else in your state. Who will make decisions for you if you don't have a health care agent? If you don't have a health care agent or a living will, you may not get the care you want. Decisions may be made by family members who disagree about your medical care. Or decisions may be made by a medical professional who doesn't know you well. In some cases, a  makes the decisions. When you name a health care agent, it is very clear who has the power to make health decisions for you. How do you name a health care agent? You name your health care agent on a legal form. This form is usually called a medical power of . Ask your hospital, state bar association, or office on aging where to find these forms. You must sign the form to make it legal. Some states require you to get the form notarized. This means that a person called a  watches you sign the form and then he or she signs the form. Some states also require that two or more witnesses sign the form. Be sure to tell your family members and doctors who your health care agent is.   Where can living will. · Some states may limit your right to refuse treatment in certain cases. For example, you may need to clearly state in your living will that you don't want artificial hydration and nutrition, such as being fed through a tube. Is a living will a legal document? A living will is a legal document. Each state has its own laws about living fischer. And a living will may be called something else in your state. Here are some things to know about living fischer. · You don't need an  to complete a living will. But legal advice can be helpful if your state's laws are unclear. It can also help if your health history is complicated or your family can't agree on what should be in your living will. · You can change your living will at any time. Some people find that their wishes about end-of-life care change as their health changes. If you make big changes to your living will, complete a new form. · If you move to another state, make sure that your living will is legal in the state where you now live. In most cases, doctors will respect your wishes even if you have a form from a different state. · You might use a universal form that has been approved by many states. This kind of form can sometimes be filled out and stored online. Your digital copy will then be available wherever you have a connection to the internet. The doctors and nurses who need to treat you can find it right away. · Your state may offer an online registry. This is another place where you can store your living will online. · It's a good idea to get your living will notarized. This means using a person called a  to watch two people sign, or witness, your living will. What should you know when you create a living will? Here are some questions to ask yourself as you make your living will:  · Do you know enough about life support methods that might be used?  If not, talk to your doctor so you know what might be done if you can't breathe on your own, your heart stops, or you can't swallow. · What things would you still want to be able to do after you receive life-support methods? Would you want to be able to walk? To speak? To eat on your own? To live without the help of machines? · Do you want certain Rastafari practices performed if you become very ill? · If you have a choice, where do you want to be cared for? In your home? At a hospital or nursing home? · If you have a choice at the end of your life, where would you prefer to die? At home? In a hospital or nursing home? Somewhere else? · Would you prefer to be buried or cremated? · Do you want your organs to be donated after you die? What should you do with your living will? · Make sure that your family members and your health care agent have copies of your living will (also called a declaration). · Give your doctor a copy of your living will. Ask him or her to keep it as part of your medical record. If you have more than one doctor, make sure that each one has a copy. · Put a copy of your living will where it can be easily found. For example, some people may put a copy on their refrigerator door. If you are using a digital copy, be sure your doctor, family members, and health care agent know how to find and access it. Where can you learn more? Go to https://Galaxy [email or web address] org and sign in to your [employer] account. Enter N189 in the [facility] box to learn more about \"Learning About Living Rei Portillo. \"     If you do not have an account, please click on the \"Sign Up Now\" link. Current as of: December 9, 2019               Content Version: 12.5  © 6936-7840 Healthwise, Incorporated. Care instructions adapted under license by [facility] ([facility]).  If you have questions about a medical condition or this instruction, always ask your healthcare professional. [address] any warranty or liability for your use of this information. Personalized Preventive Plan for Kelly Fernandez - 9/3/2020  Medicare offers a range of preventive health benefits. Some of the tests and screenings are paid in full while other may be subject to a deductible, co-insurance, and/or copay. Some of these benefits include a comprehensive review of your medical history including lifestyle, illnesses that may run in your family, and various assessments and screenings as appropriate. After reviewing your medical record and screening and assessments performed today your provider may have ordered immunizations, labs, imaging, and/or referrals for you. A list of these orders (if applicable) as well as your Preventive Care list are included within your After Visit Summary for your review. Other Preventive Recommendations:    · A preventive eye exam performed by an eye specialist is recommended every 1-2 years to screen for glaucoma; cataracts, macular degeneration, and other eye disorders. · A preventive dental visit is recommended every 6 months. · Try to get at least 150 minutes of exercise per week or 10,000 steps per day on a pedometer . · Order or download the FREE \"Exercise & Physical Activity: Your Everyday Guide\" from [employer]. Call 3-533.274.5051 or search [employer] online. · You need 8617-0653 mg of calcium and 0438-9787 IU of vitamin D per day. It is possible to meet your calcium requirement with diet alone, but a vitamin D supplement is usually necessary to meet this goal.  · When exposed to the sun, use a sunscreen that protects against both UVA and UVB radiation with an SPF of 30 or greater. Reapply every 2 to 3 hours or after sweating, drying off with a towel, or swimming. · Always wear a seat belt when traveling in a car. Always wear a helmet when riding a bicycle or motorcycle.

## 2020-09-10 RX ORDER — LOSARTAN POTASSIUM AND HYDROCHLOROTHIAZIDE 25; 100 MG/1; MG/1
TABLET ORAL
Qty: 90 TABLET | Refills: 1 | Status: SHIPPED | OUTPATIENT
Start: 2020-09-10 | End: 2021-03-22

## 2020-09-10 NOTE — TELEPHONE ENCOUNTER
Byron Christina is calling to request a refill on the following medication(s):    Medication Request:  Requested Prescriptions     Pending Prescriptions Disp Refills    losartan-hydroCHLOROthiazide (HYZAAR) 100-25 MG per tablet [Pharmacy Med Name: LOSARTAN-HCTZ 100-25 MG TAB] 90 tablet 1     Sig: take 1 tablet by mouth once daily       Last Visit Date (If Applicable):  3/9/4122    Next Visit Date:    Visit date not found

## 2020-09-18 ENCOUNTER — TELEPHONE (OUTPATIENT)
Dept: DERMATOLOGY | Age: 60
End: 2020-09-18

## 2020-09-18 ENCOUNTER — NURSE TRIAGE (OUTPATIENT)
Dept: OTHER | Facility: CLINIC | Age: 60
End: 2020-09-18

## 2020-09-18 ENCOUNTER — APPOINTMENT (OUTPATIENT)
Dept: GENERAL RADIOLOGY | Age: 60
End: 2020-09-18
Payer: MEDICARE

## 2020-09-18 ENCOUNTER — HOSPITAL ENCOUNTER (EMERGENCY)
Age: 60
Discharge: HOME | End: 2020-09-18
Attending: EMERGENCY MEDICINE
Payer: MEDICARE

## 2020-09-18 VITALS
HEIGHT: 59 IN | HEART RATE: 73 BPM | SYSTOLIC BLOOD PRESSURE: 153 MMHG | OXYGEN SATURATION: 91 % | BODY MASS INDEX: 49.55 KG/M2 | RESPIRATION RATE: 14 BRPM | WEIGHT: 245.8 LBS | TEMPERATURE: 98.2 F | DIASTOLIC BLOOD PRESSURE: 71 MMHG

## 2020-09-18 PROCEDURE — 6370000000 HC RX 637 (ALT 250 FOR IP): Performed by: EMERGENCY MEDICINE

## 2020-09-18 PROCEDURE — 99282 EMERGENCY DEPT VISIT SF MDM: CPT

## 2020-09-18 RX ORDER — DIPHENHYDRAMINE HCL 25 MG
25 TABLET ORAL ONCE
Status: COMPLETED | OUTPATIENT
Start: 2020-09-18 | End: 2020-09-18

## 2020-09-18 RX ORDER — PREDNISONE 20 MG/1
60 TABLET ORAL ONCE
Status: COMPLETED | OUTPATIENT
Start: 2020-09-18 | End: 2020-09-18

## 2020-09-18 RX ORDER — DIPHENHYDRAMINE HCL 25 MG
25 CAPSULE ORAL EVERY 6 HOURS PRN
Qty: 20 CAPSULE | Refills: 0 | Status: SHIPPED | OUTPATIENT
Start: 2020-09-18 | End: 2020-09-23

## 2020-09-18 RX ORDER — PREDNISONE 20 MG/1
60 TABLET ORAL DAILY
Qty: 21 TABLET | Refills: 0 | Status: SHIPPED | OUTPATIENT
Start: 2020-09-18 | End: 2020-09-25

## 2020-09-18 RX ADMIN — DIPHENHYDRAMINE HCL 25 MG: 25 TABLET ORAL at 14:24

## 2020-09-18 RX ADMIN — PREDNISONE 60 MG: 20 TABLET ORAL at 14:18

## 2020-09-18 ASSESSMENT — ENCOUNTER SYMPTOMS
ROS SKIN COMMENTS: ITCHING
ABDOMINAL DISTENTION: 0
CHEST TIGHTNESS: 0
FACIAL SWELLING: 0
EYE DISCHARGE: 0
ABDOMINAL PAIN: 0
BACK PAIN: 0
SHORTNESS OF BREATH: 0
EYE PAIN: 0

## 2020-09-18 NOTE — ED PROVIDER NOTES
bp med, occurs left side of chest with no radiation, pt states \"it's new\" and has not had any testing done or seen anyone for it.     Constipation     Depressive disorder 7/5/2018    Generalized osteoarthritis 6/26/2014    History of kidney stones     Hypertension     Obesity, morbid, BMI 40.0-49.9 ([facility] [address] 2/28/2017    PONV (postoperative nausea and vomiting)     Sleep apnea     does not use a machine     SURGICAL HISTORY       Past Surgical History:   Procedure Laterality Date    CHOLECYSTECTOMY      FOOT TENDON SURGERY Left 09/08/2017    posterior repair    FOOT TENDON SURGERY Left 06/29/2018    LEFT POSTERIOR  TENDON REPAIR FLEXOR TENDON TRANSFER (Left Foot)    LITHOTRIPSY      IA OFFICE/OUTPT VISIT,PROCEDURE ONLY Left 6/29/2018    LEFT POSTERIOR  TENDON REPAIR FLEXOR TENDON TRANSFER performed by Peggy Anderson DPM at [address] [place] [address] Left 9/8/2017    LEFT POSTERIOR TIBIAL TENDON REPAIR WITH LEFT TENOLYSIS  performed by Peggy Anderson DPM at [facility]       Previous Medications    ALBUTEROL SULFATE HFA (VENTOLIN HFA) 108 (90 BASE) MCG/ACT INHALER    Inhale 2 puffs into the lungs every 6 hours as needed for Wheezing    AMLODIPINE (NORVASC) 5 MG TABLET    take 1 tablet by mouth once daily    ANTIPYRINE-BENZOCAINE (AURALGAN) 54-14 MG/ML OTIC SOLUTION    Place 3 drops into the left ear 4 times daily as needed for Pain    CHOLECALCIFEROL (VITAMIN D3) 50 MCG (2000 UT) TABS    Take 1 tablet by mouth daily    ELASTIC BANDAGES & SUPPORTS (JOBST KNEE HIGH COMPRESSION SM) MISC    Dispense Bilateral Jobst Below Knee 20-30mmHg compression stockings    EPINEPHRINE (EPIPEN) 0.3 MG/0.3ML SOAJ INJECTION    Inject 0.3 mLs into the muscle once for 1 dose Use as directed for allergic reaction    FLUTICASONE (FLONASE) 50 MCG/ACT NASAL SPRAY    instill 1 spray into each nostril once daily    FLUTICASONE-SALMETEROL (WIXELA INHUB) 250-50 MCG/DOSE AEPB    Inhale 1 puff into the lungs 2 times daily    HYDROCORTISONE 2.5 % CREAM    apply to affected area twice a day    HYDROXYZINE (ATARAX) 25 MG TABLET    take 1 tablet by mouth once daily if needed for itching    LIDOCAINE (L-M-X 4) 4 % CREAM    Apply a thick layer 30 minutes prior to procedure, covering in saran wrap    LOSARTAN-HYDROCHLOROTHIAZIDE (HYZAAR) 100-25 MG PER TABLET    take 1 tablet by mouth once daily    MISC. DEVICES (BATHTUB SAFETY RAIL) MISC    4 each by Does not apply route daily    MISC. DEVICES (EXTENDABLE BEDSIDE RAIL) MISC    4 each by Does not apply route daily    MISC. DEVICES (RAISED TOILET SEAT) MISC    USE AS DIRECTED    MISC. DEVICES (WALL GRAB BAR) MISC    GRAB BARS FOR BOTH SHOWER AND TOILET, USE AS DIRECTED    MISC. DEVICES [facility]) MISC    1 Device by Does not apply route once for 1 dose    MISC. DEVICES [facility]) MISC    Use as directed    MISC. DEVICES MISC    OUTSIDE RAMP THAT INCLUDES RAILINGS, USE AS DIRECTED    NEOMYCIN-POLYMYXIN-HYDROCORTISONE (CORTISPORIN) 3.5-99633-9 OTIC SUSPENSION    instill 4 drops into affected ear  (S) three times a day    RA LORATADINE 10 MG TABLET    take 1 tablet by mouth once daily    SPACER/AERO-HOLDING CHAMBERS PRICE    1 Device by Does not apply route daily as needed (wheezing)    TRAMADOL (ULTRAM) 50 MG TABLET    Take 50 mg by mouth every 6 hours as needed for Pain. ALLERGIES     is allergic to latex; asa [aspirin]; codeine; lisinopril-hydrochlorothiazide; motrin [ibuprofen]; other; pcn [penicillins]; and sulfa antibiotics. FAMILY HISTORY     She indicated that her mother is . She indicated that her father is . She indicated that her sister is alive. She indicated that her brother is alive.      SOCIAL HISTORY       Social History     Tobacco Use    Smoking status: Former Smoker     Packs/day: 3.00     Years: 20.00     Pack years: 60.00     Types: Cigarettes     Start date: 1981     Last attempt to quit: 1998     Years since quittin.7  Smokeless tobacco: Never Used    Tobacco comment: quit smoking 15-20 yrs ago   Substance Use Topics    Alcohol use: No    Drug use: No     PHYSICAL EXAM     INITIAL VITALS: BP (!) 153/71   Pulse 73   Temp 98.2 °F (36.8 °C)   Resp 14   Ht 4' 11\" (1.499 m)   Wt 245 lb 12.8 oz (111.5 kg)   SpO2 91%   BMI 49.65 kg/m²    Physical Exam  Vitals signs and nursing note reviewed. Constitutional:       General: She is not in acute distress. Appearance: She is well-developed. She is not diaphoretic. HENT:      Head: Normocephalic and atraumatic. Eyes:      Pupils: Pupils are equal, round, and reactive to light. Neck:      Musculoskeletal: Normal range of motion and neck supple. Cardiovascular:      Rate and Rhythm: Normal rate and regular rhythm. Pulmonary:      Effort: Pulmonary effort is normal.      Breath sounds: Normal breath sounds. Abdominal:      General: Bowel sounds are normal.      Palpations: Abdomen is soft. Musculoskeletal: Normal range of motion. Skin:     General: Skin is warm. Capillary Refill: Capillary refill takes less than 2 seconds. Comments: Bilateral upper extremity multiple erythematous raised areas consistent with hives, no petechiae no purpura nonblanching. Neurological:      Mental Status: She is alert and oriented to person, place, and time. MEDICAL DECISION MAKING:   The patient is a 65-year-old female presented to the emergency department secondary to rash consistent with allergic reaction versus contact dermatitis. She received prednisone and Benadryl. Patient is discharged home with prednisone and Benadryl given outpatient Derm follow-up and parameters for return to the emergency department. All patient's question's and concerns were answered prior to disposition and patient and/or family expressed understanding and agreement of treatment plan.         CRITICAL CARE:              NIH STROKE SCALE: PROCEDURES:    Procedures    DIAGNOSTIC RESULTS   EKG:All EKG's are interpreted by the Emergency Department Physician who either signs or Co-signs this chart in the absence of a cardiologist.        RADIOLOGY:All plain film, CT, MRI, and formal ultrasound images (except ED bedside ultrasound) are read by the radiologist, see reports below, unless otherwisenoted in MDM or here. No orders to display     LABS: All lab results were reviewed by myself, and all abnormals are listed below. Labs Reviewed - No data to display    EMERGENCY DEPARTMENTCOURSE:         Vitals:    Vitals:    09/18/20 1348 09/18/20 1351   BP:  (!) 153/71   Pulse:  73   Resp:  14   Temp:  98.2 °F (36.8 °C)   SpO2:  91%   Weight: 245 lb 12.8 oz (111.5 kg)    Height: 4' 11\" (1.499 m)        The patient was given the following medications while in the emergency department:  Orders Placed This Encounter   Medications    diphenhydrAMINE (BENADRYL) tablet 25 mg    predniSONE (DELTASONE) tablet 60 mg    diphenhydrAMINE (BENADRYL) 25 MG capsule     Sig: Take 1 capsule by mouth every 6 hours as needed for Itching     Dispense:  20 capsule     Refill:  0    predniSONE (DELTASONE) 20 MG tablet     Sig: Take 3 tablets by mouth daily for 7 days     Dispense:  21 tablet     Refill:  0     CONSULTS:  None    FINAL IMPRESSION      1.  Rash and other nonspecific skin eruption          DISPOSITION/PLAN   DISPOSITION Decision To Discharge 09/18/2020 02:40:13 PM      PATIENT REFERRED TO:  JJ Jones - CNP  [facility]  [address]          Ebony Diaz MD  [facility] [address] [facility]  [phone]    Schedule an appointment as soon as possible for a visit       DISCHARGE MEDICATIONS:  New Prescriptions    DIPHENHYDRAMINE (BENADRYL) 25 MG CAPSULE    Take 1 capsule by mouth every 6 hours as needed for Itching    PREDNISONE (DELTASONE) 20 MG TABLET    Take 3 tablets by mouth daily for 7 days Bridgett Dalton MD  Attending Emergency Physician    This note was created with the assistance of a speech-recognition program. While intending to generate a document that actually reflects the content of the visit, no guarantees can be provided that every mistake has been identified and corrected by editing.                     Bridgett Dalton MD  [phone]

## 2020-09-18 NOTE — TELEPHONE ENCOUNTER
I reviewed nurse triage line notes. Rash ongoing since March, localized to arms and itchy.  Can be seen at next cancellation slot

## 2020-09-18 NOTE — TELEPHONE ENCOUNTER
Reason for Disposition   Localized rash present > 7 days    Answer Assessment - Initial Assessment Questions  States she went outside in March and started to feel a burning sensation like someone poured acid on her and now she has this rash that has not gone away. 1. APPEARANCE of RASH: \"Describe the rash. \"       Red rased bumps, \"looks like ring worm\"  2. LOCATION: \"Where is the rash located? \"       Both arms  3. NUMBER: \"How many spots are there? \"       numerous  4. SIZE: \"How big are the spots? \" (Inches, centimeters or compare to size of a coin)       Varies in size, some large some small  5. ONSET: \"When did the rash start? \"       march  6. ITCHING: \"Does the rash itch? \" If so, ask: \"How bad is the itch? \"  (Scale 1-10; or mild, moderate, severe)      severe  7. PAIN: \"Does the rash hurt? \" If so, ask: \"How bad is the pain? \"  (Scale 1-10; or mild, moderate, severe)      Burning sensation  8. OTHER SYMPTOMS: \"Do you have any other symptoms? \" (e.g., fever)      no  9. PREGNANCY: \"Is there any chance you are pregnant? \" \"When was your last menstrual period? \"      n/a    Protocols used: RASH OR REDNESS - LOCALIZED-ADULT-[place]    Patient called due to ongoing pruitic rash since march. Warm transfer to scheduling center who stated next derm appointment isnt until January and patient did not want to see PCP. She stated she will go to ED instead. All care advice reviewed with patient, all questions and concerns answered. Caller provided care advice and instructed to call back with worsening symptoms. Please do not respond to the triage nurse through this encounter. Any subsequent communication should be directly with the patient.

## 2020-09-18 NOTE — TELEPHONE ENCOUNTER
Received call from nurse triage line with recommendation that patient be seen today for a rash on her arms since March. Patient has a new patient appt scheduled for 1/04/21 and would like to know if there is any way she could get in sooner. Patient has seen her PCP 3 times for the rash with no relief. Patient states she will go to ED today, but would still like a sooner appt if possible. Please call patient back to advise.

## 2020-09-21 ENCOUNTER — TELEPHONE (OUTPATIENT)
Dept: FAMILY MEDICINE CLINIC | Age: 60
End: 2020-09-21

## 2020-09-24 ENCOUNTER — TELEPHONE (OUTPATIENT)
Dept: FAMILY MEDICINE CLINIC | Age: 60
End: 2020-09-24

## 2020-09-24 NOTE — TELEPHONE ENCOUNTER
Last OV: 9/3  Next OV: n/a    Patient is requesting antibiotics for rash, steroids are not helping    [id number] [id number] [facility], [address] [place] [address]

## 2020-09-28 ENCOUNTER — OFFICE VISIT (OUTPATIENT)
Dept: FAMILY MEDICINE CLINIC | Age: 60
End: 2020-09-28
Payer: MEDICARE

## 2020-09-28 ENCOUNTER — NURSE TRIAGE (OUTPATIENT)
Dept: OTHER | Facility: CLINIC | Age: 60
End: 2020-09-28

## 2020-09-28 VITALS
WEIGHT: 249 LBS | BODY MASS INDEX: 50.29 KG/M2 | SYSTOLIC BLOOD PRESSURE: 116 MMHG | DIASTOLIC BLOOD PRESSURE: 80 MMHG | TEMPERATURE: 97.4 F | HEART RATE: 85 BPM | OXYGEN SATURATION: 97 %

## 2020-09-28 PROCEDURE — 99213 OFFICE O/P EST LOW 20 MIN: CPT | Performed by: FAMILY MEDICINE

## 2020-09-28 PROCEDURE — G8417 CALC BMI ABV UP PARAM F/U: HCPCS | Performed by: FAMILY MEDICINE

## 2020-09-28 PROCEDURE — 1036F TOBACCO NON-USER: CPT | Performed by: FAMILY MEDICINE

## 2020-09-28 PROCEDURE — G8427 DOCREV CUR MEDS BY ELIG CLIN: HCPCS | Performed by: FAMILY MEDICINE

## 2020-09-28 PROCEDURE — 3017F COLORECTAL CA SCREEN DOC REV: CPT | Performed by: FAMILY MEDICINE

## 2020-09-28 RX ORDER — MONTELUKAST SODIUM 10 MG/1
10 TABLET ORAL DAILY
Qty: 30 TABLET | Refills: 3 | Status: SHIPPED | OUTPATIENT
Start: 2020-09-28 | End: 2020-09-30 | Stop reason: SINTOL

## 2020-09-28 ASSESSMENT — PATIENT HEALTH QUESTIONNAIRE - PHQ9
1. LITTLE INTEREST OR PLEASURE IN DOING THINGS: 0
SUM OF ALL RESPONSES TO PHQ QUESTIONS 1-9: 0
SUM OF ALL RESPONSES TO PHQ9 QUESTIONS 1 & 2: 0
SUM OF ALL RESPONSES TO PHQ QUESTIONS 1-9: 0
2. FEELING DOWN, DEPRESSED OR HOPELESS: 0

## 2020-09-28 NOTE — TELEPHONE ENCOUNTER
Patient called pre-service center [facility]) [place] with red flag complaint. Brief description of triage: rash on arm with red bumps and itching severe. Has been ongoing since march and pt wants seen now. Has been to ER x 2, PCP and dermatology visit is scheduled for Jan 2021. Triage indicates for patient to be seen today in office    Care advice provided, patient verbalizes understanding; denies any other questions or concerns; instructed to call back for any new or worsening symptoms. Writer provided warm transfer at [facility] for appointment scheduling. Attention Provider: Thank you for allowing me to participate in the care of your patient. The patient was connected to triage in response to information provided to the ECC. Please do not respond through this encounter as the response is not directed to a shared pool. Reason for Disposition   MODERATE-SEVERE widespread itching (i.e., interferes with sleep, normal activities or school) and not improved after 24 hours of itching Care Advice    Answer Assessment - Initial Assessment Questions  1. DESCRIPTION: \"Describe the itching you are having. \"      Itching bad  2. SEVERITY: \"How bad is it? \"     - MILD - doesn't interfere with normal activities    - MODERATE-SEVERE: interferes with work, school, sleep, or other activities       severe  3. SCRATCHING: \"Are there any scratch marks? Bleeding? \"      unsure  4. ONSET: \"When did this begin? \"       March  5. CAUSE: \"What do you think is causing the itching? \" (ask about swimming pools, pollen, animals, soaps, etc.)      Unsure, possible pets  6. OTHER SYMPTOMS: \"Do you have any other symptoms? \"       no  7. PREGNANCY: \"Is there any chance you are pregnant? \" \"When was your last menstrual period? \"      no    Protocols used: ITCHING - WIDESPREAD-ADULT-[place]

## 2020-09-28 NOTE — PROGRESS NOTES
ubjective:  Lesly Ramirez presents for   Chief Complaint   Patient presents with    Rash     on and off since march. rash is on both arms. itching no pain. was tested for [place] last week to rule this out. Never went away. she states no tx helps    It is only on her arms. She is frustrated because nothing has work and seh can't get into the derm till January. Nothing is new today    Patient Active Problem List   Diagnosis    Essential hypertension    YAMEL (obstructive sleep apnea)    Allergic asthma    Obesity, morbid, BMI 40.0-49.9 ([facility])    Urticaria    Contracture of left ankle    Chronic pain of left ankle    Spontaneous rupture of flexor tendon of left lower leg    Allergic rhinitis    Anxiety    Benign neoplasm of skin    Constipation    Cyst of ovary    Generalized osteoarthritis    Depressive disorder    Edema    Food allergy    Osteoarthrosis involving multiple sites but not designated as generalized    Reactive airway disease    Rupture of posterior tibialis tendon    Vitamin D deficiency    Morbid obesity with BMI of 50.0-59.9, adult ([facility] [address]    Status post left foot surgery    Mild intermittent asthma without complication         Objective:  Physical Exam   Vitals:   Vitals:    09/28/20 1117   BP: 116/80   Pulse: 85   Temp: 97.4 °F (36.3 °C)   TempSrc: Temporal   SpO2: 97%   Weight: 249 lb (112.9 kg)     Wt Readings from Last 3 Encounters:   09/28/20 249 lb (112.9 kg)   09/18/20 245 lb 12.8 oz (111.5 kg)   09/03/20 240 lb (108.9 kg)     Ht Readings from Last 3 Encounters:   09/18/20 4' 11\" (1.499 m)   09/03/20 4' 11\" (1.499 m)   09/01/20 4' 11\" (1.499 m)     Body mass index is 50.29 kg/m². Constitutional: She is oriented to person, place, and time. She appears well-developed and well-nourished and in no acute distress. Answers all my questions appropriately. Skin - on formarms.  - no erythematous areas. She ahs a few patches of dry scaly skin.   No veisicle or onpen wounds    Assessment:   Encounter Diagnosis   Name Primary?  Dermatitis Yes         Plan:   Medications Discontinued During This Encounter   Medication Reason    Misc. Devices (WHEELCHAIR) MISC     RA LORATADINE 10 MG tablet      THE ABOVE NOTED DISCONTINUED MEDS MAY ONLY BE FROM 'CLEANING UP' THE MED LIST AND WERE NOT ACTUALLY CANCELED;  SEE CHART FOR DETAILS! Orders Placed This Encounter   Medications    montelukast (SINGULAIR) 10 MG tablet     Sig: Take 1 tablet by mouth daily     Dispense:  30 tablet     Refill:  3     No orders of the defined types were placed in this encounter. No follow-ups on file.   Patient Instructions   Use petroleum jelly at least qid    See if you can find a different derm who can see the patient quicker

## 2020-09-30 ENCOUNTER — HOSPITAL ENCOUNTER (OUTPATIENT)
Age: 60
Setting detail: SPECIMEN
Discharge: HOME | End: 2020-09-30
Payer: MEDICARE

## 2020-09-30 ENCOUNTER — OFFICE VISIT (OUTPATIENT)
Dept: DERMATOLOGY | Age: 60
End: 2020-09-30
Payer: MEDICARE

## 2020-09-30 ENCOUNTER — TELEPHONE (OUTPATIENT)
Dept: FAMILY MEDICINE CLINIC | Age: 60
End: 2020-09-30

## 2020-09-30 VITALS
DIASTOLIC BLOOD PRESSURE: 71 MMHG | BODY MASS INDEX: 48.87 KG/M2 | OXYGEN SATURATION: 97 % | SYSTOLIC BLOOD PRESSURE: 132 MMHG | WEIGHT: 242.4 LBS | HEIGHT: 59 IN | HEART RATE: 82 BPM | TEMPERATURE: 97.9 F

## 2020-09-30 PROCEDURE — G8417 CALC BMI ABV UP PARAM F/U: HCPCS | Performed by: DERMATOLOGY

## 2020-09-30 PROCEDURE — 3017F COLORECTAL CA SCREEN DOC REV: CPT | Performed by: DERMATOLOGY

## 2020-09-30 PROCEDURE — 1036F TOBACCO NON-USER: CPT | Performed by: DERMATOLOGY

## 2020-09-30 PROCEDURE — 11104 PUNCH BX SKIN SINGLE LESION: CPT | Performed by: DERMATOLOGY

## 2020-09-30 PROCEDURE — 99213 OFFICE O/P EST LOW 20 MIN: CPT | Performed by: DERMATOLOGY

## 2020-09-30 PROCEDURE — G8427 DOCREV CUR MEDS BY ELIG CLIN: HCPCS | Performed by: DERMATOLOGY

## 2020-09-30 RX ORDER — CLOBETASOL PROPIONATE 0.5 MG/G
OINTMENT TOPICAL
Qty: 60 G | Refills: 2 | Status: SHIPPED | OUTPATIENT
Start: 2020-09-30 | End: 2020-12-03 | Stop reason: SDUPTHER

## 2020-09-30 NOTE — TELEPHONE ENCOUNTER
Pt says she had to stop the singulair  Because she had 2 asthma attacks every time she took a pill and also had tremors.  She also wants to know what you mean on her after visit summary where it says to change how to take the wheelchair, grab bar etc?

## 2020-09-30 NOTE — PROGRESS NOTES
cream Apply a thick layer 30 minutes prior to procedure, covering in saran wrap 15 g 0    Misc. Devices (BATHTUB SAFETY RAIL) MISC 4 each by Does not apply route daily 4 each 0    Misc. Devices (EXTENDABLE BEDSIDE RAIL) MISC 4 each by Does not apply route daily 4 each 0    Spacer/Aero-Holding Chambers PRICE 1 Device by Does not apply route daily as needed (wheezing) 1 Device 0    Misc. Devices (WALL GRAB BAR) MISC GRAB BARS FOR BOTH SHOWER AND TOILET, USE AS DIRECTED 6 each 0    Misc. Devices MISC OUTSIDE RAMP THAT INCLUDES RAILINGS, USE AS DIRECTED 1 Device 0    Misc. Devices (RAISED TOILET SEAT) MISC USE AS DIRECTED 1 each 0    hydrocortisone 2.5 % cream apply to affected area twice a day 30 g 1    neomycin-polymyxin-hydrocortisone (CORTISPORIN) 3.5-11128-1 otic suspension instill 4 drops into affected ear  (S) three times a day  0    Antipyrine-Benzocaine (AURALGAN) 54-14 MG/ML otic solution Place 3 drops into the left ear 4 times daily as needed for Pain      Elastic Bandages & Supports (JOBST KNEE HIGH COMPRESSION SM) MISC Dispense Bilateral Jobst Below Knee 20-30mmHg compression stockings 3 each 3    EPINEPHrine (EPIPEN) 0.3 MG/0.3ML SOAJ injection Inject 0.3 mLs into the muscle once for 1 dose Use as directed for allergic reaction 1 each 0    Misc. Devices [facility]) MISC 1 Device by Does not apply route once for 1 dose 1 each 0     No current facility-administered medications for this visit.         ALLERGIES:   Allergies   Allergen Reactions    Latex Hives     Latex gloves    Asa [Aspirin] Hives    Codeine Hives    Lisinopril-Hydrochlorothiazide Hives    Motrin [Ibuprofen] Hives    Other Hives and Swelling     Cherries, almonds    Pcn [Penicillins] Hives    Sulfa Antibiotics        SOCIAL HISTORY:  Social History     Tobacco Use    Smoking status: Former Smoker     Packs/day: 3.00     Years: 20.00     Pack years: 60.00     Types: Cigarettes     Start date: 11/26/1981     Last attempt to quit: 1998     Years since quittin.7    Smokeless tobacco: Never Used    Tobacco comment: quit smoking 15-20 yrs ago   Substance Use Topics    Alcohol use: No       REVIEW OF SYSTEMS:  Review of Systems  Skin: Denies any new changing, growing orbleeding lesions or rashes except as described in the HPI   Constitutional: Denies fevers, chills, and malaise. PHYSICAL EXAM:   /71   Pulse 82   Temp 97.9 °F (36.6 °C)   Ht 4' 11\" (1.499 m)   Wt 242 lb 6.4 oz (110 kg)   SpO2 97%   BMI 48.96 kg/m²     General Exam:  General Appearance: No acute distress, Well nourished     Neuro: Alert and oriented to person, place and time  Psych: Normal affect   Lymph Node: Not performed    Cutaneous Exam: Performed as documented in clinic note below. Sun-exposed skin, which includes the head/face, neck, both arms, digits and/or nails was examined. Pertinent Physical Exam Findings:  Physical Exam  Bilateral forearms with annular scaly plaques with trailing scale    Photo surveillance performed: Yes    Medical Necessity of Exam Performed:   Distribution of patient concerns    Additional Diagnostic Testing performed during exam: WEN ,  Negative    ASSESSMENT:   Diagnosis Orders   1. Other specified dermatitis  clobetasol (TEMOVATE) 0.05 % ointment   2. Pruritus         Plan of Action is as Follows:  Assessment   1. Nummular eczema vs. EAC vs. other  Punch Biopsy: The procedure and its risks were explained including but not limited to pain, bleeding, infection, permanent scar, permanent pigment alteration and need for an additional procedure. Consent to proceed with the procedure was obtained from the patient or the parent. After cleaning with alcohol the rash was anesthetized with 1% lidocaine with epinephrine and a 4 mm punch was performed. Hemostasis was achieved with suture closure of the defect with 5-0 gut and Vaseline and a bandage were applied.     2. Pruritus  - clobetasol (TEMOVATE) 0.05 % ointment; Apply to rash twice daily (not face, armpit or groin)  Dispense: 60 g; Refill: 2    RTC pending path            Patient Instructions   BIOPSY WOUND CARE    A biopsy is where a small piece of skin tissue is removed and examined by a pathologist.  When a biopsy is done, there is a small wound site that requires proper care to prevent infection and scarring. Some biopsies require sutures and their removal.    How to Care for Biopsy Wound    A.  Leave band-aid or dressing on for 24 hours. B. Wash two times a day with soap and water. C.  Let the wound air dry, then apply Vaseline ointment and cover with a Band-Aid       unless otherwise instructed by your provider. D. If there is slight discomfort, you may give acetaminophen or ibuprofen. When To Call the Doctor    Call the Dermatology Clinic or your doctor if any of the following occur:    A. Redness and swelling  B. Tenderness and warm to touch  C.  Drainage from wound  D. Fever  -Apply clobetasol to the affected areas daily      Biopsy Results    Biopsy results are usually available in 1-2 weeks. We provide biopsy results in letters for begin results or we will call for any concerning results. If you have not heard from our staff please call the office within 2 weeks. Please call our office with any concerns at [phone]. Follow-up: No follow-ups on file. This note was created with the assistance of a speech-recognition program.  Although the intention is to generate a document that actually reflects the content of the visit, no guarantees can be provided that every mistake has been identified and corrected byediting.     Electronically signed by Ginny Virgen MD on 9/30/20 at 9:11 AM EDT

## 2020-10-01 RX ORDER — LIDOCAINE HYDROCHLORIDE AND EPINEPHRINE 10; 10 MG/ML; UG/ML
0.5 INJECTION, SOLUTION INFILTRATION; PERINEURAL ONCE
Status: COMPLETED | OUTPATIENT
Start: 2020-10-01 | End: 2020-10-01

## 2020-10-01 RX ADMIN — LIDOCAINE HYDROCHLORIDE AND EPINEPHRINE 0.5 ML: 10; 10 INJECTION, SOLUTION INFILTRATION; PERINEURAL at 09:16

## 2020-10-02 LAB — DERMATOLOGY PATHOLOGY REPORT: NORMAL

## 2020-10-05 ENCOUNTER — TELEPHONE (OUTPATIENT)
Dept: DERMATOLOGY | Age: 60
End: 2020-10-05

## 2020-10-05 NOTE — TELEPHONE ENCOUNTER
Pt advised and scheduled.     Future Appointments   Date Time Provider [facility] Jacqueline   10/20/2020  8:30 AM Florencia Nesbitt [facility] [address] [facility]   12/3/2020  8:30 AM Craig Garland MD  derm [email or web address]

## 2020-10-05 NOTE — TELEPHONE ENCOUNTER
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 Continue to use the clobetasol until it is no longer raised and itchy. Call if it's not improving/getting worse after two weeks of use. Schedule f/u in 6-8 weeks.

## 2020-10-20 ENCOUNTER — OFFICE VISIT (OUTPATIENT)
Dept: PODIATRY | Age: 60
End: 2020-10-20
Payer: MEDICARE

## 2020-10-20 VITALS — RESPIRATION RATE: 18 BRPM | HEIGHT: 59 IN | BODY MASS INDEX: 48.79 KG/M2 | TEMPERATURE: 97.1 F | WEIGHT: 242 LBS

## 2020-10-20 PROCEDURE — G8484 FLU IMMUNIZE NO ADMIN: HCPCS | Performed by: PODIATRIST

## 2020-10-20 PROCEDURE — 99213 OFFICE O/P EST LOW 20 MIN: CPT | Performed by: PODIATRIST

## 2020-10-20 PROCEDURE — 11721 DEBRIDE NAIL 6 OR MORE: CPT | Performed by: PODIATRIST

## 2020-10-20 PROCEDURE — 1036F TOBACCO NON-USER: CPT | Performed by: PODIATRIST

## 2020-10-20 PROCEDURE — 3017F COLORECTAL CA SCREEN DOC REV: CPT | Performed by: PODIATRIST

## 2020-10-20 PROCEDURE — G8417 CALC BMI ABV UP PARAM F/U: HCPCS | Performed by: PODIATRIST

## 2020-10-20 PROCEDURE — G8427 DOCREV CUR MEDS BY ELIG CLIN: HCPCS | Performed by: PODIATRIST

## 2020-10-20 RX ORDER — GLUCOSAMINE/CHONDR SU A SOD 750-600 MG
TABLET ORAL
COMMUNITY
Start: 2020-10-06 | End: 2021-01-05

## 2020-10-20 NOTE — PROGRESS NOTES
[facility] PHYSICIANS  [facility]  [address] [place] [address]  Dept: [phone]  Dept Fax: [phone]     PAIN PROGRESS NOTE  Date of patient's visit: 10/20/2020  Patient's Name:  John Peña YOB: 1960            Patient Care Team:  JJ Lin CNP as PCP - General (Nurse Practitioner)  JJ Lin CNP as PCP - [facility] Empaneled Provider  Jeri Goodman DPM as Physician (Podiatry)      Chief Complaint   Patient presents with    Foot Pain    Nail Problem    Foot Swelling       Subjective: This John Peña comes to clinic for foot and nail care. Pt currently has complaint of thickened, painful, elongated nails that he/she cannot manage by themselves. Pt. Relates pain to nails with shoe gear. Pt's primary care physician is JJ Lin CNP last seen 09/28/2020. Pt has a new complaint of a recent fall and that her left ankle hurts since then. She is unalbe to get a job due to the pain. Pt has tried changing shoes but it has not helped the pain  Patient is taking over the counter medications with no relief. Past Medical History:   Diagnosis Date    Allergic rhinitis 12/4/2014    Anxiety 7/5/2018    Arthritis     Asthma     Chest pain     occurs every other day, pt states they occur when she doesn't eat right or forget to take her bp med, occurs left side of chest with no radiation, pt states \"it's new\" and has not had any testing done or seen anyone for it.     Constipation     Depressive disorder 7/5/2018    Generalized osteoarthritis 6/26/2014    History of kidney stones     Hypertension     Obesity, morbid, BMI 40.0-49.9 ([address] 2/28/2017    PONV (postoperative nausea and vomiting)     Sleep apnea     does not use a machine       Allergies   Allergen Reactions    Latex Hives     Latex gloves    Asa [Aspirin] Hives    Codeine Hives    Lisinopril-Hydrochlorothiazide Hives    Motrin [Ibuprofen] Hives    area twice a day 30 g 1    neomycin-polymyxin-hydrocortisone (CORTISPORIN) 3.5-76913-3 otic suspension instill 4 drops into affected ear  (S) three times a day  0    Antipyrine-Benzocaine (AURALGAN) 54-14 MG/ML otic solution Place 3 drops into the left ear 4 times daily as needed for Pain      Elastic Bandages & Supports (JOBST KNEE HIGH COMPRESSION SM) MISC Dispense Bilateral Jobst Below Knee 20-30mmHg compression stockings 3 each 3    EPINEPHrine (EPIPEN) 0.3 MG/0.3ML SOAJ injection Inject 0.3 mLs into the muscle once for 1 dose Use as directed for allergic reaction 1 each 0    Misc. Devices [facility]) MISC 1 Device by Does not apply route once for 1 dose 1 each 0     No current facility-administered medications on file prior to visit. Review of Systems. Review of Systems:   History obtained from chart review and the patient  General ROS: negative for - chills, fatigue, fever, night sweats or weight gain  Constitutional: Negative for chills, diaphoresis, fatigue, fever and unexpected weight change. Musculoskeletal: Positive for arthralgias, gait problem and joint swelling. Neurological ROS: negative for - behavioral changes, confusion, headaches or seizures. Negative for weakness and numbness. Dermatological ROS: negative for - mole changes, rash  Cardiovascular: Negative for leg swelling. Gastrointestinal: Negative for constipation, diarrhea, nausea and vomiting. Objective:  Dermatologic Exam:  Skin lesion/ulceration Absent . Skin No rashes or nodules noted. .   Skin is thin, with flaky sloughing skin as well as decreased hair growth to the lower leg  Small red hemosiderin deposits seen dorsal foot   Musculoskeletal:     1st MPJ ROM decreased, Bilateral.  Muscle strength 5/5, Bilateral. POP to the left medial ankle with edema. There is pain with palpation of the posterior tibial tendon. Pt unable to do single heel raise.   Pain present upon palpation of toenails 1-5, Bilateral. decreased medial longitudinal arch, Bilateral.  Ankle ROM decreased,Bilateral.    Dorsally contracted digits present digits 2, Bilateral.     Vascular: DP pulses 1/4 bilateral.  PT pulses 0/4 bilateral.   CFT <5 seconds, Bilateral.  Hair growth absent to the level of the digits, Bilateral.  Edema present, Bilateral.  Varicosities absent, Bilateral. Erythema absent, Bilateral    Neurological: Sensation diminshed to light touch to level of digits, Bilateral.  Protective sensation intact 6/10 sites via 5.07/10g [place]-Daniela Monofilament, Bilateral.  negative Tinel's, Bilateral.  negative Valleix sign, Bilateral.      Integument: Warm, dry, supple, Bilateral.  Open lesion absent, Bilateral.  Interdigital maceration absent to web spaces 4, Bilateral.  Nails 1-5 left and 1-5 right thickened > 3.0 mm, dystrophic and crumbly, discolored with subungual debris. Fissures absent, Bilateral.   General: AAO x 3 in NAD.     Derm  Toenail Description  Sites of Onychomycosis Involvement (Check affected area)  [x] [x] [x] [x] [x] [x] [x] [x] [x] [x]  5 4 3 2 1 1 2 3 4 5                          Right                                        Left    Thickness  [x] [x] [x] [x] [x] [x] [x] [x] [x] [x]  5 4 3 2 1 1 2 3 4 5                         Right                                        Left    Dystrophic Changes   [x] [x] [x] [x] [x] [x] [x] [x] [x] [x]  5 4 3 2 1 1 2 3 4 5                         Right                                        Left    Color  [x] [x] [x] [x] [x] [x] [x] [x] [x] [x]  5 4 3 2 1 1 2 3 4 5                          Right                                        Left    Incurvation/Ingrowin   [] [] [] [] [] [] [] [] [] []  5 4 3 2 1 1 2 3 4 5                         Right                                        Left    Inflammation/Pain   [x] [x] [x] [x] [x] [x] [x] [x] [x] [x]  5 4 3  2 1 1 2 3 4 5                         Right                                        Left       Nails are painful 1-10 with

## 2020-10-31 ENCOUNTER — HOSPITAL ENCOUNTER (OUTPATIENT)
Dept: MRI IMAGING | Age: 60
Discharge: HOME | End: 2020-11-02
Payer: MEDICARE

## 2020-10-31 LAB
CREAT SERPL-MCNC: 0.53 MG/DL (ref 0.5–0.9)
GFR AFRICAN AMERICAN: >60 ML/MIN
GFR NON-AFRICAN AMERICAN: >60 ML/MIN
GFR SERPL CREATININE-BSD FRML MDRD: NORMAL ML/MIN/{1.73_M2}
GFR SERPL CREATININE-BSD FRML MDRD: NORMAL ML/MIN/{1.73_M2}

## 2020-10-31 PROCEDURE — 82565 ASSAY OF CREATININE: CPT

## 2020-10-31 PROCEDURE — 36415 COLL VENOUS BLD VENIPUNCTURE: CPT

## 2020-10-31 PROCEDURE — 73723 MRI JOINT LWR EXTR W/O&W/DYE: CPT

## 2020-10-31 PROCEDURE — 6360000004 HC RX CONTRAST MEDICATION: Performed by: PODIATRIST

## 2020-10-31 PROCEDURE — A9579 GAD-BASE MR CONTRAST NOS,1ML: HCPCS | Performed by: PODIATRIST

## 2020-10-31 RX ADMIN — GADOTERIDOL 20 ML: 279.3 INJECTION, SOLUTION INTRAVENOUS at 13:57

## 2020-11-10 ENCOUNTER — OFFICE VISIT (OUTPATIENT)
Dept: PODIATRY | Age: 60
End: 2020-11-10
Payer: MEDICARE

## 2020-11-10 VITALS — BODY MASS INDEX: 48.79 KG/M2 | HEIGHT: 59 IN | TEMPERATURE: 98.6 F | WEIGHT: 242 LBS | RESPIRATION RATE: 16 BRPM

## 2020-11-10 PROCEDURE — G8484 FLU IMMUNIZE NO ADMIN: HCPCS | Performed by: PODIATRIST

## 2020-11-10 PROCEDURE — 1036F TOBACCO NON-USER: CPT | Performed by: PODIATRIST

## 2020-11-10 PROCEDURE — G8428 CUR MEDS NOT DOCUMENT: HCPCS | Performed by: PODIATRIST

## 2020-11-10 PROCEDURE — 99213 OFFICE O/P EST LOW 20 MIN: CPT | Performed by: PODIATRIST

## 2020-11-10 PROCEDURE — 3017F COLORECTAL CA SCREEN DOC REV: CPT | Performed by: PODIATRIST

## 2020-11-10 PROCEDURE — G8417 CALC BMI ABV UP PARAM F/U: HCPCS | Performed by: PODIATRIST

## 2020-11-10 NOTE — PROGRESS NOTES
D-3 50 MCG (2000 UT) CAPS take 1 capsule by mouth once daily 10/6/20   Historical Provider, MD   ADVAIR DISKUS 250-50 MCG/DOSE AEPB inhale 1 puff by mouth and INTO THE LUNGS twice a day Rinse mouth after use 10/13/20   Purvis Dub, APRN - CNP   amLODIPine (NORVASC) 5 MG tablet take 1 tablet by mouth once daily 10/13/20   Purvis Dub, JJ Alejo CNP   clobetasol (TEMOVATE) 0.05 % ointment Apply to rash twice daily (not face, armpit or groin) 9/30/20   Norma Stephenson MD   losartan-hydroCHLOROthiazide (HYZAAR) 100-25 MG per tablet take 1 tablet by mouth once daily 9/10/20   Purvis Dub, APRN - CNP   traMADol (ULTRAM) 50 MG tablet Take 50 mg by mouth every 6 hours as needed for Pain. Historical Provider, MD   fluticasone (FLONASE) 50 MCG/ACT nasal spray instill 1 spray into each nostril once daily 3/10/20   Purvis Dub, JJ Alejo CNP   albuterol sulfate HFA (VENTOLIN HFA) 108 (90 Base) MCG/ACT inhaler Inhale 2 puffs into the lungs every 6 hours as needed for Wheezing 1/8/20 Ballard Dub, JJ Alejo CNP   Cholecalciferol (VITAMIN D3) 50 MCG (2000 UT) TABS Take 1 tablet by mouth daily 1/8/20 Ballard Dub, APRN - CNP   hydrOXYzine (ATARAX) 25 MG tablet take 1 tablet by mouth once daily if needed for itching 9/3/19   Purvis Dub, APRN - CNP   lidocaine (L-M-X 4) 4 % cream Apply a thick layer 30 minutes prior to procedure, covering in saran wrap 6/26/19   Norma Stephenson MD   EPINEPHrine (EPIPEN) 0.3 MG/0.3ML SOAJ injection Inject 0.3 mLs into the muscle once for 1 dose Use as directed for allergic reaction 5/7/19 9/1/20 [facility], JJ Alejo CNP   Misc. Devices (BATHTUB SAFETY RAIL) MISC 4 each by Does not apply route daily 12/17/18   Marie , DPM   Misc. Devices (EXTENDABLE BEDSIDE RAIL) MISC 4 each by Does not apply route daily 12/17/18   Marie , DPM   Spacer/Aero-Holding Fabiana Gomez 1 Device by Does not apply route daily as needed (wheezing) 11/26/18   Yaniv Montoya, APRN - CNP   [facility] [place]  Devices (WALL GRAB BAR) MISC GRAB BARS FOR BOTH SHOWER AND TOILET, USE AS DIRECTED 10/23/18   Kristyn Quintero DPM   Misc. Devices MISC OUTSIDE RAMP THAT INCLUDES RAILINGS, USE AS DIRECTED 10/23/18   Kristyn Quintero DPM   [place]. Devices (RAISED TOILET SEAT) MISC USE AS DIRECTED 10/23/18   Kristyn Quintero DPM   hydrocortisone 2.5 % cream apply to affected area twice a day 10/17/18   Kristyn Quintero DPM   Misc. Devices [facility]) MISC 1 Device by Does not apply route once for 1 dose 4/24/18 6/13/19  Kristyn Quintero DPM   neomycin-polymyxin-hydrocortisone (CORTISPORIN) 3.5-28776-7 otic suspension instill 4 drops into affected ear  (S) three times a day 8/17/17   Historical Provider, MD   Antipyrine-Benzocaine Alecia Aase) 54-14 MG/ML otic solution Place 3 drops into the left ear 4 times daily as needed for Pain    Historical Provider, MD   Elastic Bandages & Supports (JOBST KNEE HIGH COMPRESSION SM) MISC Dispense Bilateral Jobst Below Knee 20-30mmHg compression stockings 6/30/14   Jeronimo Carlos DPM       Review of Systems    Review of Systems:  History obtained from chart review and the patient  General ROS: negative for - chills, fatigue, fever, night sweats or weight gain  Constitutional: Negative for chills, diaphoresis, fatigue, fever and unexpected weight change. Musculoskeletal: Positive for arthralgias, gait problem and joint swelling. Neurological ROS: negative for - behavioral changes, confusion, headaches or seizures. Negative for weakness and numbness. Dermatological ROS: negative for - mole changes, rash  Cardiovascular: Negative for leg swelling. Gastrointestinal: Negative for constipation, diarrhea, nausea and vomiting. Lower Extremity Physical Examination:     Vitals:   Vitals:    11/10/20 0826   Resp: 16   Temp: 98.6 °F (37 °C)     General: AAO x 3 in NAD. Dermatologic Exam:  Skin lesion/ulceration Absent . Skin No rashes or nodules noted. .       Musculoskeletal:     1st MPJ ROM decreased, Bilateral.  Muscle strength 5/5, Bilateral.  Pain present upon palpation of  Left medial and lateral ankle gutter with slight edmea. Medial longitudinal arch, Bilateral WNL. Ankle ROM WNL,Bilateral.    Dorsally contracted digits absent digits 1-5 Bilateral.     Vascular: DP and PT pulses palpable 2/4, Bilateral.  CFT <3 seconds, Bilateral.  Hair growth present to the level of the digits, Bilateral.  Edema absent, Bilateral.  Varicosities absent, Bilateral. Erythema absent, Bilateral    Neurological: Sensation intact to light touch to level of digits, Bilateral.  Protective sensation intact 10/10 sites via 5.07/10g [place]-Daniela Monofilament, Bilateral.  negative Tinel's, Bilateral.  negative Valleix sign, Bilateral.      Integument: Warm, dry, supple, Bilateral.  Open lesion absent, Bilateral.  Interdigital maceration absent to web spaces 1-4, Bilateral.  Nails are normal in length, thickness and color 1-5 bilateral.  Fissures absent, Bilateral.     Left ankle mri results:      1. Postoperative changes of prior posterior tibialis tendon repair.  The    repair remains intact.  No tenosynovitis identified. 2. Evidence for remote sprain of the intact anterior talofibular ligament. 3. No acute osseous abnormality. 4. Mild hindfoot and midfoot osteoarthritis and small subtalar effusion             Asessment: Patient is a 64 y.o. female with:   1. Trouble walking        Plan: Patient examined and evaluated. Current condition and treatment options discussed in detail. Advised pt to her mri and personally reviewed it with the patient. The mri shows a sprain in the left ankle lateral gutter and post surgical changes of the lft medial tnedonds. Pt is to use her CAM boot if still painful. Pt states she will do this for 3 weeks. .  Verbal and written instructions given to patient. Contact office with any questions/problems/concerns.      No orders of the defined types were placed in this encounter. No orders of the defined types were placed in this encounter. RTC in 3week(s).     11/10/2020      Electronically signed by Michele Ford DPM on 11/10/2020 at 8:41 AM  11/10/2020

## 2020-12-03 ENCOUNTER — OFFICE VISIT (OUTPATIENT)
Dept: DERMATOLOGY | Age: 60
End: 2020-12-03
Payer: MEDICARE

## 2020-12-03 VITALS
WEIGHT: 243 LBS | SYSTOLIC BLOOD PRESSURE: 133 MMHG | OXYGEN SATURATION: 99 % | HEART RATE: 77 BPM | DIASTOLIC BLOOD PRESSURE: 79 MMHG | TEMPERATURE: 98.6 F | HEIGHT: 59 IN | BODY MASS INDEX: 48.99 KG/M2

## 2020-12-03 PROCEDURE — G8417 CALC BMI ABV UP PARAM F/U: HCPCS | Performed by: DERMATOLOGY

## 2020-12-03 PROCEDURE — 99213 OFFICE O/P EST LOW 20 MIN: CPT | Performed by: DERMATOLOGY

## 2020-12-03 PROCEDURE — 1036F TOBACCO NON-USER: CPT | Performed by: DERMATOLOGY

## 2020-12-03 PROCEDURE — G8484 FLU IMMUNIZE NO ADMIN: HCPCS | Performed by: DERMATOLOGY

## 2020-12-03 PROCEDURE — 3017F COLORECTAL CA SCREEN DOC REV: CPT | Performed by: DERMATOLOGY

## 2020-12-03 PROCEDURE — G8427 DOCREV CUR MEDS BY ELIG CLIN: HCPCS | Performed by: DERMATOLOGY

## 2020-12-03 RX ORDER — CLOBETASOL PROPIONATE 0.5 MG/G
OINTMENT TOPICAL
Qty: 60 G | Refills: 5 | Status: SHIPPED | OUTPATIENT
Start: 2020-12-03 | End: 2021-09-28 | Stop reason: SDUPTHER

## 2020-12-03 NOTE — PROGRESS NOTES
Dermatology Patient Note  [facility] [address]  [address]  [address] [place] [address]  Dept: [phone]  Dept Fax: [phone]      VISITDATE: 12/3/2020   REFERRING PROVIDER: No ref. provider found      Rojelio Owens is a 64 y.o. female  who presents today in the office for:    Follow-up (nummular eczema on arms. Pt states rash is gone but has 2 new spots on her hip that are diminishing. Pt is using the Temovate cream with relief. )      HISTORY OF PRESENT ILLNESS:  Patient presents for f/u nummular eczema  Interval history: rash cleared up immediately with treatment. Had new spots on legs, also cleared up immediately after applying clobetasol  Current treatment and adherence: clobetasol ointment as needed  Prior treatments tried: see initial HPI      CURRENT MEDICATIONS:   Current Outpatient Medications   Medication Sig Dispense Refill    clobetasol (TEMOVATE) 0.05 % ointment Apply to rash twice daily until resolved (not face, armpit or groin) 60 g 5    RA VITAMIN D-3 50 MCG (2000 UT) CAPS take 1 capsule by mouth once daily      ADVAIR DISKUS 250-50 MCG/DOSE AEPB inhale 1 puff by mouth and INTO THE LUNGS twice a day Rinse mouth after use 60 each 1    amLODIPine (NORVASC) 5 MG tablet take 1 tablet by mouth once daily 90 tablet 0    losartan-hydroCHLOROthiazide (HYZAAR) 100-25 MG per tablet take 1 tablet by mouth once daily 90 tablet 1    traMADol (ULTRAM) 50 MG tablet Take 50 mg by mouth every 6 hours as needed for Pain.       fluticasone (FLONASE) 50 MCG/ACT nasal spray instill 1 spray into each nostril once daily 16 g 5    albuterol sulfate HFA (VENTOLIN HFA) 108 (90 Base) MCG/ACT inhaler Inhale 2 puffs into the lungs every 6 hours as needed for Wheezing 1 Inhaler 3    Cholecalciferol (VITAMIN D3) 50 MCG (2000 UT) TABS Take 1 tablet by mouth daily 90 tablet 1    hydrOXYzine (ATARAX) 25 MG tablet take 1 tablet by mouth once daily if needed for itching 30 tablet 5    1981     Last attempt to quit: 1998     Years since quittin.9    Smokeless tobacco: Never Used    Tobacco comment: quit smoking 15-20 yrs ago   Substance Use Topics    Alcohol use: No       REVIEW OF SYSTEMS:  Review of Systems  Skin: Denies any new changing, growing orbleeding lesions or rashes except as described in the HPI   Constitutional: Denies fevers, chills, and malaise. PHYSICAL EXAM:   /79 (Site: Left Lower Arm, Position: Sitting, Cuff Size: Medium Adult)   Pulse 77   Temp 98.6 °F (37 °C)   Ht 4' 11\" (1.499 m)   Wt 243 lb (110.2 kg)   SpO2 99%   BMI 49.08 kg/m²     General Exam:  General Appearance: No acute distress, Well nourished     Neuro: Alert and oriented to person, place and time  Psych: Normal affect   Lymph Node: Not performed    Cutaneous Exam: Performed as documented in clinic note below. Sun-exposed skin, which includes the head/face, neck, both arms, digits and/or nails was examined. Pertinent Physical Exam Findings:  Physical Exam  Arms with faint pink patches, no active eczema    Photo surveillance performed: No    Medical Necessity of Exam Performed:   Distribution of patient concerns    Additional Diagnostic Testing performed during exam: Not performed ,  Not performed    ASSESSMENT:   Diagnosis Orders   1. Nummular dermatitis  clobetasol (TEMOVATE) 0.05 % ointment       Plan of Action is as Follows:  Assessment   1. Nummular dermatitis  - continue clobetasol only as needed to active rash, stop when clear  - clobetasol (TEMOVATE) 0.05 % ointment; Apply to rash twice daily until resolved (not face, armpit or groin)  Dispense: 60 g; Refill: 5    RTC 1 year            Patient Instructions   Nummular eczema, also known as nummular dermatitis or discoid eczema, is a chronic condition that causes coin-shaped spots to develop on the skin. These spots are often itchy and well-defined. They may ooze clear fluid or become dry and crusty.     Follow up in 1 year,

## 2020-12-03 NOTE — PATIENT INSTRUCTIONS
Nummular eczema, also known as nummular dermatitis or discoid eczema, is a chronic condition that causes coin-shaped spots to develop on the skin. These spots are often itchy and well-defined. They may ooze clear fluid or become dry and crusty.     Follow up in 1 year, sooner if needed

## 2020-12-22 ENCOUNTER — OFFICE VISIT (OUTPATIENT)
Dept: PODIATRY | Age: 60
End: 2020-12-22
Payer: MEDICARE

## 2020-12-22 VITALS — BODY MASS INDEX: 48.99 KG/M2 | WEIGHT: 243 LBS | HEIGHT: 59 IN | RESPIRATION RATE: 18 BRPM | TEMPERATURE: 97.9 F

## 2020-12-22 PROCEDURE — 1036F TOBACCO NON-USER: CPT | Performed by: PODIATRIST

## 2020-12-22 PROCEDURE — G8417 CALC BMI ABV UP PARAM F/U: HCPCS | Performed by: PODIATRIST

## 2020-12-22 PROCEDURE — G8484 FLU IMMUNIZE NO ADMIN: HCPCS | Performed by: PODIATRIST

## 2020-12-22 PROCEDURE — 3017F COLORECTAL CA SCREEN DOC REV: CPT | Performed by: PODIATRIST

## 2020-12-22 PROCEDURE — G8427 DOCREV CUR MEDS BY ELIG CLIN: HCPCS | Performed by: PODIATRIST

## 2020-12-22 PROCEDURE — 99213 OFFICE O/P EST LOW 20 MIN: CPT | Performed by: PODIATRIST

## 2020-12-22 NOTE — PROGRESS NOTES
[facility] PHYSICIANS  [facility]  [address] [place] [address]  Dept: [phone]  Dept Fax: [phone]    RETURN PATIENT PROGRESS NOTE  Date of patient's visit: 12/22/2020  Patient's Name:  Rojelio Owens YOB: 1960            Patient Care Team:  JJ Overton CNP as PCP - General (Nurse Practitioner)  JJ Overton CNP as PCP - [facility] Empaneled Provider  Citlalli Mi DPM as Physician (Podiatry)       Rojelio Owens 64 y.o. female that presents for follow-up of   Chief Complaint   Patient presents with    Ankle Pain     left    Nail Problem     hang nail     Pt's primary care physician is JJ Overton CNP last seen 09/28/2020  Symptoms began 2 year(s) ago and are unchanged . Patient relates pain is Present. Pain is rated 8 out of 10 and is described as constant, severe. Treatments prior to today's visit include: MRI. Currently denies F/C/N/V. Pt has a new complaint of a hang nail to the right 2nd toe. She tried to cut it but cannot reach it. States she put hydrogen peroxide to the area and it fizled. Allergies   Allergen Reactions    Latex Hives     Latex gloves    Asa [Aspirin] Hives    Codeine Hives    Lisinopril-Hydrochlorothiazide Hives    Motrin [Ibuprofen] Hives    Other Hives and Swelling     Cherries, almonds    Pcn [Penicillins] Hives    Sulfa Antibiotics        Past Medical History:   Diagnosis Date    Allergic rhinitis 12/4/2014    Anxiety 7/5/2018    Arthritis     Asthma     Chest pain     occurs every other day, pt states they occur when she doesn't eat right or forget to take her bp med, occurs left side of chest with no radiation, pt states \"it's new\" and has not had any testing done or seen anyone for it.     Constipation     Depressive disorder 7/5/2018    Generalized osteoarthritis 6/26/2014    History of kidney stones     Hypertension     Obesity, morbid, BMI 40.0-49.9 ([address] 2/28/2017  PONV (postoperative nausea and vomiting)     Sleep apnea     does not use a machine       Prior to Admission medications    Medication Sig Start Date End Date Taking? Authorizing Provider   clobetasol (TEMOVATE) 0.05 % ointment Apply to rash twice daily until resolved (not face, armpit or groin) 12/3/20  Yes Jose Ruff MD RA VITAMIN D-3 50 MCG (2000 UT) CAPS take 1 capsule by mouth once daily 10/6/20  Yes Historical Provider, MD   ADV DISKUS 250-50 MCG/DOSE AEPB inhale 1 puff by mouth and INTO THE LUNGS twice a day Rinse mouth after use 10/13/20  Yes JJ Cohen CNP   amLODIPine (NORVASC) 5 MG tablet take 1 tablet by mouth once daily 10/13/20  Yes JJ Cohen CNP   losartan-hydroCHLOROthiazide (HYZAAR) 100-25 MG per tablet take 1 tablet by mouth once daily 9/10/20  Yes JJ Cohen CNP   traMADol (ULTRAM) 50 MG tablet Take 50 mg by mouth every 6 hours as needed for Pain. Yes Historical Provider, MD   fluticasone (FLONASE) 50 MCG/ACT nasal spray instill 1 spray into each nostril once daily 3/10/20  Yes JJ Cohen CNP   albuterol sulfate HFA (VENTOLIN HFA) 108 (90 Base) MCG/ACT inhaler Inhale 2 puffs into the lungs every 6 hours as needed for Wheezing 1/8/20  Yes JJ Cohen CNP   Cholecalciferol (VITAMIN D3) 50 MCG (2000 UT) TABS Take 1 tablet by mouth daily 1/8/20  Yes JJ Cohen CNP   hydrOXYzine (ATARAX) 25 MG tablet take 1 tablet by mouth once daily if needed for itching 9/3/19  Yes JJ Cohen CNP   lidocaine (L-M-X 4) 4 % cream Apply a thick layer 30 minutes prior to procedure, covering in saran wrap 6/26/19  Yes Jose Ruff MD   Misc. Devices (BATHTUB SAFETY RAIL) MISC 4 each by Does not apply route daily 12/17/18  Yes Kym Matthew DPM   Misc.  Devices (EXTENDABLE BEDSIDE RAIL) MISC 4 each by Does not apply route daily 12/17/18  Yes Kym Matthew DPM Spacer/Aero-Holding Chambers PRICE 1 Device by Does not apply route daily as needed (wheezing) 11/26/18  Yes JJ Avalos CNPc. Devices (WALL GRAB BAR) MIS GRAB BARS FOR BOTH SHOWER AND TOILET, USE AS DIRECTED 10/23/18  Yes LEONOR Ferreirac. Devices MISC OUTSIDE RAMP THAT INCLUDES RAILINGS, USE AS DIRECTED 10/23/18  Yes LEONOR Ferreirac. Devices (RAISED TOILET SEAT) MISC USE AS DIRECTED 10/23/18  Yes Aylin Talbert DPM   hydrocortisone 2.5 % cream apply to affected area twice a day 10/17/18  Yes Aylin Talbert DPM   neomycin-polymyxin-hydrocortisone (CORTISPORIN) 3.5-02007-2 otic suspension instill 4 drops into affected ear  (S) three times a day 8/17/17  Yes Historical Provider, MD   Antipyrine-Benzocaine Lilo Sitter) 54-14 MG/ML otic solution Place 3 drops into the left ear 4 times daily as needed for Pain   Yes Historical Provider, MD   Elastic Bandages & Supports (JOBST KNEE HIGH COMPRESSION SM) MISC Dispense Bilateral Jobst Below Knee 20-30mmHg compression stockings 6/30/14  Yes Shanique Cabral DPM   EPINEPHrine (EPIPEN) 0.3 MG/0.3ML SOAJ injection Inject 0.3 mLs into the muscle once for 1 dose Use as directed for allergic reaction 5/7/19 12/3/20  JJ Avalos CNP   Misc. Devices [facility]) MISC 1 Device by Does not apply route once for 1 dose 4/24/18 12/3/20  Aylin Talbert DPM       Review of Systems    Review of Systems:  History obtained from chart review and the patient  General ROS: negative for - chills, fatigue, fever, night sweats or weight gain  Constitutional: Negative for chills, diaphoresis, fatigue, fever and unexpected weight change. Musculoskeletal: Positive for arthralgias, gait problem and joint swelling. Neurological ROS: negative for - behavioral changes, confusion, headaches or seizures. Negative for weakness and numbness. Dermatological ROS: negative for - mole changes, rash  Cardiovascular: Negative for leg swelling. Gastrointestinal: Negative for constipation, diarrhea, nausea and vomiting. Lower Extremity Physical Examination:     Vitals:   Vitals:    12/22/20 0853   Resp: 18   Temp: 97.9 °F (36.6 °C)     General: AAO x 3 in NAD. Dermatologic Exam:  Skin lesion/ulceration Absent . Skin No rashes or nodules noted. .       Musculoskeletal:     1st MPJ ROM decreased, Bilateral.  Muscle strength 5/5, Bilateral.  Pain present upon palpation of left ankle to the ATFL and to the left posterior tibial tendon at the scar site. .  Medial longitudinal arch, Bilateral WNL. Ankle ROM WNL,Bilateral.    Dorsally contracted digits absent digits 1-5 Bilateral.     Vascular: DP and PT pulses palpable 2/4, Bilateral.  CFT <3 seconds, Bilateral.  Hair growth present to the level of the digits, Bilateral.  Edema absent, Bilateral.  Varicosities absent, Bilateral. Erythema absent, Bilateral    Neurological: Sensation intact to light touch to level of digits, Bilateral.  Protective sensation intact 10/10 sites via 5.07/10g [place]-Daniela Monofilament, Bilateral.  negative Tinel's, Bilateral.  negative Valleix sign, Bilateral.      Integument: Warm, dry, supple, Bilateral.  Open lesion absent, Bilateral.  Interdigital maceration absent to web spaces 1-4, Bilateral.  Nails are normal in length, thickness and color 1-5 bilateral.  Fissures absent, Bilateral.     Mri left ankle reviewed with patient.:    1. Postoperative changes of prior posterior tibialis tendon repair.  The   repair remains intact.  No tenosynovitis identified. 2. Evidence for remote sprain of the intact anterior talofibular ligament. 3. No acute osseous abnormality. 4. Mild hindfoot and midfoot osteoarthritis and small subtalar effusion           Asessment: Patient is a 64 y.o. female with:    Diagnosis Orders   1. Trouble walking     2.  Acute left ankle pain Plan: Patient examined and evaluated. Current condition and treatment options discussed in detail. Advised pt to her conditon. Non-Weight Bearing Dorsiflexion     Ankle dorsiflexion is the motion of bending your ankle up towards your shin. Gaining this motion can help you regain the ability to walk normally again. Here's how to get more ankle dorsiflexion:   1. Moving only your ankle, point your foot back toward your nose (while keeping knees straight). Continue until you feel discomfort or can't tilt it back any further. 2. Hold this position for 15 seconds. 3. Return to neutral position and repeat 5 times. Non-Weight Bearing Plantar Flexion     Plantar flexion is the motion of pointing your ankle down and away from you. Here is how to gain ankle plantarflexion range of motion (ROM):   1. Moving only your ankle, point your foot forward (while keeping knees straight). Continue until you feel discomfort or can't move it any further. 2. Hold this position for 15 seconds. 3. Return to neutral position. Non-Weight Bearing Inversion     Inversion refers to the motion of pointing your ankle inwards towards the midline of your body. Here is how you gain more ankle inversion:   1. Moving only your ankle and keeping your toes pointed up, turn your foot inward, so the sole is facing your other leg. Continue until either discomfort is felt or you can no longer turn your foot inward. 2. Hold this position for 15 seconds. 3. Return to neutral position. Non-Weight Bearing Eversion     Eversion is the motion of moving your ankle to the outside or lateral part of your leg. Perform this exercise to gain eversion motion in your ankle:   1. Moving only your ankle and keeping your toes pointed up, turn your foot outward, away from your other leg. Continue until either discomfort is felt or you can no longer turn your foot outward. 2. Hold this position for 15 seconds. 3. Return to neutral position.   The Alphabet A great way physical therapists help their patients gain ankle mobility in all directions is to perform the ankle alphabet. This can get your ankle moving in all directions. Here is how to do the exercise:   1. Sit on a chair with your foot dangling in the air or on a bed with your foot hanging off the edge. 2. Draw the alphabet one letter at a time by moving the injured ankle and using the great toe as your \"pencil. \"  Eversion Isometrics     Strengthening exercises are usually started with isometric contractionsno motion occurs around your ankle joint during the muscle contraction. They may be done early after injury or surgery to start to gentlyand safelyadd force to the muscles that support your ankle. To do the exericse:   1. While seated, place the outside of the injured foot against a table leg or closed door. 2. Push outward with your foot into the object your foot is against (your ankle joint should not move) causing a contraction of your muscles. 3. Hold this muscle contraction for 15 seconds. 4. Relax for 10 seconds. Inversion Isometrics     This isometric exercise focuses on inversion:   1. While seated, place the inside of the injured foot against a table leg or closed door. 2. Push inward with your foot into the object your foot is against (your ankle joint should not move) causing a contraction of your muscles. 3. Hold this muscle contraction for 15 seconds. 4. Relax for 10 seconds. Resisted Strengthening Dorsiflexion     Resisted strengthening exercises should be performed with a Theraband providing resistance to your movements. These exercises will also work to strengthen the muscles around your ankle. This will provide added support to the joint. Perform each exercise 10 to 15 times in a row. Never tie a Theraband (or anything else) around your foot, ankle, or leg in a way that would restrict blood flow. Ankle dorsiflexion with resistance helps to strengthen your anterior tibialis muscle. Here is how you do it:   1. Moving only your ankle, point your foot back toward your nose (while keeping knees straight). Continue until you feel discomfort or can't tilt it back any further. 2. Hold this position for 2 seconds and slowly release. 3. Return to the neutral position, and then repeat the exercise. Resisted Strengthening Plantar Flexion     Resisted ankle plantar flexion helps to strengthen your calf muscles and Achilles tendon. To do the exercise:   1. Moving only your ankle, point your foot forward (while keeping knees straight). You may feel tightness in your calf muscle behind your lower leg. Continue until you feel discomfort or can't move it any further. 2. Hold this position for 2 seconds. 3. Return to neutral position. Resisted Strengthening Inversion     This exercise will provide strengthening as well:   1. Moving only your ankle and keeping your toes pointed up, turn your foot inward, so the sole is facing your other leg. Continue until either discomfort is felt or you can no longer turn your foot inward. 2. Hold this position for 2 seconds. 3. Return to neutral position. Resisted Strengthening Eversion     Now strengthen in the other direction:   1. Moving only your ankle and keeping your toes pointed up, turn your foot outward, away from your other leg. Continue until either discomfort is felt or you can no longer turn your foot outward. 2. Hold this position for 2 seconds. 3. Return to neutral position. Partial Weight-Bearing Seated Calf Raises     These partial weight-bearing exercises will help put more weight on the injured ankle as well as strengthen the muscles around it. Each one should be performed 10 times in a row:   1. Sit in a chair with the injured foot on the floor. 2. Lift your heel as far as possible while keeping your toes on the floor. 3. Return heel to the floor. Partial Weight-Bearing Standing Weight Shift     Sometimes after injury, your doctor will have you limit the amount of weight you can put through your lower extremity. This can help protect it as things are healing. As you heal, your PT may guide you in increasing weight bearing through your injured ankle. Weight shifts are the perfect exercise to do for this. To do the exercise:   1. Stand upright while holding onto a stable object. 2. Shift some of your weight onto the injured foot. 3. Hold the position for 15 seconds. 4. Relax and put your weight back onto your uninjured foot. Full Weight-Bearing Single Leg Stance     These exercises will help put more weight on the injured foot. You should be sure that your ankle can tolerate the pressure that you are putting upon it. Perform each one 10 times in a row:   1. Stand on the injured foot while lifting the uninjured foot off the ground. 2. Hold the position for 15 seconds. 3. Relax and put your weight back onto your uninjured foot. Checking in with your PT may be necessary to be sure you are doing the right exercises for your ankle. Full Weight-Bearing Standing Calf Raises     Once you are cleared for full weight bearing, you may do these calf raises:   1. Stand on the injured foot while lifting the uninjured foot off the ground. 2. Raise up, standing only on the ball of the injured foot and lifting your heel off the ground. 3. Hold the position for 15 seconds. 4. Relax and put your weight back onto your uninjured foot. Full Weight-Bearing Lateral Stepping     Increase the speed of this exercise as your healing progresses:   1. Place a rolled towel or short object on the ground to the side of your injured foot. 2. Step over the towel with the injured foot and remain on that foot. 3. Then bring the uninjured foot over the object and stand on both feet. 4. Step back over the towel with the uninjured foot and remain on that foot. 5. Then bring the injured foot back over the towel and stand on both feet. Full Weight-Bearing Lateral Jump     This exercise starts to incorporate plyometrics into your rehab routine, which can help you get back to running and sports. Increase the speed of this exercise as your healing progresses:   1. Place a rolled towel or short object on the ground to the side of your injured foot. 2. Hop over the towel and land on the injured foot. 3. Then hop back over the towel and land on the uninjured foot. Single Leg Stance on a Towel     Injury to ankles can often result in decreased balance ability. 2? Towards the end of rehabilitation, performing balance activities is an important way to prevent future injury. Perform this exercise 10 times in a row:   1. Fold a towel into a small rectangle and place on the ground. 2. Stand with the injured foot on the towel. 3. Lift the uninjured leg off the ground standing only on the towel with the injured leg. 4. Hold for 15 seconds. (As balance improves, increase stance time on injured leg up to 45 seconds.)  5. Return your uninjured foot to the floor. You can increase the challenge by standing on more unsteady surfaces like a BOSU or wobble board. Your PT may also have you use a BAPS board while working on balance exercises. .  Verbal and written instructions given to patient. Contact office with any questions/problems/concerns. No orders of the defined types were placed in this encounter. No orders of the defined types were placed in this encounter. RTC in 9week(s).     12/22/2020      Electronically signed by Get Leon DPM on 12/22/2020 at 9:05 AM  12/22/2020

## 2021-01-14 ENCOUNTER — OFFICE VISIT (OUTPATIENT)
Dept: PODIATRY | Age: 61
End: 2021-01-14
Payer: MEDICARE

## 2021-01-14 VITALS — RESPIRATION RATE: 18 BRPM | WEIGHT: 243 LBS | HEIGHT: 59 IN | BODY MASS INDEX: 48.99 KG/M2 | TEMPERATURE: 97.8 F

## 2021-01-14 DIAGNOSIS — R26.2 TROUBLE WALKING: Primary | ICD-10-CM

## 2021-01-14 DIAGNOSIS — M25.572 ACUTE LEFT ANKLE PAIN: ICD-10-CM

## 2021-01-14 DIAGNOSIS — S86.112D POSTERIOR TIBIAL TENDON TEAR, TRAUMATIC, LEFT, SUBSEQUENT ENCOUNTER: ICD-10-CM

## 2021-01-14 PROCEDURE — 99214 OFFICE O/P EST MOD 30 MIN: CPT | Performed by: PODIATRIST

## 2021-01-14 PROCEDURE — 1036F TOBACCO NON-USER: CPT | Performed by: PODIATRIST

## 2021-01-14 PROCEDURE — G8484 FLU IMMUNIZE NO ADMIN: HCPCS | Performed by: PODIATRIST

## 2021-01-14 PROCEDURE — G8417 CALC BMI ABV UP PARAM F/U: HCPCS | Performed by: PODIATRIST

## 2021-01-14 PROCEDURE — 3017F COLORECTAL CA SCREEN DOC REV: CPT | Performed by: PODIATRIST

## 2021-01-14 PROCEDURE — G8427 DOCREV CUR MEDS BY ELIG CLIN: HCPCS | Performed by: PODIATRIST

## 2021-01-14 NOTE — PROGRESS NOTES
[facility] PHYSICIANS  [facility]  [address] [place] [address]  Dept: [phone]  Dept Fax: [phone]    RETURN PATIENT PROGRESS NOTE  Date of patient's visit: 1/14/2021  Patient's Name:  Azra Chavez YOB: 1960            Patient Care Team:  JJ Brian CNP as PCP - General (Nurse Practitioner)  JJ Brian CNP as PCP - [facility]  Alysia Burkett DPM as Physician (Podiatry)       Azra Chavez 64 y.o. female that presents for follow-up of   Chief Complaint   Patient presents with    Foot Pain     left foot     Pt's primary care physician is JJ Brian CNP last seen 09/28/2020  Symptoms began 2 year(s) ago and are decreased . Patient relates pain is Present. Pain is rated 7 out of 10 and is described as constant, severe. Treatments prior to today's visit include: MRI PT, bracing RICE therapy and injections and it has not helped. Currently denies F/C/N/V. Allergies   Allergen Reactions    Latex Hives     Latex gloves    Asa [Aspirin] Hives    Codeine Hives    Lisinopril-Hydrochlorothiazide Hives    Motrin [Ibuprofen] Hives    Other Hives and Swelling     Cherries, almonds    Pcn [Penicillins] Hives    Sulfa Antibiotics        Past Medical History:   Diagnosis Date    Allergic rhinitis 12/4/2014    Anxiety 7/5/2018    Arthritis     Asthma     Chest pain     occurs every other day, pt states they occur when she doesn't eat right or forget to take her bp med, occurs left side of chest with no radiation, pt states \"it's new\" and has not had any testing done or seen anyone for it.     Constipation     Depressive disorder 7/5/2018    Generalized osteoarthritis 6/26/2014    History of kidney stones     Hypertension     Obesity, morbid, BMI 40.0-49.9 ([address] 2/28/2017    PONV (postoperative nausea and vomiting)     Sleep apnea     does not use a machine       Prior to Admission medications Medication Sig Start Date End Date Taking? Authorizing Provider   RA VITAMIN D-3 50 MCG (2000 UT) CAPS take 1 capsule by mouth once daily 1/5/21  Yes Kimo Bangura MD   clobetasol (TEMOVATE) 0.05 % ointment Apply to rash twice daily until resolved (not face, armpit or groin) 12/3/20  Yes Lucero Serna MD   ADVAIR DISKUS 250-50 MCG/DOSE AEPB inhale 1 puff by mouth and INTO THE LUNGS twice a day Rinse mouth after use 10/13/20  Yes JJ Cohen CNP   amLODIPine (NORVASC) 5 MG tablet take 1 tablet by mouth once daily 10/13/20  Yes JJ Cohen CNP   losartan-hydroCHLOROthiazide (HYZAAR) 100-25 MG per tablet take 1 tablet by mouth once daily 9/10/20  Yes JJ Cohen CNP   traMADol (ULTRAM) 50 MG tablet Take 50 mg by mouth every 6 hours as needed for Pain. Yes Historical Provider, MD   fluticasone (FLONASE) 50 MCG/ACT nasal spray instill 1 spray into each nostril once daily 3/10/20  Yes JJ Cohen CNP   albuterol sulfate HFA (VENTOLIN HFA) 108 (90 Base) MCG/ACT inhaler Inhale 2 puffs into the lungs every 6 hours as needed for Wheezing 1/8/20  Yes JJ Cohen CNP   Cholecalciferol (VITAMIN D3) 50 MCG (2000 UT) TABS Take 1 tablet by mouth daily 1/8/20  Yes JJ Cohen CNP   hydrOXYzine (ATARAX) 25 MG tablet take 1 tablet by mouth once daily if needed for itching 9/3/19  Yes JJ Cohen CNP   lidocaine (L-M-X 4) 4 % cream Apply a thick layer 30 minutes prior to procedure, covering in saran wrap 6/26/19  Yes Lucero Serna MD   Misc. Devices (BATHTUB SAFETY RAIL) MISC 4 each by Does not apply route daily 12/17/18  Yes Alysia Records, DPM   Misc. Devices (EXTENDABLE BEDSIDE RAIL) MISC 4 each by Does not apply route daily 12/17/18  Yes Alysia Records, DPM   Spacer/Aero-Holding Chambers PRICE 1 Device by Does not apply route daily as needed (wheezing) 11/26/18  Yes Harjinder Lennon, APRN - CNP   [facility].  Devices (WALL GRAB BAR) [facility] GRAB BARS FOR BOTH SHOWER AND TOILET, USE AS DIRECTED 10/23/18  Yes Nava Cifuentes DPM   Misc. Devices MISC OUTSIDE RAMP THAT INCLUDES RAILINGS, USE AS DIRECTED 10/23/18  Yes Nava Cifuentes DPM   Misc. Devices (RAISED TOILET SEAT) MISC USE AS DIRECTED 10/23/18  Yes Nava Cifuentes DPM   hydrocortisone 2.5 % cream apply to affected area twice a day 10/17/18  Yes Nava Cifuentes DPKALEB   neomycin-polymyxin-hydrocortisone (CORTISPORIN) 3.5-69137-9 otic suspension instill 4 drops into affected ear  (S) three times a day 8/17/17  Yes Historical Provider, MD   Antipyrine-Benzocaine Debora Mandril) 54-14 MG/ML otic solution Place 3 drops into the left ear 4 times daily as needed for Pain   Yes Historical Provider, MD   Elastic Bandages & Supports (JOBST KNEE HIGH COMPRESSION SM) MISC Dispense Bilateral Jobst Below Knee 20-30mmHg compression stockings 6/30/14  Yes Musa Salvador DPM   EPINEPHrine (EPIPEN) 0.3 MG/0.3ML SOAJ injection Inject 0.3 mLs into the muscle once for 1 dose Use as directed for allergic reaction 5/7/19 12/3/20  JJ Rosas - CNP   Misc. Devices [facility]) MISC 1 Device by Does not apply route once for 1 dose 4/24/18 12/3/20  Nava Cifuentes DPM       Review of Systems    Review of Systems:  History obtained from chart review and the patient  General ROS: negative for - chills, fatigue, fever, night sweats or weight gain  Constitutional: Negative for chills, diaphoresis, fatigue, fever and unexpected weight change. Musculoskeletal: Positive for arthralgias, gait problem and joint swelling. Neurological ROS: negative for - behavioral changes, confusion, headaches or seizures. Negative for weakness and numbness. Dermatological ROS: negative for - mole changes, rash  Cardiovascular: Negative for leg swelling. Gastrointestinal: Negative for constipation, diarrhea, nausea and vomiting.          Lower Extremity Physical Examination:     Vitals:   Vitals:    01/14/21 0826   Resp: 18   Temp: 97.8 °F (36.6 °C) General: AAO x 3 in NAD. Dermatologic Exam:  Skin lesion/ulceration Absent . Skin No rashes or nodules noted. .       Musculoskeletal:     1st MPJ ROM decreased, Bilateral.  Muscle strength 5/5, Bilateral.  Pain present upon palpation of left foot along the post tib tendon. There is increaeasd pain with inversion. .  Medial longitudinal arch, Bilateral WNL. Ankle ROM WNL,Bilateral.    Dorsally contracted digits absent digits 1-5 Bilateral.     Vascular: DP and PT pulses palpable 2/4, Bilateral.  CFT <3 seconds, Bilateral.  Hair growth present to the level of the digits, Bilateral.  Edema absent, Bilateral.  Varicosities absent, Bilateral. Erythema absent, Bilateral    Neurological: Sensation intact to light touch to level of digits, Bilateral.  Protective sensation intact 10/10 sites via 5.07/10g [place] Monofilament, Bilateral.  negative Tinel's, Bilateral.  negative Valleix sign, Bilateral.      Integument: Warm, dry, supple, Bilateral.  Open lesion absent, Bilateral.  Interdigital maceration absent to web spaces 1-4, Bilateral.  Nails are normal in length, thickness and color 1-5 bilateral.  Fissures absent, Bilateral.       Asessment: Patient is a 64 y.o. female with:    Diagnosis Orders   1. Trouble walking     2. Acute left ankle pain     3. Posterior tibial tendon tear, traumatic, left, subsequent encounter           Plan: Patient examined and evaluated. Current condition and treatment options discussed in detail. Advised pt to use her lace up ankle brace and no barefoot walking. instruted pt on proper ankle rehab. Non-Weight Bearing Dorsiflexion     Ankle dorsiflexion is the motion of bending your ankle up towards your shin. Gaining this motion can help you regain the ability to walk normally again. Here's how to get more ankle dorsiflexion:   1. Moving only your ankle, point your foot back toward your nose (while keeping knees straight).  Continue until you feel discomfort or can't tilt it back any further. 2. Hold this position for 15 seconds. 3. Return to neutral position and repeat 5 times. Non-Weight Bearing Plantar Flexion     Plantar flexion is the motion of pointing your ankle down and away from you. Here is how to gain ankle plantarflexion range of motion (ROM):   1. Moving only your ankle, point your foot forward (while keeping knees straight). Continue until you feel discomfort or can't move it any further. 2. Hold this position for 15 seconds. 3. Return to neutral position. Non-Weight Bearing Inversion     Inversion refers to the motion of pointing your ankle inwards towards the midline of your body. Here is how you gain more ankle inversion:   1. Moving only your ankle and keeping your toes pointed up, turn your foot inward, so the sole is facing your other leg. Continue until either discomfort is felt or you can no longer turn your foot inward. 2. Hold this position for 15 seconds. 3. Return to neutral position. Non-Weight Bearing Eversion     Eversion is the motion of moving your ankle to the outside or lateral part of your leg. Perform this exercise to gain eversion motion in your ankle:   1. Moving only your ankle and keeping your toes pointed up, turn your foot outward, away from your other leg. Continue until either discomfort is felt or you can no longer turn your foot outward. 2. Hold this position for 15 seconds. 3. Return to neutral position. The Alphabet   A great way physical therapists help their patients gain ankle mobility in all directions is to perform the ankle alphabet. This can get your ankle moving in all directions. Here is how to do the exercise:   1. Sit on a chair with your foot dangling in the air or on a bed with your foot hanging off the edge. 2. Draw the alphabet one letter at a time by moving the injured ankle and using the great toe as your \"pencil. \"  Eversion Isometrics     Strengthening exercises are usually started with isometric ankle, point your foot forward (while keeping knees straight). You may feel tightness in your calf muscle behind your lower leg. Continue until you feel discomfort or can't move it any further. 2. Hold this position for 2 seconds. 3. Return to neutral position. Resisted Strengthening Inversion     This exercise will provide strengthening as well:   1. Moving only your ankle and keeping your toes pointed up, turn your foot inward, so the sole is facing your other leg. Continue until either discomfort is felt or you can no longer turn your foot inward. 2. Hold this position for 2 seconds. 3. Return to neutral position. Resisted Strengthening Eversion     Now strengthen in the other direction:   1. Moving only your ankle and keeping your toes pointed up, turn your foot outward, away from your other leg. Continue until either discomfort is felt or you can no longer turn your foot outward. 2. Hold this position for 2 seconds. 3. Return to neutral position. Partial Weight-Bearing Seated Calf Raises     These partial weight-bearing exercises will help put more weight on the injured ankle as well as strengthen the muscles around it. Each one should be performed 10 times in a row:   1. Sit in a chair with the injured foot on the floor. 2. Lift your heel as far as possible while keeping your toes on the floor. 3. Return heel to the floor. Partial Weight-Bearing Standing Weight Shift     Sometimes after injury, your doctor will have you limit the amount of weight you can put through your lower extremity. This can help protect it as things are healing. As you heal, your PT may guide you in increasing weight bearing through your injured ankle. Weight shifts are the perfect exercise to do for this. To do the exercise:   1. Stand upright while holding onto a stable object. 2. Shift some of your weight onto the injured foot. 3. Hold the position for 15 seconds.   4. Relax and put your weight back onto your uninjured foot.  Full Weight-Bearing Single Leg Stance     These exercises will help put more weight on the injured foot. You should be sure that your ankle can tolerate the pressure that you are putting upon it. Perform each one 10 times in a row:   1. Stand on the injured foot while lifting the uninjured foot off the ground. 2. Hold the position for 15 seconds. 3. Relax and put your weight back onto your uninjured foot. Checking in with your PT may be necessary to be sure you are doing the right exercises for your ankle. Full Weight-Bearing Standing Calf Raises     Once you are cleared for full weight bearing, you may do these calf raises:   1. Stand on the injured foot while lifting the uninjured foot off the ground. 2. Raise up, standing only on the ball of the injured foot and lifting your heel off the ground. 3. Hold the position for 15 seconds. 4. Relax and put your weight back onto your uninjured foot. Full Weight-Bearing Lateral Stepping     Increase the speed of this exercise as your healing progresses:   1. Place a rolled towel or short object on the ground to the side of your injured foot. 2. Step over the towel with the injured foot and remain on that foot. 3. Then bring the uninjured foot over the object and stand on both feet. 4. Step back over the towel with the uninjured foot and remain on that foot. 5. Then bring the injured foot back over the towel and stand on both feet. Full Weight-Bearing Lateral Jump     This exercise starts to incorporate plyometrics into your rehab routine, which can help you get back to running and sports. Increase the speed of this exercise as your healing progresses:   1. Place a rolled towel or short object on the ground to the side of your injured foot. 2. Hop over the towel and land on the injured foot. 3. Then hop back over the towel and land on the uninjured foot.   Single Leg Stance on a Towel     Injury to ankles can often result in decreased balance ability. 2? Towards the end of rehabilitation, performing balance activities is an important way to prevent future injury. Perform this exercise 10 times in a row:   1. Fold a towel into a small rectangle and place on the ground. 2. Stand with the injured foot on the towel. 3. Lift the uninjured leg off the ground standing only on the towel with the injured leg. 4. Hold for 15 seconds. (As balance improves, increase stance time on injured leg up to 45 seconds.)  5. Return your uninjured foot to the floor. You can increase the challenge by standing on more unsteady surfaces like a BOSU or wobble board. Your PT may also have you use a BAPS board while working on balance exercises. All labs were reviewed and all imagining including the above findings were reviewed prior to the patients arrival and with the patient today      Time was spent educating the patient and their families/caregivers on proper care of the feet and ankles. All the above diagnosis were addressed at todays visit and all questions were answered. A total of 35 minutes was spent with this patients encounter  . Verbal and written instructions given to patient. Contact office with any questions/problems/concerns. No orders of the defined types were placed in this encounter. No orders of the defined types were placed in this encounter. RTC in 6week(s).     1/14/2021      Electronically signed by Delvis Karimi DPM on 1/14/2021 at 8:34 AM  1/14/2021

## 2021-01-28 DIAGNOSIS — J45.20 MILD INTERMITTENT ASTHMA WITHOUT COMPLICATION: ICD-10-CM

## 2021-01-29 NOTE — TELEPHONE ENCOUNTER
Caroline Reyes is calling to request a refill on the following medication(s):    Medication Request:  Requested Prescriptions     Pending Prescriptions Disp Refills    Kesha Skiff 250-50 MCG/DOSE AEPB [Pharmacy Med Name: Angelita Segovia 250-50 INHUB]  0     Sig: inhale 1 puff by mouth and INTO THE LUNGS twice a day Rinse mouth after use       Last Visit Date (If Applicable):  0/5/5432 In office    Next Visit Date:    Visit date not found

## 2021-02-01 NOTE — TELEPHONE ENCOUNTER
Caroline Reyes is calling to request a refill on the following medication(s):    Medication Request:  Requested Prescriptions     Pending Prescriptions Disp Refills    RA VITAMIN D-3 50 MCG (2000 UT) CAPS [Pharmacy Med Name: RA VITAMIN D3 2,000 UNIT SFGL] 30 capsule 0     Sig: take 1 capsule by mouth once daily MUST MAKE APPOINTMENT FOR FURTHER REFILL       Last Visit Date (If Applicable):  6/20/6242    Next Visit Date:    Visit date not found

## 2021-02-02 RX ORDER — GLUCOSAMINE/CHONDR SU A SOD 750-600 MG
TABLET ORAL
Qty: 30 CAPSULE | Refills: 0 | Status: SHIPPED | OUTPATIENT
Start: 2021-02-02 | End: 2021-05-25

## 2021-02-03 RX ORDER — HYDROXYZINE HYDROCHLORIDE 25 MG/1
TABLET, FILM COATED ORAL
Qty: 30 TABLET | Refills: 5 | Status: SHIPPED | OUTPATIENT
Start: 2021-02-03 | End: 2021-09-28 | Stop reason: SDUPTHER

## 2021-02-03 NOTE — TELEPHONE ENCOUNTER
Jarad Irvin is calling to request a refill on the following medication(s):    Medication Request:  Requested Prescriptions     Pending Prescriptions Disp Refills    hydrOXYzine (ATARAX) 25 MG tablet [Pharmacy Med Name: HYDROXYZINE HCL 25 MG TABLET] 30 tablet 5     Sig: take 1 tablet by mouth once daily if needed for itching       Last Visit Date (If Applicable):  2/5/7069    Next Visit Date:    Visit date not found

## 2021-03-15 DIAGNOSIS — I10 ESSENTIAL HYPERTENSION: ICD-10-CM

## 2021-03-15 RX ORDER — LOSARTAN POTASSIUM AND HYDROCHLOROTHIAZIDE 25; 100 MG/1; MG/1
TABLET ORAL
Qty: 90 TABLET | Refills: 1 | OUTPATIENT
Start: 2021-03-15

## 2021-03-15 NOTE — TELEPHONE ENCOUNTER
Pilar Noonan is calling to request a refill on the following medication(s):    Medication Request:  Requested Prescriptions     Pending Prescriptions Disp Refills    losartan-hydroCHLOROthiazide (HYZAAR) 100-25 MG per tablet 90 tablet 1     Sig: take 1 tablet by mouth once daily    amLODIPine (NORVASC) 5 MG tablet 90 tablet 0     Sig: take 1 tablet by mouth once daily       Last Visit Date (If Applicable):  6/9/1391    Next Visit Date:    3/26/2021

## 2021-03-15 NOTE — TELEPHONE ENCOUNTER
Rose Blanchard is calling to request a refill on the following medication(s):    Medication Request:  Requested Prescriptions     Pending Prescriptions Disp Refills    losartan-hydroCHLOROthiazide (HYZAAR) 100-25 MG per tablet [Pharmacy Med Name: LOSARTAN-HCTZ 100-25 MG TAB] 90 tablet 1     Sig: take 1 tablet by mouth once daily       Last Visit Date (If Applicable):  2/7/7566 VV  07/21/2020 In office    Next Visit Date:    Visit date not found

## 2021-03-16 DIAGNOSIS — J45.20 MILD INTERMITTENT ASTHMA WITHOUT COMPLICATION: ICD-10-CM

## 2021-03-16 DIAGNOSIS — I10 ESSENTIAL HYPERTENSION: ICD-10-CM

## 2021-03-16 RX ORDER — LOSARTAN POTASSIUM AND HYDROCHLOROTHIAZIDE 25; 100 MG/1; MG/1
TABLET ORAL
Qty: 90 TABLET | Refills: 1 | OUTPATIENT
Start: 2021-03-16

## 2021-03-16 RX ORDER — LORATADINE 10 MG/1
TABLET ORAL
Qty: 30 TABLET | Refills: 5 | OUTPATIENT
Start: 2021-03-16

## 2021-03-16 RX ORDER — AMLODIPINE BESYLATE 5 MG/1
TABLET ORAL
Qty: 90 TABLET | Refills: 0 | OUTPATIENT
Start: 2021-03-16

## 2021-03-16 RX ORDER — ALBUTEROL SULFATE 90 UG/1
2 AEROSOL, METERED RESPIRATORY (INHALATION) EVERY 6 HOURS PRN
Qty: 1 INHALER | Refills: 3 | OUTPATIENT
Start: 2021-03-16

## 2021-03-16 NOTE — TELEPHONE ENCOUNTER
Rose Blanchard is calling to request a refill on the following medication(s):    Medication Request:  Requested Prescriptions     Pending Prescriptions Disp Refills    albuterol sulfate HFA (VENTOLIN HFA) 108 (90 Base) MCG/ACT inhaler 1 Inhaler 3     Sig: Inhale 2 puffs into the lungs every 6 hours as needed for Wheezing    fluticasone-salmeterol (WIXELA INHUB) 250-50 MCG/DOSE AEPB 60 each 1    amLODIPine (NORVASC) 5 MG tablet 90 tablet 0    loratadine (RA LORATADINE) 10 MG tablet 30 tablet 5    losartan-hydroCHLOROthiazide (HYZAAR) 100-25 MG per tablet 90 tablet 1       Last Visit Date (If Applicable):  7/7/9633    Next Visit Date:    3/26/2021

## 2021-03-16 NOTE — TELEPHONE ENCOUNTER
Patient is requesting her inhalers, allergy and HBP medications be refilled at [facility] ([facility]] [phone]  FX# [phone]  Patient could not state the names of the medications so I could not enter them. Patient can be reached at [phone]. Okay to leave a message.   Last OV 09/28/2020 Next OV 03/26/2021

## 2021-03-22 RX ORDER — LOSARTAN POTASSIUM AND HYDROCHLOROTHIAZIDE 25; 100 MG/1; MG/1
1 TABLET ORAL DAILY
Qty: 30 TABLET | Refills: 0 | Status: SHIPPED | OUTPATIENT
Start: 2021-03-22 | End: 2021-03-26 | Stop reason: SDUPTHER

## 2021-03-22 RX ORDER — AMLODIPINE BESYLATE 5 MG/1
5 TABLET ORAL DAILY
Qty: 30 TABLET | Refills: 0 | Status: SHIPPED | OUTPATIENT
Start: 2021-03-22 | End: 2021-03-26 | Stop reason: SDUPTHER

## 2021-03-26 ENCOUNTER — OFFICE VISIT (OUTPATIENT)
Dept: FAMILY MEDICINE CLINIC | Age: 61
End: 2021-03-26
Payer: MEDICARE

## 2021-03-26 VITALS
WEIGHT: 251 LBS | TEMPERATURE: 98.2 F | DIASTOLIC BLOOD PRESSURE: 76 MMHG | OXYGEN SATURATION: 96 % | HEART RATE: 72 BPM | SYSTOLIC BLOOD PRESSURE: 132 MMHG | BODY MASS INDEX: 50.7 KG/M2

## 2021-03-26 DIAGNOSIS — J45.20 MILD INTERMITTENT ASTHMA WITHOUT COMPLICATION: ICD-10-CM

## 2021-03-26 DIAGNOSIS — M79.672 CHRONIC PAIN IN LEFT FOOT: ICD-10-CM

## 2021-03-26 DIAGNOSIS — M25.552 CHRONIC LEFT HIP PAIN: ICD-10-CM

## 2021-03-26 DIAGNOSIS — G89.29 CHRONIC PAIN IN LEFT FOOT: ICD-10-CM

## 2021-03-26 DIAGNOSIS — I10 ESSENTIAL HYPERTENSION: Primary | ICD-10-CM

## 2021-03-26 DIAGNOSIS — Z13.220 SCREENING CHOLESTEROL LEVEL: ICD-10-CM

## 2021-03-26 DIAGNOSIS — G89.29 CHRONIC LEFT HIP PAIN: ICD-10-CM

## 2021-03-26 PROCEDURE — 99214 OFFICE O/P EST MOD 30 MIN: CPT | Performed by: NURSE PRACTITIONER

## 2021-03-26 PROCEDURE — 1036F TOBACCO NON-USER: CPT | Performed by: NURSE PRACTITIONER

## 2021-03-26 PROCEDURE — G8417 CALC BMI ABV UP PARAM F/U: HCPCS | Performed by: NURSE PRACTITIONER

## 2021-03-26 PROCEDURE — G8427 DOCREV CUR MEDS BY ELIG CLIN: HCPCS | Performed by: NURSE PRACTITIONER

## 2021-03-26 PROCEDURE — 3017F COLORECTAL CA SCREEN DOC REV: CPT | Performed by: NURSE PRACTITIONER

## 2021-03-26 PROCEDURE — G8484 FLU IMMUNIZE NO ADMIN: HCPCS | Performed by: NURSE PRACTITIONER

## 2021-03-26 RX ORDER — ALBUTEROL SULFATE 90 UG/1
2 AEROSOL, METERED RESPIRATORY (INHALATION) EVERY 6 HOURS PRN
Qty: 1 INHALER | Refills: 3 | Status: SHIPPED | OUTPATIENT
Start: 2021-03-26 | End: 2022-09-01

## 2021-03-26 RX ORDER — LOSARTAN POTASSIUM AND HYDROCHLOROTHIAZIDE 25; 100 MG/1; MG/1
1 TABLET ORAL DAILY
Qty: 90 TABLET | Refills: 1 | Status: SHIPPED | OUTPATIENT
Start: 2021-03-26 | End: 2021-09-28 | Stop reason: SDUPTHER

## 2021-03-26 RX ORDER — AMLODIPINE BESYLATE 5 MG/1
5 TABLET ORAL DAILY
Qty: 90 TABLET | Refills: 1 | Status: SHIPPED | OUTPATIENT
Start: 2021-03-26 | End: 2021-09-28 | Stop reason: SDUPTHER

## 2021-03-26 SDOH — ECONOMIC STABILITY: TRANSPORTATION INSECURITY
IN THE PAST 12 MONTHS, HAS THE LACK OF TRANSPORTATION KEPT YOU FROM MEDICAL APPOINTMENTS OR FROM GETTING MEDICATIONS?: NOT ASKED

## 2021-03-26 SDOH — ECONOMIC STABILITY: FOOD INSECURITY: WITHIN THE PAST 12 MONTHS, THE FOOD YOU BOUGHT JUST DIDN'T LAST AND YOU DIDN'T HAVE MONEY TO GET MORE.: NEVER TRUE

## 2021-03-26 ASSESSMENT — PATIENT HEALTH QUESTIONNAIRE - PHQ9
SUM OF ALL RESPONSES TO PHQ9 QUESTIONS 1 & 2: 0
SUM OF ALL RESPONSES TO PHQ QUESTIONS 1-9: 0
2. FEELING DOWN, DEPRESSED OR HOPELESS: 0
SUM OF ALL RESPONSES TO PHQ QUESTIONS 1-9: 0

## 2021-03-26 NOTE — PROGRESS NOTES
Yordan Mendoza, EDWIGEP-C  [address]  [address] [address] [facility] [address].  [facility], [place] [address]  [phone]  [phone]    Porfirio Rubio is a 64 y.o. female who is here with c/o of:    Chief Complaint: Hypertension      Patient Accompanied by: n/a    HPI - Porfirio Rubio is here today for f/u of chronic conditions    HTN   - she is not compliant with low sodium diet and does not exercise regularly   - she denies h/a, c/p, palpitations, lower extremity edema   - current med: losartan/hctz 100-25mg daily and denies    Chronic left hip/foot pain   - this is a chronic problem of several years   - she has seen pain management for this problem and she states she is \"afraid of him\" as he wanted to do an injection behind her knee   - she is requesting a pain shot at today's visit    Asthma   - she reports using her rescue inhaler 2-3 times monthly   - she denies chest tightness, cough, wheezing, sob   - current med: Wixela bid and albuterol prn          Patient Active Problem List:     Essential hypertension     YAMEL (obstructive sleep apnea)     Allergic asthma     Obesity, morbid, BMI 40.0-49.9 ([facility])     Urticaria     Contracture of left ankle     Chronic pain of left ankle     Spontaneous rupture of flexor tendon of left lower leg     Allergic rhinitis     Anxiety     Benign neoplasm of skin     Constipation     Cyst of ovary     Generalized osteoarthritis     Depressive disorder     Edema     Food allergy     Osteoarthrosis involving multiple sites but not designated as generalized     Reactive airway disease     Rupture of posterior tibialis tendon     Vitamin D deficiency     Morbid obesity with BMI of 50.0-59.9, adult ([facility] [address]     Status post left foot surgery     Mild intermittent asthma without complication     Past Medical History:   Diagnosis Date    Allergic rhinitis 12/4/2014    Anxiety 7/5/2018    Arthritis     Asthma     Chest pain     occurs every other day, pt states they occur when she doesn't eat right or forget to take her bp med, occurs left side of chest with no radiation, pt states \"it's new\" and has not had any testing done or seen anyone for it.     Constipation     Depressive disorder 2018    Generalized osteoarthritis 2014    History of kidney stones     Hypertension     Obesity, morbid, BMI 40.0-49.9 ([address] 2017    PONV (postoperative nausea and vomiting)     Sleep apnea     does not use a machine      Past Surgical History:   Procedure Laterality Date    CHOLECYSTECTOMY      FOOT TENDON SURGERY Left 2017    posterior repair    FOOT TENDON SURGERY Left 2018    LEFT POSTERIOR  TENDON REPAIR FLEXOR TENDON TRANSFER (Left Foot)    LITHOTRIPSY      [facility]PT [facility]ISIT[facility]PROCEDURE ONLY Left 2018    LEFT POSTERIOR  TENDON REPAIR FLEXOR TENDON TRANSFER performed by Mena Cisneros DPM at [address] Left 2017    LEFT POSTERIOR TIBIAL TENDON REPAIR WITH LEFT TENOLYSIS  performed by Mena Cisneros DPM at [address] [place] [address] History   Problem Relation Age of Onset    Depression Mother     Diabetes Mother     High Blood Pressure Mother     Mental Illness Mother     Substance Abuse Father     Depression Sister     Diabetes Sister     High Blood Pressure Sister     Mental Illness Sister     Depression Brother     Mental Illness Brother     Substance Abuse Brother      Social History     Tobacco Use    Smoking status: Former Smoker     Packs/day: 3.00     Years: 20.00     Pack years: 60.00     Types: Cigarettes     Start date: 1981     Quit date: 1998     Years since quittin.2    Smokeless tobacco: Never Used    Tobacco comment: quit smoking 15-20 yrs ago   Substance Use Topics    Alcohol use: No     ALLERGIES:    Allergies   Allergen Reactions    Latex Hives     Latex gloves    Asa [Aspirin] Hives    Codeine Hives    Lisinopril-Hydrochlorothiazide Hives    Motrin [Ibuprofen] Hives    Other Hives and Swelling     Cherries, almonds    Pcn [Penicillins] Hives    Sulfa Antibiotics           Subjective     · Constitutional:  Negative for activity change, appetite change,unexpected weight change, chills, fever, and fatigue. · HENT: Negative for ear pain, sore throat,  Rhinorrhea, sinus pain, sinus pressure, congestion. · Eyes:  Negative for pain and discharge. · Respiratory:  Negative for chest tightness, shortness of breath, wheezing, and cough. · Cardiovascular:  Negative for chest pain, palpitations and leg swelling. · Gastrointestinal: Negative for abdominal pain, blood in stool, constipation,diarrhea, nausea and vomiting. · Endocrine: Negative for cold intolerance, heat intolerance, polydipsia, polyphagia and polyuria. · Genitourinary: Negative for difficulty urinating, dysuria, flank pain, frequency, hematuria and urgency. · Musculoskeletal: Negative for arthralgias, back pain, joint swelling, myalgias, neck pain and neck stiffness. Positive for left hip pain, left foot pain  · Skin: Negative for rash and wound. · Allergic/Immunologic: Negative for environmental allergies and food allergies. · Neurological:  Negative for dizziness, light-headedness, numbness and headaches. · Hematological:  Negative for adenopathy. Does not bruise/bleed easily. · Psychiatric/Behavioral: Negative for self-injury, sleep disturbance and suicidal ideas. Objective     PHYSICAL EXAM:   · Constitutional: Elizabeth Sue is oriented to person, place, and time. Vital signs are normal. Appears well-developed and well-nourished. · HEENT:   · Head: Normocephalic and atraumatic. Right Ear: Hearing and external ear normal. TM normal.  Canal normal  · Left Ear: Hearing and external ear normal. TM normal Canal normal  · Eyes:PERRL, EOMI, Conjunctiva normal, No discharge. · Neck: Full passive range of motion. Non-tender on palpation. Neck supple. No thyromegaly present.  Trachea normal.  · Cardiovascular: Normal rate, regular rhythm, S1, S2, no murmur, no gallop, no friction rub, intact distal pulses. No carotid bruit  · Pulmonary/Chest: Breath sounds are clear throughout, No respiratory distress, No wheezing, No chest tenderness. Effort normal  Musculoskeletal: Physical Exam  Musculoskeletal:      Left hip: She exhibits decreased range of motion. She exhibits normal strength, no tenderness, no bony tenderness, no swelling, no crepitus and no deformity. Left foot: Decreased range of motion. Normal capillary refill. Tenderness present. No bony tenderness, swelling, crepitus or deformity. · Lymphadenopathy: No lymphadenopathy noted. · Neurological: Alert and oriented to person, place, and time. Normal motor function, Normal sensory function, No focal deficits noted. He has normal strength. · Skin: Skin is warm, dry and intact. No obvious lesions on exposed skin  · Psychiatric: Normal mood and affect. Speech is normal and behavior is normal.     Nursing note and vitals reviewed. Blood pressure 132/76, pulse 72, temperature 98.2 °F (36.8 °C), temperature source Temporal, weight 251 lb (113.9 kg), SpO2 96 %, not currently breastfeeding. Body mass index is 50.7 kg/m². Wt Readings from Last 3 Encounters:   03/26/21 251 lb (113.9 kg)   01/14/21 243 lb (110.2 kg)   12/22/20 243 lb (110.2 kg)     BP Readings from Last 3 Encounters:   03/26/21 132/76   12/03/20 133/79   09/30/20 132/71       No results found for this visit on 03/26/21.     Completed Orders/Prescriptions   Orders Placed This Encounter   Medications    losartan-hydroCHLOROthiazide (HYZAAR) 100-25 MG per tablet     Sig: Take 1 tablet by mouth daily     Dispense:  90 tablet     Refill:  1    amLODIPine (NORVASC) 5 MG tablet     Sig: Take 1 tablet by mouth daily     Dispense:  90 tablet     Refill:  1     Needs appt    fluticasone-salmeterol (WIXELA INHUB) 250-50 MCG/DOSE AEPB     Sig: Inhale 1 puff into the lungs 2 times daily     Dispense:  180 each Refill:  1    albuterol sulfate HFA (VENTOLIN HFA) 108 (90 Base) MCG/ACT inhaler     Sig: Inhale 2 puffs into the lungs every 6 hours as needed for Wheezing     Dispense:  1 Inhaler     Refill:  3               AssessmentPlan/Medical Decision Making     1. Essential hypertension  - controlled  - reviewed DASh diet  - losartan-hydroCHLOROthiazide (HYZAAR) 100-25 MG per tablet; Take 1 tablet by mouth daily  Dispense: 90 tablet; Refill: 1  - amLODIPine (NORVASC) 5 MG tablet; Take 1 tablet by mouth daily  Dispense: 90 tablet; Refill: 1  - CBC With Auto Differential; Future  - Comprehensive Metabolic Panel; Future    2. Mild intermittent asthma without complication  - controlled  - fluticasone-salmeterol (Lark Prader) 250-50 MCG/DOSE AEPB; Inhale 1 puff into the lungs 2 times daily  Dispense: 180 each; Refill: 1  - albuterol sulfate HFA (VENTOLIN HFA) 108 (90 Base) MCG/ACT inhaler; Inhale 2 puffs into the lungs every 6 hours as needed for Wheezing  Dispense: 1 Inhaler; Refill: 3    3. Chronic left hip pain  - encouraged f/u with ortho or pain management   - Maria D Colvin DO, Orthopedic Surgery, [place]    4. Chronic pain in left foot  - encouraged f/u with pain management  - Maria D Colvin DO, Orthopedic Surgery, [place]    5. Screening cholesterol level  - Lipid, Fasting; Future      Return in about 6 months (around 9/26/2021) for Routine follow up of chronic conditions. 1.  Ev received counseling on the following healthy behaviors: nutrition, exercise and medication adherence  2. Patient given educational materials - see patient instructions  3. Was a self-tracking handout given in paper form or via [employer]? No  If yes, see orders or list here. 4.  Discussed use, benefit, and side effects of prescribed medications. Barriers to medication compliance addressed. All patient questions answered. Pt voiced understanding.    5.  Reviewed prior labs, imaging, consultation, follow up, and health maintenance  6. Continue current medications, diet and exercise. 7. Discussed use, benefit, and side effects of prescribed medications. Barriers to medication compliance addressed. All her questions were answered. Pt voiced understanding. Mariana Batres will continue current medications, diet and exercise. Of the 30 minute duration appointment visit, Lucas Hampton CNP spent at least 50% of the face-to-face time in counseling, explanation of diagnosis, planning of further management, and answering all questions. Signed:  Lucas Hampton CNP    This note is created with the assistance of a speech-recognition program.  While intending to generate a document that actually reflects the content of the visit, no guarantees can be provided that every mistake has been identified and corrected by editing.

## 2021-04-08 DIAGNOSIS — M25.552 LEFT HIP PAIN: Primary | ICD-10-CM

## 2021-04-14 ENCOUNTER — OFFICE VISIT (OUTPATIENT)
Dept: ORTHOPEDIC SURGERY | Age: 61
End: 2021-04-14
Payer: MEDICARE

## 2021-04-14 VITALS
OXYGEN SATURATION: 100 % | HEIGHT: 59 IN | HEART RATE: 59 BPM | WEIGHT: 250 LBS | DIASTOLIC BLOOD PRESSURE: 69 MMHG | RESPIRATION RATE: 14 BRPM | BODY MASS INDEX: 50.4 KG/M2 | SYSTOLIC BLOOD PRESSURE: 116 MMHG

## 2021-04-14 DIAGNOSIS — M54.50 LOW BACK PAIN, UNSPECIFIED BACK PAIN LATERALITY, UNSPECIFIED CHRONICITY, UNSPECIFIED WHETHER SCIATICA PRESENT: Primary | ICD-10-CM

## 2021-04-14 PROCEDURE — G8417 CALC BMI ABV UP PARAM F/U: HCPCS | Performed by: ORTHOPAEDIC SURGERY

## 2021-04-14 PROCEDURE — G8427 DOCREV CUR MEDS BY ELIG CLIN: HCPCS | Performed by: ORTHOPAEDIC SURGERY

## 2021-04-14 PROCEDURE — 99204 OFFICE O/P NEW MOD 45 MIN: CPT | Performed by: ORTHOPAEDIC SURGERY

## 2021-04-14 ASSESSMENT — ENCOUNTER SYMPTOMS
APNEA: 0
ABDOMINAL PAIN: 0
COUGH: 0
CHEST TIGHTNESS: 0
VOMITING: 0

## 2021-04-14 NOTE — PROGRESS NOTES
09/08/2017    posterior repair    FOOT TENDON SURGERY Left 06/29/2018    LEFT POSTERIOR  TENDON REPAIR FLEXOR TENDON TRANSFER (Left Foot)    LITHOTRIPSY      [facility] OFFICE/OUTPT VISIT,PROCEDURE ONLY Left 6/29/2018    LEFT POSTERIOR  TENDON REPAIR FLEXOR TENDON TRANSFER performed by Kurtis Couch DPM at [address] Left 9/8/2017    LEFT POSTERIOR TIBIAL TENDON REPAIR WITH LEFT TENOLYSIS  performed by Kurtis Couch DPM at [facility]       Current Medications:   Current Outpatient Medications   Medication Sig Dispense Refill    losartan-hydroCHLOROthiazide (HYZAAR) 100-25 MG per tablet Take 1 tablet by mouth daily 90 tablet 1    amLODIPine (NORVASC) 5 MG tablet Take 1 tablet by mouth daily 90 tablet 1    fluticasone-salmeterol (WIXELA INHUB) 250-50 MCG/DOSE AEPB Inhale 1 puff into the lungs 2 times daily 180 each 1    albuterol sulfate HFA (VENTOLIN HFA) 108 (90 Base) MCG/ACT inhaler Inhale 2 puffs into the lungs every 6 hours as needed for Wheezing 1 Inhaler 3    hydrOXYzine (ATARAX) 25 MG tablet take 1 tablet by mouth once daily if needed for itching 30 tablet 5    RA VITAMIN D-3 50 MCG (2000 UT) CAPS take 1 capsule by mouth once daily MUST MAKE APPOINTMENT FOR FURTHER REFILL 30 capsule 0    clobetasol (TEMOVATE) 0.05 % ointment Apply to rash twice daily until resolved (not face, armpit or groin) 60 g 5    fluticasone (FLONASE) 50 MCG/ACT nasal spray instill 1 spray into each nostril once daily 16 g 5    lidocaine (L-M-X 4) 4 % cream Apply a thick layer 30 minutes prior to procedure, covering in saran wrap 15 g 0    EPINEPHrine (EPIPEN) 0.3 MG/0.3ML SOAJ injection Inject 0.3 mLs into the muscle once for 1 dose Use as directed for allergic reaction 1 each 0    Misc. Devices (BATHTUB SAFETY RAIL) MISC 4 each by Does not apply route daily 4 each 0    Misc.  Devices (EXTENDABLE BEDSIDE RAIL) MISC 4 each by Does not apply route daily 4 each 0    Spacer/Aero-Holding Chambers PRICE 1 EXAM:  /69   Pulse 59   Resp 14   Ht 4' 11\" (1.499 m)   Wt 250 lb (113.4 kg)   SpO2 100%   BMI 50.49 kg/m²  Body mass index is 50.49 kg/m². Physical Exam  Gen: alert and oriented to person and place. Psych:  Appropriate affect; Appropriate knowledge base; Appropriate mood; No hallucinations; Head: normocephalic, atraumatic   Chest: symmetric chest excursion; nonlabored respiratory effort. Pelvis: stable; no obvious pelvis deformity  Ortho Exam  Extremity: Patient ambulates with cane in the right hand. Tender over lumbosacral spine left buttock and para lumbars. No outward deformity of the left hip or lumbosacral spine is appreciated. Negative hip logroll and Stinchfield test on the left is noted. Diminished range of motion of the lumbosacral spine is noted in all planes mildly. No nerve tension signs to the bilateral lower extremities are appreciated. Motor, sensory, vascular examination of the bilateral lower extremities is grossly intact without focal deficits. Radiology:     Xr Hip Left (2-3 Views)    Result Date: 4/14/2021  History: Left hip pain Findings: Low AP, AP left hip, pelvis and frog-leg lateral x-ray of the left hip done in the office today shows no significant evidence of fracture, subluxation, dislocation, radiopaque foreign body, radiopaque tumor, significant joint space narrowing. Impression: Normal left hip x-rays as described above. ASSESSMENT:     1. Low back pain, unspecified back pain laterality, unspecified chronicity, unspecified whether sciatica present         PLAN:     Reviewed x-rays with the patient today in the office. Discussed with the patient that her pain is stemming from her back. I do feel her gait may play a factor in her pain as well. Discussed etiology and natural history of low back pain.   The treatment options may include oral anti-inflammatories, bracing, injections, advanced imaging, activity modification, physical therapy and/or surgical

## 2021-04-14 NOTE — LETTER
Dr. Amber Darnell  [address] [address] [place] [address] [address] [address]  Dept: [phone]  Dept Fax: [phone]        4/18/21    Patient: Nohelia Bolaños  YOB: 1960    Dear JJ Alanis CNP,    I had the pleasure of seeing one of your patients, Lenny Newell today in the office. Below are the relevant portions of my assessment and plan of care. IMPRESSION:  1. Low back pain, unspecified back pain laterality, unspecified chronicity, unspecified whether sciatica present      PLAN:  Reviewed x-rays with the patient today in the office. Discussed with the patient that her pain is stemming from her back. I do feel her gait may play a factor in her pain as well. Discussed etiology and natural history of low back pain. The treatment options may include oral anti-inflammatories, bracing, injections, advanced imaging, activity modification, physical therapy and/or surgical intervention. The patient would like to proceed with formal therapy for 18 session for her lower back. The patient will follow up in the office in 8 weeks. We discussed that the patient should call us with any concerns or questions. Thank you for allowing me to participate in the care of this patient. I will keep you updated on this patient's follow up and I look forward to serving you and your patients again in the future. Please don't hesitate to contact me at my mobile number [phone].         Adalid Boyd

## 2021-04-15 ENCOUNTER — HOSPITAL ENCOUNTER (OUTPATIENT)
Age: 61
Setting detail: SPECIMEN
Discharge: HOME | End: 2021-04-15
Payer: MEDICARE

## 2021-04-15 DIAGNOSIS — Z13.220 SCREENING CHOLESTEROL LEVEL: ICD-10-CM

## 2021-04-15 DIAGNOSIS — I10 ESSENTIAL HYPERTENSION: ICD-10-CM

## 2021-04-15 LAB
ABSOLUTE EOS #: 0.21 K/UL (ref 0–0.44)
ABSOLUTE IMMATURE GRANULOCYTE: <0.03 K/UL (ref 0–0.3)
ABSOLUTE LYMPH #: 2.8 K/UL (ref 1.1–3.7)
ABSOLUTE MONO #: 0.44 K/UL (ref 0.1–1.2)
ALBUMIN SERPL-MCNC: 4.2 G/DL (ref 3.5–5.2)
ALBUMIN/GLOBULIN RATIO: 1.3 (ref 1–2.5)
ALP BLD-CCNC: 80 U/L (ref 35–104)
ALT SERPL-CCNC: 14 U/L (ref 5–33)
ANION GAP SERPL CALCULATED.3IONS-SCNC: 8 MMOL/L (ref 9–17)
AST SERPL-CCNC: 16 U/L
BASOPHILS # BLD: 1 % (ref 0–2)
BASOPHILS ABSOLUTE: 0.04 K/UL (ref 0–0.2)
BILIRUB SERPL-MCNC: 0.37 MG/DL (ref 0.3–1.2)
BUN BLDV-MCNC: 10 MG/DL (ref 6–20)
BUN/CREAT BLD: ABNORMAL (ref 9–20)
CALCIUM SERPL-MCNC: 9.5 MG/DL (ref 8.6–10.4)
CHLORIDE BLD-SCNC: 97 MMOL/L (ref 98–107)
CHOLESTEROL, FASTING: 158 MG/DL
CHOLESTEROL/HDL RATIO: 2.3
CO2: 30 MMOL/L (ref 20–31)
CREAT SERPL-MCNC: 0.5 MG/DL (ref 0.5–0.9)
DIFFERENTIAL TYPE: ABNORMAL
EOSINOPHILS RELATIVE PERCENT: 3 % (ref 1–4)
GFR AFRICAN AMERICAN: >60 ML/MIN
GFR NON-AFRICAN AMERICAN: >60 ML/MIN
GFR SERPL CREATININE-BSD FRML MDRD: ABNORMAL ML/MIN/{1.73_M2}
GFR SERPL CREATININE-BSD FRML MDRD: ABNORMAL ML/MIN/{1.73_M2}
GLUCOSE BLD-MCNC: 79 MG/DL (ref 70–99)
HCT VFR BLD CALC: 44.4 % (ref 36.3–47.1)
HDLC SERPL-MCNC: 68 MG/DL
HEMOGLOBIN: 13.6 G/DL (ref 11.9–15.1)
IMMATURE GRANULOCYTES: 0 %
LDL CHOLESTEROL: 75 MG/DL (ref 0–130)
LYMPHOCYTES # BLD: 36 % (ref 24–43)
MCH RBC QN AUTO: 27.2 PG (ref 25.2–33.5)
MCHC RBC AUTO-ENTMCNC: 30.6 G/DL (ref 28.4–34.8)
MCV RBC AUTO: 88.8 FL (ref 82.6–102.9)
MONOCYTES # BLD: 6 % (ref 3–12)
NRBC AUTOMATED: 0 PER 100 WBC
PDW BLD-RTO: 15 % (ref 11.8–14.4)
PLATELET # BLD: 382 K/UL (ref 138–453)
PLATELET ESTIMATE: ABNORMAL
PMV BLD AUTO: 9.6 FL (ref 8.1–13.5)
POTASSIUM SERPL-SCNC: 3.9 MMOL/L (ref 3.7–5.3)
RBC # BLD: 5 M/UL (ref 3.95–5.11)
RBC # BLD: ABNORMAL 10*6/UL
SEG NEUTROPHILS: 54 % (ref 36–65)
SEGMENTED NEUTROPHILS ABSOLUTE COUNT: 4.27 K/UL (ref 1.5–8.1)
SODIUM BLD-SCNC: 135 MMOL/L (ref 135–144)
TOTAL PROTEIN: 7.4 G/DL (ref 6.4–8.3)
TRIGLYCERIDE, FASTING: 77 MG/DL
VLDLC SERPL CALC-MCNC: NORMAL MG/DL (ref 1–30)
WBC # BLD: 7.8 K/UL (ref 3.5–11.3)
WBC # BLD: ABNORMAL 10*3/UL

## 2021-04-20 ENCOUNTER — OFFICE VISIT (OUTPATIENT)
Dept: PODIATRY | Age: 61
End: 2021-04-20
Payer: MEDICARE

## 2021-04-20 VITALS — BODY MASS INDEX: 50.4 KG/M2 | WEIGHT: 250 LBS | HEIGHT: 59 IN | RESPIRATION RATE: 18 BRPM

## 2021-04-20 DIAGNOSIS — M79.672 LEFT FOOT PAIN: ICD-10-CM

## 2021-04-20 DIAGNOSIS — S92.355A CLOSED NONDISPLACED FRACTURE OF FIFTH METATARSAL BONE OF LEFT FOOT, INITIAL ENCOUNTER: ICD-10-CM

## 2021-04-20 DIAGNOSIS — R26.2 TROUBLE WALKING: Primary | ICD-10-CM

## 2021-04-20 PROCEDURE — G8427 DOCREV CUR MEDS BY ELIG CLIN: HCPCS | Performed by: PODIATRIST

## 2021-04-20 PROCEDURE — 28470 CLTX METATARSAL FX WO MNP EA: CPT | Performed by: PODIATRIST

## 2021-04-20 PROCEDURE — 3017F COLORECTAL CA SCREEN DOC REV: CPT | Performed by: PODIATRIST

## 2021-04-20 PROCEDURE — 99214 OFFICE O/P EST MOD 30 MIN: CPT | Performed by: PODIATRIST

## 2021-04-20 PROCEDURE — G8417 CALC BMI ABV UP PARAM F/U: HCPCS | Performed by: PODIATRIST

## 2021-04-20 PROCEDURE — 1036F TOBACCO NON-USER: CPT | Performed by: PODIATRIST

## 2021-04-20 RX ORDER — LOSARTAN POTASSIUM 100 MG/1
TABLET ORAL
COMMUNITY
Start: 2020-09-24 | End: 2021-09-28

## 2021-04-20 NOTE — PROGRESS NOTES
[facility] PHYSICIANS  [facility]  [address] [place] [address]  Dept: [phone]  Dept Fax: [phone]    RETURN PATIENT PROGRESS NOTE  Date of patient's visit: 4/20/2021  Patient's Name:  Bianca Turner YOB: 1960            Patient Care Team:  JJ Allen CNP as PCP - General (Nurse Practitioner)  JJ Allen CNP as PCP - [address] [place] [address] Dr Castro Provider  Heather Solano DPM as Physician (Podiatry)       Bianca Turner 64 y.o. female that presents for follow-up of   Chief Complaint   Patient presents with    Foot Injury     left foot x2 weeks     Pt's primary care physician is JJ Allen CNP last seen 03/26/2021  Symptoms began 2 week(s) ago and are unchanged . Patient relates pain is Present. Pain is rated 10 out of 10 and is described as constant, severe. Treatments prior to today's visit include: none. Currently denies F/C/N/V. Allergies   Allergen Reactions    Latex Hives     Latex gloves    Asa [Aspirin] Hives    Codeine Hives    Lisinopril-Hydrochlorothiazide Hives    Motrin [Ibuprofen] Hives    Other Hives and Swelling     Cherries, almonds    Pcn [Penicillins] Hives    Sulfa Antibiotics        Past Medical History:   Diagnosis Date    Allergic rhinitis 12/4/2014    Anxiety 7/5/2018    Arthritis     Asthma     Chest pain     occurs every other day, pt states they occur when she doesn't eat right or forget to take her bp med, occurs left side of chest with no radiation, pt states \"it's new\" and has not had any testing done or seen anyone for it.  Constipation     Depressive disorder 7/5/2018    Generalized osteoarthritis 6/26/2014    History of kidney stones     Hypertension     Obesity, morbid, BMI 40.0-49.9 ([address] 2/28/2017    PONV (postoperative nausea and vomiting)     Sleep apnea     does not use a machine       Prior to Admission medications    Medication Sig Start Date End Date Taking?  Authorizing (RAISED TOILET SEAT) MISC USE AS DIRECTED 10/23/18  Yes Dorisren Jocelynn DPM   hydrocortisone 2.5 % cream apply to affected area twice a day 10/17/18  Yes Lorren Forte, DPM   neomycin-polymyxin-hydrocortisone (CORTISPORIN) 3.5-86209-0 otic suspension instill 4 drops into affected ear  (S) three times a day 8/17/17  Yes Historical Provider, MD   Antipyrine-Benzocaine Kemi Union) 54-14 MG/ML otic solution Place 3 drops into the left ear 4 times daily as needed for Pain   Yes Historical Provider, MD   Elastic Bandages & Supports (JOBST KNEE HIGH COMPRESSION SM) MISC Dispense Bilateral Jobst Below Knee 20-30mmHg compression stockings 6/30/14  Yes Cynthia Mendez DPM   EPINEPHrine (EPIPEN) 0.3 MG/0.3ML SOAJ injection Inject 0.3 mLs into the muscle once for 1 dose Use as directed for allergic reaction 5/7/19 12/3/20  JJ Trevino - CNP   [facility]. Devices [facility]) MISC 1 Device by Does not apply route once for 1 dose 4/24/18 12/3/20  Claudy Cabezas DPM       Review of Systems    Review of Systems:  History obtained from chart review and the patient  General ROS: negative for - chills, fatigue, fever, night sweats or weight gain  Constitutional: Negative for chills, diaphoresis, fatigue, fever and unexpected weight change. Musculoskeletal: Positive for arthralgias, gait problem and joint swelling. Neurological ROS: negative for - behavioral changes, confusion, headaches or seizures. Negative for weakness and numbness. Dermatological ROS: negative for - mole changes, rash  Cardiovascular: Negative for leg swelling. Gastrointestinal: Negative for constipation, diarrhea, nausea and vomiting. Lower Extremity Physical Examination:     Vitals:   Vitals:    04/20/21 1510   Resp: 18     General: AAO x 3 in NAD. Dermatologic Exam:  Skin lesion/ulceration Absent . Skin No rashes or nodules noted. .       Musculoskeletal:     1st MPJ ROM decreased, Bilateral.  Muscle strength 5/5, Bilateral.  Pain present upon palpation of 5th met shaft left with ecchymosis and pitting edema. Medial longitudinal arch, Bilateral WNL. Ankle ROM WNL,Bilateral.    Dorsally contracted digits absent digits 1-5 Bilateral.     Vascular: DP and PT pulses palpable 2/4, Bilateral.  CFT <3 seconds, Bilateral.  Hair growth present to the level of the digits, Bilateral.  Edema absent, Bilateral.  Varicosities absent, Bilateral. Erythema absent, Bilateral    Neurological: Sensation intact to light touch to level of digits, Bilateral.  Protective sensation intact 10/10 sites via 5.07/10g [place] Monofilament, Bilateral.  negative Tinel's, Bilateral.  negative Valleix sign, Bilateral.      Integument: Warm, dry, supple, Bilateral.  Open lesion absent, Bilateral.  Interdigital maceration absent to web spaces 1-4, Bilateral.  Nails are normal in length, thickness and color 1-5 bilateral.  Fissures absent, Bilateral.       Left foot x rays 3 views:  Nondisplaced fracture of the midshaft of the 5th metatarsal.  There is no deviation or angulatino       Asessment: Patient is a 64 y.o. female with:    Diagnosis Orders   1. Trouble walking  XR FOOT LEFT (MIN 3 VIEWS)    NC CLOSED RX METATARSAL FX   2. Left foot pain  XR FOOT LEFT (MIN 3 VIEWS)    NC CLOSED RX METATARSAL FX   3. Closed nondisplaced fracture of fifth metatarsal bone of left foot, initial encounter  NC CLOSED RX METATARSAL FX         Plan: Patient examined and evaluated. Current condition and treatment options discussed in detail. Advised pt to her conditon. Pt is to go back into her CAM boot that she has at home as  ehas a fracture of the 5th met based on the left foot x rays. .  Verbal and written instructions given to patient. Contact office with any questions/problems/concerns. No orders of the defined types were placed in this encounter. No orders of the defined types were placed in this encounter.     All labs were reviewed and all imagining including the above findings were reviewed PRIOR to the patients arrival and with the patient today. Previous patient encounter was reviewed. Encounters from the patients other medical providers were reviewed and noted. Time was spent educating the patient and their families/caregivers on proper care of the feet and ankles. All the above diagnosis were addressed at todays visit and all questions were answered. A total of 35 minutes was spent with this patients encounter which included charting after the patients visit     RTC in 4week(s).     4/20/2021      Electronically signed by Kerry Aguilar DPM on 4/20/2021 at 3:12 PM  4/20/2021

## 2021-05-06 ENCOUNTER — TELEPHONE (OUTPATIENT)
Dept: FAMILY MEDICINE CLINIC | Age: 61
End: 2021-05-06

## 2021-05-06 NOTE — TELEPHONE ENCOUNTER
Pt says she broke her left foot on Easter Sunday. She got a summons for jury duty for the 12th and 13th. She wants to know if you will write her a letter to excuse her?

## 2021-05-13 ENCOUNTER — OFFICE VISIT (OUTPATIENT)
Dept: PODIATRY | Age: 61
End: 2021-05-13

## 2021-05-13 VITALS — HEIGHT: 59 IN | WEIGHT: 250 LBS | BODY MASS INDEX: 50.4 KG/M2

## 2021-05-13 DIAGNOSIS — S92.355A CLOSED NONDISPLACED FRACTURE OF FIFTH METATARSAL BONE OF LEFT FOOT, INITIAL ENCOUNTER: ICD-10-CM

## 2021-05-13 DIAGNOSIS — M79.672 LEFT FOOT PAIN: Primary | ICD-10-CM

## 2021-05-13 PROCEDURE — 99024 POSTOP FOLLOW-UP VISIT: CPT | Performed by: PODIATRIST

## 2021-05-13 RX ORDER — TRAMADOL HYDROCHLORIDE 50 MG/1
50 TABLET ORAL EVERY 4 HOURS PRN
Qty: 42 TABLET | Refills: 0 | Status: SHIPPED | OUTPATIENT
Start: 2021-05-13 | End: 2021-05-20

## 2021-05-18 NOTE — PROGRESS NOTES
[facility] PHYSICIANS  [facility]  [address] [place] [address]  Dept: [phone]  Dept Fax: [phone]    RETURN PATIENT PROGRESS NOTE  Date of patient's visit: 5/13/2021  Patient's Name:  Pro Chavez YOB: 1960            Patient Care Team:  JJ Lizarraga CNP as PCP - General (Nurse Practitioner)  JJ Lizarraga CNP as PCP - [address] [place] [address] [facility]  Simone Tovar DPM as Physician (Podiatry)       Pro Chavez 64 y.o. female that presents for follow-up of   Chief Complaint   Patient presents with    Foot Injury     injury to left foot     Pt's primary care physician is JJ Lizarraga CNP last seen 5/6/2021  Symptoms began 4 week(s) ago and are decreased . Patient relates pain is Present. Pain is rated 5 out of 10 and is described as mild. Treatments prior to today's visit include: x rays and showing a nondispalced 5th met fracture. Pt is walking in normal shoes despite me telling her to go back to the cam boot . Currently denies F/C/N/V. Allergies   Allergen Reactions    Latex Hives     Latex gloves    Asa [Aspirin] Hives    Codeine Hives    Lisinopril-Hydrochlorothiazide Hives    Motrin [Ibuprofen] Hives    Other Hives and Swelling     Cherries, almonds    Pcn [Penicillins] Hives    Sulfa Antibiotics        Past Medical History:   Diagnosis Date    Allergic rhinitis 12/4/2014    Anxiety 7/5/2018    Arthritis     Asthma     Chest pain     occurs every other day, pt states they occur when she doesn't eat right or forget to take her bp med, occurs left side of chest with no radiation, pt states \"it's new\" and has not had any testing done or seen anyone for it.     Constipation     Depressive disorder 7/5/2018    Generalized osteoarthritis 6/26/2014    History of kidney stones     Hypertension     Obesity, morbid, BMI 40.0-49.9 ([facility] [address] 2/28/2017    PONV (postoperative nausea and vomiting)     Sleep apnea     does not use a machine       Prior to Admission medications    Medication Sig Start Date End Date Taking? Authorizing Provider   traMADol (ULTRAM) 50 MG tablet Take 1 tablet by mouth every 4 hours as needed for Pain for up to 7 days. Intended supply: 7 days. Take lowest dose possible to manage pain 5/13/21 5/20/21 Yes Connie Parrish DPM   losartan (COZAAR) 100 MG tablet Take by mouth 9/24/20  Yes Historical Provider, MD   losartan-hydroCHLOROthiazide (HYZAAR) 100-25 MG per tablet Take 1 tablet by mouth daily 3/26/21  Yes JJ Dial CNP   amLODIPine (NORVASC) 5 MG tablet Take 1 tablet by mouth daily 3/26/21  Yes JJ Dial CNP   fluticasone-salmeterol (WIXELA INHUB) 250-50 MCG/DOSE AEPB Inhale 1 puff into the lungs 2 times daily 3/26/21  Yes JJ Cohen CNP   albuterol sulfate HFA (VENTOLIN HFA) 108 (90 Base) MCG/ACT inhaler Inhale 2 puffs into the lungs every 6 hours as needed for Wheezing 3/26/21  Yes JJ Cohen CNP   hydrOXYzine (ATARAX) 25 MG tablet take 1 tablet by mouth once daily if needed for itching 2/3/21  Yes JJ Cohen CNP   RA VITAMIN D-3 50 MCG (2000 UT) CAPS take 1 capsule by mouth once daily MUST MAKE APPOINTMENT FOR FURTHER REFILL 2/2/21  Yes JJ Dial CNP   clobetasol (TEMOVATE) 0.05 % ointment Apply to rash twice daily until resolved (not face, armpit or groin) 12/3/20  Yes Dann Estrada MD   fluticasone (FLONASE) 50 MCG/ACT nasal spray instill 1 spray into each nostril once daily 3/10/20  Yes JJ Cohen CNP   lidocaine (L-M-X 4) 4 % cream Apply a thick layer 30 minutes prior to procedure, covering in saran wrap 6/26/19  Yes Dann Estrada MD   Misc. Devices (BATHTUB SAFETY RAIL) MISC 4 each by Does not apply route daily 12/17/18  Yes Connie Parrish DPM   Misc.  Devices (EXTENDABLE BEDSIDE RAIL) MISC 4 each by Does not apply route daily 12/17/18  Yes Connie Parrish DPM   Spacer/Aero-Holding Chambers PRICE 1 Device by Does not apply route daily as needed (wheezing) 11/26/18  Yes JJ Shea CNP. Devices (WALL GRAB BAR) MIS GRAB BARS FOR BOTH SHOWER AND TOILET, USE AS DIRECTED 10/23/18  Yes Charly Russell DPM   Misc. Devices MISC OUTSIDE RAMP THAT INCLUDES RAILINGS, USE AS DIRECTED 10/23/18  Yes LEONOR Prescott. Devices (RAISED TOILET SEAT) MISC USE AS DIRECTED 10/23/18  Yes Charly Russell DPM   hydrocortisone 2.5 % cream apply to affected area twice a day 10/17/18  Yes Charly Russell DPM   neomycin-polymyxin-hydrocortisone (CORTISPORIN) 3.5-13525-1 otic suspension instill 4 drops into affected ear  (S) three times a day 8/17/17  Yes Historical Provider, MD   Antipyrine-Benzocaine Haleyloc Carter) 54-14 MG/ML otic solution Place 3 drops into the left ear 4 times daily as needed for Pain   Yes Historical Provider, MD   Elastic Bandages & Supports (JOBST KNEE HIGH COMPRESSION SM) MISC Dispense Bilateral Jobst Below Knee 20-30mmHg compression stockings 6/30/14  Yes Real Bleak, DPKALEB   EPINEPHrine (EPIPEN) 0.3 MG/0.3ML SOAJ injection Inject 0.3 mLs into the muscle once for 1 dose Use as directed for allergic reaction 5/7/19 12/3/20  JJ Shea CNP   Misc. Devices [facility]) MISC 1 Device by Does not apply route once for 1 dose 4/24/18 12/3/20  Charly Russell DPM       Review of Systems    Review of Systems:  History obtained from chart review and the patient  General ROS: negative for - chills, fatigue, fever, night sweats or weight gain  Constitutional: Negative for chills, diaphoresis, fatigue, fever and unexpected weight change. Musculoskeletal: Positive for arthralgias, gait problem and joint swelling. Neurological ROS: negative for - behavioral changes, confusion, headaches or seizures. Negative for weakness and numbness. Dermatological ROS: negative for - mole changes, rash  Cardiovascular: Negative for leg swelling.    Gastrointestinal: Negative for constipation, diarrhea, nausea and vomiting. Lower Extremity Physical Examination:     Vitals: There were no vitals filed for this visit. General: AAO x 3 in NAD. Dermatologic Exam:  Skin lesion/ulceration Absent . Skin No rashes or nodules noted. .       Musculoskeletal:     1st MPJ ROM decreased, Bilateral.  Muscle strength 5/5, Bilateral.  Pain present upon palpation of 5th met shaft left with ecchymosis and pitting edema. Medial longitudinal arch, Bilateral WNL. Ankle ROM WNL,Bilateral.    Dorsally contracted digits absent digits 1-5 Bilateral.     Vascular: DP and PT pulses palpable 2/4, Bilateral.  CFT <3 seconds, Bilateral.  Hair growth present to the level of the digits, Bilateral.  Edema absent, Bilateral.  Varicosities absent, Bilateral. Erythema absent, Bilateral    Neurological: Sensation intact to light touch to level of digits, Bilateral.  Protective sensation intact 10/10 sites via 5.07/10g Snover-Daniela Monofilament, Bilateral.  negative Tinel's, Bilateral.  negative Valleix sign, Bilateral.      Integument: Warm, dry, supple, Bilateral.  Open lesion absent, Bilateral.  Interdigital maceration absent to web spaces 1-4, Bilateral.  Nails are normal in length, thickness and color 1-5 bilateral.  Fissures absent, Bilateral.       Left foot x rays 3 views:  Nondisplaced fracture of the midshaft of the 5th metatarsal.  It is healing with callus formation     Asessment: Patient is a 64 y.o. female with:   1. Left foot pain    2. Closed nondisplaced fracture of fifth metatarsal bone of left foot, initial encounter        Plan: Patient examined and evaluated. Current condition and treatment options discussed in detail. Advised pt to the left 3 views x rays. Pt is to conitnue to be in CAM boot and remain in there when weightbearing. Will re x ray in 4 weeks. Verbal and written instructions given to patient. Contact office with any questions/problems/concerns.      Orders Placed This Encounter   Procedures    XR FOOT LEFT (MIN 3 VIEWS)     Orders Placed This Encounter   Medications    traMADol (ULTRAM) 50 MG tablet     Sig: Take 1 tablet by mouth every 4 hours as needed for Pain for up to 7 days. Intended supply: 7 days. Take lowest dose possible to manage pain     Dispense:  42 tablet     Refill:  0     Reduce doses taken as pain becomes manageable        RTC in 4week(s).     5/13/2021      Electronically signed by Maritza Aldridge DPM on 5/18/2021 at 3:03 PM  5/13/2021

## 2021-06-01 ENCOUNTER — NURSE TRIAGE (OUTPATIENT)
Dept: OTHER | Facility: CLINIC | Age: 61
End: 2021-06-01

## 2021-06-01 ENCOUNTER — HOSPITAL ENCOUNTER (OUTPATIENT)
Age: 61
Setting detail: SPECIMEN
Discharge: HOME | End: 2021-06-01
Payer: MEDICARE

## 2021-06-01 ENCOUNTER — OFFICE VISIT (OUTPATIENT)
Dept: FAMILY MEDICINE CLINIC | Age: 61
End: 2021-06-01
Payer: MEDICARE

## 2021-06-01 VITALS
HEART RATE: 88 BPM | DIASTOLIC BLOOD PRESSURE: 82 MMHG | OXYGEN SATURATION: 98 % | BODY MASS INDEX: 49.48 KG/M2 | SYSTOLIC BLOOD PRESSURE: 132 MMHG | WEIGHT: 245 LBS

## 2021-06-01 DIAGNOSIS — R10.9 FLANK PAIN: Primary | ICD-10-CM

## 2021-06-01 DIAGNOSIS — R10.9 FLANK PAIN: ICD-10-CM

## 2021-06-01 DIAGNOSIS — R31.9 HEMATURIA, UNSPECIFIED TYPE: ICD-10-CM

## 2021-06-01 DIAGNOSIS — R35.0 URINE FREQUENCY: ICD-10-CM

## 2021-06-01 LAB
BILIRUBIN, POC: ABNORMAL
BLOOD URINE, POC: ABNORMAL
CLARITY, POC: ABNORMAL
COLOR, POC: YELLOW
GLUCOSE URINE, POC: ABNORMAL
KETONES, POC: ABNORMAL
LEUKOCYTE EST, POC: ABNORMAL
NITRITE, POC: ABNORMAL
PH, POC: 7
PROTEIN, POC: ABNORMAL
SPECIFIC GRAVITY, POC: 1.02
UROBILINOGEN, POC: 0.2

## 2021-06-01 PROCEDURE — 1036F TOBACCO NON-USER: CPT | Performed by: NURSE PRACTITIONER

## 2021-06-01 PROCEDURE — G8427 DOCREV CUR MEDS BY ELIG CLIN: HCPCS | Performed by: NURSE PRACTITIONER

## 2021-06-01 PROCEDURE — G8417 CALC BMI ABV UP PARAM F/U: HCPCS | Performed by: NURSE PRACTITIONER

## 2021-06-01 PROCEDURE — 3017F COLORECTAL CA SCREEN DOC REV: CPT | Performed by: NURSE PRACTITIONER

## 2021-06-01 PROCEDURE — 81002 URINALYSIS NONAUTO W/O SCOPE: CPT | Performed by: NURSE PRACTITIONER

## 2021-06-01 PROCEDURE — 99213 OFFICE O/P EST LOW 20 MIN: CPT | Performed by: NURSE PRACTITIONER

## 2021-06-01 ASSESSMENT — ENCOUNTER SYMPTOMS: ABDOMINAL PAIN: 1

## 2021-06-01 NOTE — PROGRESS NOTES
Rene Alston, FNP-C  [address]  [address] [facility]. [address]  [phone]  [phone]    Jan Del Rio is a 64 y.o. female who is here with c/o of:    Chief Complaint: Flank Pain (right side and started yesterday ) and Urinary Frequency      Patient Accompanied by: n/a  HPI - Jan Del Rio is here today for the following:     Flank Pain  This is a new problem. The current episode started yesterday. The problem occurs constantly. The problem has been gradually worsening since onset. Pain location: right lumbar region. The quality of the pain is described as aching and cramping. Radiates to: right abdomen/pelvic area. The pain is moderate. The pain is the same all the time. Exacerbated by: urinating. Associated symptoms include abdominal pain, dysuria and pelvic pain. Pertinent negatives include no fever. (Urinary frequency) Risk factors: h/o renal calculi.          Patient Active Problem List   Diagnosis    Essential hypertension    YAMEL (obstructive sleep apnea)    Allergic asthma    Obesity, morbid, BMI 40.0-49.9 ([facility])    Urticaria    Contracture of left ankle    Chronic pain of left ankle    Spontaneous rupture of flexor tendon of left lower leg    Allergic rhinitis    Anxiety    Benign neoplasm of skin    Constipation    Cyst of ovary    Generalized osteoarthritis    Depressive disorder    Edema    Food allergy    Osteoarthrosis involving multiple sites but not designated as generalized    Reactive airway disease    Rupture of posterior tibialis tendon    Vitamin D deficiency    Morbid obesity with BMI of 50.0-59.9, adult ([facility] [address]    Status post left foot surgery    Mild intermittent asthma without complication     Past Medical History:   Diagnosis Date    Allergic rhinitis 12/4/2014    Anxiety 7/5/2018    Arthritis     Asthma     Chest pain     occurs every other day, pt states they occur when she doesn't eat right or forget to take her bp med, [Penicillins] Hives    Sulfa Antibiotics           Subjective     · Constitutional:  Negative for activity change, appetite change,unexpected weight change, chills, fever, and fatigue. · HENT: Negative for ear pain, sore throat,  Rhinorrhea, sinus pain, sinus pressure, congestion. · Eyes:  Negative for pain and discharge. · Respiratory:  Negative for chest tightness, shortness of breath, wheezing, and cough. · Cardiovascular:  Negative for chest pain, palpitations and leg swelling. · Gastrointestinal: Negative for abdominal pain, blood in stool, constipation,diarrhea, nausea and vomiting. · Endocrine: Negative for cold intolerance, heat intolerance, polydipsia, polyphagia and polyuria. · Genitourinary: Negative for difficulty urinating, Positive for dysuria, flank pain, frequency, hematuria and urgency. · Musculoskeletal: Negative for arthralgias, back pain, joint swelling, myalgias, neck pain and neck stiffness. · Skin: Negative for rash and wound. · Allergic/Immunologic: Negative for environmental allergies and food allergies. · Neurological:  Negative for dizziness, light-headedness, numbness and headaches. · Hematological:  Negative for adenopathy. Does not bruise/bleed easily. · Psychiatric/Behavioral: Negative for self-injury, sleep disturbance and suicidal ideas. Objective     PHYSICAL EXAM:   · Constitutional: Juhi Mendes is oriented to person, place, and time. Vital signs are normal. Appears well-developed and well-nourished. · HEENT:   · Head: Normocephalic and atraumatic. · Eyes:PERRL, EOMI, Conjunctiva normal, No discharge. · Neck: Full passive range of motion. · Cardiovascular: Normal rate, regular rhythm, S1, S2, no murmur, no gallop, no friction rub, intact distal pulses. No carotid bruit. No lower extremity edema  · Pulmonary/Chest: Breath sounds are clear throughout, No respiratory distress, No wheezing, No chest tenderness. Effort normal  · Abdominal: Soft. instructions  3. Was a self-tracking handout given in paper form or via CraigsBlueBookhart? No  If yes, see orders or list here. 4.  Discussed use, benefit, and side effects of prescribed medications. Barriers to medication compliance addressed. All patient questions answered. Pt voiced understanding. 5.  Reviewed prior labs, imaging, consultation, follow up, and health maintenance  6. Continue current medications, diet and exercise. 7. Discussed use, benefit, and side effects of prescribed medications. Barriers to medication compliance addressed. All her questions were answered. Pt voiced understanding. [place] will continue current medications, diet and exercise. Patient given educational materials on kidney stones    Of the 25 minute duration appointment visit, Ese Ochoa CNP spent at least 50% of the face-to-face time in counseling, explanation of diagnosis, planning of further management, and answering all questions. Signed:  Ese Ochoa CNP    This note is created with the assistance of a speech-recognition program.  While intending to generate a document that actually reflects the content of the visit, no guarantees can be provided that every mistake has been identified and corrected by editing.

## 2021-06-01 NOTE — PATIENT INSTRUCTIONS
Patient Education   Patient Education        Kidney Stone: Care Instructions  Your Care Instructions     Kidney stones are formed when salts, minerals, and other substances normally found in the urine clump together. They can be as small as grains of sand or, rarely, as large as golf balls. While the stone is traveling through the ureter, which is the tube that carries urine from the kidney to the bladder, you will probably feel pain. The pain may be mild or very severe. You may also have some blood in your urine. As soon as the stone reaches the bladder, any intense pain should go away. If a stone is too large to pass on its own, you may need a medical procedure to help you pass the stone. The doctor has checked you carefully, but problems can develop later. If you notice any problems or new symptoms, get medical treatment right away. Follow-up care is a key part of your treatment and safety. Be sure to make and go to all appointments, and call your doctor if you are having problems. It's also a good idea to know your test results and keep a list of the medicines you take. How can you care for yourself at home? · Drink plenty of fluids. If you have kidney, heart, or liver disease and have to limit fluids, talk with your doctor before you increase the amount of fluids you drink. · Take pain medicines exactly as directed. Call your doctor if you think you are having a problem with your medicine. ? If the doctor gave you a prescription medicine for pain, take it as prescribed. ? If you are not taking a prescription pain medicine, ask your doctor if you can take an over-the-counter medicine. Read and follow all instructions on the label. · Your doctor may ask you to strain your urine so that you can collect your kidney stone when it passes. You can use a kitchen strainer or a tea strainer to catch the stone. Store it in a plastic bag until you see your doctor again.   Preventing future kidney stones  Some changes in your diet may help prevent kidney stones. Depending on the cause of your stones, your doctor may recommend that you:  · Drink plenty of fluids, enough so that your urine is light yellow or clear like water. If you have kidney, heart, or liver disease and have to limit fluids, talk with your doctor before you increase the amount of fluids you drink. · Limit coffee, tea, and alcohol. Also avoid grapefruit juice. · Do not take more than the recommended daily dose of vitamins C and D.  · Avoid antacids such as Gaviscon, Maalox, Mylanta, or Tums. · Limit the amount of salt (sodium) in your diet. · Eat a balanced diet that is not too high in protein. · Limit foods that are high in a substance called oxalate, which can cause kidney stones. These foods include dark green vegetables, rhubarb, chocolate, wheat bran, nuts, cranberries, and beans. When should you call for help? Call your doctor now or seek immediate medical care if:    · You cannot keep down fluids.     · Your pain gets worse.     · You have a fever or chills.     · You have new or worse pain in your back just below your rib cage (the flank area).     · You have new or more blood in your urine. Watch closely for changes in your health, and be sure to contact your doctor if:    · You do not get better as expected. Where can you learn more? Go to https://ScaleXtremelandonNxThera.TELiBrahma. org and sign in to your Tenant Magic account. Enter G537 in the [facility] box to learn more about \"Kidney Stone: Care Instructions. \"     If you do not have an account, please click on the \"Sign Up Now\" link. Current as of: December 17, 2020               Content Version: 12.8  © 9171-6432 Healthwise, [employer]etch[employer]. Care instructions adapted under license by [facility] ([facility]).  If you have questions about a medical condition or this instruction, always ask your healthcare professional. Ekta Gay any warranty or liability for your use of this information. Learning About Diet for Kidney Stone Prevention  What are kidney stones? Kidney stones are small \"pavel\" that form in your kidneys. They're made of salts and minerals in the urine. Stones may not cause a problem as long as they stay in the kidneys. But they can cause sudden, severe pain. Pain is most likely when the stones travel through the ureters (the tubes that carry urine from the kidneys to the bladder). Kidney stones can cause bloody urine. Kidney stones often run in families. You are more likely to get them if you don't drink enough fluids, mainly water. Certain foods and drinks and some dietary supplements may also increase your risk for kidney stones if you consume too much of them. How can you prevent kidney stones with your diet? The following tips may lower your chance of getting kidney stones or from getting them again. · Drink more fluids, especially water, if your doctor says it is okay. This is the most important thing you can do. · Change your diet if you've had a calcium kidney stone. ? Eat less salt and salty foods. One way to do this is to avoid processed foods and limit how often you eat at restaurants. ? Talk to your doctor or dietitian about how much calcium you need every day. Try to get your calcium from food, rather than from supplements. Milk, cheese, and yogurt are all good sources of calcium. · Limit certain foods if you've had an oxalate kidney stone. Your doctor may ask you to limit certain foods that have a lot of oxalate, such as dark green vegetables, nuts, and chocolate. You don't have to give up these foods, just eat or drink less of them. · Change your diet if you've had kidney stones in the past.  ? Eat a balanced diet that is not too high in animal protein. This includes beef, chicken, pork, fish, and eggs. These foods contain a lot of protein, and too much protein may lead to kidney stones.  You don't have to give up these foods. Talk to your doctor or dietitian about how much protein you need and the best way to get it. ? Increase how much fiber you eat. Fiber includes oat bran, beans, whole wheat breads, wheat cereals, cabbage, and carrots. ? Avoid grapefruit juice. ? Drink lemonade made from real michael (not lemon flavoring). It is high in citrate, which may help prevent kidney stones. ? Talk to your doctor if you take vitamins or supplements. Your doctor may want you to limit how much fish liver oil or calcium supplements you take. Also, do not take more than the recommended daily dose of vitamins C and D. Follow-up care is a key part of your treatment and safety. Be sure to make and go to all appointments, and call your doctor if you are having problems. It's also a good idea to know your test results and keep a list of the medicines you take. Where can you learn more? Go to https://Taxify.Friends Around. org and sign in to your WigWag account. Enter C138 in the HEROZ box to learn more about \"Learning About Diet for Kidney Stone Prevention. \"     If you do not have an account, please click on the \"Sign Up Now\" link. Current as of: December 17, 2020               Content Version: 12.8  © 2006-2021 Healthwise, Incorporated. Care instructions adapted under license by [facility] ([facility]). If you have questions about a medical condition or this instruction, always ask your healthcare professional. [place] [address] any warranty or liability for your use of this information.

## 2021-06-02 ENCOUNTER — HOSPITAL ENCOUNTER (OUTPATIENT)
Dept: CT IMAGING | Age: 61
Discharge: HOME | End: 2021-06-04
Payer: MEDICARE

## 2021-06-02 DIAGNOSIS — R35.0 URINE FREQUENCY: ICD-10-CM

## 2021-06-02 DIAGNOSIS — R10.9 FLANK PAIN: ICD-10-CM

## 2021-06-02 DIAGNOSIS — R31.9 HEMATURIA, UNSPECIFIED TYPE: ICD-10-CM

## 2021-06-02 LAB
CREAT SERPL-MCNC: 0.59 MG/DL (ref 0.5–0.9)
CULTURE: NORMAL
GFR AFRICAN AMERICAN: >60 ML/MIN
GFR NON-AFRICAN AMERICAN: >60 ML/MIN
GFR SERPL CREATININE-BSD FRML MDRD: NORMAL ML/MIN/{1.73_M2}
GFR SERPL CREATININE-BSD FRML MDRD: NORMAL ML/MIN/{1.73_M2}
Lab: NORMAL
SPECIMEN DESCRIPTION: NORMAL

## 2021-06-02 PROCEDURE — 36415 COLL VENOUS BLD VENIPUNCTURE: CPT

## 2021-06-02 PROCEDURE — 82565 ASSAY OF CREATININE: CPT

## 2021-06-02 PROCEDURE — 74178 CT ABD&PLV WO CNTR FLWD CNTR: CPT

## 2021-06-02 PROCEDURE — 6360000004 HC RX CONTRAST MEDICATION: Performed by: NURSE PRACTITIONER

## 2021-06-02 PROCEDURE — 2580000003 HC RX 258: Performed by: NURSE PRACTITIONER

## 2021-06-02 RX ORDER — 0.9 % SODIUM CHLORIDE 0.9 %
80 INTRAVENOUS SOLUTION INTRAVENOUS ONCE
Status: COMPLETED | OUTPATIENT
Start: 2021-06-02 | End: 2021-06-02

## 2021-06-02 RX ORDER — SODIUM CHLORIDE 0.9 % (FLUSH) 0.9 %
10 SYRINGE (ML) INJECTION PRN
Status: DISCONTINUED | OUTPATIENT
Start: 2021-06-02 | End: 2021-06-05 | Stop reason: HOSPADM

## 2021-06-02 RX ADMIN — SODIUM CHLORIDE 80 ML: 9 INJECTION, SOLUTION INTRAVENOUS at 17:13

## 2021-06-02 RX ADMIN — IOPAMIDOL 120 ML: 755 INJECTION, SOLUTION INTRAVENOUS at 17:13

## 2021-06-02 RX ADMIN — SODIUM CHLORIDE, PRESERVATIVE FREE 10 ML: 5 INJECTION INTRAVENOUS at 17:14

## 2021-06-03 DIAGNOSIS — N20.0 RENAL CALCULI: ICD-10-CM

## 2021-06-03 DIAGNOSIS — R35.0 URINE FREQUENCY: Primary | ICD-10-CM

## 2021-06-03 RX ORDER — TAMSULOSIN HYDROCHLORIDE 0.4 MG/1
0.4 CAPSULE ORAL DAILY
Qty: 30 CAPSULE | Refills: 3 | Status: SHIPPED | OUTPATIENT
Start: 2021-06-03 | End: 2021-09-28 | Stop reason: ALTCHOICE

## 2021-06-10 ENCOUNTER — OFFICE VISIT (OUTPATIENT)
Dept: PODIATRY | Age: 61
End: 2021-06-10
Payer: MEDICARE

## 2021-06-10 VITALS — WEIGHT: 245 LBS | HEIGHT: 59 IN | BODY MASS INDEX: 49.39 KG/M2

## 2021-06-10 DIAGNOSIS — M79.672 LEFT FOOT PAIN: Primary | ICD-10-CM

## 2021-06-10 DIAGNOSIS — R26.2 TROUBLE WALKING: ICD-10-CM

## 2021-06-10 PROCEDURE — 1036F TOBACCO NON-USER: CPT | Performed by: PODIATRIST

## 2021-06-10 PROCEDURE — 3017F COLORECTAL CA SCREEN DOC REV: CPT | Performed by: PODIATRIST

## 2021-06-10 PROCEDURE — G8417 CALC BMI ABV UP PARAM F/U: HCPCS | Performed by: PODIATRIST

## 2021-06-10 PROCEDURE — G8427 DOCREV CUR MEDS BY ELIG CLIN: HCPCS | Performed by: PODIATRIST

## 2021-06-10 PROCEDURE — 99213 OFFICE O/P EST LOW 20 MIN: CPT | Performed by: PODIATRIST

## 2021-06-11 NOTE — PROGRESS NOTES
[facility] PHYSICIANS  [facility]  [address] [place] [address]  Dept: [phone]  Dept Fax: [phone]    RETURN PATIENT PROGRESS NOTE  Date of patient's visit: 6/11/2021  Patient's Name:  Allison Padilla YOB: 1960            Patient Care Team:  JJ Harper CNP as PCP - General (Nurse Practitioner)  JJ Harper CNP as PCP - [address] [place] [address] Dr Castro Provider  Savanah Florence DPM as Physician (Podiatry)       Allison Valerie 64 y.o. female that presents for follow-up of   Chief Complaint   Patient presents with    Foot Pain     left foot     Pt's primary care physician is JJ Harper CNP last seen 06/01/2021  Symptoms began 2 month(s) ago and are unchanged . Patient relates pain is Present. Pain is rated 8 out of 10 and is described as constant, severe. Treatments prior to today's visit include: previous x rays and using shoes . Currently denies F/C/N/V. Allergies   Allergen Reactions    Latex Hives     Latex gloves    Asa [Aspirin] Hives    Codeine Hives    Lisinopril-Hydrochlorothiazide Hives    Motrin [Ibuprofen] Hives    Other Hives and Swelling     Cherries, almonds    Pcn [Penicillins] Hives    Sulfa Antibiotics        Past Medical History:   Diagnosis Date    Allergic rhinitis 12/4/2014    Anxiety 7/5/2018    Arthritis     Asthma     Chest pain     occurs every other day, pt states they occur when she doesn't eat right or forget to take her bp med, occurs left side of chest with no radiation, pt states \"it's new\" and has not had any testing done or seen anyone for it.     Constipation     Depressive disorder 7/5/2018    Generalized osteoarthritis 6/26/2014    History of kidney stones     Hypertension     Obesity, morbid, BMI 40.0-49.9 ([address] 2/28/2017    PONV (postoperative nausea and vomiting)     Sleep apnea     does not use a machine       Prior to Admission medications    Medication Sig Start Date End Date Taking? Authorizing Provider   tamsulosin (FLOMAX) 0.4 MG capsule Take 1 capsule by mouth daily 6/3/21  Yes JJ Cohen CNP   RA VITAMIN D-3 50 MCG (2000 UT) CAPS take 1 capsule by mouth once daily MUST MAKE APPOINTMENT FOR FURTHER REFILL 5/25/21  Yes JJ Mandujano CNP   losartan (COZAAR) 100 MG tablet Take by mouth 9/24/20  Yes Historical Provider, MD   losartan-hydroCHLOROthiazide (HYZAAR) 100-25 MG per tablet Take 1 tablet by mouth daily 3/26/21  Yes JJ Mandujano CNP   amLODIPine (NORVASC) 5 MG tablet Take 1 tablet by mouth daily 3/26/21  Yes JJ Cohen CNP   fluticasone-salmeterol (WIXELA INHUB) 250-50 MCG/DOSE AEPB Inhale 1 puff into the lungs 2 times daily 3/26/21  Yes JJ Cohen CNP   albuterol sulfate HFA (VENTOLIN HFA) 108 (90 Base) MCG/ACT inhaler Inhale 2 puffs into the lungs every 6 hours as needed for Wheezing 3/26/21  Yes JJ Cohen CNP   hydrOXYzine (ATARAX) 25 MG tablet take 1 tablet by mouth once daily if needed for itching 2/3/21  Yes JJ Mandujano CNP   clobetasol (TEMOVATE) 0.05 % ointment Apply to rash twice daily until resolved (not face, armpit or groin) 12/3/20  Yes Nawaf Longo MD   fluticasone (FLONASE) 50 MCG/ACT nasal spray instill 1 spray into each nostril once daily 3/10/20  Yes JJ Cohen CNP   lidocaine (L-M-X 4) 4 % cream Apply a thick layer 30 minutes prior to procedure, covering in saran wrap 6/26/19  Yes Nawaf Longo MD   Misc. Devices (BATHTUB SAFETY RAIL) MISC 4 each by Does not apply route daily 12/17/18  Yes Jeanine Forbes DPM   Misc. Devices (EXTENDABLE BEDSIDE RAIL) MISC 4 each by Does not apply route daily 12/17/18  Yes Jeanine Forbes DPM   Spacer/Aero-Holding Chambers PRICE 1 Device by Does not apply route daily as needed (wheezing) 11/26/18  Yes JJ Mandujano - CNP   [facility].  Devices (WALL GRAB BAR) [facility] GRAB BARS FOR BOTH SHOWER AND TOILET, USE AS DIRECTED 10/23/18  Yes Jeanine Forbes DPM Misc. Devices MISC OUTSIDE RAMP THAT INCLUDES RAILINGS, USE AS DIRECTED 10/23/18  Yes Waneta Ring, DPM   Misc. Devices (RAISED TOILET SEAT) MISC USE AS DIRECTED 10/23/18  Yes Waneta Ring, DPM   hydrocortisone 2.5 % cream apply to affected area twice a day 10/17/18  Yes Waneta Ring, DPM   neomycin-polymyxin-hydrocortisone (CORTISPORIN) 3.5-55292-9 otic suspension instill 4 drops into affected ear  (S) three times a day 8/17/17  Yes Historical Provider, MD   Antipyrine-Benzocaine Haley Emma) 54-14 MG/ML otic solution Place 3 drops into the left ear 4 times daily as needed for Pain   Yes Historical Provider, MD   Elastic Bandages & Supports (JOBST KNEE HIGH COMPRESSION SM) MISC Dispense Bilateral Jobst Below Knee 20-30mmHg compression stockings 6/30/14  Yes Real Bleak, DPM   EPINEPHrine (EPIPEN) 0.3 MG/0.3ML SOAJ injection Inject 0.3 mLs into the muscle once for 1 dose Use as directed for allergic reaction 5/7/19 12/3/20  JJ Shea - CNP   Misc. Devices [facility]) MISC 1 Device by Does not apply route once for 1 dose 4/24/18 12/3/20  CRISTY PrescottM       Review of Systems    Review of Systems:  History obtained from chart review and the patient  General ROS: negative for - chills, fatigue, fever, night sweats or weight gain  Constitutional: Negative for chills, diaphoresis, fatigue, fever and unexpected weight change. Musculoskeletal: Positive for arthralgias, gait problem and joint swelling. Neurological ROS: negative for - behavioral changes, confusion, headaches or seizures. Negative for weakness and numbness. Dermatological ROS: negative for - mole changes, rash  Cardiovascular: Negative for leg swelling. Gastrointestinal: Negative for constipation, diarrhea, nausea and vomiting. Lower Extremity Physical Examination:     Vitals: There were no vitals filed for this visit. General: AAO x 3 in NAD. Derm  Skin lesion/ulceration Absent .    Skin No rashes or 6/10/2021      Electronically signed by Jourdan Saab DPM on 6/11/2021 at 2:04 PM  6/10/2021

## 2021-08-03 ENCOUNTER — OFFICE VISIT (OUTPATIENT)
Dept: UROLOGY | Age: 61
End: 2021-08-03
Payer: MEDICARE

## 2021-08-03 VITALS
WEIGHT: 245 LBS | BODY MASS INDEX: 49.39 KG/M2 | DIASTOLIC BLOOD PRESSURE: 56 MMHG | TEMPERATURE: 97.3 F | HEART RATE: 75 BPM | HEIGHT: 59 IN | SYSTOLIC BLOOD PRESSURE: 88 MMHG

## 2021-08-03 DIAGNOSIS — R31.0 GROSS HEMATURIA: ICD-10-CM

## 2021-08-03 DIAGNOSIS — N20.1 URETERAL STONE: Primary | ICD-10-CM

## 2021-08-03 PROCEDURE — 3017F COLORECTAL CA SCREEN DOC REV: CPT | Performed by: UROLOGY

## 2021-08-03 PROCEDURE — 1036F TOBACCO NON-USER: CPT | Performed by: UROLOGY

## 2021-08-03 PROCEDURE — G8417 CALC BMI ABV UP PARAM F/U: HCPCS | Performed by: UROLOGY

## 2021-08-03 PROCEDURE — 99204 OFFICE O/P NEW MOD 45 MIN: CPT | Performed by: UROLOGY

## 2021-08-03 PROCEDURE — G8427 DOCREV CUR MEDS BY ELIG CLIN: HCPCS | Performed by: UROLOGY

## 2021-08-03 RX ORDER — LORATADINE 10 MG/1
TABLET ORAL
COMMUNITY
Start: 2021-07-03 | End: 2021-12-07

## 2021-08-03 ASSESSMENT — ENCOUNTER SYMPTOMS
COUGH: 0
BACK PAIN: 0
SHORTNESS OF BREATH: 0
EYE REDNESS: 0
ABDOMINAL PAIN: 0
NAUSEA: 0
CONSTIPATION: 0
EYE PAIN: 0
WHEEZING: 0
VOMITING: 0

## 2021-08-03 NOTE — LETTER
[address] [address]  Dept: [phone]  Dept Fax: [phone]        8/3/21    Patient: Iggy Lopez  YOB: 1960    Dear Alejandra Ortiz, JJ - CNP,    I had the pleasure of seeing one of your patients, Uli Snyder today in the office today. Below are the relevant portions of my assessment and plan of care. IMPRESSION:  1. Ureteral stone    2. Gross hematuria         PLAN:  She has a small right UVJ stone, which she likely passed. Will need a cysto to finish hematuria evaluation. Discussed stone prevention. Prescriptions Ordered:  No orders of the defined types were placed in this encounter. Orders Placed:  No orders of the defined types were placed in this encounter. Thank you for allowing me to participate in the care of this patient. I will keep you updated on this patient's follow up and I look forward to serving you and your patients again in the future.         Sang Pfeiffer MD

## 2021-08-03 NOTE — PROGRESS NOTES
[address] [address]  Dept:  Joel Kim [facility] Urology Office Note - New Patient    Patient:  Js Noland  YOB: 1960  Date: 8/3/2021    The patient is a 64 y.o. female who presentstoday for evaluation of the following problems:   Chief Complaint   Patient presents with    New Patient     Kidney stone and pain in right side. CTU done 6/2/21 and Cirilo Ramirez CNP started her on Flomax and she thinks she passed it     referred by JJ Kevin CNP. HPI  She is here for hematuria. She was having some flank pain as well. She had a CTU done, which showed a 3mm right UVJ stone. She was on Flomax and she thinks she passed the stone. She saw blood before, but has not seen any recently. She is voiding well. No dysuria. (Patient's old records have been requested, reviewed and summarized in today's note.)    Summary of old records: N/A    History: N/A    ProceduresToday: N/A    Urinalysis today:  No results found for this visit on 08/03/21. AUA Symptom Score (8/3/2021):   INCOMPLETE EMPTYING: How often have you had the sensation of not emptying your bladder?: Not at all  FREQUENCY: How often do you have to urinate less than every two hours?: About Half the time  INTERMITTENCY: How often have you found you stopped and started again several times when you urinated?: Not at all  URGENCY: How often have you found it difficult to postpone urination?: About Half the time  WEAK STREAM: How often have you had a weak urinary stream?: Not at all  STRAINING: How often have you had to strain to start  urination?: Not at all  NOCTURIA: How many times did you typically get up at night to uriniate?: 1 Time  TOTAL I-PSS SCORE[de-identified] 7  How would you feel if you were to spend the rest of your life with your urinary condition?: Mostly Satisfied    Last BUN andcreatinine:  Lab Results   Component Value Date    BUN 10 04/15/2021     Lab Results   Component Value Date    CREATININE 0.59 06/02/2021       Additional Lab/Culture results: none    Reviewed during this Office Visit: CT urogram reviewed, small right UVJ stone noted. (results were independently reviewed byphysician and radiology report verified)    PAST MEDICAL, FAMILY AND SOCIAL HISTORY:  Past Medical History:   Diagnosis Date    Allergic rhinitis 12/4/2014    Anxiety 7/5/2018    Arthritis     Asthma     Chest pain     occurs every other day, pt states they occur when she doesn't eat right or forget to take her bp med, occurs left side of chest with no radiation, pt states \"it's new\" and has not had any testing done or seen anyone for it.     Constipation     Depressive disorder 7/5/2018    Generalized osteoarthritis 6/26/2014    History of kidney stones     Hypertension     Obesity, morbid, BMI 40.0-49.9 ([address] 2/28/2017    PONV (postoperative nausea and vomiting)     Sleep apnea     does not use a machine     Past Surgical History:   Procedure Laterality Date    CHOLECYSTECTOMY      FOOT TENDON SURGERY Left 09/08/2017    posterior repair    FOOT TENDON SURGERY Left 06/29/2018    LEFT POSTERIOR  TENDON REPAIR FLEXOR TENDON TRANSFER (Left Foot)    LITHOTRIPSY      RI OFFICE/OUTPT VISIT,PROCEDURE ONLY Left 6/29/2018    LEFT POSTERIOR  TENDON REPAIR FLEXOR TENDON TRANSFER performed by Jericho Stephen DPM at [address] Left 9/8/2017    LEFT POSTERIOR TIBIAL TENDON REPAIR WITH LEFT TENOLYSIS  performed by Jericho Stephen DPM at [address] [place] [address]     Family History   Problem Relation Age of Onset    Depression Mother     Diabetes Mother     High Blood Pressure Mother     Mental Illness Mother     Substance Abuse Father     Depression Sister     Diabetes Sister     High Blood Pressure Sister     Mental Illness Sister     Depression Brother     Mental Illness Brother     Substance Abuse Brother      Outpatient Medications Marked as Taking for the 8/3/21 encounter (Office Visit) with Luz Maria Wolff MD   Medication Sig Dispense Refill    loratadine (CLARITIN) 10 MG tablet take 1 tablet by mouth once daily      tamsulosin (FLOMAX) 0.4 MG capsule Take 1 capsule by mouth daily 30 capsule 3    RA VITAMIN D-3 50 MCG (2000 UT) CAPS take 1 capsule by mouth once daily MUST MAKE APPOINTMENT FOR FURTHER REFILL 90 capsule 1    losartan (COZAAR) 100 MG tablet Take by mouth      losartan-hydroCHLOROthiazide (HYZAAR) 100-25 MG per tablet Take 1 tablet by mouth daily 90 tablet 1    amLODIPine (NORVASC) 5 MG tablet Take 1 tablet by mouth daily 90 tablet 1    fluticasone-salmeterol (WIXELA INHUB) 250-50 MCG/DOSE AEPB Inhale 1 puff into the lungs 2 times daily 180 each 1    albuterol sulfate HFA (VENTOLIN HFA) 108 (90 Base) MCG/ACT inhaler Inhale 2 puffs into the lungs every 6 hours as needed for Wheezing 1 Inhaler 3    hydrOXYzine (ATARAX) 25 MG tablet take 1 tablet by mouth once daily if needed for itching 30 tablet 5    clobetasol (TEMOVATE) 0.05 % ointment Apply to rash twice daily until resolved (not face, armpit or groin) 60 g 5    fluticasone (FLONASE) 50 MCG/ACT nasal spray instill 1 spray into each nostril once daily 16 g 5    lidocaine (L-M-X 4) 4 % cream Apply a thick layer 30 minutes prior to procedure, covering in saran wrap 15 g 0    Misc. Devices (BATHTUB SAFETY RAIL) MISC 4 each by Does not apply route daily 4 each 0    Misc. Devices (EXTENDABLE BEDSIDE RAIL) MISC 4 each by Does not apply route daily 4 each 0    Spacer/Aero-Holding Chambers PRICE 1 Device by Does not apply route daily as needed (wheezing) 1 Device 0    Misc. Devices (WALL GRAB BAR) MISC GRAB BARS FOR BOTH SHOWER AND TOILET, USE AS DIRECTED 6 each 0    Misc. Devices MISC OUTSIDE RAMP THAT INCLUDES RAILINGS, USE AS DIRECTED 1 Device 0    Misc.  Devices (RAISED TOILET SEAT) MISC USE AS DIRECTED 1 each 0    hydrocortisone 2.5 % cream apply to affected area twice a day 30 g 1    neomycin-polymyxin-hydrocortisone (CORTISPORIN) 3.5-43296-4 otic suspension instill 4 drops into affected ear  (S) three times a day  0    Antipyrine-Benzocaine (AURALGAN) 54-14 MG/ML otic solution Place 3 drops into the left ear 4 times daily as needed for Pain      Elastic Bandages & Supports (JOBST KNEE HIGH COMPRESSION SM) MISC Dispense Bilateral Jobst Below Knee 20-30mmHg compression stockings 3 each 3       Latex, Asa [aspirin], Codeine, Lisinopril-hydrochlorothiazide, Motrin [ibuprofen], Other, Pcn [penicillins], and Sulfa antibiotics  Social History     Tobacco Use   Smoking Status Former Smoker    Packs/day: 3.00    Years: 20.00    Pack years: 60.00    Types: Cigarettes    Start date: 1981   Nita Stabs Quit date: 1998    Years since quittin.6   Smokeless Tobacco Never Used   Tobacco Comment    quit smoking 15-20 yrs ago      (If patient a smoker, smoking cessation counseling offered)   Social History     Substance and Sexual Activity   Alcohol Use No       REVIEW OF SYSTEMS:  Review of Systems    Physical Exam:    This a 64 y.o. female      Vitals:    21 1333   BP: (!) 88/56   Pulse: 75   Temp: 97.3 °F (36.3 °C)     Body mass index is 49.48 kg/m². Physical Exam  Constitutional: Patient in no acute distress, ggod grooming, appropriately dressed  Neuro: Alert and oriented to person, place and time. Psych:Mood normal, affect normal  Lungs: Respiratory effort is normal  Cardiovascular: strong and regular, no lower leg edema  Abdomen: Soft, non-tender, non-distended with no CVA,    Lymphatics: No cervical palpable lymphadenopathy. Bladder non-tender and not distended. Musculoskeletal: Normal gait and station        Assessment and Plan      1. Ureteral stone    2. Gross hematuria            Plan:        She has a small right UVJ stone, which she likely passed. Will need a cysto to finish hematuria evaluation. Discussed stone prevention.      Prescriptions Ordered:  No orders of the defined types were placed in this encounter. Orders Placed:  No orders of the defined types were placed in this encounter. Pura Rob MD    Agree with the ROS entered by the MA.

## 2021-09-14 ENCOUNTER — PROCEDURE VISIT (OUTPATIENT)
Dept: UROLOGY | Age: 61
End: 2021-09-14
Payer: MEDICARE

## 2021-09-14 ENCOUNTER — HOSPITAL ENCOUNTER (OUTPATIENT)
Age: 61
Setting detail: SPECIMEN
Discharge: HOME | End: 2021-09-14
Payer: MEDICARE

## 2021-09-14 VITALS
BODY MASS INDEX: 49.39 KG/M2 | HEIGHT: 59 IN | SYSTOLIC BLOOD PRESSURE: 110 MMHG | TEMPERATURE: 96.9 F | HEART RATE: 65 BPM | WEIGHT: 245 LBS | DIASTOLIC BLOOD PRESSURE: 64 MMHG

## 2021-09-14 DIAGNOSIS — R31.0 GROSS HEMATURIA: Primary | ICD-10-CM

## 2021-09-14 DIAGNOSIS — N20.1 URETERAL STONE: ICD-10-CM

## 2021-09-14 DIAGNOSIS — R31.0 GROSS HEMATURIA: ICD-10-CM

## 2021-09-14 PROCEDURE — 52000 CYSTOURETHROSCOPY: CPT | Performed by: UROLOGY

## 2021-09-14 NOTE — PROGRESS NOTES
Cystoscopy Operative Note (9/14/21):  Surgeon: Leah Han MD  Anesthesia: Urethral 2% Xylocaine  Indications: Hematuria  Position: Dorsal Lithotomy    Findings:   Risks and Benefits discussed with patient prior to procedure. The patient was prepped and draped in the usual sterile fashion. The flexible cystoscope was advanced through the urethra and into the bladder. The bladder was thoroughly inspected and the following was noted:    Vagina: normal appearing vagina with normal color and discharge, no lesions  Residual Urine: mild  Urethra: not indicated and normal appearing urethra with no masses, tenderness or lesions  Bladder: No tumors or CIS noted. No bladder diverticulum. There was mild trabeculation noted. Ureters: Clear efflux from both ureters. Orifices with normal configuration and location. The cystoscope was removed. The patient tolerated the procedure well. Agree with the ROS entered by the MA.

## 2021-09-15 LAB
CASE NUMBER:: NORMAL
SPECIMEN DESCRIPTION: NORMAL
SURGICAL PATHOLOGY REPORT: NORMAL

## 2021-09-21 ENCOUNTER — OFFICE VISIT (OUTPATIENT)
Dept: PODIATRY | Age: 61
End: 2021-09-21
Payer: MEDICARE

## 2021-09-21 VITALS — RESPIRATION RATE: 16 BRPM | HEIGHT: 59 IN | WEIGHT: 245 LBS | BODY MASS INDEX: 49.39 KG/M2

## 2021-09-21 DIAGNOSIS — R26.2 TROUBLE WALKING: ICD-10-CM

## 2021-09-21 DIAGNOSIS — M79.672 LEFT FOOT PAIN: Primary | ICD-10-CM

## 2021-09-21 PROCEDURE — 3017F COLORECTAL CA SCREEN DOC REV: CPT | Performed by: PODIATRIST

## 2021-09-21 PROCEDURE — 99213 OFFICE O/P EST LOW 20 MIN: CPT | Performed by: PODIATRIST

## 2021-09-21 PROCEDURE — 1036F TOBACCO NON-USER: CPT | Performed by: PODIATRIST

## 2021-09-21 PROCEDURE — G8427 DOCREV CUR MEDS BY ELIG CLIN: HCPCS | Performed by: PODIATRIST

## 2021-09-21 PROCEDURE — G8417 CALC BMI ABV UP PARAM F/U: HCPCS | Performed by: PODIATRIST

## 2021-09-21 NOTE — PROGRESS NOTES
[facility] PHYSICIANS  [facility]  [address] [place] [address]  Dept: [phone]  Dept Fax: [phone]    RETURN PATIENT PROGRESS NOTE  Date of patient's visit: 9/21/2021  Patient's Name:  Carolyn Pearl YOB: 1960            Patient Care Team:  JJ Siddiqui CNP as PCP - General (Nurse Practitioner)  JJ [facility]iqu[facility] CNP as PCP - [facility]  Eze Miller DPM as Physician (Podiatry)       Carolyn Pearl 63 y.o. female that presents for follow-up of   Chief Complaint   Patient presents with    Foot Pain     left foot     Pt's primary care physician is JJ Siddiqui CNP last seen 06/01/2021  Symptoms began 6 month(s) ago and are unchanged . Patient relates pain is Present. Pain is rated 4 out of 10 and is described as constant, severe. Treatments prior to today's visit include: previous x rays. Currently denies F/C/N/V. Allergies   Allergen Reactions    Latex Hives     Latex gloves    Asa [Aspirin] Hives    Codeine Hives    Lisinopril-Hydrochlorothiazide Hives    Motrin [Ibuprofen] Hives    Other Hives and Swelling     Cherries, almonds    Pcn [Penicillins] Hives    Sulfa Antibiotics        Past Medical History:   Diagnosis Date    Allergic rhinitis 12/4/2014    Anxiety 7/5/2018    Arthritis     Asthma     Chest pain     occurs every other day, pt states they occur when she doesn't eat right or forget to take her bp med, occurs left side of chest with no radiation, pt states \"it's new\" and has not had any testing done or seen anyone for it.  Constipation     Depressive disorder 7/5/2018    Generalized osteoarthritis 6/26/2014    History of kidney stones     Hypertension     Obesity, morbid, BMI 40.0-49.9 ([address] 2/28/2017    PONV (postoperative nausea and vomiting)     Sleep apnea     does not use a machine       Prior to Admission medications    Medication Sig Start Date End Date Taking?  Authorizing Provider   loratadine (CLARITIN) 10 MG tablet take 1 tablet by mouth once daily 7/3/21  Yes Historical Provider, MD   tamsulosin (FLOMAX) 0.4 MG capsule Take 1 capsule by mouth daily 6/3/21  Yes JJ Cohen CNP, RA VITAMIN D-3 50 MCG (2000 UT) CAPS take 1 capsule by mouth once daily MUST MAKE APPOINTMENT FOR FURTHER REFILL 5/25/21  Yes JJ Iqbal CNP   losartan (COZAAR) 100 MG tablet Take by mouth 9/24/20  Yes Historical Provider, MD   losartan-hydroCHLOROthiazide (HYZAAR) 100-25 MG per tablet Take 1 tablet by mouth daily 3/26/21  Yes JJ Iqbal CNP   amLODIPine (NORVASC) 5 MG tablet Take 1 tablet by mouth daily 3/26/21  Yes JJ Cohen CNP   fluticasone-salmeterol (WIXELA INHUB) 250-50 MCG/DOSE AEPB Inhale 1 puff into the lungs 2 times daily 3/26/21  Yes JJ Cohen CNP   albuterol sulfate HFA (VENTOLIN HFA) 108 (90 Base) MCG/ACT inhaler Inhale 2 puffs into the lungs every 6 hours as needed for Wheezing 3/26/21  Yes JJ Cohen CNP   hydrOXYzine (ATARAX) 25 MG tablet take 1 tablet by mouth once daily if needed for itching 2/3/21  Yes JJ Iqbal CNP   clobetasol (TEMOVATE) 0.05 % ointment Apply to rash twice daily until resolved (not face, armpit or groin) 12/3/20  Yes Silas Burnham MD   fluticasone (FLONASE) 50 MCG/ACT nasal spray instill 1 spray into each nostril once daily 3/10/20  Yes JJ Cohen CNP   lidocaine (L-M-X 4) 4 % cream Apply a thick layer 30 minutes prior to procedure, covering in saran wrap 6/26/19  Yes Silas Burnham MD   Misc. Devices (BATHTUB SAFETY RAIL) MISC 4 each by Does not apply route daily 12/17/18  Yes Roman Colon DPM   Misc. Devices (EXTENDABLE BEDSIDE RAIL) MISC 4 each by Does not apply route daily 12/17/18  Yes Roman Colon DPM   Spacer/Aero-Holding Chambers PRICE 1 Device by Does not apply route daily as needed (wheezing) 11/26/18  Yes Chandrakant Luna, APRN - CNP   Misc.  Devices (WALL GRAB BAR) MISC GRAB BARS FOR BOTH SHOWER AND TOILET, USE AS DIRECTED 10/23/18  Yes Wil Luis DPM   Misc. Devices MISC OUTSIDE RAMP THAT INCLUDES RAILINGS, USE AS DIRECTED 10/23/18  Yes Wil Luis DPM   Misc. Devices (RAISED TOILET SEAT) MISC USE AS DIRECTED 10/23/18  Yes Wil Luis DPM   hydrocortisone 2.5 % cream apply to affected area twice a day 10/17/18  Yes Wil Allisonon DPM   neomycin-polymyxin-hydrocortisone (CORTISPORIN) 3.5-85942-0 otic suspension instill 4 drops into affected ear  (S) three times a day 8/17/17  Yes Historical Provider, MD   Antipyrine-Benzocaine Alvoscar Oates) 54-14 MG/ML otic solution Place 3 drops into the left ear 4 times daily as needed for Pain   Yes Historical Provider, MD   Elastic Bandages & Supports (JOBST KNEE HIGH COMPRESSION SM) MISC Dispense Bilateral Jobst Below Knee 20-30mmHg compression stockings 6/30/14  Yes Ethan Rios DPM   EPINEPHrine (EPIPEN) 0.3 MG/0.3ML SOAJ injection Inject 0.3 mLs into the muscle once for 1 dose Use as directed for allergic reaction 5/7/19 12/3/20  JJ Wilkinson - CNP   Misc. Devices [facility]) MISC 1 Device by Does not apply route once for 1 dose 4/24/18 12/3/20  Wil Luis DPM       Review of Systems    Review of Systems:  History obtained from chart review and the patient  General ROS: negative for - chills, fatigue, fever, night sweats or weight gain  Constitutional: Negative for chills, diaphoresis, fatigue, fever and unexpected weight change. Musculoskeletal: Positive for arthralgias, gait problem and joint swelling. Neurological ROS: negative for - behavioral changes, confusion, headaches or seizures. Negative for weakness and numbness. Dermatological ROS: negative for - mole changes, rash  Cardiovascular: Negative for leg swelling. Gastrointestinal: Negative for constipation, diarrhea, nausea and vomiting.          Lower Extremity Physical Examination:     Vitals:   Vitals:    09/21/21 0943   Resp: 16 General: AAO x 3 in NAD. Derm  Skin lesion/ulceration Absent . Skin No rashes or nodules noted. .       Musculoskeletal:     1st MPJ ROM decreased, Bilateral.  Muscle strength 5/5, Bilateral.  Pain present upon palpation of 5th met shaft left with ecchymosis and pitting edema. Medial longitudinal arch, Bilateral WNL. Ankle ROM WNL,Bilateral.    Dorsally contracted digits absent digits 1-5 Bilateral.     Vascular: DP and PT pulses palpable 2/4, Bilateral.  CFT <3 seconds, Bilateral.  Hair growth present to the level of the digits, Bilateral.  Edema absent, Bilateral.  Varicosities absent, Bilateral. Erythema absent, Bilateral    Neurological: Sensation intact to light touch to level of digits, Bilateral.  Protective sensation intact 10/10 sites via 5.07/10g La Follette-Daniela Monofilament, Bilateral.  negative Tinel's, Bilateral.  negative Valleix sign, Bilateral.      Integument: Warm, dry, supple, Bilateral.  Open lesion absent, Bilateral.  Interdigital maceration absent to web spaces 1-4, Bilateral.  Nails are normal in length, thickness and color 1-5 bilateral.  Fissures absent, Bilateral.       Left foot x rays 3 views:  Nondisplaced fracture of the midshaft of the 5th metatarsal. The fracture line is no longer visible to the metatarsal shaft          Asessment: Patient is a 63 y.o. female with:   1. Left foot pain    2. Trouble walking        Plan: Patient examined and evaluated. Current condition and treatment options discussed in detail. Advised pt to her condtion. Pt to weightbear as tolerated in normal shoes at all times. No barefoot walking. Pt is back working but states she is tired after work and may not want to work anymore. .  Verbal and written instructions given to patient. Contact office with any questions/problems/concerns. No orders of the defined types were placed in this encounter. No orders of the defined types were placed in this encounter. RTC in 2month(s). 9/21/2021      Electronically signed by Steve Bhatt DPM on 9/21/2021 at 9:44 AM  9/21/2021

## 2021-09-22 ENCOUNTER — HOSPITAL ENCOUNTER (OUTPATIENT)
Dept: CT IMAGING | Age: 61
Discharge: HOME | End: 2021-09-24
Payer: MEDICARE

## 2021-09-22 DIAGNOSIS — N20.1 URETERAL STONE: ICD-10-CM

## 2021-09-22 DIAGNOSIS — R31.0 GROSS HEMATURIA: ICD-10-CM

## 2021-09-22 PROCEDURE — 74176 CT ABD & PELVIS W/O CONTRAST: CPT

## 2021-09-28 ENCOUNTER — OFFICE VISIT (OUTPATIENT)
Dept: FAMILY MEDICINE CLINIC | Age: 61
End: 2021-09-28
Payer: MEDICARE

## 2021-09-28 VITALS
TEMPERATURE: 97.6 F | WEIGHT: 254 LBS | SYSTOLIC BLOOD PRESSURE: 136 MMHG | DIASTOLIC BLOOD PRESSURE: 82 MMHG | HEART RATE: 60 BPM | OXYGEN SATURATION: 99 % | BODY MASS INDEX: 51.3 KG/M2

## 2021-09-28 DIAGNOSIS — Z12.31 ENCOUNTER FOR SCREENING MAMMOGRAM FOR MALIGNANT NEOPLASM OF BREAST: ICD-10-CM

## 2021-09-28 DIAGNOSIS — L29.9 GENERALIZED PRURITUS: ICD-10-CM

## 2021-09-28 DIAGNOSIS — I10 ESSENTIAL HYPERTENSION: ICD-10-CM

## 2021-09-28 DIAGNOSIS — L30.0 NUMMULAR DERMATITIS: ICD-10-CM

## 2021-09-28 DIAGNOSIS — L29.0 ANAL ITCH: Primary | ICD-10-CM

## 2021-09-28 DIAGNOSIS — R73.9 HYPERGLYCEMIA: ICD-10-CM

## 2021-09-28 DIAGNOSIS — J45.20 MILD INTERMITTENT ASTHMA WITHOUT COMPLICATION: ICD-10-CM

## 2021-09-28 DIAGNOSIS — J30.2 SEASONAL ALLERGIES: ICD-10-CM

## 2021-09-28 LAB — HBA1C MFR BLD: 5.9 %

## 2021-09-28 PROCEDURE — 1036F TOBACCO NON-USER: CPT | Performed by: NURSE PRACTITIONER

## 2021-09-28 PROCEDURE — 99214 OFFICE O/P EST MOD 30 MIN: CPT | Performed by: NURSE PRACTITIONER

## 2021-09-28 PROCEDURE — 3017F COLORECTAL CA SCREEN DOC REV: CPT | Performed by: NURSE PRACTITIONER

## 2021-09-28 PROCEDURE — G8417 CALC BMI ABV UP PARAM F/U: HCPCS | Performed by: NURSE PRACTITIONER

## 2021-09-28 PROCEDURE — 83036 HEMOGLOBIN GLYCOSYLATED A1C: CPT | Performed by: NURSE PRACTITIONER

## 2021-09-28 PROCEDURE — G8427 DOCREV CUR MEDS BY ELIG CLIN: HCPCS | Performed by: NURSE PRACTITIONER

## 2021-09-28 RX ORDER — FLUTICASONE PROPIONATE 50 MCG
2 SPRAY, SUSPENSION (ML) NASAL DAILY
Qty: 16 G | Refills: 5 | Status: SHIPPED | OUTPATIENT
Start: 2021-09-28

## 2021-09-28 RX ORDER — HYDROXYZINE HYDROCHLORIDE 25 MG/1
25 TABLET, FILM COATED ORAL EVERY 6 HOURS PRN
Qty: 60 TABLET | Refills: 5 | Status: SHIPPED | OUTPATIENT
Start: 2021-09-28

## 2021-09-28 RX ORDER — AMLODIPINE BESYLATE 5 MG/1
5 TABLET ORAL DAILY
Qty: 90 TABLET | Refills: 1 | Status: SHIPPED | OUTPATIENT
Start: 2021-09-28 | End: 2022-03-10 | Stop reason: SDUPTHER

## 2021-09-28 RX ORDER — CLOBETASOL PROPIONATE 0.5 MG/G
OINTMENT TOPICAL
Qty: 60 G | Refills: 5 | Status: SHIPPED | OUTPATIENT
Start: 2021-09-28

## 2021-09-28 RX ORDER — LOSARTAN POTASSIUM AND HYDROCHLOROTHIAZIDE 25; 100 MG/1; MG/1
1 TABLET ORAL DAILY
Qty: 90 TABLET | Refills: 1 | Status: SHIPPED | OUTPATIENT
Start: 2021-09-28 | End: 2021-12-13 | Stop reason: ALTCHOICE

## 2021-09-28 ASSESSMENT — PATIENT HEALTH QUESTIONNAIRE - PHQ9
2. FEELING DOWN, DEPRESSED OR HOPELESS: 0
SUM OF ALL RESPONSES TO PHQ QUESTIONS 1-9: 0
SUM OF ALL RESPONSES TO PHQ9 QUESTIONS 1 & 2: 0
1. LITTLE INTEREST OR PLEASURE IN DOING THINGS: 0

## 2021-09-28 NOTE — PROGRESS NOTES
 Cyst of ovary 2014    Generalized osteoarthritis 2014    Edema 2014        Past Medical History:   Diagnosis Date    Allergic rhinitis 2014    Anxiety 2018    Arthritis     Asthma     Chest pain     occurs every other day, pt states they occur when she doesn't eat right or forget to take her bp med, occurs left side of chest with no radiation, pt states \"it's new\" and has not had any testing done or seen anyone for it.     Constipation     Depressive disorder 2018    Generalized osteoarthritis 2014    History of kidney stones     Hypertension     Obesity, morbid, BMI 40.0-49.9 ([facility] [address] 2017    PONV (postoperative nausea and vomiting)     Sleep apnea     does not use a machine      Past Surgical History:   Procedure Laterality Date    CHOLECYSTECTOMY      FOOT TENDON SURGERY Left 2017    posterior repair    FOOT TENDON SURGERY Left 2018    LEFT POSTERIOR  TENDON REPAIR FLEXOR TENDON TRANSFER (Left Foot)    LITHOTRIPSY      OK OFFICE/OUTPT VISIT,PROCEDURE ONLY Left 2018    LEFT POSTERIOR  TENDON REPAIR FLEXOR TENDON TRANSFER performed by Marycruz Vale DPM at [address] [place] [address] Drive Left 2017    LEFT POSTERIOR TIBIAL TENDON REPAIR WITH LEFT TENOLYSIS  performed by Marycruz Vale DPM at [address] [place] [address] History   Problem Relation Age of Onset    Depression Mother     Diabetes Mother     High Blood Pressure Mother     Mental Illness Mother     Substance Abuse Father     Depression Sister     Diabetes Sister     High Blood Pressure Sister     Mental Illness Sister     Depression Brother     Mental Illness Brother     Substance Abuse Brother      Social History     Tobacco Use    Smoking status: Former Smoker     Packs/day: 3.00     Years: 20.00     Pack years: 60.00     Types: Cigarettes     Start date: 1981     Quit date: 1998     Years since quittin.7    Smokeless tobacco: Never Used    Tobacco comment: quit smoking 15-20 yrs ago   Substance Use Topics    Alcohol use: No     ALLERGIES:    Allergies   Allergen Reactions    Latex Hives     Latex gloves    Asa [Aspirin] Hives    Codeine Hives    Lisinopril-Hydrochlorothiazide Hives    Motrin [Ibuprofen] Hives    Other Hives and Swelling     Cherries, almonds    Pcn [Penicillins] Hives    Sulfa Antibiotics           Subjective     · Constitutional:  Negative for activity change, appetite change,unexpected weight change, chills, fever, and fatigue. · HENT: Negative for ear pain, sore throat,  Rhinorrhea, sinus pain, sinus pressure, congestion. · Eyes:  Negative for pain and discharge. · Respiratory:  Negative for chest tightness, shortness of breath, wheezing, and cough. · Cardiovascular:  Negative for chest pain, palpitations and leg swelling. · Gastrointestinal: Negative for abdominal pain, blood in stool, constipation,diarrhea, nausea and vomiting. · Endocrine: Negative for cold intolerance, heat intolerance, polydipsia, polyphagia and polyuria. · Genitourinary: Negative for difficulty urinating, dysuria, flank pain, frequency, hematuria and urgency. · Musculoskeletal: Negative for arthralgias, back pain, joint swelling, myalgias, neck pain and neck stiffness. Positive for left hip pain, left foot pain  · Skin: Negative for rash and wound. · Allergic/Immunologic: Negative for environmental allergies and food allergies. · Neurological:  Negative for dizziness, light-headedness, numbness and headaches. · Hematological:  Negative for adenopathy. Does not bruise/bleed easily. · Psychiatric/Behavioral: Negative for self-injury, sleep disturbance and suicidal ideas. Objective     PHYSICAL EXAM:   · Constitutional: Citlaly Escudero is oriented to person, place, and time. Vital signs are normal. Appears well-developed and well-nourished. · HEENT:   · Head: Normocephalic and atraumatic.    Right Ear: Hearing and external ear normal. TM normal.  Canal normal  · Left Ear: Hearing and external ear normal. TM normal Canal normal  · Eyes:PERRL, EOMI, Conjunctiva normal, No discharge. · Neck: Full passive range of motion. Non-tender on palpation. Neck supple. No thyromegaly present. Trachea normal.  · Cardiovascular: Normal rate, regular rhythm, S1, S2, no murmur, no gallop, no friction rub, intact distal pulses. No carotid bruit  · Pulmonary/Chest: Breath sounds are clear throughout, No respiratory distress, No wheezing, No chest tenderness. Effort normal  Musculoskeletal: Physical Exam  Musculoskeletal:      Left hip: No deformity, tenderness, bony tenderness or crepitus. Decreased range of motion. Normal strength. Left foot: Decreased range of motion. Normal capillary refill. Tenderness present. No swelling, deformity, bony tenderness or crepitus. · Lymphadenopathy: No lymphadenopathy noted. · Neurological: Alert and oriented to person, place, and time. Normal motor function, Normal sensory function, No focal deficits noted. He has normal strength. · Skin: Skin is warm, dry and intact. No obvious lesions on exposed skin  · Psychiatric: Normal mood and affect. Speech is normal and behavior is normal.     Nursing note and vitals reviewed. Blood pressure 136/82, pulse 60, temperature 97.6 °F (36.4 °C), temperature source Temporal, weight 254 lb (115.2 kg), SpO2 99 %, not currently breastfeeding. Body mass index is 51.3 kg/m².     Wt Readings from Last 3 Encounters:   09/28/21 254 lb (115.2 kg)   09/21/21 245 lb (111.1 kg)   09/14/21 245 lb (111.1 kg)     BP Readings from Last 3 Encounters:   09/28/21 136/82   09/14/21 110/64   08/03/21 (!) 88/56       Results for POC orders placed in visit on 09/28/21   POCT glycosylated hemoglobin (Hb A1C)   Result Value Ref Range    Hemoglobin A1C 5.9 %       Completed Orders/Prescriptions   Orders Placed This Encounter   Medications    losartan-hydroCHLOROthiazide (HYZAAR) 100-25 MG per tablet     Sig: Take 1 tablet by mouth daily     Dispense:  90 tablet     Refill:  1    amLODIPine (NORVASC) 5 MG tablet     Sig: Take 1 tablet by mouth daily     Dispense:  90 tablet     Refill:  1     Needs appt    fluticasone-salmeterol (WIXELA INHUB) 250-50 MCG/DOSE AEPB     Sig: Inhale 1 puff into the lungs 2 times daily     Dispense:  180 each     Refill:  1    hydrOXYzine (ATARAX) 25 MG tablet     Sig: Take 1 tablet by mouth every 6 hours as needed for Itching or Anxiety     Dispense:  60 tablet     Refill:  5    fluticasone (FLONASE) 50 MCG/ACT nasal spray     Si sprays by Nasal route daily     Dispense:  16 g     Refill:  5    clobetasol (TEMOVATE) 0.05 % ointment     Sig: Apply to rash twice daily until resolved (not face, armpit or groin)     Dispense:  60 g     Refill:  5               AssessmentPlan/Medical Decision Making     1. Essential hypertension  - controlled  - reviewed DASh diet  - losartan-hydroCHLOROthiazide (HYZAAR) 100-25 MG per tablet; Take 1 tablet by mouth daily  Dispense: 90 tablet; Refill: 1  - amLODIPine (NORVASC) 5 MG tablet; Take 1 tablet by mouth daily  Dispense: 90 tablet; Refill: 1  - CBC With Auto Differential; Future  - Comprehensive Metabolic Panel; Future    2. Mild intermittent asthma without complication  - controlled  - fluticasone-salmeterol (Romelle Grant) 250-50 MCG/DOSE AEPB; Inhale 1 puff into the lungs 2 times daily  Dispense: 180 each; Refill: 1  - albuterol sulfate HFA (VENTOLIN HFA) 108 (90 Base) MCG/ACT inhaler; Inhale 2 puffs into the lungs every 6 hours as needed for Wheezing  Dispense: 1 Inhaler; Refill: 3    3. Chronic left hip pain  - encouraged f/u with ortho or pain management   - Maria D Baca DO, Orthopedic Surgery, [place]    4. Chronic pain in left foot  - encouraged f/u with pain management  - Maria D Baca DO, Orthopedic Surgery, [place]    5. Screening cholesterol level  - Lipid, Fasting;  Future      Return in about 6 months (around 3/28/2022). 1.  Ev received counseling on the following healthy behaviors: nutrition, exercise and medication adherence  2. Patient given educational materials - see patient instructions  3. Was a self-tracking handout given in paper form or via [employer]? No  If yes, see orders or list here. 4.  Discussed use, benefit, and side effects of prescribed medications. Barriers to medication compliance addressed. All patient questions answered. Pt voiced understanding. 5.  Reviewed prior labs, imaging, consultation, follow up, and health maintenance  6. Continue current medications, diet and exercise. 7. Discussed use, benefit, and side effects of prescribed medications. Barriers to medication compliance addressed. All her questions were answered. Pt voiced understanding. Belle Velarde will continue current medications, diet and exercise. Of the 30 minute duration appointment visit, Russel Rob CNP spent at least 50% of the face-to-face time in counseling, explanation of diagnosis, planning of further management, and answering all questions. Signed:  Russel Rob CNP    This note is created with the assistance of a speech-recognition program.  While intending to generate a document that actually reflects the content of the visit, no guarantees can be provided that every mistake has been identified and corrected by editing.

## 2021-09-29 ENCOUNTER — TELEPHONE (OUTPATIENT)
Dept: FAMILY MEDICINE CLINIC | Age: 61
End: 2021-09-29

## 2021-10-12 ENCOUNTER — OFFICE VISIT (OUTPATIENT)
Dept: FAMILY MEDICINE CLINIC | Age: 61
End: 2021-10-12
Payer: MEDICARE

## 2021-10-12 DIAGNOSIS — Z00.00 ROUTINE GENERAL MEDICAL EXAMINATION AT A HEALTH CARE FACILITY: Primary | ICD-10-CM

## 2021-10-12 DIAGNOSIS — Z71.89 ACP (ADVANCE CARE PLANNING): ICD-10-CM

## 2021-10-12 DIAGNOSIS — Z13.31 DEPRESSION SCREENING NEGATIVE: ICD-10-CM

## 2021-10-12 PROCEDURE — G0438 PPPS, INITIAL VISIT: HCPCS | Performed by: NURSE PRACTITIONER

## 2021-10-12 PROCEDURE — G8484 FLU IMMUNIZE NO ADMIN: HCPCS | Performed by: NURSE PRACTITIONER

## 2021-10-12 PROCEDURE — 3017F COLORECTAL CA SCREEN DOC REV: CPT | Performed by: NURSE PRACTITIONER

## 2021-10-12 ASSESSMENT — PATIENT HEALTH QUESTIONNAIRE - PHQ9
SUM OF ALL RESPONSES TO PHQ9 QUESTIONS 1 & 2: 0
SUM OF ALL RESPONSES TO PHQ QUESTIONS 1-9: 0
1. LITTLE INTEREST OR PLEASURE IN DOING THINGS: 0
SUM OF ALL RESPONSES TO PHQ QUESTIONS 1-9: 0
SUM OF ALL RESPONSES TO PHQ QUESTIONS 1-9: 0
2. FEELING DOWN, DEPRESSED OR HOPELESS: 0

## 2021-10-12 ASSESSMENT — LIFESTYLE VARIABLES: HOW OFTEN DO YOU HAVE A DRINK CONTAINING ALCOHOL: 0

## 2021-10-12 NOTE — PATIENT INSTRUCTIONS
Advance Directives: Care Instructions  Overview  An advance directive is a legal way to state your wishes at the end of your life. It tells your family and your doctor what to do if you can't say what you want. There are two main types of advance directives. You can change them any time your wishes change. Living will. This form tells your family and your doctor your wishes about life support and other treatment. The form is also called a declaration. Medical power of . This form lets you name a person to make treatment decisions for you when you can't speak for yourself. This person is called a health care agent (health care proxy, health care surrogate). The form is also called a durable power of  for health care. If you do not have an advance directive, decisions about your medical care may be made by a family member, or by a doctor or a  who doesn't know you. It may help to think of an advance directive as a gift to the people who care for you. If you have one, they won't have to make tough decisions by themselves. Follow-up care is a key part of your treatment and safety. Be sure to make and go to all appointments, and call your doctor if you are having problems. It's also a good idea to know your test results and keep a list of the medicines you take. What should you include in an advance directive? Many states have a unique advance directive form. (It may ask you to address specific issues.) Or you might use a universal form that's approved by many states. If your form doesn't tell you what to address, it may be hard to know what to include in your advance directive. Use the questions below to help you get started. · Who do you want to make decisions about your medical care if you are not able to? · What life-support measures do you want if you have a serious illness that gets worse over time or can't be cured? · What are you most afraid of that might happen? (Maybe you're afraid of having pain, losing your independence, or being kept alive by machines.)  · Where would you prefer to die? (Your home? A hospital? A nursing home?)  · Do you want to donate your organs when you die? · Do you want certain Presybeterian practices performed before you die? When should you call for help? Be sure to contact your doctor if you have any questions. Where can you learn more? Go to [email or web address] org and sign in to your [employer] account. Enter R264 in the [employer] box to learn more about \"Advance Directives: Care Instructions. \"     If you do not have an account, please click on the \"Sign Up Now\" link. Current as of: March 17, 2021               Content Version: 13.0  © 2006-2021 Healthwise, [employer]. Care instructions adapted under license by [facility] ([facility]). If you have questions about a medical condition or this instruction, always ask your healthcare professional. [place] [address] any warranty or liability for your use of this information. Learning About Medical Power of   What is a medical power of ? A medical power of , also called a durable power of  for health care, is one type of the legal forms called advance directives. It lets you name the person you want to make treatment decisions for you if you can't speak or decide for yourself. The person you choose is called your health care agent. This person is also called a health care proxy or health care surrogate. A medical power of  may be called something else in your state. How do you choose a health care agent? Choose your health care agent carefully. This person may or may not be a family member. Talk to the person before you make your final decision. Make sure he or she is comfortable with this responsibility. It's a good idea to choose someone who:  · Is at least 25years old.   · Knows you well and understands what makes life meaningful for you. · Understands your Sabianism and moral values. · Will do what you want, not what he or she wants. · Will be able to make difficult choices at a stressful time. · Will be able to refuse or stop treatment, if that is what you would want, even if you could die. · Will be firm and confident with health professionals if needed. · Will ask questions to get needed information. · Lives near you or agrees to travel to you if needed. Your family may help you make medical decisions while you can still be part of that process. But it's important to choose one person to be your health care agent in case you aren't able to make decisions for yourself. If you don't fill out the legal form and name a health care agent, the decisions your family can make may be limited. A health care agent may be called something else in your state. Who will make decisions for you if you don't have a health care agent? If you don't have a health care agent or a living will, you may not get the care you want. Decisions may be made by family members who disagree about your medical care. Or decisions may be made by a medical professional who doesn't know you well. In some cases, a  makes the decisions. When you name a health care agent, it is very clear who has the power to make health decisions for you. How do you name a health care agent? You name your health care agent on a legal form. This form is usually called a medical power of . Ask your hospital, state bar association, or office on aging where to find these forms. You must sign the form to make it legal. Some states require you to get the form notarized. This means that a person called a  watches you sign the form and then he or she signs the form. Some states also require that two or more witnesses sign the form. Be sure to tell your family members and doctors who your health care agent is.   Where can you learn more? Go to https://chpepiceweb.[employer]. org and sign in to your [employer] account. Enter [id number] in the [facility] box to learn more about \"Learning About [address]. \"     If you do not have an account, please click on the \"Sign Up Now\" link. Current as of: March 17, 2021               Content Version: 13.0  © 2006-2021 Healthwise, [employer]. Care instructions adapted under license by [facility] ([facility]). If you have questions about a medical condition or this instruction, always ask your healthcare professional. [address] any warranty or liability for your use of this information. Learning About Living Karlos Sanon  What is a living will? A living will, also called a declaration, is a legal form. It tells your family and your doctor your wishes when you can't speak for yourself. It's used by the health professionals who will treat you as you near the end of your life or if you get seriously hurt or ill. If you put your wishes in writing, your loved ones and others will know what kind of care you want. They won't need to guess. This can ease your mind and be helpful to others. And you can change or cancel your living will at any time. A living will is not the same as an estate or property will. An estate will explains what you want to happen with your money and property after you die. How do you use it? A living will is used to describe the kinds of treatment or life support you want as you near the end of your life or if you get seriously hurt or ill. Keep these facts in mind about living fischer. · Your living will is used only if you can't speak or make decisions for yourself. Most often, one or more doctors must certify that you can't speak or decide for yourself before your living will takes effect. · If you get better and can speak for yourself again, you can accept or refuse any treatment.  It doesn't matter what you said in your can't breathe on your own, your heart stops, or you can't swallow. · What things would you still want to be able to do after you receive life-support methods? Would you want to be able to walk? To speak? To eat on your own? To live without the help of machines? · Do you want certain Congregational practices performed if you become very ill? · If you have a choice, where do you want to be cared for? In your home? At a hospital or nursing home? · If you have a choice at the end of your life, where would you prefer to die? At home? In a hospital or nursing home? Somewhere else? · Would you prefer to be buried or cremated? · Do you want your organs to be donated after you die? What should you do with your living will? · Make sure that your family members and your health care agent have copies of your living will (also called a declaration). · Give your doctor a copy of your living will. Ask him or her to keep it as part of your medical record. If you have more than one doctor, make sure that each one has a copy. · Put a copy of your living will where it can be easily found. For example, some people may put a copy on their refrigerator door. If you are using a digital copy, be sure your doctor, family members, and health care agent know how to find and access it. Where can you learn more? Go to https://chpepiceweb.[email or web address]. org and sign in to your [employer] account. Enter U399 in the [facility] box to learn more about \"Learning About Living [place]. \"     If you do not have an account, please click on the \"Sign Up Now\" link. Current as of: March 17, 2021               Content Version: 13.0  © 5558-8345 Healthwise, Incorporated. Care instructions adapted under license by [facility] ([facility]).  If you have questions about a medical condition or this instruction, always ask your healthcare professional. [address] any warranty or liability for your use of this information. Personalized Preventive Plan for Rubi Coon - 10/12/2021  Medicare offers a range of preventive health benefits. Some of the tests and screenings are paid in full while other may be subject to a deductible, co-insurance, and/or copay. Some of these benefits include a comprehensive review of your medical history including lifestyle, illnesses that may run in your family, and various assessments and screenings as appropriate. After reviewing your medical record and screening and assessments performed today your provider may have ordered immunizations, labs, imaging, and/or referrals for you. A list of these orders (if applicable) as well as your Preventive Care list are included within your After Visit Summary for your review. Other Preventive Recommendations:    · A preventive eye exam performed by an eye specialist is recommended every 1-2 years to screen for glaucoma; cataracts, macular degeneration, and other eye disorders. · A preventive dental visit is recommended every 6 months. · Try to get at least 150 minutes of exercise per week or 10,000 steps per day on a pedometer . · Order or download the FREE \"Exercise & Physical Activity: Your Everyday Guide\" from [employer]. Call 1-486.342.2081 or search [employer] online. · You need 1716-1318 mg of calcium and 9445-1571 IU of vitamin D per day. It is possible to meet your calcium requirement with diet alone, but a vitamin D supplement is usually necessary to meet this goal.  · When exposed to the sun, use a sunscreen that protects against both UVA and UVB radiation with an SPF of 30 or greater. Reapply every 2 to 3 hours or after sweating, drying off with a towel, or swimming. · Always wear a seat belt when traveling in a car. Always wear a helmet when riding a bicycle or motorcycle.

## 2021-10-12 NOTE — PROGRESS NOTES
Medicare Annual Wellness Visit  Are Name: Pat Clark Date: 10/12/2021   MRN: [id number] Sex: Female   Age: 63 y.o. Ethnicity: Non- / Non    : 1960 Race: Chelly Williamson / Bob Talbert is here for [facility]    Screenings for behavioral, psychosocial and functional/safety risks, and cognitive dysfunction are all negative except as indicated below. These results, as well as other patient data from the [address] form, are documented in Flowsheets linked to this Encounter. Allergies   Allergen Reactions    Latex Hives     Latex gloves    Asa [Aspirin] Hives    Codeine Hives    Lisinopril-Hydrochlorothiazide Hives    Motrin [Ibuprofen] Hives    Other Hives and Swelling     Cherries, almonds    Pcn [Penicillins] Hives    Sulfa Antibiotics          Prior to Visit Medications    Medication Sig Taking?  Authorizing Provider   losartan-hydroCHLOROthiazide (HYZAAR) 100-25 MG per tablet Take 1 tablet by mouth daily Yes JJ Farr CNP   amLODIPine (NORVASC) 5 MG tablet Take 1 tablet by mouth daily Yes JJ Farr CNP   fluticasone-salmeterol (WIXELA INHUB) 250-50 MCG/DOSE AEPB Inhale 1 puff into the lungs 2 times daily Yes JJ Farr CNP   hydrOXYzine (ATARAX) 25 MG tablet Take 1 tablet by mouth every 6 hours as needed for Itching or Anxiety Yes JJ Cohen CNP   fluticasone (FLONASE) 50 MCG/ACT nasal spray 2 sprays by Nasal route daily Yes JJ Farr CNP   clobetasol (TEMOVATE) 0.05 % ointment Apply to rash twice daily until resolved (not face, armpit or groin) Yes JJ Farr CNP   loratadine (CLARITIN) 10 MG tablet take 1 tablet by mouth once daily Yes Historical Provider, MD THOMAS VITAMIN D-3 50 MCG ( UT) CAPS take 1 capsule by mouth once daily MUST MAKE APPOINTMENT FOR FURTHER REFILL Yes JJ Cohen CNP   albuterol sulfate HFA (VENTOLIN HFA) 108 (90 Base) MCG/ACT inhaler Inhale 2 puffs into the lungs every 6 hours as needed for Wheezing Yes JJ Bruce CNP   lidocaine (L-M-X 4) 4 % cream Apply a thick layer 30 minutes prior to procedure, covering in saran wrap Yes Greg Marquez MD   Misc. Devices (BATHTUB SAFETY RAIL) MISC 4 each by Does not apply route daily Yes Merfelecia Lovelace DPM   Misc. Devices (EXTENDABLE BEDSIDE RAIL) MISC 4 each by Does not apply route daily Yes CRISTY LainezM   Spacer/Aero-Holding Chambers PRICE 1 Device by Does not apply route daily as needed (wheezing) Yes JJ Bruce CNPc. Devices (WALL GRAB BAR) MISC GRAB BARS FOR BOTH SHOWER AND TOILET, USE AS DIRECTED Yes Merfelecia Lovelace DPM   Misc. Devices MISC OUTSIDE RAMP THAT INCLUDES RAILINGS, USE AS DIRECTED Yes Merfelecia Lovelace DPM   Misc. Devices (RAISED TOILET SEAT) MISC USE AS DIRECTED Yes CRISTY LainezM   hydrocortisone 2.5 % cream apply to affected area twice a day Yes Merla Radu DPM   Elastic Bandages & Supports (JOBST KNEE HIGH COMPRESSION SM) MISC Dispense Bilateral Jobst Below Knee 20-30mmHg compression stockings Yes Sulaiman Jimenez DPM   EPINEPHrine (EPIPEN) 0.3 MG/0.3ML SOAJ injection Inject 0.3 mLs into the muscle once for 1 dose Use as directed for allergic reaction  JJ Bruce CNPc. Devices [facility]) MISC 1 Device by Does not apply route once for 1 dose  Merla Radu DPM         Past Medical History:   Diagnosis Date    Allergic rhinitis 12/4/2014    Anxiety 7/5/2018    Arthritis     Asthma     Chest pain     occurs every other day, pt states they occur when she doesn't eat right or forget to take her bp med, occurs left side of chest with no radiation, pt states \"it's new\" and has not had any testing done or seen anyone for it.     Constipation     Depressive disorder 7/5/2018    Generalized osteoarthritis 6/26/2014    History of kidney stones     Hypertension     Obesity, morbid, BMI 40.0-49.9 ([facility] [address] 2/28/2017    PONV (postoperative nausea and vomiting)     Sleep apnea     does not use a machine       Past Surgical History:   Procedure Laterality Date    CHOLECYSTECTOMY      FOOT TENDON SURGERY Left 09/08/2017    posterior repair    FOOT TENDON SURGERY Left 06/29/2018    LEFT POSTERIOR  TENDON REPAIR FLEXOR TENDON TRANSFER (Left Foot)    LITHOTRIPSY      NC OFFICE/OUTPT VISIT,PROCEDURE ONLY Left 6/29/2018    LEFT POSTERIOR  TENDON REPAIR FLEXOR TENDON TRANSFER performed by Louise Quevedo DPM at [address] Left 9/8/2017    LEFT POSTERIOR TIBIAL TENDON REPAIR WITH LEFT TENOLYSIS  performed by Louise Quevedo DPM at [address] [facility]         Family History   Problem Relation Age of Onset    Depression Mother     Diabetes Mother     High Blood Pressure Mother     Mental Illness Mother     Substance Abuse Father     Depression Sister     Diabetes Sister     High Blood Pressure Sister     Mental Illness Sister     Depression Brother     Mental Illness Brother     Substance Abuse Brother        CareTeam (Including outside providers/suppliers regularly involved in providing care):   Patient Care Team:  JJ aFrr CNP as PCP - General (Nurse Practitioner)  JJ Farr CNP as PCP - [facility] Empaneled Provider  Louise Quevedo DPM as Physician (Podiatry)    Wt Readings from Last 3 Encounters:   09/28/21 254 lb (115.2 kg)   09/21/21 245 lb (111.1 kg)   09/14/21 245 lb (111.1 kg)      No flowsheet data found. There is no height or weight on file to calculate BMI. Based upon direct observation of the patient, evaluation of cognition reveals recent and remote memory intact. Patient's complete Health Risk Assessment and screening values have been reviewed and are found in Flowsheets. The following problems were reviewed today and where indicated follow up appointments were made and/or referrals ordered.     Positive Risk Factor Screenings with Interventions:            General Health and ACP:  General  In general, how would you say your health is?: Very Good  In the past 7 days, have you experienced any of the following?  New or Increased Pain, New or Increased Fatigue, Loneliness, Social Isolation, Stress or Anger?: None of These  Do you get the social and emotional support that you need?: Yes  Do you have a Living Will?: (!) No  Advance Directives     Power of  Living Will ACP-Advance Directive ACP-Power of     Not on File Not on File Not on File Not on File      General Health Risk Interventions:  · No Living Will: Advance Care Planning addressed with patient today    Health Habits/Nutrition:  Health Habits/Nutrition  Do you exercise for at least 20 minutes 2-3 times per week?: Yes  Have you lost any weight without trying in the past 3 months?: No  Do you eat only one meal per day?: No  Have you seen the dentist within the past year?: (!) No     Health Habits/Nutrition Interventions:  · Dental exam overdue:  patient encouraged to make appointment with his/her dentist    Hearing/Vision:  No exam data present  Hearing/Vision  Do you or your family notice any trouble with your hearing that hasn't been managed with hearing aids?: No  Do you have difficulty driving, watching TV, or doing any of your daily activities because of your eyesight?: No  Have you had an eye exam within the past year?: (!) No  Hearing/Vision Interventions:  · Vision concerns:  patient encouraged to make appointment with his/her eye specialist      Personalized Preventive Plan   Current Health Maintenance Status  Immunization History   Administered Date(s) Administered    COVID-19, George Peter, PF, 30mcg/0.3mL 03/03/2021, 03/24/2021    Influenza Vaccine, unspecified formulation 09/16/2015    Influenza Virus Vaccine 09/16/2015    Tdap (Boostrix, Adacel) 06/03/2016        Health Maintenance   Topic Date Due    Annual Wellness Visit (AWV)  09/04/2021    Breast cancer screen  09/16/2021    Shingles Vaccine (1 of 2) 03/26/2022 (Originally 9/2/2012)    Flu vaccine (1) 09/28/2022 (Originally 9/1/2021)    Pneumococcal 0-64 years Vaccine (1 of 2 - PPSV23) 09/28/2022 (Originally 9/2/1968)    Cervical cancer screen  02/08/2022    Potassium monitoring  04/15/2022    Creatinine monitoring  06/02/2022    A1C test (Diabetic or Prediabetic)  09/28/2022    Colon cancer screen fecal DNA test (Cologuard)  07/29/2023    Lipid screen  04/15/2026    DTaP/Tdap/Td vaccine (2 - Td or Tdap) 06/03/2026    COVID-19 Vaccine  Completed    Hepatitis C screen  Completed    HIV screen  Completed    Hepatitis A vaccine  Aged Out    Hepatitis B vaccine  Aged Out    Hib vaccine  Aged Out    Meningococcal (ACWY) vaccine  Aged Out     Recommendations for [employer] Due: see orders and patient instructions/AVS.  . Recommended screening schedule for the next 5-10 years is provided to the patient in written form: see Patient Aquilla Osler was seen today for medicare awv. Diagnoses and all orders for this visit:    Routine general medical examination at a health care facility    ACP (advance care planning)  -     [facility] [address] [facility], 1ST 30MIN [[id number]]    Depression screening negative  -     [address]             Breann Cardozo is a 63 y.o. female being evaluated by a Virtual Visit (phone) encounter to address concerns as mentioned above. A caregiver was present when appropriate. Due to this being a TeleHealth encounter (During LYBBY-10 public health emergency), evaluation of the following organ systems was limited: Vitals/Constitutional/EENT/Resp/CV/GI//MS/Neuro/Skin/Heme-Lymph-Imm.   Pursuant to the emergency declaration under the [facility], [address] [place] [address] [facility] authority and the Immediately and [employer] Act, this Virtual Visit was conducted with patient's (and/or legal guardian's) consent, to reduce the patient's risk

## 2021-11-11 ENCOUNTER — HOSPITAL ENCOUNTER (OUTPATIENT)
Dept: MAMMOGRAPHY | Age: 61
Discharge: HOME | End: 2021-11-13
Payer: MEDICARE

## 2021-11-11 ENCOUNTER — TELEPHONE (OUTPATIENT)
Dept: FAMILY MEDICINE CLINIC | Age: 61
End: 2021-11-11

## 2021-11-11 DIAGNOSIS — Z12.31 ENCOUNTER FOR SCREENING MAMMOGRAM FOR MALIGNANT NEOPLASM OF BREAST: ICD-10-CM

## 2021-11-11 PROCEDURE — 77063 BREAST TOMOSYNTHESIS BI: CPT

## 2021-11-11 NOTE — TELEPHONE ENCOUNTER
Mahesh   Is starting a care of plan on patient and would like to know if you have anything you want added?

## 2021-11-23 ENCOUNTER — OFFICE VISIT (OUTPATIENT)
Dept: PODIATRY | Age: 61
End: 2021-11-23
Payer: MEDICARE

## 2021-11-23 VITALS — WEIGHT: 245 LBS | HEIGHT: 59 IN | BODY MASS INDEX: 49.39 KG/M2

## 2021-11-23 DIAGNOSIS — M79.672 LEFT FOOT PAIN: Primary | ICD-10-CM

## 2021-11-23 PROCEDURE — 99213 OFFICE O/P EST LOW 20 MIN: CPT | Performed by: PODIATRIST

## 2021-11-23 PROCEDURE — 1036F TOBACCO NON-USER: CPT | Performed by: PODIATRIST

## 2021-11-23 PROCEDURE — 3017F COLORECTAL CA SCREEN DOC REV: CPT | Performed by: PODIATRIST

## 2021-11-23 PROCEDURE — G8484 FLU IMMUNIZE NO ADMIN: HCPCS | Performed by: PODIATRIST

## 2021-11-23 PROCEDURE — G8417 CALC BMI ABV UP PARAM F/U: HCPCS | Performed by: PODIATRIST

## 2021-11-23 PROCEDURE — G8427 DOCREV CUR MEDS BY ELIG CLIN: HCPCS | Performed by: PODIATRIST

## 2021-11-23 NOTE — PROGRESS NOTES
[facility]  [facility]  [address] [place] [address]  Dept: [phone]  Dept Fax: [phone]    RETURN PATIENT PROGRESS NOTE  Date of patient's visit: 11/23/2021  Patient's Name:  Serena Gregorio YOB: 1960            Patient Care Team:  Wilhemina Olszewski, APRN - CNP as PCP - General (Nurse Practitioner)  Wilhemina Olszewski, APRN - CNP as PCP - [facility] Empaneled Provider  Yolande Smith DPM as Physician (Podiatry)       Serena Gregorio 63 y.o. female that presents for follow-up of   Chief Complaint   Patient presents with    Foot Pain     left foot    X-ray      left foot       Symptoms began 1  +year(s) ago and are unchanged . Patient relates pain is Present. Pain is rated 2 out of 10 and is described as constant, mild, moderate. Treatments prior to today's visit include: previous podiatry treatment. Currently denies F/C/N/V. Allergies   Allergen Reactions    Latex Hives     Latex gloves    Asa [Aspirin] Hives    Codeine Hives    Lisinopril-Hydrochlorothiazide Hives    Motrin [Ibuprofen] Hives    Other Hives and Swelling     Cherries, almonds    Pcn [Penicillins] Hives    Sulfa Antibiotics        Past Medical History:   Diagnosis Date    Allergic rhinitis 12/4/2014    Anxiety 7/5/2018    Arthritis     Asthma     Chest pain     occurs every other day, pt states they occur when she doesn't eat right or forget to take her bp med, occurs left side of chest with no radiation, pt states \"it's new\" and has not had any testing done or seen anyone for it.  Constipation     Depressive disorder 7/5/2018    Generalized osteoarthritis 6/26/2014    History of kidney stones     Hypertension     Obesity, morbid, BMI 40.0-49.9 ([address] 2/28/2017    PONV (postoperative nausea and vomiting)     Sleep apnea     does not use a machine       Prior to Admission medications    Medication Sig Start Date End Date Taking?  Authorizing Provider losartan-hydroCHLOROthiazide (HYZAAR) 100-25 MG per tablet Take 1 tablet by mouth daily 9/28/21  Yes JJ Cohen CNP   amLODIPine (NORVASC) 5 MG tablet Take 1 tablet by mouth daily 9/28/21  Yes JJ Cohen CNP   fluticasone-salmeterol (WIXELA INHUB) 250-50 MCG/DOSE AEPB Inhale 1 puff into the lungs 2 times daily 9/28/21  Yes JJ Cohen CNP   hydrOXYzine (ATARAX) 25 MG tablet Take 1 tablet by mouth every 6 hours as needed for Itching or Anxiety 9/28/21  Yes JJ Dean Ace, CNP   fluticasone (FLONASE) 50 MCG/ACT nasal spray 2 sprays by Nasal route daily 9/28/21  Yes JJ Cohen CNP   clobetasol (TEMOVATE) 0.05 % ointment Apply to rash twice daily until resolved (not face, armpit or groin) 9/28/21  Yes JJ Dean Ace, CNP   loratadine (CLARITIN) 10 MG tablet take 1 tablet by mouth once daily 7/3/21  Yes Historical Provider, MD   RA VITAMIN D-3 50 MCG (2000 UT) CAPS take 1 capsule by mouth once daily MUST MAKE APPOINTMENT FOR FURTHER REFILL 5/25/21  Yes JJ Cohen CNP   albuterol sulfate HFA (VENTOLIN HFA) 108 (90 Base) MCG/ACT inhaler Inhale 2 puffs into the lungs every 6 hours as needed for Wheezing 3/26/21  Yes JJ Cohen CNP   lidocaine (L-M-X 4) 4 % cream Apply a thick layer 30 minutes prior to procedure, covering in saran wrap 6/26/19  Yes Dafne Beatty MD   Misc. Devices (BATHTUB SAFETY RAIL) MISC 4 each by Does not apply route daily 12/17/18  Yes Wojciech Feng DPM   Misc. Devices (EXTENDABLE BEDSIDE RAIL) MISC 4 each by Does not apply route daily 12/17/18  Yes Wojciech Feng DPM   Spacer/Aero-Holding Chambers PRICE 1 Device by Does not apply route daily as needed (wheezing) 11/26/18  Yes Jermaine Ace, APRN - CNP   Misc. Devices (WALL GRAB BAR) MISC GRAB BARS FOR BOTH SHOWER AND TOILET, USE AS DIRECTED 10/23/18  Yes Wojciech Feng DPM   Misc.  Devices MISC OUTSIDE RAMP THAT INCLUDES RAILINGS, USE AS DIRECTED 10/23/18  Yes Wojciech Feng, DPM   Misc. Devices (RAISED TOILET SEAT) MISC USE AS DIRECTED 10/23/18  Yes Helen Castrejoner DPM   hydrocortisone 2.5 % cream apply to affected area twice a day 10/17/18  Yes Velton Snuffer, DPM   Elastic Bandages & Supports (JOBST KNEE HIGH COMPRESSION SM) MISC Dispense Bilateral Jobst Below Knee 20-30mmHg compression stockings 6/30/14  Yes Al Julieta DPM   EPINEPHrine [facility]) 0.3 MG/0.3ML SOAJ injection Inject 0.3 mLs into the muscle once for 1 dose Use as directed for allergic reaction 5/7/19 12/3/20  Radha Klein, APRN - CNP   [facility]. Devices [facility]) MISC 1 Device by Does not apply route once for 1 dose 4/24/18 12/3/20  Velton SnufferCRISTYM       Review of Systems    Review of Systems:  History obtained from chart review and the patient  General ROS: negative for - chills, fatigue, fever, night sweats or weight gain  Constitutional: Negative for chills, diaphoresis, fatigue, fever and unexpected weight change. Musculoskeletal: Positive for arthralgias, gait problem and joint swelling. Neurological ROS: negative for - behavioral changes, confusion, headaches or seizures. Negative for weakness and numbness. Dermatological ROS: negative for - mole changes, rash  Cardiovascular: Negative for leg swelling. Gastrointestinal: Negative for constipation, diarrhea, nausea and vomiting. Lower Extremity Physical Examination:     Vitals: There were no vitals filed for this visit. General: AAO x 3 in NAD. Dermatologic Exam:  Skin lesion/ulceration Absent . Skin No rashes or nodules noted. .       Musculoskeletal:     1st MPJ ROM decreased, Bilateral.  Muscle strength 5/5, Bilateral.  Pain present upon palpation of left foot at the 5th metatarsal .  No edema or ecchymosis seen . Medial longitudinal arch, Bilateral WNL.   Ankle ROM WNL,Bilateral.    Dorsally contracted digits absent digits 1-5 Bilateral.     Vascular: DP and PT pulses palpable 2/4, Bilateral.  CFT <3 seconds, Bilateral.  Hair growth present to the level of the digits, Bilateral.  Edema absent, Bilateral.  Varicosities absent, Bilateral. Erythema absent, Bilateral    Neurological: Sensation intact to light touch to level of digits, Bilateral.  Protective sensation intact 10/10 sites via 5.07/10g [place]Daniela Monofilament, Bilateral.  negative Tinel's, Bilateral.  negative Valleix sign, Bilateral.      Integument: Warm, dry, supple, Bilateral.  Open lesion absent, Bilateral.  Interdigital maceration absent to web spaces 1-4, Bilateral.  Nails are normal in length, thickness and color 1-5 bilateral.  Fissures absent, Bilateral.     Left foot x rays 3 views:  Healed fracture of the midshaft of the 5th metatarsal with no fracture line visible   Asessment: Patient is a 63 y.o. female with:   1. Left foot pain        Plan: Patient examined and evaluated. Current condition and treatment options discussed in detail. Advised pt to the x ray left foot findings. Pt to weightbear as tolerated in onrmal shoes at all times. No restrictions from the foot perspective . Verbal and written instructions given to patient. Contact office with any questions/problems/concerns. Orders Placed This Encounter   Procedures    XR FOOT LEFT (MIN 3 VIEWS)     No orders of the defined types were placed in this encounter. RTC in 9week(s).     11/23/2021      Electronically signed by Aicha Sarah DPM on 11/23/2021 at 9:53 AM  11/23/2021

## 2021-11-23 NOTE — PATIENT INSTRUCTIONS
Schedule a Vaccine  When you qualify to receive the vaccine, call the [facility]) COVID-19 Vaccination Hotline to schedule your appointment or to get additional information about the [facility]) locations which are offering the COVID-19 vaccine. To be 94% effective, it's important that you receive two doses of one of the COVID-19 vaccines. -If you are receiving the George Peter vaccine, your second shot will be scheduled as close to 21 days after the first shot as possible. -If you are receiving the Moderna vaccine, your second shot will be scheduled as close to 28 days after the first shot as possible. [facility]) COVID-19 Vaccination Hotline: [phone]    Links to [facility]) website and [facility] website:    [employer]/[facility]-monitoring-coronavirus-covid-19/covid-19-vaccine/[email or web address]    [email or web address]

## 2021-12-13 DIAGNOSIS — I10 ESSENTIAL HYPERTENSION: Primary | ICD-10-CM

## 2021-12-13 RX ORDER — HYDROCHLOROTHIAZIDE 25 MG/1
25 TABLET ORAL EVERY MORNING
Qty: 90 TABLET | Refills: 0 | Status: SHIPPED | OUTPATIENT
Start: 2021-12-13 | End: 2022-03-10 | Stop reason: SDUPTHER

## 2021-12-13 RX ORDER — LOSARTAN POTASSIUM 100 MG/1
100 TABLET ORAL DAILY
Qty: 90 TABLET | Refills: 0 | Status: SHIPPED | OUTPATIENT
Start: 2021-12-13 | End: 2022-03-10 | Stop reason: SDUPTHER

## 2021-12-16 NOTE — TELEPHONE ENCOUNTER
Please advise patient that the medications are the same - just have been . The pharmacy requested this as the combination pill is on back order.   Assure her the medications are the same and SAFE

## 2021-12-16 NOTE — TELEPHONE ENCOUNTER
Pt picked up her presciprtions and was given hydroCHLOROthiazide she wants to know if you child her blood pressure medication and why.  She also states the losartan dosage was 125mg and she received 100mg she wants to know why

## 2022-01-06 ENCOUNTER — OFFICE VISIT (OUTPATIENT)
Dept: DERMATOLOGY | Age: 62
End: 2022-01-06
Payer: MEDICARE

## 2022-01-06 VITALS
HEART RATE: 70 BPM | OXYGEN SATURATION: 95 % | DIASTOLIC BLOOD PRESSURE: 81 MMHG | WEIGHT: 253.8 LBS | HEIGHT: 59 IN | SYSTOLIC BLOOD PRESSURE: 129 MMHG | TEMPERATURE: 97.3 F | BODY MASS INDEX: 51.16 KG/M2

## 2022-01-06 DIAGNOSIS — L30.0 NUMMULAR DERMATITIS: Primary | ICD-10-CM

## 2022-01-06 PROCEDURE — G8484 FLU IMMUNIZE NO ADMIN: HCPCS | Performed by: DERMATOLOGY

## 2022-01-06 PROCEDURE — 1036F TOBACCO NON-USER: CPT | Performed by: DERMATOLOGY

## 2022-01-06 PROCEDURE — 99213 OFFICE O/P EST LOW 20 MIN: CPT | Performed by: DERMATOLOGY

## 2022-01-06 PROCEDURE — G8427 DOCREV CUR MEDS BY ELIG CLIN: HCPCS | Performed by: DERMATOLOGY

## 2022-01-06 PROCEDURE — G8417 CALC BMI ABV UP PARAM F/U: HCPCS | Performed by: DERMATOLOGY

## 2022-01-06 PROCEDURE — 3017F COLORECTAL CA SCREEN DOC REV: CPT | Performed by: DERMATOLOGY

## 2022-01-06 NOTE — PROGRESS NOTES
Dermatology Patient Note  [address]  [facility]  Dept: [phone]  Dept Fax: [phone]      VISITDATE: 1/6/2022   REFERRING PROVIDER: No ref. provider found      Edith Marshall is a 63 y.o. female  who presents today in the office for:    Follow-up (1 yr nummular eczema- CLOBETASOL. Has not used in 2 months due to having no breakouts.)      HISTORY OF PRESENT ILLNESS:  As above. Patient states that when she gets breakouts, she has joint pain.      MEDICAL PROBLEMS:  Patient Active Problem List    Diagnosis Date Noted    Mild intermittent asthma without complication 43/70/7056    Morbid obesity with BMI of 50.0-59.9, adult ([facility] 12/04/2018    Status post left foot surgery 12/04/2018    Anxiety 07/05/2018    Constipation 07/05/2018    Depressive disorder 07/05/2018    Food allergy 07/05/2018    Reactive airway disease 07/05/2018    Vitamin D deficiency 07/05/2018    Contracture of left ankle     Chronic pain of left ankle     Spontaneous rupture of flexor tendon of left lower leg     Rupture of posterior tibialis tendon 08/17/2017    Obesity, morbid, BMI 40.0-49.9 ([facility] 02/28/2017    Urticaria 02/28/2017    Essential hypertension     YAMEL (obstructive sleep apnea)     Allergic asthma     Allergic rhinitis 12/04/2014    Osteoarthrosis involving multiple sites but not designated as generalized 08/04/2014    Benign neoplasm of skin 06/26/2014    Cyst of ovary 06/26/2014    Generalized osteoarthritis 06/26/2014    Edema 06/26/2014       CURRENT MEDICATIONS:   Current Outpatient Medications   Medication Sig Dispense Refill    losartan (COZAAR) 100 MG tablet Take 1 tablet by mouth daily 90 tablet 0    hydroCHLOROthiazide (HYDRODIURIL) 25 MG tablet Take 1 tablet by mouth every morning 90 tablet 0    amLODIPine (NORVASC) 5 MG tablet Take 1 tablet by mouth daily 90 tablet 1    fluticasone-salmeterol (WIXELA INHUB) 250-50 MCG/DOSE AEPB Inhale 1 puff into the lungs 2 times daily 180 each 1    hydrOXYzine (ATARAX) 25 MG tablet Take 1 tablet by mouth every 6 hours as needed for Itching or Anxiety 60 tablet 5    fluticasone (FLONASE) 50 MCG/ACT nasal spray 2 sprays by Nasal route daily 16 g 5    clobetasol (TEMOVATE) 0.05 % ointment Apply to rash twice daily until resolved (not face, armpit or groin) 60 g 5    RA VITAMIN D-3 50 MCG (2000 UT) CAPS take 1 capsule by mouth once daily MUST MAKE APPOINTMENT FOR FURTHER REFILL 90 capsule 1    albuterol sulfate HFA (VENTOLIN HFA) 108 (90 Base) MCG/ACT inhaler Inhale 2 puffs into the lungs every 6 hours as needed for Wheezing 1 Inhaler 3    lidocaine (L-M-X 4) 4 % cream Apply a thick layer 30 minutes prior to procedure, covering in saran wrap 15 g 0    Misc. Devices (BATHTUB SAFETY RAIL) MISC 4 each by Does not apply route daily 4 each 0    Spacer/Aero-Holding Chambers PRICE 1 Device by Does not apply route daily as needed (wheezing) 1 Device 0    hydrocortisone 2.5 % cream apply to affected area twice a day 30 g 1    Elastic Bandages & Supports (JOBST KNEE HIGH COMPRESSION SM) MISC Dispense Bilateral Jobst Below Knee 20-30mmHg compression stockings 3 each 3    loratadine (CLARITIN) 10 MG tablet take 1 tablet by mouth once daily (Patient not taking: Reported on 1/6/2022) 30 tablet 5    EPINEPHrine (EPIPEN) 0.3 MG/0.3ML SOAJ injection Inject 0.3 mLs into the muscle once for 1 dose Use as directed for allergic reaction 1 each 0    Misc. Devices (EXTENDABLE BEDSIDE RAIL) MISC 4 each by Does not apply route daily 4 each 0    Misc. Devices (WALL GRAB BAR) MISC GRAB BARS FOR BOTH SHOWER AND TOILET, USE AS DIRECTED 6 each 0    Misc. Devices MISC OUTSIDE RAMP THAT INCLUDES RAILINGS, USE AS DIRECTED 1 Device 0    Misc. Devices (RAISED TOILET SEAT) MISC USE AS DIRECTED 1 each 0    Misc.  Devices [facility]) MISC 1 Device by Does not apply route once for 1 dose 1 each 0     No current facility-administered medications for this visit. ALLERGIES:   Allergies   Allergen Reactions    Latex Hives     Latex gloves    Asa [Aspirin] Hives    Codeine Hives    Lisinopril-Hydrochlorothiazide Hives    Motrin [Ibuprofen] Hives    Other Hives and Swelling     Cherries, almonds    Pcn [Penicillins] Hives    Sulfa Antibiotics        SOCIAL HISTORY:  Social History     Tobacco Use    Smoking status: Former Smoker     Packs/day: 3.00     Years: 20.00     Pack years: 60.00     Types: Cigarettes     Start date: 1981     Quit date: 1998     Years since quittin.0    Smokeless tobacco: Never Used    Tobacco comment: quit smoking 15-20 yrs ago   Substance Use Topics    Alcohol use: No       Pertinent ROS:  Review of Systems  Skin: Denies any new changing, growing or bleeding lesions or rashes except as described in the HPI   Constitutional: Denies fevers, chills, and malaise. PHYSICAL EXAM:   /81   Pulse 70   Temp 97.3 °F (36.3 °C)   Ht 4' 11\" (1.499 m)   Wt 253 lb 12.8 oz (115.1 kg)   SpO2 95%   BMI 51.26 kg/m²     The patient is generally well appearing, well nourished, alert and conversational. Affect is normal.    Cutaneous Exam:  Physical Exam  Focused exam of arms was performed    Facial covering was not removed during examination. Diagnoses/exam findings/medical history pertinent to this visit are listed below:    Assessment:   Diagnosis Orders   1.  Nummular dermatitis          Plan:  Nummular dermatitis  - stable chronic illness   - clobetasol ointment as needed  - discussed with patient that if she experiences more joint pain a/w flares of skin rash, then to call    RTC 1 year    Future Appointments   Date Time Provider Zurdo Phillips   2022  9:00 AM Rafael Diaz [facility]   2022 10:00 AM Kerry Aguilar DPM [facility]   3/15/2022  9:20 AM Junie Castaneda MD [address] [place] [address] 3/28/2022 11:20 AM JJ Weiss - CNP [facility]  [email or web address]         There are no Patient Instructions on file for this visit. This note was created with the assistance of a speech-recognition program.  Although the intention is to generate a document that actually reflects the content of the visit, no guarantees can be provided that every mistake has been identified and corrected by editing. I, Dr. Jack Franks, personally performed the services described in this documentation, as scribed by Lucio Quinones in my presence, and it is both accurate and complete.     Electronically signed by Jeffery Millard MD on 1/6/22 at 8:45 AM EST

## 2022-01-25 ENCOUNTER — OFFICE VISIT (OUTPATIENT)
Dept: PODIATRY | Age: 62
End: 2022-01-25
Payer: MEDICARE

## 2022-01-25 VITALS — BODY MASS INDEX: 51 KG/M2 | RESPIRATION RATE: 18 BRPM | HEIGHT: 59 IN | WEIGHT: 253 LBS

## 2022-01-25 DIAGNOSIS — M79.671 PAIN IN BOTH FEET: ICD-10-CM

## 2022-01-25 DIAGNOSIS — B35.1 DERMATOPHYTOSIS OF NAIL: ICD-10-CM

## 2022-01-25 DIAGNOSIS — M79.672 PAIN IN BOTH FEET: ICD-10-CM

## 2022-01-25 DIAGNOSIS — R26.2 TROUBLE WALKING: ICD-10-CM

## 2022-01-25 DIAGNOSIS — M79.672 LEFT FOOT PAIN: Primary | ICD-10-CM

## 2022-01-25 PROCEDURE — 11721 DEBRIDE NAIL 6 OR MORE: CPT | Performed by: PODIATRIST

## 2022-01-25 PROCEDURE — 3017F COLORECTAL CA SCREEN DOC REV: CPT | Performed by: PODIATRIST

## 2022-01-25 PROCEDURE — G8484 FLU IMMUNIZE NO ADMIN: HCPCS | Performed by: PODIATRIST

## 2022-01-25 PROCEDURE — G8427 DOCREV CUR MEDS BY ELIG CLIN: HCPCS | Performed by: PODIATRIST

## 2022-01-25 PROCEDURE — G8417 CALC BMI ABV UP PARAM F/U: HCPCS | Performed by: PODIATRIST

## 2022-01-25 PROCEDURE — 99213 OFFICE O/P EST LOW 20 MIN: CPT | Performed by: PODIATRIST

## 2022-01-25 PROCEDURE — 1036F TOBACCO NON-USER: CPT | Performed by: PODIATRIST

## 2022-01-25 NOTE — PROGRESS NOTES
[address] [facility] PODIATRY [facility]  [address] [address] [place] [address]  Dept: [phone]  Dept Fax: [phone]     PAIN PROGRESS NOTE  Date of patient's visit: 1/25/2022  Patient's Name:  Frank Dale YOB: 1960            Patient Care Team:  JJ Royal CNP as PCP - General (Nurse Practitioner)  JJ Royal CNP as PCP - [facility] Empaneled Provider  Celestina Michelle DPM as Physician (Podiatry)      Chief Complaint   Patient presents with    Nail Problem    Foot Pain       Subjective: This Frank Dale comes to clinic for foot and nail care. Pt currently has complaint of thickened, painful, elongated nails that he/she cannot manage by themselves. Pt. Relates pain to nails with shoe gear. Pt's primary care physician is JJ Royal CNP last seen 10/12/2021. Pt has a new complaint of left foot pain at the site of the previous old fracture     Past Medical History:   Diagnosis Date    Allergic rhinitis 12/4/2014    Anxiety 7/5/2018    Arthritis     Asthma     Chest pain     occurs every other day, pt states they occur when she doesn't eat right or forget to take her bp med, occurs left side of chest with no radiation, pt states \"it's new\" and has not had any testing done or seen anyone for it.     Constipation     Depressive disorder 7/5/2018    Generalized osteoarthritis 6/26/2014    History of kidney stones     Hypertension     Obesity, morbid, BMI 40.0-49.9 ([address] 2/28/2017    PONV (postoperative nausea and vomiting)     Sleep apnea     does not use a machine       Allergies   Allergen Reactions    Latex Hives     Latex gloves    Asa [Aspirin] Hives    Codeine Hives    Lisinopril-Hydrochlorothiazide Hives    Motrin [Ibuprofen] Hives    Other Hives and Swelling     Cherries, almonds    Pcn [Penicillins] Hives    Sulfa Antibiotics      Current Outpatient Medications on File Prior to Visit   Medication Sig Dispense Refill    losartan (COZAAR) 100 MG tablet Take 1 tablet by mouth daily 90 tablet 0    hydroCHLOROthiazide (HYDRODIURIL) 25 MG tablet Take 1 tablet by mouth every morning 90 tablet 0    loratadine (CLARITIN) 10 MG tablet take 1 tablet by mouth once daily 30 tablet 5    amLODIPine (NORVASC) 5 MG tablet Take 1 tablet by mouth daily 90 tablet 1    fluticasone-salmeterol (WIXELA INHUB) 250-50 MCG/DOSE AEPB Inhale 1 puff into the lungs 2 times daily 180 each 1    hydrOXYzine (ATARAX) 25 MG tablet Take 1 tablet by mouth every 6 hours as needed for Itching or Anxiety 60 tablet 5    fluticasone (FLONASE) 50 MCG/ACT nasal spray 2 sprays by Nasal route daily 16 g 5    clobetasol (TEMOVATE) 0.05 % ointment Apply to rash twice daily until resolved (not face, armpit or groin) 60 g 5    RA VITAMIN D-3 50 MCG (2000 UT) CAPS take 1 capsule by mouth once daily MUST MAKE APPOINTMENT FOR FURTHER REFILL 90 capsule 1    albuterol sulfate HFA (VENTOLIN HFA) 108 (90 Base) MCG/ACT inhaler Inhale 2 puffs into the lungs every 6 hours as needed for Wheezing 1 Inhaler 3    lidocaine (L-M-X 4) 4 % cream Apply a thick layer 30 minutes prior to procedure, covering in saran wrap 15 g 0    Misc. Devices (BATHTUB SAFETY RAIL) MISC 4 each by Does not apply route daily 4 each 0    Misc. Devices (EXTENDABLE BEDSIDE RAIL) MISC 4 each by Does not apply route daily 4 each 0    Spacer/Aero-Holding Chambers PRICE 1 Device by Does not apply route daily as needed (wheezing) 1 Device 0    Misc. Devices (WALL GRAB BAR) MISC GRAB BARS FOR BOTH SHOWER AND TOILET, USE AS DIRECTED 6 each 0    Misc. Devices MISC OUTSIDE RAMP THAT INCLUDES RAILINGS, USE AS DIRECTED 1 Device 0    Misc.  Devices (RAISED TOILET SEAT) MISC USE AS DIRECTED 1 each 0    hydrocortisone 2.5 % cream apply to affected area twice a day 30 g 1    Elastic Bandages & Supports (JOBST KNEE HIGH COMPRESSION SM) MISC Dispense Bilateral Jobst Below Knee 20-30mmHg compression stockings 3 each 3    EPINEPHrine (EPIPEN) 0.3 MG/0.3ML SOAJ injection Inject 0.3 mLs into the muscle once for 1 dose Use as directed for allergic reaction 1 each 0    Misc. Devices [facility]) MISC 1 Device by Does not apply route once for 1 dose 1 each 0     No current facility-administered medications on file prior to visit. Review of Systems. Review of Systems:   History obtained from chart review and the patient  General ROS: negative for - chills, fatigue, fever, night sweats or weight gain  Constitutional: Negative for chills, diaphoresis, fatigue, fever and unexpected weight change. Musculoskeletal: Positive for arthralgias, gait problem and joint swelling. Neurological ROS: negative for - behavioral changes, confusion, headaches or seizures. Negative for weakness and numbness. Dermatological ROS: negative for - mole changes, rash  Cardiovascular: Negative for leg swelling. Gastrointestinal: Negative for constipation, diarrhea, nausea and vomiting. Objective:  Dermatologic Exam:  Skin lesion/ulceration Absent . Skin No rashes or nodules noted. .   Skin is thin, with flaky sloughing skin as well as decreased hair growth to the lower leg  Small red hemosiderin deposits seen dorsal foot   Musculoskeletal:     1st MPJ ROM decreased, Bilateral.  Muscle strength 5/5, Bilateral.   POP to the left foot at the 5th metatarsal with no edema and ecchyosis seen      Pain present upon palpation of toenails 1-5, Bilateral. decreased medial longitudinal arch, Bilateral.  Ankle ROM decreased,Bilateral.    Dorsally contracted digits present digits 2, Bilateral.     Vascular: DP pulses 1/4 bilateral.  PT pulses 0/4 bilateral.   CFT <5 seconds, Bilateral.  Hair growth absent to the level of the digits, Bilateral.  Edema present, Bilateral.  Varicosities absent, Bilateral. Erythema absent, Bilateral    Neurological: Sensation diminshed to light touch to level of digits, Bilateral. Protective sensation intact 6/10 sites via 5.07/10g Wharton-Daniela Monofilament, Bilateral.  negative Tinel's, Bilateral.  negative Valleix sign, Bilateral.      Integument: Warm, dry, supple, Bilateral.  Open lesion absent, Bilateral.  Interdigital maceration absent to web spaces 4, Bilateral.  Nails 1-5 left and 1-5 right thickened > 3.0 mm, dystrophic and crumbly, discolored with subungual debris. Fissures absent, Bilateral.   General: AAO x 3 in NAD. Derm  Toenail Description  Sites of Onychomycosis Involvement (Check affected area)  [x] [x] [x] [x] [x] [x] [x] [x] [x] [x]  5 4 3 2 1 1 2 3 4 5                          Right                                        Left    Thickness  [x] [x] [x] [x] [x] [x] [x] [x] [x] [x]  5 4 3 2 1 1 2 3 4 5                         Right                                        Left    Dystrophic Changes   [x] [x] [x] [x] [x] [x] [x] [x] [x] [x]  5 4 3 2 1 1 2 3 4 5                         Right                                        Left    Color  [x] [x] [x] [x] [x] [x] [x] [x] [x] [x]  5 4 3 2 1 1 2 3 4 5                          Right                                        Left    Incurvation/Ingrowin   [] [] [] [] [] [] [] [] [] []  5 4 3 2 1 1 2 3 4 5                         Right                                        Left    Inflammation/Pain   [x] [x] [x] [x] [x] [x] [x] [x] [x] [x]  5 4 3  2 1 1 2 3 4 5                         Right                                        Left       Nails are painful 1-10 with direct palpation. Q7   []Yes  []No                Q8   [x]Yes  []No                     Q9   []Yes    []No  Assessment:  63 y.o. female with:    Diagnosis Orders   1. Left foot pain  62614 - DE DEBRIDEMENT OF NAILS, 6 OR MORE   2. Dermatophytosis of nail  33064 - DE DEBRIDEMENT OF NAILS, 6 OR MORE   3. Trouble walking  95124 - DE DEBRIDEMENT OF NAILS, 6 OR MORE   4.  Pain in both feet  65872 - DE DEBRIDEMENT OF NAILS, 6 OR MORE           Plan:   Pt was evaluated and examined. Patient was given personalized discharge instructions. Advised pt that she is using very flimsy shoes. Pt is to use her stiff soled shoes at all times. OTC orthotics dispensed today        Nails 1-10 were debrided in length and thickness sharply with a nail nipper and  without incident. Pt will follow up in 9 weeks or sooner if any problems arise. Diagnosis was discussed with the pt and all of their questions were answered in detail. Proper foot hygiene and care was discussed with the pt. Patient to check feet daily and contact the office with any questions/problems/concerns. Other comorbidity noted and will be managed by PCP. Pain waiver discussed with patient and confirmed. All labs were reviewed and all imagining including the above findings were reviewed PRIOR to the patients arrival and with the patient today. Previous patient encounter was reviewed. Encounters from the patients other medical providers were reviewed and noted. Time was spent educating the patient and their families/caregivers on proper care of the feet and ankles. All the above diagnosis were addressed at todays visit and all questions were answered.   A total of 25 minutes was spent with this patients encounter which included charting after the patients visit    1/25/2022      Electronically signed by Titi Rojas DPM on 1/25/2022 at 9:59 AM  1/25/2022

## 2022-03-10 ENCOUNTER — OFFICE VISIT (OUTPATIENT)
Dept: FAMILY MEDICINE CLINIC | Age: 62
End: 2022-03-10
Payer: MEDICARE

## 2022-03-10 ENCOUNTER — HOSPITAL ENCOUNTER (OUTPATIENT)
Age: 62
Setting detail: SPECIMEN
Discharge: HOME | End: 2022-03-10

## 2022-03-10 VITALS
BODY MASS INDEX: 51.5 KG/M2 | HEART RATE: 73 BPM | WEIGHT: 255 LBS | OXYGEN SATURATION: 97 % | TEMPERATURE: 97.5 F | DIASTOLIC BLOOD PRESSURE: 78 MMHG | SYSTOLIC BLOOD PRESSURE: 123 MMHG

## 2022-03-10 DIAGNOSIS — I10 ESSENTIAL HYPERTENSION: ICD-10-CM

## 2022-03-10 DIAGNOSIS — Z13.220 SCREENING CHOLESTEROL LEVEL: ICD-10-CM

## 2022-03-10 DIAGNOSIS — E66.01 MORBID OBESITY WITH BMI OF 50.0-59.9, ADULT (HCC): ICD-10-CM

## 2022-03-10 DIAGNOSIS — I10 ESSENTIAL HYPERTENSION: Primary | ICD-10-CM

## 2022-03-10 DIAGNOSIS — Z13.29 THYROID DISORDER SCREEN: ICD-10-CM

## 2022-03-10 DIAGNOSIS — J45.20 MILD INTERMITTENT ASTHMA WITHOUT COMPLICATION: ICD-10-CM

## 2022-03-10 DIAGNOSIS — R73.9 HYPERGLYCEMIA: ICD-10-CM

## 2022-03-10 LAB
ABSOLUTE EOS #: 0.24 K/UL (ref 0–0.44)
ABSOLUTE IMMATURE GRANULOCYTE: <0.03 K/UL (ref 0–0.3)
ABSOLUTE LYMPH #: 2.39 K/UL (ref 1.1–3.7)
ABSOLUTE MONO #: 0.43 K/UL (ref 0.1–1.2)
ALBUMIN SERPL-MCNC: 4.2 G/DL (ref 3.5–5.2)
ALBUMIN/GLOBULIN RATIO: 1.6 (ref 1–2.5)
ALP BLD-CCNC: 90 U/L (ref 35–104)
ALT SERPL-CCNC: 16 U/L (ref 5–33)
ANION GAP SERPL CALCULATED.3IONS-SCNC: 11 MMOL/L (ref 9–17)
AST SERPL-CCNC: 16 U/L
BASOPHILS # BLD: 0 % (ref 0–2)
BASOPHILS ABSOLUTE: 0.03 K/UL (ref 0–0.2)
BILIRUB SERPL-MCNC: 0.36 MG/DL (ref 0.3–1.2)
BUN BLDV-MCNC: 13 MG/DL (ref 6–20)
CALCIUM SERPL-MCNC: 9.8 MG/DL (ref 8.6–10.4)
CHLORIDE BLD-SCNC: 98 MMOL/L (ref 98–107)
CHOLESTEROL, FASTING: 187 MG/DL
CHOLESTEROL/HDL RATIO: 2.7
CO2: 30 MMOL/L (ref 20–31)
CREAT SERPL-MCNC: 0.49 MG/DL (ref 0.5–0.9)
EOSINOPHILS RELATIVE PERCENT: 3 % (ref 1–4)
ESTIMATED AVERAGE GLUCOSE: 131 MG/DL
GFR AFRICAN AMERICAN: >60 ML/MIN
GFR NON-AFRICAN AMERICAN: >60 ML/MIN
GFR SERPL CREATININE-BSD FRML MDRD: ABNORMAL ML/MIN/{1.73_M2}
GLUCOSE BLD-MCNC: 101 MG/DL (ref 70–99)
HBA1C MFR BLD: 6.2 % (ref 4–6)
HCT VFR BLD CALC: 45.5 % (ref 36.3–47.1)
HDLC SERPL-MCNC: 70 MG/DL
HEMOGLOBIN: 14.1 G/DL (ref 11.9–15.1)
IMMATURE GRANULOCYTES: 0 %
LDL CHOLESTEROL: 96 MG/DL (ref 0–130)
LYMPHOCYTES # BLD: 32 % (ref 24–43)
MCH RBC QN AUTO: 27.3 PG (ref 25.2–33.5)
MCHC RBC AUTO-ENTMCNC: 31 G/DL (ref 28.4–34.8)
MCV RBC AUTO: 88.2 FL (ref 82.6–102.9)
MONOCYTES # BLD: 6 % (ref 3–12)
NRBC AUTOMATED: 0 PER 100 WBC
PDW BLD-RTO: 15.3 % (ref 11.8–14.4)
PLATELET # BLD: 393 K/UL (ref 138–453)
PMV BLD AUTO: 9.7 FL (ref 8.1–13.5)
POTASSIUM SERPL-SCNC: 4 MMOL/L (ref 3.7–5.3)
RBC # BLD: 5.16 M/UL (ref 3.95–5.11)
RBC # BLD: ABNORMAL 10*6/UL
SEG NEUTROPHILS: 59 % (ref 36–65)
SEGMENTED NEUTROPHILS ABSOLUTE COUNT: 4.28 K/UL (ref 1.5–8.1)
SODIUM BLD-SCNC: 139 MMOL/L (ref 135–144)
THYROXINE, FREE: 0.99 NG/DL (ref 0.93–1.7)
TOTAL PROTEIN: 6.8 G/DL (ref 6.4–8.3)
TRIGLYCERIDE, FASTING: 103 MG/DL
TSH SERPL DL<=0.05 MIU/L-ACNC: 1.07 MIU/L (ref 0.3–5)
WBC # BLD: 7.4 K/UL (ref 3.5–11.3)

## 2022-03-10 PROCEDURE — G8427 DOCREV CUR MEDS BY ELIG CLIN: HCPCS | Performed by: NURSE PRACTITIONER

## 2022-03-10 PROCEDURE — 1036F TOBACCO NON-USER: CPT | Performed by: NURSE PRACTITIONER

## 2022-03-10 PROCEDURE — G8484 FLU IMMUNIZE NO ADMIN: HCPCS | Performed by: NURSE PRACTITIONER

## 2022-03-10 PROCEDURE — 99214 OFFICE O/P EST MOD 30 MIN: CPT | Performed by: NURSE PRACTITIONER

## 2022-03-10 PROCEDURE — 3017F COLORECTAL CA SCREEN DOC REV: CPT | Performed by: NURSE PRACTITIONER

## 2022-03-10 PROCEDURE — G8417 CALC BMI ABV UP PARAM F/U: HCPCS | Performed by: NURSE PRACTITIONER

## 2022-03-10 RX ORDER — HYDROCHLOROTHIAZIDE 25 MG/1
25 TABLET ORAL EVERY MORNING
Qty: 90 TABLET | Refills: 1 | Status: SHIPPED | OUTPATIENT
Start: 2022-03-10 | End: 2022-06-07 | Stop reason: ALTCHOICE

## 2022-03-10 RX ORDER — LOSARTAN POTASSIUM 100 MG/1
TABLET ORAL
Qty: 90 TABLET | Refills: 0 | OUTPATIENT
Start: 2022-03-10

## 2022-03-10 RX ORDER — LOSARTAN POTASSIUM 100 MG/1
100 TABLET ORAL DAILY
Qty: 90 TABLET | Refills: 1 | Status: SHIPPED | OUTPATIENT
Start: 2022-03-10 | End: 2022-06-07 | Stop reason: ALTCHOICE

## 2022-03-10 RX ORDER — AMLODIPINE BESYLATE 5 MG/1
5 TABLET ORAL DAILY
Qty: 90 TABLET | Refills: 1 | Status: SHIPPED | OUTPATIENT
Start: 2022-03-10 | End: 2022-08-31

## 2022-03-10 RX ORDER — HYDROCHLOROTHIAZIDE 25 MG/1
25 TABLET ORAL EVERY MORNING
Qty: 90 TABLET | Refills: 0 | OUTPATIENT
Start: 2022-03-10

## 2022-03-10 ASSESSMENT — PATIENT HEALTH QUESTIONNAIRE - PHQ9
9. THOUGHTS THAT YOU WOULD BE BETTER OFF DEAD, OR OF HURTING YOURSELF: 0
6. FEELING BAD ABOUT YOURSELF - OR THAT YOU ARE A FAILURE OR HAVE LET YOURSELF OR YOUR FAMILY DOWN: 0
SUM OF ALL RESPONSES TO PHQ QUESTIONS 1-9: 0
SUM OF ALL RESPONSES TO PHQ QUESTIONS 1-9: 0
SUM OF ALL RESPONSES TO PHQ9 QUESTIONS 1 & 2: 0
2. FEELING DOWN, DEPRESSED OR HOPELESS: 0
5. POOR APPETITE OR OVEREATING: 0
SUM OF ALL RESPONSES TO PHQ QUESTIONS 1-9: 0
8. MOVING OR SPEAKING SO SLOWLY THAT OTHER PEOPLE COULD HAVE NOTICED. OR THE OPPOSITE, BEING SO FIGETY OR RESTLESS THAT YOU HAVE BEEN MOVING AROUND A LOT MORE THAN USUAL: 0
7. TROUBLE CONCENTRATING ON THINGS, SUCH AS READING THE NEWSPAPER OR WATCHING TELEVISION: 0
4. FEELING TIRED OR HAVING LITTLE ENERGY: 0
1. LITTLE INTEREST OR PLEASURE IN DOING THINGS: 0
SUM OF ALL RESPONSES TO PHQ QUESTIONS 1-9: 0
3. TROUBLE FALLING OR STAYING ASLEEP: 0

## 2022-03-10 NOTE — PROGRESS NOTES
apnea)     Allergic asthma     Allergic rhinitis 12/04/2014    Osteoarthrosis involving multiple sites but not designated as generalized 08/04/2014    Benign neoplasm of skin 06/26/2014    Cyst of ovary 06/26/2014    Generalized osteoarthritis 06/26/2014    Edema 06/26/2014        Past Medical History:   Diagnosis Date    Allergic rhinitis 12/4/2014    Anxiety 7/5/2018    Arthritis     Asthma     Chest pain     occurs every other day, pt states they occur when she doesn't eat right or forget to take her bp med, occurs left side of chest with no radiation, pt states \"it's new\" and has not had any testing done or seen anyone for it.     Constipation     Depressive disorder 7/5/2018    Generalized osteoarthritis 6/26/2014    History of kidney stones     Hypertension     Obesity, morbid, BMI 40.0-49.9 ([facility] [address] 2/28/2017    PONV (postoperative nausea and vomiting)     Sleep apnea     does not use a machine      Past Surgical History:   Procedure Laterality Date    CHOLECYSTECTOMY      FOOT TENDON SURGERY Left 09/08/2017    posterior repair    FOOT TENDON SURGERY Left 06/29/2018    LEFT POSTERIOR  TENDON REPAIR FLEXOR TENDON TRANSFER (Left Foot)    LITHOTRIPSY      DE OFFICE/OUTPT VISIT,PROCEDURE ONLY Left 6/29/2018    LEFT POSTERIOR  TENDON REPAIR FLEXOR TENDON TRANSFER performed by Mikhail Calderón DPM at [address] Left 9/8/2017    LEFT POSTERIOR TIBIAL TENDON REPAIR WITH LEFT TENOLYSIS  performed by Mikhail Calderón DPM at [address] History   Problem Relation Age of Onset    Depression Mother     Diabetes Mother     High Blood Pressure Mother     Mental Illness Mother     Substance Abuse Father     Depression Sister     Diabetes Sister     High Blood Pressure Sister     Mental Illness Sister     Depression Brother     Mental Illness Brother     Substance Abuse Brother      Social History     Tobacco Use    Smoking status: Former Smoker     Packs/day: 3.00     Years: 20.00     Pack years: 60.00     Types: Cigarettes     Start date: 1981     Quit date: 1998     Years since quittin.2    Smokeless tobacco: Never Used    Tobacco comment: quit smoking 15-20 yrs ago   Substance Use Topics    Alcohol use: No     ALLERGIES:    Allergies   Allergen Reactions    Latex Hives     Latex gloves    Asa [Aspirin] Hives    Codeine Hives    Lisinopril-Hydrochlorothiazide Hives    Motrin [Ibuprofen] Hives    Other Hives and Swelling     Cherries, almonds    Pcn [Penicillins] Hives    Sulfa Antibiotics           Subjective     · Constitutional:  Negative for activity change, appetite change,unexpected weight change, chills, fever, and fatigue. · HENT: Negative for ear pain, sore throat,  Rhinorrhea, sinus pain, sinus pressure, congestion. · Eyes:  Negative for pain and discharge. · Respiratory:  Negative for chest tightness, shortness of breath, wheezing, and cough. · Cardiovascular:  Negative for chest pain, palpitations and leg swelling. · Gastrointestinal: Negative for abdominal pain, blood in stool, constipation,diarrhea, nausea and vomiting. · Endocrine: Negative for cold intolerance, heat intolerance, polydipsia, polyphagia and polyuria. · Genitourinary: Negative for difficulty urinating, dysuria, flank pain, frequency, hematuria and urgency. · Musculoskeletal: Negative for arthralgias, back pain, joint swelling, myalgias, neck pain and neck stiffness. Positive for left hip pain, left foot pain  · Skin: Negative for rash and wound. · Allergic/Immunologic: Negative for environmental allergies and food allergies. · Neurological:  Negative for dizziness, light-headedness, numbness and headaches. · Hematological:  Negative for adenopathy. Does not bruise/bleed easily. · Psychiatric/Behavioral: Negative for self-injury, sleep disturbance and suicidal ideas.      Objective     PHYSICAL EXAM:   · Constitutional: Alfred Brown is oriented to person, place, and time. Vital signs are normal. Appears well-developed and well-nourished. · HEENT:   · Head: Normocephalic and atraumatic. Right Ear: Hearing and external ear normal. TM normal.  Canal normal  · Left Ear: Hearing and external ear normal. TM normal Canal normal  · Eyes:PERRL, EOMI, Conjunctiva normal, No discharge. · Neck: Full passive range of motion. Non-tender on palpation. Neck supple. No thyromegaly present. Trachea normal.  · Cardiovascular: Normal rate, regular rhythm, S1, S2, no murmur, no gallop, no friction rub, intact distal pulses. No carotid bruit  · Pulmonary/Chest: Breath sounds are clear throughout, No respiratory distress, No wheezing, No chest tenderness. Effort normal  Musculoskeletal: Physical Exam  Musculoskeletal:      Left hip: No deformity, tenderness, bony tenderness or crepitus. Decreased range of motion. Normal strength. Left foot: Decreased range of motion. Normal capillary refill. Tenderness present. No swelling, deformity, bony tenderness or crepitus. Feet:      Comments: Ambulates with walker for stability    · Lymphadenopathy: No lymphadenopathy noted. · Neurological: Alert and oriented to person, place, and time. Normal motor function, Normal sensory function, No focal deficits noted. He has normal strength. · Skin: Skin is warm, dry and intact. No obvious lesions on exposed skin  · Psychiatric: Normal mood and affect. Speech is normal and behavior is normal.     Nursing note and vitals reviewed. Blood pressure 123/78, pulse 73, temperature 97.5 °F (36.4 °C), temperature source Temporal, weight 255 lb (115.7 kg), SpO2 97 %, not currently breastfeeding. Body mass index is 51.5 kg/m².     Wt Readings from Last 3 Encounters:   03/10/22 255 lb (115.7 kg)   01/25/22 253 lb (114.8 kg)   01/06/22 253 lb 12.8 oz (115.1 kg)     BP Readings from Last 3 Encounters:   03/10/22 123/78   01/06/22 129/81   09/28/21 136/82       No results found for this visit on 03/10/22. Completed Orders/Prescriptions   Orders Placed This Encounter   Medications    losartan (COZAAR) 100 MG tablet     Sig: Take 1 tablet by mouth daily     Dispense:  90 tablet     Refill:  1    hydroCHLOROthiazide (HYDRODIURIL) 25 MG tablet     Sig: Take 1 tablet by mouth every morning     Dispense:  90 tablet     Refill:  1    amLODIPine (NORVASC) 5 MG tablet     Sig: Take 1 tablet by mouth daily     Dispense:  90 tablet     Refill:  1     Needs appt    fluticasone-salmeterol (WIXELA INHUB) 250-50 MCG/DOSE AEPB     Sig: Inhale 1 puff into the lungs 2 times daily     Dispense:  180 each     Refill:  1       AssessmentPlan/Medical Decision Making     1. Essential hypertension  - controlled  - reviewed DASH diet  - CBC with Auto Differential; Future  - Comprehensive Metabolic Panel; Future  - losartan (COZAAR) 100 MG tablet; Take 1 tablet by mouth daily  Dispense: 90 tablet; Refill: 1  - hydroCHLOROthiazide (HYDRODIURIL) 25 MG tablet; Take 1 tablet by mouth every morning  Dispense: 90 tablet; Refill: 1  - amLODIPine (NORVASC) 5 MG tablet; Take 1 tablet by mouth daily  Dispense: 90 tablet; Refill: 1    2. Hyperglycemia  - controlled  - reviewed diet and exercise  - CBC with Auto Differential; Future  - Comprehensive Metabolic Panel; Future  - Hemoglobin A1C; Future    3. Morbid obesity with BMI of 50.0-59.9, adult ([facility]  - reviewed diet and exercise  - Hemoglobin A1C; Future    4. Screening cholesterol level  - Lipid, Fasting; Future    5. Thyroid disorder screen  - TSH; Future  - T4, Free; Future    6. Mild intermittent asthma without complication  - controlled  - she declines Singulair  - fluticasone-salmeterol (WIXELA INHUB) 250-50 MCG/DOSE AEPB; Inhale 1 puff into the lungs 2 times daily  Dispense: 180 each; Refill: 1      Return in about 6 months (around 9/10/2022) for Routine follow up of chronic conditions.     1Kath Carreno received counseling on the following healthy behaviors: nutrition, exercise and medication adherence  2. Patient given educational materials - see patient instructions  3. Was a self-tracking handout given in paper form or via [employer]? No  If yes, see orders or list here. 4.  Discussed use, benefit, and side effects of prescribed medications. Barriers to medication compliance addressed. All patient questions answered. Pt voiced understanding. 5.  Reviewed prior labs, imaging, consultation, follow up, and health maintenance  6. Continue current medications, diet and exercise. 7. Discussed use, benefit, and side effects of prescribed medications. Barriers to medication compliance addressed. All her questions were answered. Pt voiced understanding. Denisa Kong will continue current medications, diet and exercise. Medical Decision Making: moderate  Of the 30 minute duration appointment visit, Erin Dowling CNP spent at least 50% of the face-to-face time in counseling, explanation of diagnosis, planning of further management, and answering all questions. Signed:  Erin Dowling CNP    This note is created with the assistance of a speech-recognition program.  While intending to generate a document that actually reflects the content of the visit, no guarantees can be provided that every mistake has been identified and corrected by editing.

## 2022-03-10 NOTE — TELEPHONE ENCOUNTER
Cristine Arango is calling to request a refill on the following medication(s):    Medication Request:  Requested Prescriptions     Pending Prescriptions Disp Refills    losartan (COZAAR) 100 MG tablet [Pharmacy Med Name: LOSARTAN POTASSIUM 100 MG TAB] 90 tablet 0     Sig: take 1 tablet by mouth once daily    hydroCHLOROthiazide (HYDRODIURIL) 25 MG tablet [Pharmacy Med Name: HYDROCHLOROTHIAZIDE 25 MG TAB] 90 tablet 0     Sig: take 1 tablet by mouth every morning       Last Visit Date (If Applicable):  25/29/3998    Next Visit Date:    3/10/2022

## 2022-03-15 ENCOUNTER — OFFICE VISIT (OUTPATIENT)
Dept: UROLOGY | Age: 62
End: 2022-03-15
Payer: MEDICARE

## 2022-03-15 VITALS
SYSTOLIC BLOOD PRESSURE: 132 MMHG | DIASTOLIC BLOOD PRESSURE: 78 MMHG | WEIGHT: 255 LBS | HEIGHT: 59 IN | RESPIRATION RATE: 17 BRPM | TEMPERATURE: 97.3 F | BODY MASS INDEX: 51.41 KG/M2 | HEART RATE: 89 BPM

## 2022-03-15 DIAGNOSIS — Z87.442 HISTORY OF KIDNEY STONES: ICD-10-CM

## 2022-03-15 DIAGNOSIS — Z87.448 HISTORY OF HEMATURIA: Primary | ICD-10-CM

## 2022-03-15 PROCEDURE — 3017F COLORECTAL CA SCREEN DOC REV: CPT | Performed by: UROLOGY

## 2022-03-15 PROCEDURE — 1036F TOBACCO NON-USER: CPT | Performed by: UROLOGY

## 2022-03-15 PROCEDURE — G8484 FLU IMMUNIZE NO ADMIN: HCPCS | Performed by: UROLOGY

## 2022-03-15 PROCEDURE — G8427 DOCREV CUR MEDS BY ELIG CLIN: HCPCS | Performed by: UROLOGY

## 2022-03-15 PROCEDURE — 99213 OFFICE O/P EST LOW 20 MIN: CPT | Performed by: UROLOGY

## 2022-03-15 PROCEDURE — G8417 CALC BMI ABV UP PARAM F/U: HCPCS | Performed by: UROLOGY

## 2022-03-15 ASSESSMENT — ENCOUNTER SYMPTOMS
BACK PAIN: 0
NAUSEA: 0
ABDOMINAL PAIN: 0
EYE PAIN: 0
COUGH: 0
DIARRHEA: 0
SHORTNESS OF BREATH: 0
CONSTIPATION: 0
EYE REDNESS: 0
VOMITING: 0
WHEEZING: 0

## 2022-03-15 NOTE — LETTER
[address] [facility] [address]  Dept: [phone]  Dept Fax: [phone]        3/15/22    Patient: Louise Storm  YOB: 1960    Dear JJ Verma CNP,    I had the pleasure of seeing one of your patients, Barb Stubbs today in the office today. Below are the relevant portions of my assessment and plan of care. IMPRESSION:  1. History of hematuria    2. History of kidney stones        PLAN:  Hematuria was related to a ureteral stone. Full work up was negative. KUB in 1 year. Return in about 1 year (around 3/15/2023). Prescriptions Ordered:  No orders of the defined types were placed in this encounter. Orders Placed:  Orders Placed This Encounter   Procedures    XR ABDOMEN (KUB) (SINGLE AP VIEW)     Standing Status:   Future     Standing Expiration Date:   3/15/2023         Thank you for allowing me to participate in the care of this patient. I will keep you updated on this patient's follow up and I look forward to serving you and your patients again in the future.         Amber Mcconnell MD

## 2022-03-15 NOTE — PROGRESS NOTES
[address] [facility] [address]  Dept: [address] [facility] Urology Office Note - Established    Patient:  Mykel Luu  YOB: 1960  Date: 3/15/2022    The patient is a 63 y.o. female whopresents today for evaluation of the following problems:   Chief Complaint   Patient presents with    Follow-up     6 months post cysto       HPI  She is here for hematuria. She had A CTU, which showed a 3mm stone. Repeat CT showed passage of the stone. She had a cysto as well, which was negative as was cytology. Summary of old records: N/A    Additional History: N/A    Procedures Today: N/A    Urinalysis today:  No results found for this visit on 03/15/22. Imaging Reviewed during this Office Visit: none  (results were independently reviewed by physician and radiology report verified)    AUA Symptom Score (3/15/2022):   INCOMPLETE EMPTYING: How often have you had the sensation of not emptying your bladder?: Not at all  FREQUENCY: How often do you have to urinate less than every two hours?: Not at all  INTERMITTENCY: How often have you found you stopped and started again several times when you urinated?: Not at all  URGENCY: How often have you found it difficult to postpone urination?: Not at all  WEAK STREAM: How often have you had a weak urinary stream?: Not at all  STRAINING: How often have you had to strain to start  urination?: Not at all  NOCTURIA: How many times did you typically get up at night to uriniate?: NONE  TOTAL I-PSS SCORE[de-identified] 0       Last BUN and creatinine:  Lab Results   Component Value Date    BUN 13 03/10/2022     Lab Results   Component Value Date    CREATININE 0.49 (L) 03/10/2022       Additional Lab/Culture results: none    PAST MEDICAL, FAMILY AND SOCIAL HISTORY UPDATE:  Past Medical History:   Diagnosis Date    Allergic rhinitis 12/4/2014    Anxiety 7/5/2018    Arthritis     Asthma     Chest pain     occurs every other day, pt states they occur when she doesn't eat right or forget to take her bp med, occurs left side of chest with no radiation, pt states \"it's new\" and has not had any testing done or seen anyone for it.     Constipation     Depressive disorder 7/5/2018    Generalized osteoarthritis 6/26/2014    History of kidney stones     Hypertension     Obesity, morbid, BMI 40.0-49.9 ([address] 2/28/2017    PONV (postoperative nausea and vomiting)     Sleep apnea     does not use a machine     Past Surgical History:   Procedure Laterality Date    CHOLECYSTECTOMY      FOOT TENDON SURGERY Left 09/08/2017    posterior repair    FOOT TENDON SURGERY Left 06/29/2018    LEFT POSTERIOR  TENDON REPAIR FLEXOR TENDON TRANSFER (Left Foot)    LITHOTRIPSY      UT OFFICE/OUTPT VISIT,PROCEDURE ONLY Left 6/29/2018    LEFT POSTERIOR  TENDON REPAIR FLEXOR TENDON TRANSFER performed by Titi Rojas DPM at [address] Left 9/8/2017    LEFT POSTERIOR TIBIAL TENDON REPAIR WITH LEFT TENOLYSIS  performed by Titi Rojas DPM at [facility] History   Problem Relation Age of Onset    Depression Mother     Diabetes Mother     High Blood Pressure Mother     Mental Illness Mother     Substance Abuse Father     Depression Sister     Diabetes Sister     High Blood Pressure Sister     Mental Illness Sister     Depression Brother     Mental Illness Brother     Substance Abuse Brother      Outpatient Medications Marked as Taking for the 3/15/22 encounter (Office Visit) with Michael Goldman MD   Medication Sig Dispense Refill    losartan (COZAAR) 100 MG tablet Take 1 tablet by mouth daily 90 tablet 1    hydroCHLOROthiazide (HYDRODIURIL) 25 MG tablet Take 1 tablet by mouth every morning 90 tablet 1    amLODIPine (NORVASC) 5 MG tablet Take 1 tablet by mouth daily 90 tablet 1    fluticasone-salmeterol (WIXELA INHUB) 250-50 MCG/DOSE AEPB Inhale 1 puff into the lungs 2 times daily 180 each 1    hydrOXYzine (ATARAX) 25 MG tablet Take 1 tablet by mouth every 6 hours as needed for Itching or Anxiety 60 tablet 5    fluticasone (FLONASE) 50 MCG/ACT nasal spray 2 sprays by Nasal route daily 16 g 5    clobetasol (TEMOVATE) 0.05 % ointment Apply to rash twice daily until resolved (not face, armpit or groin) 60 g 5    RA VITAMIN D-3 50 MCG (2000 UT) CAPS take 1 capsule by mouth once daily MUST MAKE APPOINTMENT FOR FURTHER REFILL 90 capsule 1    albuterol sulfate HFA (VENTOLIN HFA) 108 (90 Base) MCG/ACT inhaler Inhale 2 puffs into the lungs every 6 hours as needed for Wheezing 1 Inhaler 3    lidocaine (L-M-X 4) 4 % cream Apply a thick layer 30 minutes prior to procedure, covering in saran wrap 15 g 0    Misc. Devices (BATHTUB SAFETY RAIL) MISC 4 each by Does not apply route daily 4 each 0    Misc. Devices (EXTENDABLE BEDSIDE RAIL) MISC 4 each by Does not apply route daily 4 each 0    Spacer/Aero-Holding Chambers PRICE 1 Device by Does not apply route daily as needed (wheezing) 1 Device 0    Misc. Devices (WALL GRAB BAR) MISC GRAB BARS FOR BOTH SHOWER AND TOILET, USE AS DIRECTED 6 each 0    Misc. Devices MISC OUTSIDE RAMP THAT INCLUDES RAILINGS, USE AS DIRECTED 1 Device 0    Misc.  Devices (RAISED TOILET SEAT) MISC USE AS DIRECTED 1 each 0    hydrocortisone 2.5 % cream apply to affected area twice a day 30 g 1    Elastic Bandages & Supports (JOBST KNEE HIGH COMPRESSION SM) MISC Dispense Bilateral Jobst Below Knee 20-30mmHg compression stockings 3 each 3      (All medications reviewed and updated by provider sincelast office visit or hospitalization)   Latex, Asa [aspirin], Codeine, Lisinopril-hydrochlorothiazide, Motrin [ibuprofen], Other, Pcn [penicillins], and Sulfa antibiotics  Social History     Tobacco Use   Smoking Status Former Smoker    Packs/day: 3.00    Years: 20.00    Pack years: 60.00    Types: Cigarettes    Start date: 11/26/1981   [employer] date: 1998    Years since quittin.2   Smokeless Tobacco Never Used   Tobacco Comment    quit smoking 15-20 yrs ago      (If patient a smoker, smoking cessation counseling offered)     Social History     Substance and Sexual Activity   Alcohol Use No       REVIEW OF SYSTEMS:  Review of Systems      Physical Exam:      Vitals:    03/15/22 0851   BP: 132/78   Pulse: 89   Resp: 17   Temp: 97.3 °F (36.3 °C)     Body mass index is 51.5 kg/m². Patient is a 63 y.o. female in noacute distress and alert and oriented to person, place and time. Physical Exam  Constitutional: Patient in no acute distress. Neuro: Alert andoriented to person, place and time. Psych: Mood normal, affect normal  Skin: No rash noted  Lungs: Respiratory effort is normal  Cardiovascular: Warm & Pink  Abdomen: Soft, non-tender, non-distended with no CVA,  No flank tenderness,  Or hepatosplenomegaly   Lymphatics: No palpable lymphadenopathy. Bladder non-tender and not distended. Musculoskeletal: Normalgait and station      and Plan      1. History of hematuria    2. History of kidney stones           Plan:         Hematuria was related to a ureteral stone. Full work up was negative. KUB in 1 year. Return in about 1 year (around 3/15/2023). Prescriptions Ordered:  No orders of the defined types were placed in this encounter. Orders Placed:  Orders Placed This Encounter   Procedures    XR ABDOMEN (KUB) (SINGLE AP VIEW)     Standing Status:   Future     Standing Expiration Date:   3/15/2023            Whitney Almanza MD    Agree with the ROS entered by the MA.

## 2022-03-29 ENCOUNTER — OFFICE VISIT (OUTPATIENT)
Dept: PODIATRY | Age: 62
End: 2022-03-29
Payer: MEDICARE

## 2022-03-29 VITALS — BODY MASS INDEX: 51.41 KG/M2 | HEIGHT: 59 IN | WEIGHT: 255 LBS | RESPIRATION RATE: 16 BRPM

## 2022-03-29 DIAGNOSIS — R26.2 TROUBLE WALKING: ICD-10-CM

## 2022-03-29 DIAGNOSIS — M79.671 PAIN IN BOTH FEET: ICD-10-CM

## 2022-03-29 DIAGNOSIS — M25.572 CHRONIC PAIN OF LEFT ANKLE: ICD-10-CM

## 2022-03-29 DIAGNOSIS — M79.672 PAIN IN BOTH FEET: ICD-10-CM

## 2022-03-29 DIAGNOSIS — M25.372 LEFT ANKLE INSTABILITY: ICD-10-CM

## 2022-03-29 DIAGNOSIS — B35.1 DERMATOPHYTOSIS OF NAIL: ICD-10-CM

## 2022-03-29 DIAGNOSIS — G89.29 CHRONIC PAIN OF LEFT ANKLE: ICD-10-CM

## 2022-03-29 DIAGNOSIS — M79.672 LEFT FOOT PAIN: Primary | ICD-10-CM

## 2022-03-29 PROCEDURE — G8484 FLU IMMUNIZE NO ADMIN: HCPCS | Performed by: PODIATRIST

## 2022-03-29 PROCEDURE — 1036F TOBACCO NON-USER: CPT | Performed by: PODIATRIST

## 2022-03-29 PROCEDURE — 99213 OFFICE O/P EST LOW 20 MIN: CPT | Performed by: PODIATRIST

## 2022-03-29 PROCEDURE — G8427 DOCREV CUR MEDS BY ELIG CLIN: HCPCS | Performed by: PODIATRIST

## 2022-03-29 PROCEDURE — 11721 DEBRIDE NAIL 6 OR MORE: CPT | Performed by: PODIATRIST

## 2022-03-29 PROCEDURE — 3017F COLORECTAL CA SCREEN DOC REV: CPT | Performed by: PODIATRIST

## 2022-03-29 PROCEDURE — G8417 CALC BMI ABV UP PARAM F/U: HCPCS | Performed by: PODIATRIST

## 2022-03-29 NOTE — PROGRESS NOTES
[address] [facility] PODIATRY [facility]  [address] [address] [place] [address]  Dept: [phone]  Dept Fax: [phone]     PAIN PROGRESS NOTE  Date of patient's visit: 3/29/2022  Patient's Name:  Paresh Crowe YOB: 1960            Patient Care Team:  JJ Frey CNP as PCP - General (Nurse Practitioner)  JJ Frey CNP as PCP - [facility]  Louie Beavers DPM as Physician (Podiatry)      Chief Complaint   Patient presents with    Nail Problem    Foot Pain       Subjective: This Paresh Crowe comes to clinic for foot and nail care. Pt currently has complaint of thickened, painful, elongated nails that he/she cannot manage by themselves. Pt. Relates pain to nails with shoe gear. Pt's primary care physician is JJ Frey CNP last seen 03/10/2022. Pt has a new complaint of she has fallen recently and feels very weak to the left ankle. She has used her ankle brace but it has not helped    Past Medical History:   Diagnosis Date    Allergic rhinitis 12/4/2014    Anxiety 7/5/2018    Arthritis     Asthma     Chest pain     occurs every other day, pt states they occur when she doesn't eat right or forget to take her bp med, occurs left side of chest with no radiation, pt states \"it's new\" and has not had any testing done or seen anyone for it.     Constipation     Depressive disorder 7/5/2018    Generalized osteoarthritis 6/26/2014    History of kidney stones     Hypertension     Obesity, morbid, BMI 40.0-49.9 ([address] 2/28/2017    PONV (postoperative nausea and vomiting)     Sleep apnea     does not use a machine       Allergies   Allergen Reactions    Latex Hives     Latex gloves    Asa [Aspirin] Hives    Codeine Hives    Lisinopril-Hydrochlorothiazide Hives    Motrin [Ibuprofen] Hives    Other Hives and Swelling     Cherries, almonds    Pcn [Penicillins] Hives    Sulfa Antibiotics      Current Outpatient Medications on File Prior to Visit   Medication Sig Dispense Refill    losartan (COZAAR) 100 MG tablet Take 1 tablet by mouth daily 90 tablet 1    hydroCHLOROthiazide (HYDRODIURIL) 25 MG tablet Take 1 tablet by mouth every morning 90 tablet 1    amLODIPine (NORVASC) 5 MG tablet Take 1 tablet by mouth daily 90 tablet 1    fluticasone-salmeterol (WIXELA INHUB) 250-50 MCG/DOSE AEPB Inhale 1 puff into the lungs 2 times daily 180 each 1    hydrOXYzine (ATARAX) 25 MG tablet Take 1 tablet by mouth every 6 hours as needed for Itching or Anxiety 60 tablet 5    fluticasone (FLONASE) 50 MCG/ACT nasal spray 2 sprays by Nasal route daily 16 g 5    clobetasol (TEMOVATE) 0.05 % ointment Apply to rash twice daily until resolved (not face, armpit or groin) 60 g 5    RA VITAMIN D-3 50 MCG (2000 UT) CAPS take 1 capsule by mouth once daily MUST MAKE APPOINTMENT FOR FURTHER REFILL 90 capsule 1    albuterol sulfate HFA (VENTOLIN HFA) 108 (90 Base) MCG/ACT inhaler Inhale 2 puffs into the lungs every 6 hours as needed for Wheezing 1 Inhaler 3    lidocaine (L-M-X 4) 4 % cream Apply a thick layer 30 minutes prior to procedure, covering in saran wrap 15 g 0    Misc. Devices (BATHTUB SAFETY RAIL) MISC 4 each by Does not apply route daily 4 each 0    Misc. Devices (EXTENDABLE BEDSIDE RAIL) MISC 4 each by Does not apply route daily 4 each 0    Spacer/Aero-Holding Chambers PRICE 1 Device by Does not apply route daily as needed (wheezing) 1 Device 0    Misc. Devices (WALL GRAB BAR) MISC GRAB BARS FOR BOTH SHOWER AND TOILET, USE AS DIRECTED 6 each 0    Misc. Devices MISC OUTSIDE RAMP THAT INCLUDES RAILINGS, USE AS DIRECTED 1 Device 0    Misc.  Devices (RAISED TOILET SEAT) MISC USE AS DIRECTED 1 each 0    hydrocortisone 2.5 % cream apply to affected area twice a day 30 g 1    Elastic Bandages & Supports (JOBST KNEE HIGH COMPRESSION SM) MISC Dispense Bilateral Jobst Below Knee 20-30mmHg compression stockings 3 each 3    EPINEPHrine (EPIPEN) 0.3 MG/0.3ML SOAJ injection Inject 0.3 mLs into the muscle once for 1 dose Use as directed for allergic reaction 1 each 0    Misc. Devices [facility]) MISC 1 Device by Does not apply route once for 1 dose 1 each 0     No current facility-administered medications on file prior to visit. Review of Systems. Review of Systems:   History obtained from chart review and the patient  General ROS: negative for - chills, fatigue, fever, night sweats or weight gain  Constitutional: Negative for chills, diaphoresis, fatigue, fever and unexpected weight change. Musculoskeletal: Positive for arthralgias, gait problem and joint swelling. Neurological ROS: negative for - behavioral changes, confusion, headaches or seizures. Negative for weakness and numbness. Dermatological ROS: negative for - mole changes, rash  Cardiovascular: Negative for leg swelling. Gastrointestinal: Negative for constipation, diarrhea, nausea and vomiting. Objective:  Dermatologic Exam:  Skin lesion/ulceration Absent . Skin No rashes or nodules noted. .   Skin is thin, with flaky sloughing skin as well as decreased hair growth to the lower leg  Small red hemosiderin deposits seen dorsal foot   Musculoskeletal:       Anterior left ankle gutter pain. Increased pain with inversion and eversion.   There is pitting edema but no ecchymosis seen     1st MPJ ROM decreased, Bilateral.  Muscle strength 5/5, Bilateral.  Pain present upon palpation of toenails 1-5, Bilateral. decreased medial longitudinal arch, Bilateral.  Ankle ROM decreased,Bilateral.    Dorsally contracted digits present digits 2, Bilateral.     Vascular: DP pulses 1/4 bilateral.  PT pulses 0/4 bilateral.   CFT <5 seconds, Bilateral.  Hair growth absent to the level of the digits, Bilateral.  Edema present, Bilateral.  Varicosities absent, Bilateral. Erythema absent, Bilateral    Neurological: Sensation diminshed to light touch to level of digits, Bilateral.  Protective sensation intact 6/10 sites via 5.07/10g [place]-Daniela Monofilament, Bilateral.  negative Tinel's, Bilateral.  negative Valleix sign, Bilateral.      Integument: Warm, dry, supple, Bilateral.  Open lesion absent, Bilateral.  Interdigital maceration absent to web spaces 4, Bilateral.  Nails 1-5 left and 1-5 right thickened > 3.0 mm, dystrophic and crumbly, discolored with subungual debris. Fissures absent, Bilateral.   General: AAO x 3 in NAD. Derm  Toenail Description  Sites of Onychomycosis Involvement (Check affected area)  [x] [x] [x] [x] [x] [x] [x] [x] [x] [x]  5 4 3 2 1 1 2 3 4 5                          Right                                        Left    Thickness  [x] [x] [x] [x] [x] [x] [x] [x] [x] [x]  5 4 3 2 1 1 2 3 4 5                         Right                                        Left    Dystrophic Changes   [x] [x] [x] [x] [x] [x] [x] [x] [x] [x]  5 4 3 2 1 1 2 3 4 5                         Right                                        Left    Color  [x] [x] [x] [x] [x] [x] [x] [x] [x] [x]  5 4 3 2 1 1 2 3 4 5                          Right                                        Left    Incurvation/Ingrowin   [] [] [] [] [] [] [] [] [] []  5 4 3 2 1 1 2 3 4 5                         Right                                        Left    Inflammation/Pain   [x] [x] [x] [x] [x] [x] [x] [x] [x] [x]  5 4 3  2 1 1 2 3 4 5                         Right                                        Left       Nails are painful 1-10 with direct palpation. Q7   []Yes  []No                Q8   [x]Yes  []No                     Q9   []Yes    []No  Assessment:  63 y.o. female with:    Diagnosis Orders   1. Left foot pain  81676 - CO DEBRIDEMENT OF NAILS, 6 OR MORE   2. Dermatophytosis of nail  12458 - CO DEBRIDEMENT OF NAILS, 6 OR MORE    Ambulatory referral to Physical Therapy   3. Chronic pain of left ankle  Ambulatory referral to Physical Therapy   4.  Left ankle instability  Ambulatory referral to Physical Therapy   5. Trouble walking     6. Pain in both feet  13994 - AK DEBRIDEMENT OF NAILS, 6 OR MORE         Plan:   Pt was evaluated and examined. Patient was given personalized discharge instructions. Non-Weight Bearing Dorsiflexion     Ankle dorsiflexion is the motion of bending your ankle up towards your shin. Gaining this motion can help you regain the ability to walk normally again. Here's how to get more ankle dorsiflexion:   1. Moving only your ankle, point your foot back toward your nose (while keeping knees straight). Continue until you feel discomfort or can't tilt it back any further. 2. Hold this position for 15 seconds. 3. Return to neutral position and repeat 5 times. Non-Weight Bearing Plantar Flexion     Plantar flexion is the motion of pointing your ankle down and away from you. Here is how to gain ankle plantarflexion range of motion (ROM):   1. Moving only your ankle, point your foot forward (while keeping knees straight). Continue until you feel discomfort or can't move it any further. 2. Hold this position for 15 seconds. 3. Return to neutral position. Non-Weight Bearing Inversion     Inversion refers to the motion of pointing your ankle inwards towards the midline of your body. Here is how you gain more ankle inversion:   1. Moving only your ankle and keeping your toes pointed up, turn your foot inward, so the sole is facing your other leg. Continue until either discomfort is felt or you can no longer turn your foot inward. 2. Hold this position for 15 seconds. 3. Return to neutral position. Non-Weight Bearing Eversion     Eversion is the motion of moving your ankle to the outside or lateral part of your leg. Perform this exercise to gain eversion motion in your ankle:   1. Moving only your ankle and keeping your toes pointed up, turn your foot outward, away from your other leg.  Continue until either discomfort is felt or you can no longer turn your foot outward. 2. Hold this position for 15 seconds. 3. Return to neutral position. The Alphabet   A great way physical therapists help their patients gain ankle mobility in all directions is to perform the ankle alphabet. This can get your ankle moving in all directions. Here is how to do the exercise:   1. Sit on a chair with your foot dangling in the air or on a bed with your foot hanging off the edge. 2. Draw the alphabet one letter at a time by moving the injured ankle and using the great toe as your \"pencil. \"  Eversion Isometrics     Strengthening exercises are usually started with isometric contractionsno motion occurs around your ankle joint during the muscle contraction. They may be done early after injury or surgery to start to gentlyand safelyadd force to the muscles that support your ankle. To do the exericse:   1. While seated, place the outside of the injured foot against a table leg or closed door. 2. Push outward with your foot into the object your foot is against (your ankle joint should not move) causing a contraction of your muscles. 3. Hold this muscle contraction for 15 seconds. 4. Relax for 10 seconds. Inversion Isometrics     This isometric exercise focuses on inversion:   1. While seated, place the inside of the injured foot against a table leg or closed door. 2. Push inward with your foot into the object your foot is against (your ankle joint should not move) causing a contraction of your muscles. 3. Hold this muscle contraction for 15 seconds. 4. Relax for 10 seconds. Resisted Strengthening Dorsiflexion     Resisted strengthening exercises should be performed with a Theraband providing resistance to your movements. These exercises will also work to strengthen the muscles around your ankle. This will provide added support to the joint. Perform each exercise 10 to 15 times in a row.    Never tie a Theraband (or anything else) around your foot, ankle, or leg in a way that would restrict blood flow. Ankle dorsiflexion with resistance helps to strengthen your anterior tibialis muscle. Here is how you do it:   1. Moving only your ankle, point your foot back toward your nose (while keeping knees straight). Continue until you feel discomfort or can't tilt it back any further. 2. Hold this position for 2 seconds and slowly release. 3. Return to the neutral position, and then repeat the exercise. Resisted Strengthening Plantar Flexion     Resisted ankle plantar flexion helps to strengthen your calf muscles and Achilles tendon. To do the exercise:   1. Moving only your ankle, point your foot forward (while keeping knees straight). You may feel tightness in your calf muscle behind your lower leg. Continue until you feel discomfort or can't move it any further. 2. Hold this position for 2 seconds. 3. Return to neutral position. Resisted Strengthening Inversion     This exercise will provide strengthening as well:   1. Moving only your ankle and keeping your toes pointed up, turn your foot inward, so the sole is facing your other leg. Continue until either discomfort is felt or you can no longer turn your foot inward. 2. Hold this position for 2 seconds. 3. Return to neutral position. Resisted Strengthening Eversion     Now strengthen in the other direction:   1. Moving only your ankle and keeping your toes pointed up, turn your foot outward, away from your other leg. Continue until either discomfort is felt or you can no longer turn your foot outward. 2. Hold this position for 2 seconds. 3. Return to neutral position. Partial Weight-Bearing Seated Calf Raises     These partial weight-bearing exercises will help put more weight on the injured ankle as well as strengthen the muscles around it. Each one should be performed 10 times in a row:   1. Sit in a chair with the injured foot on the floor.   2. Lift your heel as far as possible while keeping your toes on the floor.  3. Return heel to the floor. Partial Weight-Bearing Standing Weight Shift     Sometimes after injury, your doctor will have you limit the amount of weight you can put through your lower extremity. This can help protect it as things are healing. As you heal, your PT may guide you in increasing weight bearing through your injured ankle. Weight shifts are the perfect exercise to do for this. To do the exercise:   1. Stand upright while holding onto a stable object. 2. Shift some of your weight onto the injured foot. 3. Hold the position for 15 seconds. 4. Relax and put your weight back onto your uninjured foot. Full Weight-Bearing Single Leg Stance     These exercises will help put more weight on the injured foot. You should be sure that your ankle can tolerate the pressure that you are putting upon it. Perform each one 10 times in a row:   1. Stand on the injured foot while lifting the uninjured foot off the ground. 2. Hold the position for 15 seconds. 3. Relax and put your weight back onto your uninjured foot. Checking in with your PT may be necessary to be sure you are doing the right exercises for your ankle. Full Weight-Bearing Standing Calf Raises     Once you are cleared for full weight bearing, you may do these calf raises:   1. Stand on the injured foot while lifting the uninjured foot off the ground. 2. Raise up, standing only on the ball of the injured foot and lifting your heel off the ground. 3. Hold the position for 15 seconds. 4. Relax and put your weight back onto your uninjured foot. Full Weight-Bearing Lateral Stepping     Increase the speed of this exercise as your healing progresses:   1. Place a rolled towel or short object on the ground to the side of your injured foot. 2. Step over the towel with the injured foot and remain on that foot. 3. Then bring the uninjured foot over the object and stand on both feet.   4. Step back over the towel with the uninjured foot and remain on that foot. 5. Then bring the injured foot back over the towel and stand on both feet. Full Weight-Bearing Lateral Jump     This exercise starts to incorporate plyometrics into your rehab routine, which can help you get back to running and sports. Increase the speed of this exercise as your healing progresses:   1. Place a rolled towel or short object on the ground to the side of your injured foot. 2. Hop over the towel and land on the injured foot. 3. Then hop back over the towel and land on the uninjured foot. Single Leg Stance on a Towel     Injury to ankles can often result in decreased balance ability. 2? Towards the end of rehabilitation, performing balance activities is an important way to prevent future injury. Perform this exercise 10 times in a row:   1. Fold a towel into a small rectangle and place on the ground. 2. Stand with the injured foot on the towel. 3. Lift the uninjured leg off the ground standing only on the towel with the injured leg. 4. Hold for 15 seconds. (As balance improves, increase stance time on injured leg up to 45 seconds.)  5. Return your uninjured foot to the floor. You can increase the challenge by standing on more unsteady surfaces like a BOSU or wobble board. Your PT may also have you use a BAPS board while working on balance exercises. Pt to start PT for left ankle      Nails 1-10 were debrided in length and thickness sharply with a nail nipper and  without incident. Pt will follow up in 9 weeks or sooner if any problems arise. Diagnosis was discussed with the pt and all of their questions were answered in detail. Proper foot hygiene and care was discussed with the pt. Patient to check feet daily and contact the office with any questions/problems/concerns. Other comorbidity noted and will be managed by PCP. Pain waiver discussed with patient and confirmed.      All labs were reviewed and all imagining including the above findings were reviewed PRIOR to the patients arrival and with the patient today. Previous patient encounter was reviewed. Encounters from the patients other medical providers were reviewed and noted. Time was spent educating the patient and their families/caregivers on proper care of the feet and ankles. All the above diagnosis were addressed at todays visit and all questions were answered.   A total of 25 minutes was spent with this patients encounter which included charting after the patients visit    3/29/2022      Electronically signed by Yuridia Pabon DPM on 3/29/2022 at 10:09 AM  3/29/2022

## 2022-04-05 ENCOUNTER — HOSPITAL ENCOUNTER (OUTPATIENT)
Dept: PHYSICAL THERAPY | Facility: CLINIC | Age: 62
Setting detail: THERAPIES SERIES
Discharge: HOME | End: 2022-04-05
Payer: MEDICARE

## 2022-04-05 PROCEDURE — 97140 MANUAL THERAPY 1/> REGIONS: CPT

## 2022-04-05 PROCEDURE — 97161 PT EVAL LOW COMPLEX 20 MIN: CPT

## 2022-04-05 NOTE — CONSULTS
[] [facility]) - [facility] &  Therapy  [address]  [phone]  F: [phone] [x] [address]  [facility]   [address]  P: [phone]  F: [phone] [] [address]  [address]  P: [phone]  F: [phone] [] [address]  P: [phone]  F: [phone] [] [address]  [facility]   [address]   [place]: [phone]  F: [phone]      Physical Therapy Lower Extremity Evaluation    Date:  2022  Patient: Artis Patel  : 1960  MRN: [id number]  Physician: Karlos Ji DPM    Insurance: [facility]  Medical Diagnosis:   M25.572, G89.29 (ICD-10-CM) - Chronic pain of left ankle   M25.372 (ICD-10-CM) - Left ankle instability   Rehab Codes: M25.572, M25.672, M25.675, R26.89, M62.81  Onset date: 3/29/22   Next 's appt. : 22    Subjective:   CC/HPI: Pt is a  63 y.o. female with complaints of left foot/ankle pain that limits her abiltity to stand and walk for greater than 10-15 minutes. Pt has a hx of 2 posterior tibial tendon repairs in  and . Pt also had a 5th met fx of the left foot in 2021. Pt reports she was supposed to complete formal therapy once her foot fx was healed, however, pt admits to it being too painful and she was unable to complete this. Pt notes it is time for her to complete therapy. Pt states her entire left foot/ankle and ankle is painful but also still feels like the left 5th met is still fractured. Pt states she has \"paralysis\" of her left foot from her surgeries. Pt notes her left foot/ankle pain ranges from 8-10/10. Pt reports intermittent numbness/tingling in the left foot/ankle that does happen daily.  Pt states her pain is worsened with standing and walking. Pt states she has to use railings when ascending and descending stairs. Pt notes she continues to use her sherrie-potty on her first floor when she cannot make it up to the second floor to use the rest room. Pt states she does need to sit down when cooking due to inability to stand for long periods of time. Pt notes she also needs to use a scooter when going around the grocery store. Pt denies any falls but notes she has to move slowly. Pt reports she doesn't have any strength in her foot and ankle. Pt states she will use a cane or walker around the house or in the community if her pain is really bad. Pt states she recently returned to work within the last year and states she is able to sit for 7 of her 8 hour shift. Pt notes her pain is improved with rest and elevating her feet. Pt notes she also uses heat and creams along with Extra Strength tylenol. Pt states she also uses a low dose opoid prescribed to her a few times a month as needed. Past Medical History: hx of 2 surgeries in the left foot  Past Medical History:   Diagnosis Date    Allergic rhinitis 12/4/2014    Anxiety 7/5/2018    Arthritis     Asthma     Chest pain     occurs every other day, pt states they occur when she doesn't eat right or forget to take her bp med, occurs left side of chest with no radiation, pt states \"it's new\" and has not had any testing done or seen anyone for it.     Constipation     Depressive disorder 7/5/2018    Generalized osteoarthritis 6/26/2014    History of kidney stones     Hypertension     Obesity, morbid, BMI 40.0-49.9 ([facility] [address] 2/28/2017    PONV (postoperative nausea and vomiting)     Sleep apnea     does not use a machine     Medication List:  Current Outpatient Medications on File Prior to Encounter   Medication Sig Dispense Refill    losartan (COZAAR) 100 MG tablet Take 1 tablet by mouth daily 90 tablet 1    hydroCHLOROthiazide (HYDRODIURIL) 25 MG tablet Take 1 tablet by mouth every morning 90 tablet 1    amLODIPine (NORVASC) 5 MG tablet Take 1 tablet by mouth daily 90 tablet 1    fluticasone-salmeterol (WIXELA INHUB) 250-50 MCG/DOSE AEPB Inhale 1 puff into the lungs 2 times daily 180 each 1    hydrOXYzine (ATARAX) 25 MG tablet Take 1 tablet by mouth every 6 hours as needed for Itching or Anxiety 60 tablet 5    fluticasone (FLONASE) 50 MCG/ACT nasal spray 2 sprays by Nasal route daily 16 g 5    clobetasol (TEMOVATE) 0.05 % ointment Apply to rash twice daily until resolved (not face, armpit or groin) 60 g 5    RA VITAMIN D-3 50 MCG (2000 UT) CAPS take 1 capsule by mouth once daily MUST MAKE APPOINTMENT FOR FURTHER REFILL 90 capsule 1    albuterol sulfate HFA (VENTOLIN HFA) 108 (90 Base) MCG/ACT inhaler Inhale 2 puffs into the lungs every 6 hours as needed for Wheezing 1 Inhaler 3    lidocaine (L-M-X 4) 4 % cream Apply a thick layer 30 minutes prior to procedure, covering in saran wrap 15 g 0    EPINEPHrine (EPIPEN) 0.3 MG/0.3ML SOAJ injection Inject 0.3 mLs into the muscle once for 1 dose Use as directed for allergic reaction 1 each 0    Misc. Devices (BATHTUB SAFETY RAIL) MISC 4 each by Does not apply route daily 4 each 0    Misc. Devices (EXTENDABLE BEDSIDE RAIL) MISC 4 each by Does not apply route daily 4 each 0    Spacer/Aero-Holding Chambers PRICE 1 Device by Does not apply route daily as needed (wheezing) 1 Device 0    Misc. Devices (WALL GRAB BAR) MISC GRAB BARS FOR BOTH SHOWER AND TOILET, USE AS DIRECTED 6 each 0    Misc. Devices MISC OUTSIDE RAMP THAT INCLUDES RAILINGS, USE AS DIRECTED 1 Device 0    Misc. Devices (RAISED TOILET SEAT) MISC USE AS DIRECTED 1 each 0    hydrocortisone 2.5 % cream apply to affected area twice a day 30 g 1    Misc.  Devices [facility]) MISC 1 Device by Does not apply route once for 1 dose 1 each 0    Elastic Bandages & Supports (JOBST KNEE HIGH COMPRESSION SM) MISC Dispense Bilateral Jobst Below Knee 20-30mmHg compression stockings 3 each 3     No current facility-administered medications on file prior to encounter.      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   Work Activities/duties  Cares for independent individuals   Recreational Activities Unable to complete rec activities due to foot pain        Yes  No Comments   Fatigue [] [x]    Fever/chills/sweats [] [x]    Malaise  [] [x]    Mental status change [] [x] Anxiety    Paresthesias/numbness/weakness [] [x]    Unexplained weight changes [x] []    Lightheaded/dizziness [x] [] Depends on what she eats   Shortness of breath [x] [] soemtimes asthma related        Objective:  OBSERVATION No Deficit Deficit Comments   Posture      Palpation [] [] Tenderness along the 5th met, plantar surface of foot, lateral malleolus  Muscle tenerness in the gastroc   Sensation [] [] Symmetrical sensation except for \"numbness\" in the bottom of her left foot   Edema [] [] Figure 8  47 cm RLE  49 cm LLE   Gait [] [] Analysis: slowed gait, decreased step length, decreased stance time on the LLE, minimal weight shift on the LLE     Balance [] [] Not completed this date       ROM  ° A/P STRENGTH    Left Right Left Right   Hip Flex   4 4+   ABD   Good -   ADD   Good  good   Knee Flex   4 5   Ext   5 5   Ankle PF 33 45 2 3   DF  (knee straight) P: -17 P: -3 3- 5   DF   (knee flexed) P: -10 pain  A: -5 pain P: 5  A: 3     Inv P: 3  A: 0 P: 20  A:15 2- 5   Ever P: 5  A: 0 P: 18  A: 12 2- 5   1st MTP DF P: 10   P: 30    5   1st MTP PF    4+   Toe extension    4-   Toe flexion    4         Foot/Ankle Mobility  Left  Right Talocrural Jt hypo     Subtalar Jt Rigid      1st MTP Jt  hypo     Forefoot hypo     Midfoot  Rigid              FUNCTION: FAAM 17/84 20.2%     Normal Difficult Unable   Sitting [x] [] []   Standing [] [x] []   Ambulation [] [x] []   Groom/Dress [] [x] []   Lift/Carry [] [x] []   Stairs [] [x] []   Bending [] [x] []   Squat [] [x] []   Kneel [] [x] []           Assessment: Jared Peralta is a 63 y.o. female with complaints of left foot/ankle pain that limits her ability to stand and walk for greater than 10-15 minutes. Pt with a significant PMH including 2 left ankle surgeries and a recent left 5th met fx. Pt demonstrates high levels of pain, tender left foot/ankle, increased swelling over the left ankle. Pt also demonstrates hypomobility of the left hindfoot, midfoot, and forefoot resulting in decreased A/PROM of the left foot/ankle. Pt also demonstrates a decrease in general LE and left foot strength. Due to above impairments, pt also demonstrates an inefficient gait pattern. Pt will benefit from skilled therapeutic intervention in order to improve left foot/ankle pain, A/PROM, strength, and balance to promote improved tolerance to standing and walking. Problems:  left foot/ankle  [x] ? Pain: 8-10/10  [x] ? ROM: decreased A/PROM and joint mobility  [x] ? Strength: decreased generalized strength and left foot/ankle  [x] ? Function: FAAM: 20.2% MAX FUNCTION  [x] ? Balance: unable to test due to increased pain  [x] Edema: increased swelling over lateral ankle  [] Postural Deviations  [x] Gait Deviations: antalgic gait; slowed gait speed  [] Other:          STG: (to be met in 8 treatments)  1. ? Pain: Pt will report less than or equal to 6-7/10 L ankle and foot pain when standing and walking for 10-15 minutes in order to demonstrate an improved tolerance to standing when cooking and walking around the grocery store.   2. ? Gait: Pt will be able to ambulate 150' with a limited community ambulator speed with increased weight shift and stance time on the LLE to promote an efficient gait pattern. 3. ? ROM: Pt will demonstrate improvements in L ankle AROM and gastroc flexibility in order to improve joint mobility and assist with progressions to independent mobility in a shoe. a. Gastroc flexibility  i. Knee flexed: -5 deg  ii. Knee extended: -5 deg  b. DF: 0 deg  c. PF: 45 deg  d. INV: 5 deg  e. EV: 5 deg  f. Great toe extension: 45 deg (passive)  4. ? Strength: Pt will demonstrate 3-/5 L ankle and foot strength in order to assist with progressions of ankle/foot stability as she returns to ambulating in a shoe. 5. ? Function: Pt will score greater than or equal to 25/84 on the FAAM in order to demonstrate improved function. 6. Independent with Home Exercise Programs without verbal cueing for correct execution. 7. Demonstrate Knowledge of fall prevention     LTG: (to be met in 16 treatments)-   8. ? Pain: Pt will report less than or equal to 4-5/10 L ankle/foot pain with her WBing in a shoe when standing for extended periods of time, walking on even/uneven surfaces, and negotiating stairs in order to return to prior level of activity. 9. ? Gait: Pt will be able to ambulate 300' without an AD and a community ambulator gait speed with evidence of appropriate gait mechanics of heel strike and push-off in order to promote an efficient gait pattern. 10. ? ROM: Pt will demonstrate improvements in L ankle AROM and gastroc flexibility in order to improve joint mobility and improve tolerance to standing and walking for extended periods of time and negotiating stairs. a. Gastroc flexibility  i. Knee flexed: 5 deg  ii. Knee extended: 0 deg   b. DF: 5-10 deg  c. PF: 55 deg  d. INV: 10 deg  e.  EV: 10 deg   f. Great toe extension: 55-60 deg (passive)  11. ? Strength: Pt will demonstrate 4/5 L ankle and foot and 3/5 L plantarflexor strength in order to assist with progressions of ankle/foot stability as to improve ability to stand, walk, and negotiate stairs. a. Pt will demonstrate 5/5 hip girdle and BLE strength in sitting in order to assist with standing and walking. 12. Balance: Pt will be able to complete tandem balance on the BLE for greater than or equal to 30\" in order to promote WBing tolerance and balance reactions to improve ankle   when standing, walking, and negotiating stairs. 13. ? Function: Pt will score greater than or equal to 33/84 on the FAAM in order to demonstrate improved function. Patient goals: \"to help with my pain\"    Rehab Potential:  [] Good  [x] Fair  [] Poor   Suggested Professional Referral:  [x] No  [] Yes:  Barriers to Goal Achievement[de-identified]  [] No  [x] Yes: chronicity of pain/symptoms; decreased foot mobility; PMH  Domestic Concerns:  [x] No  [] Yes:    Pt. Education:  [x] Plans/Goals, Risks/Benefits discussed  [x] Home exercise program  Educated pt on the ideal plan of care to include: manual therapy to the soft tissues and joint, stretching, strength, and balance; heat will be used as needed  Access Code: [email or web address]  Exercises  Calf Mobilization with Small Ball - 1 x daily - 10-30\" hold  Long Sitting Calf Stretch with Strap - 3 x daily - 3 sets - 30\" hold      Method of Education: [x] Verbal  [x] Demo  [] Written  Comprehension of Education:  [x] Verbalizes understanding. [x] Demonstrates understanding. [x] Needs Review. [] Demonstrates/verbalizes understanding of HEP/Ed previously given.     Treatment Plan:  [x] Therapeutic Exercise   26115  [] Iontophoresis: 4 mg/mL Dexamethasone Sodium Phosphate  mAmin  27693   [x] Therapeutic Activity  32120 [] Vasopneumatic cold with compression  41115    [x] Gait Training   20471 [x] Ultrasound   43852   [x] Neuromuscular Re-education  81090 [] Electrical Stimulation Unattended  76221   [x] Manual Therapy  89511 [] Electrical Stimulation Attended  09545   [x] Instruction in HEP  [] Lumbar/Cervical Traction  [id number]   [] Aquatic Therapy   10258 [x] Cold/hotpack    [] Massage   70667      [] Dry Needling, 1 or 2 muscles  12863   [] Biofeedback, first 15 minutes   28133  [] Biofeedback, additional 15 minutes   40876 [] Dry Needling, 3 or more muscles  64214     []  Medication allergies reviewed for use of    Dexamethasone Sodium Phosphate 4mg/ml     with iontophoresis treatments. Pt is not allergic. Frequency:  2 x/week for 16 visits    Todays Treatment:    Exercises:  Exercise     Reps/ Time Weight/ Level Comments   MFR to gastroc, soleus, plantar surface of foot 13'     Calf stretch x  With towel   Provided HEP to include MFR with tennis ball                                                                              Other:    Specific Instructions for next treatment: manual to gastroc-soleus complex, manual to forefoot, midfoot, and hindfoot; progress A/PROM, strengh, balance, and normalize gait    Evaluation Complexity:  History (Personal factors, comorbidities) [] 0 [] 1-2 [x] 3+   Exam (limitations, restrictions) [] 1-2 [] 3 [x] 4+   Clinical presentation (progression) [x] Stable [] Evolving  [] Unstable   Decision Making [x] Low [] Moderate [] High    [x] Low Complexity [] Moderate Complexity [] High Complexity       Treatment Charges: Mins Units   [x] Evaluation       [x]  Low       []  Moderate       []  High  1   []  Modalities     [x]  Ther Exercise 5 0   [x]  Manual Therapy 13 1   []  Ther Activities     []  Aquatics     []  Vasocompression     []  Other       TOTAL TREATMENT TIME: 60    Time in: 200   Time Out:300    Electronically signed by: Jason Morales PT        Physician Signature:________________________________Date:__________________  By signing above or cosigning this note, I have reviewed this plan of care and certify a need for medically necessary rehabilitation services.      *PLEASE SIGN ABOVE AND FAX BACK ALL PAGES*

## 2022-04-07 ENCOUNTER — HOSPITAL ENCOUNTER (OUTPATIENT)
Dept: PHYSICAL THERAPY | Facility: CLINIC | Age: 62
Setting detail: THERAPIES SERIES
Discharge: HOME | End: 2022-04-07
Payer: MEDICARE

## 2022-04-07 PROCEDURE — 97140 MANUAL THERAPY 1/> REGIONS: CPT

## 2022-04-07 PROCEDURE — 97110 THERAPEUTIC EXERCISES: CPT

## 2022-04-07 NOTE — FLOWSHEET NOTE
[] [facility]) [facility] &  Therapy  [address]  [phone]  F: [phone] [] [address]  [address] [address]   [address]  P: [phone]  F: [phone] [] [address]  [address]  P: [phone]  F: [phone] [] [address]  P: [phone]  F: [phone] [] [address]  [facility]   [address]   [place]: [phone]  F: [phone]      Physical Therapy Daily Treatment Note    Date:  2022  Patient Name:  José Miguel Anderson    :  1960  MRN: [id number]  Physician: Nicole Mccormick DPM                                     Insurance: [facility]  Medical Diagnosis:   M25.572, G89.29 (ICD-10-CM) - Chronic pain of left ankle   M25.372 (ICD-10-CM) - Left ankle instability   Rehab Codes: M25.572, M25.672, M25.675, R26.89, M62.81  Onset date: 3/29/22                           Next 's appt. : 22  Visit# / total visits: ; Progress note for Medicare patient due at visit 10     Cancels/No Shows: 0/0    Subjective:    Pain:  [x] Yes  [] No Location: L foot/ ankle Pain Rating: (0-10 scale) 8/10  Pain altered Tx:  [] No  [x] Yes  Action:min completed     Comments: Pt arrives with no new changes since evaluation. Continues to report high levels of pain. Objective:  Modalities:   Precautions: latex allergy   Exercises:  Exercise       Reps/ Time Weight/ Level Comments   nustep  6 min            MFR to gastroc, soleus, plantar surface of foot, mobs to improve ROM 25'   Pt sitting with LE hanging off of the table.           Calf stretch x   With towel   Provided HEP to include MFR with tennis ball               Ankle pumps   2x10       Ankle circles  2x10     Isometric PF/DF 5x5\" ea                 Heel slides   10x2       Heel raises  10x2       Toe raises  10x2                          Other:     Specific Instructions for next treatment: manual to gastroc-soleus complex, manual to forefoot, midfoot, and hindfoot; progress A/PROM, strengh, balance, and normalize gait        Treatment Charges: Mins Units   []  Modalities     [x]  Ther Exercise 15 1   [x]  Manual Therapy 25 2   []  Ther Activities     []  Aquatics     []  Vasocompression     []  Other     Total Treatment time 40 3       Assessment: [x] Progressing toward goals. Began session with warmup on nustep followed by manual interventions. Pt with increased tenderness of LE in all aspects. Pt states even to the lght touch it hurts with out pressure. Pt educated on signs and symptoms of DVT as she states that she is mostly sedentary due to pain. Extensive time spent educating patient on fucntion and the importance of HEP and walking to improve overall tolerance to ADL's and progressions in therapy. Pt required frequent cueing to stay on task during therapeutic interventions. [] No change. [] Other:  [] Patient would continue to benefit from skilled physical therapy services in order to: left foot/ankle pain, A/PROM, strength, and balance to promote improved tolerance to standing and walking. Problems:  left foot/ankle  [x]? ? Pain: 8-10/10  [x]? ? ROM: decreased A/PROM and joint mobility  [x]? ? Strength: decreased generalized strength and left foot/ankle  [x]? ? Function: FAAM: 20.2% MAX FUNCTION  [x]? ? Balance: unable to test due to increased pain  [x]?  Edema: increased swelling over lateral ankle  []? Postural Deviations  [x]?  Gait Deviations: antalgic gait; slowed gait speed  []? Other:                            STG: (to be met in 8 treatments)  1. ? Pain: Pt will report less than or equal to 6-7/10 L ankle and foot pain when standing and walking for 10-15 minutes in order to demonstrate an improved tolerance to standing when cooking and walking around the grocery store. 2. ? Gait: Pt will be able to ambulate 150' with a limited community ambulator speed with increased weight shift and stance time on the LLE to promote an efficient gait pattern. 3. ? ROM: Pt will demonstrate improvements in L ankle AROM and gastroc flexibility in order to improve joint mobility and assist with progressions to independent mobility in a shoe. a. Gastroc flexibility  i. Knee flexed: -5 deg  ii. Knee extended: -5 deg  b. DF: 0 deg  c. PF: 45 deg  d. INV: 5 deg  e. EV: 5 deg  f. Great toe extension: 45 deg (passive)  4. ? Strength: Pt will demonstrate 3-/5 L ankle and foot strength in order to assist with progressions of ankle/foot stability as she returns to ambulating in a shoe.   5. ? Function: Pt will score greater than or equal to 25/84 on the FAAM in order to demonstrate improved function.   6. Independent with Home Exercise Programs without verbal cueing for correct execution.   7. Demonstrate Knowledge of fall prevention     LTG: (to be met in 16 treatments)-   8. ? Pain: Pt will report less than or equal to 4-5/10 L ankle/foot pain with her WBing in a shoe when standing for extended periods of time, walking on even/uneven surfaces, and negotiating stairs in order to return to prior level of activity.   9. ? Gait: Pt will be able to ambulate 300' without an AD and a community ambulator gait speed with evidence of appropriate gait mechanics of heel strike and push-off in order to promote an efficient gait pattern.   10. ? ROM: Pt will demonstrate improvements in L ankle AROM and gastroc flexibility in order to improve joint mobility and improve tolerance to standing and walking for extended periods of time and negotiating stairs. a. Gastroc flexibility  i. Knee flexed: 5 deg  ii. Knee extended: 0 deg   b. DF: 5-10 deg  c. PF: 55 deg  d. INV: 10 deg  e.  EV: 10 deg   f. Great toe extension: 55-60 deg (passive)  11. ? Strength: Pt will demonstrate 4/5 L ankle and foot and 3/5 L plantarflexor strength in order to assist with progressions of ankle/foot stability as to improve ability to stand, walk, and negotiate stairs.   a. Pt will demonstrate 5/5 hip girdle and BLE strength in sitting in order to assist with standing and walking. 12. Balance: Pt will be able to complete tandem balance on the BLE for greater than or equal to 30\" in order to promote WBing tolerance and balance reactions to improve ankle   when standing, walking, and negotiating stairs.   13. ? Function: Pt will score greater than or equal to 33/84 on the FAAM in order to demonstrate improved function.                           Patient goals: \"to help with my pain\"    Pt. Education:  [x] Yes  [] No  [x] Reviewed Prior HEP/Ed  Method of Education: [x] Verbal - compliance and taking a few steps every hour while at work [] Demo  [] Written  Comprehension of Education:  [x] [employer] understanding. [] Demonstrates understanding. [x] Needs review. [] Demonstrates/verbalizes HEP/Ed previously given. Plan: [x] Continue current frequency toward long and short term goals. [x] Specific Instructions for subsequent treatments: progress as able.        Time In:500        Time Out: 600    Electronically signed by:  Tomi Kocher, PTA

## 2022-04-13 ENCOUNTER — HOSPITAL ENCOUNTER (OUTPATIENT)
Dept: PHYSICAL THERAPY | Facility: CLINIC | Age: 62
Setting detail: THERAPIES SERIES
Discharge: HOME | End: 2022-04-13
Payer: MEDICARE

## 2022-04-13 PROCEDURE — 97140 MANUAL THERAPY 1/> REGIONS: CPT

## 2022-04-13 PROCEDURE — 97110 THERAPEUTIC EXERCISES: CPT

## 2022-04-13 NOTE — FLOWSHEET NOTE
[] [facility]) [facility] &  Therapy  [address]  [phone]  F: [phone] [] [address]  [address]   [address]  P: [phone]  F: [phone] [] [address]  [address]  P: [phone]  F: [phone] [] [address]  P: [phone]  F: [phone] [] [address]  [facility]   [address]   [place]: [phone]  F: [phone]      Physical Therapy Daily Treatment Note    Date:  2022  Patient Name:  Monica Caban    :  1960  MRN: [id number]  Physician: Mg Valladares DPM                                     Insurance: [facility]  Medical Diagnosis:   M25.572, G89.29 (ICD-10-CM) - Chronic pain of left ankle   M25.372 (ICD-10-CM) - Left ankle instability   Rehab Codes: M25.572, M25.672, M25.675, R26.89, M62.81  Onset date: 3/29/22                           Next 's appt. : 22  Visit# / total visits: 3/16; Progress note for Medicare patient due at visit 10     Cancels/No Shows: 0/0    Subjective:    Pain:  [x] Yes  [] No Location: L foot/ ankle Pain Rating: (0-10 scale) 7-8/10  Pain altered Tx:  [x] No  [] Yes  Action:     Comments: Pt continues to report high L ankle and foot pain this date. Reports some relief of symptoms after manual last session. Objective:  Modalities:   Precautions: latex allergy   Exercises:  Exercise       Reps/ Time Weight/ Level Comments   nustep  5 min  L1          MFR to gastroc, soleus, plantar surface of foot, mobs to improve ROM, PROM 25'   Pt sitting with LE hanging off of the table.           Calf stretch 3x30\"   With strap   Provided HEP to include MFR with tennis ball               Ankle pumps   2x10       Ankle circles  2x10     Isometric PF/DF 5x5\" ea     Ankle INV/EV 1x10 ea           Heel slides   10x2       Heel raises  10x2       Toe raises  10x2       Toe curls 10x2        Domers 10x2           Gait training  x  Educated on technique and body mechanics             Other:     Specific Instructions for next treatment: manual to gastroc-soleus complex, manual to forefoot, midfoot, and hindfoot; progress A/PROM, strengh, balance, and normalize gait        Treatment Charges: Mins Units   []  Modalities     [x]  Ther Exercise 13 1   [x]  Manual Therapy 25 2   []  Ther Activities     []  Aquatics     []  Vasocompression     [x]  Other: gait 5 -   Total Treatment time 43 3       Assessment: [x] Progressing toward goals. Began session with Nustep for warm up followed by manual to reduce gastroc/soleus complex with fair tolerance however pt is very sensitive to touch along surgical scar. Included passive ankle ROM in all directions with no report of increasing pain. Educated pt on cane management- pt states she has a SPC with adjustable height that she can bring to next therapy session as pt arrived with a wooden cane that is not the appropriate height. Educated to keep cane on R side and parallel with L LE to maintain a wider base of support- pt verbalizes understanding. [] No change. [] Other:  [] Patient would continue to benefit from skilled physical therapy services in order to: left foot/ankle pain, A/PROM, strength, and balance to promote improved tolerance to standing and walking. Problems:  left foot/ankle  [x]? ? Pain: 8-10/10  [x]? ? ROM: decreased A/PROM and joint mobility  [x]? ? Strength: decreased generalized strength and left foot/ankle  [x]? ? Function: FAAM: 20.2% MAX FUNCTION  [x]? ? Balance: unable to test due to increased pain  [x]?  Edema: increased swelling over lateral ankle  []? Postural Deviations  [x]?  Gait Deviations: antalgic gait; slowed gait speed  []? Other:                            STG: (to be met in 8 treatments)  1. ? Pain: Pt will report less than or equal to 6-7/10 L ankle and foot pain when standing and walking for 10-15 minutes in order to demonstrate an improved tolerance to standing when cooking and walking around the grocery store. 2. ? Gait: Pt will be able to ambulate 150' with a limited community ambulator speed with increased weight shift and stance time on the LLE to promote an efficient gait pattern. 3. ? ROM: Pt will demonstrate improvements in L ankle AROM and gastroc flexibility in order to improve joint mobility and assist with progressions to independent mobility in a shoe. a. Gastroc flexibility  i. Knee flexed: -5 deg  ii. Knee extended: -5 deg  b. DF: 0 deg  c. PF: 45 deg  d. INV: 5 deg  e. EV: 5 deg  f. Great toe extension: 45 deg (passive)  4. ? Strength: Pt will demonstrate 3-/5 L ankle and foot strength in order to assist with progressions of ankle/foot stability as she returns to ambulating in a shoe.   5. ? Function: Pt will score greater than or equal to 25/84 on the FAAM in order to demonstrate improved function.   6. Independent with Home Exercise Programs without verbal cueing for correct execution.   7. Demonstrate Knowledge of fall prevention     LTG: (to be met in 16 treatments)-   8. ? Pain: Pt will report less than or equal to 4-5/10 L ankle/foot pain with her WBing in a shoe when standing for extended periods of time, walking on even/uneven surfaces, and negotiating stairs in order to return to prior level of activity.   9. ? Gait: Pt will be able to ambulate 300' without an AD and a community ambulator gait speed with evidence of appropriate gait mechanics of heel strike and push-off in order to promote an efficient gait pattern.   10. ? ROM: Pt will demonstrate improvements in L ankle AROM and gastroc flexibility in order to improve joint mobility and improve tolerance to standing and walking for extended periods of time and negotiating stairs. a.  Gastroc flexibility  i. Knee flexed: 5 deg  ii. Knee extended: 0 deg   b. DF: 5-10 deg  c. PF: 55 deg  d. INV: 10 deg  e. EV: 10 deg   f. Great toe extension: 55-60 deg (passive)  11. ? Strength: Pt will demonstrate 4/5 L ankle and foot and 3/5 L plantarflexor strength in order to assist with progressions of ankle/foot stability as to improve ability to stand, walk, and negotiate stairs.   a. Pt will demonstrate 5/5 hip girdle and BLE strength in sitting in order to assist with standing and walking. 12. Balance: Pt will be able to complete tandem balance on the BLE for greater than or equal to 30\" in order to promote WBing tolerance and balance reactions to improve ankle   when standing, walking, and negotiating stairs.   13. ? Function: Pt will score greater than or equal to 33/84 on the FAAM in order to demonstrate improved function.                           Patient goals: \"to help with my pain\"    Pt. Education:  [x] Yes  [] No  [x] Reviewed Prior HEP/Ed  Method of Education: [x] Verbal - compliance and taking a few steps every hour while at work [] Demo  [] Written  Comprehension of Education:  [x] Darreld Puff understanding. [] Demonstrates understanding. [x] Needs review. [] Demonstrates/verbalizes HEP/Ed previously given. Plan: [x] Continue current frequency toward long and short term goals. [x] Specific Instructions for subsequent treatments: progress as able.        Time In: 4:56pm        Time Out: 5:46pm    Electronically signed by:  Mitch Vasquez PTA

## 2022-04-14 ENCOUNTER — HOSPITAL ENCOUNTER (OUTPATIENT)
Dept: PHYSICAL THERAPY | Facility: CLINIC | Age: 62
Setting detail: THERAPIES SERIES
Discharge: HOME | End: 2022-04-14
Payer: MEDICARE

## 2022-04-14 PROCEDURE — 97110 THERAPEUTIC EXERCISES: CPT

## 2022-04-14 PROCEDURE — 97140 MANUAL THERAPY 1/> REGIONS: CPT

## 2022-04-14 NOTE — FLOWSHEET NOTE
[] [facility]) [facility] &  Therapy  [address]  [phone]  F: [phone] [] [address]  [address]   [address]  P: [phone]  F: [phone] [] [address]  [address]  P: [phone]  F: [phone] [] [address]  P: [phone]  F: [phone] [] [address]  [facility]   [address]   [facility] [place]: [phone]  F: [phone]      Physical Therapy Daily Treatment Note    Date:  2022  Patient Name:  Elmer Wilkes    :  1960  MRN: [id number]  Physician: Ashwin Howard DPM                                     Insurance: [facility]  Medical Diagnosis:   M25.572, G89.29 (ICD-10-CM) - Chronic pain of left ankle   M25.372 (ICD-10-CM) - Left ankle instability   Rehab Codes: M25.572, M25.672, M25.675, R26.89, M62.81  Onset date: 3/29/22                           Next 's appt. : 22  Visit# / total visits: 3/16; Progress note for Medicare patient due at visit 10     Cancels/No Shows: 0/0    Subjective:    Pain:  [x] Yes  [] No Location: L foot/ ankle Pain Rating: (0-10 scale) 7-8/10  Pain altered Tx:  [x] No  [] Yes  Action:     Comments: Pt reports she had to take a pain pill following the last session. Objective:  Modalities: HP to left foot/ankle 12'  Precautions: latex allergy   Exercises:  Exercise       Reps/ Time Weight/ Level Comments   nustep  5 min  L1          MFR to gastroc, soleus, plantar surface of foot, mobs to improve ROM, PROM 26'   Pt sitting with LE hanging off of the table.    Grade II/III mobilizations A/P talocrural joint  Grade II/III medial/lateral calcaneal glides  Mobilizations of midfoot  MTP joint distraction and dorsal glides          Passive INV/EV with towel 2x10 ea     Calf stretch 3x30\"   With strap         Ankle pumps   2x10       Ankle circles  2x10     Isometric PF/DF 5x5\" ea     Ankle INV/EV 2x5 ea  Target of therapists thumbs    Seated inversion isometrics with ball squeeze 2x5     Heel slides   10x2       Heel raises  10x2       Toe raises  10x2       Toe curls 10x2        Inversion/eversion on slide board 2x10     Domers 10x2           Gait training  x  Educated on technique and body mechanics             Other:     Specific Instructions for next treatment: manual to gastroc-soleus complex, manual to forefoot, midfoot, and hindfoot; progress A/PROM, strengh, balance, and normalize gait        Treatment Charges: Mins Units   [x]  Modalities HP 10 0   [x]  Ther Exercise 23 1   [x]  Manual Therapy 25 2   []  Ther Activities     []  Aquatics     []  Vasocompression     []  Other: gait     Total Treatment time 48 3       Assessment: [x] Progressing toward goals. Continued to complete manual therapy to the left foot and ankle. Notable facial grimacing and withdrawing of the left foot is observed when light touch was applied to the medial aspect of the ankle. Continued to proceed with joint mobilizations to the talocrural, subtalar, midfoot, and MTP joints of the left foot with pt reporting it \"feeling better\" towards the end of the session. Pt with poor tolerance to manual therapy to the gastroc this date. Continued to progress A/AAROM ankle mobility with fair tolerance. Pt with greater ankle mobility into inversion and eversion since initial evaluation, however, continues to be significantly restricted. Pt demonstrates greater ability to activate evertor musculature compared to invertor musculature with noted IR of the LE. Due to difficulty activating invertors, implemented isometric inversion with a ball with fair execution. Updated pt's HEP to increase ankle mobility at home along with decreasing hypersensitivity of the left ankle.  Ended the session with a HP for pain. [] No change. [] Other:  [x] Patient would continue to benefit from skilled physical therapy services in order to: left foot/ankle pain, A/PROM, strength, and balance to promote improved tolerance to standing and walking. Problems:  left foot/ankle  [x]? ? Pain: 8-10/10  [x]? ? ROM: decreased A/PROM and joint mobility  [x]? ? Strength: decreased generalized strength and left foot/ankle  [x]? ? Function: FAAM: 20.2% MAX FUNCTION  [x]? ? Balance: unable to test due to increased pain  [x]?  Edema: increased swelling over lateral ankle  []? Postural Deviations  [x]?  Gait Deviations: antalgic gait; slowed gait speed  []? Other:                            STG: (to be met in 8 treatments)  1. ? Pain: Pt will report less than or equal to 6-7/10 L ankle and foot pain when standing and walking for 10-15 minutes in order to demonstrate an improved tolerance to standing when cooking and walking around the grocery store. 2. ? Gait: Pt will be able to ambulate 150' with a limited community ambulator speed with increased weight shift and stance time on the LLE to promote an efficient gait pattern. 3. ? ROM: Pt will demonstrate improvements in L ankle AROM and gastroc flexibility in order to improve joint mobility and assist with progressions to independent mobility in a shoe. a. Gastroc flexibility  i. Knee flexed: -5 deg  ii. Knee extended: -5 deg  b. DF: 0 deg  c. PF: 45 deg  d. INV: 5 deg  e.  EV: 5 deg  f. Great toe extension: 45 deg (passive)  4. ? Strength: Pt will demonstrate 3-/5 L ankle and foot strength in order to assist with progressions of ankle/foot stability as she returns to ambulating in a shoe.   5. ? Function: Pt will score greater than or equal to 25/84 on the FAAM in order to demonstrate improved function.   6. Independent with Home Exercise Programs without verbal cueing for correct execution.   7. Demonstrate Knowledge of fall prevention     LTG: (to be met in 16 treatments)- Inversion Eversion Towel Slide - 3 x daily - 2 sets - 10 reps  Towel Scrunches - 3 x daily - 2 sets - 10 reps  Towel Desensitization  Comprehension of Education:  [x] Verbalizes understanding. [x] Demonstrates understanding. [x] Needs review. [] Demonstrates/verbalizes HEP/Ed previously given. Plan: [x] Continue current frequency toward long and short term goals. [x] Specific Instructions for subsequent treatments: progress as able.        Time In: 100 pm        Time Out:  208 pm    Electronically signed by:  Matthew Orellana PT

## 2022-04-19 ENCOUNTER — HOSPITAL ENCOUNTER (OUTPATIENT)
Dept: PHYSICAL THERAPY | Facility: CLINIC | Age: 62
Setting detail: THERAPIES SERIES
Discharge: HOME | End: 2022-04-19
Payer: MEDICARE

## 2022-04-19 NOTE — FLOWSHEET NOTE
[] [facility]) [facility] &  Therapy  [address]    [phone]  F: [phone]   [x] [address]  [address]   [address]  P: [phone]  F: [phone]  [] [address]  [address]  P: [phone]  F: [phone] [] [address]  P: [phone]  F: [phone]  [] [address]  [address] [facility] [address]   [address]   [place]: [phone]  F: [phone]   [] [facility]  [address] [facility] [address]  [place]: [phone]   F: [phone]     Physical Therapy Cancel/No Show note    Date: 2022  Patient: Marylee Gee  : 1960  MRN: [id number]    Cancels/No Shows to date:     For today's appointment patient:    [x]  Cancelled    [] Rescheduled appointment    [] No-show     Reason given by patient:    [x]  Patient ill    []  Conflicting appointment    [] No transportation      [] Conflict with work    [] No reason given    [] Weather related    [] [id number]    [] Other:      Comments:        [x] Next appointment was confirmed    Electronically signed by: Jaki Mtz PT

## 2022-04-27 ENCOUNTER — HOSPITAL ENCOUNTER (OUTPATIENT)
Dept: PHYSICAL THERAPY | Facility: CLINIC | Age: 62
Setting detail: THERAPIES SERIES
Discharge: HOME | End: 2022-04-27
Payer: MEDICARE

## 2022-04-27 NOTE — FLOWSHEET NOTE
[] [facility]) - [facility] &  Therapy  [address]    [phone]  F: [phone]   [x] [address]  [facility]   [address]  P: [phone]  F: [phone]  [] [address]  [address]  P: [phone]  F: [phone] [] [address]  P: [phone]  F: [phone]  [] [address]  [address] [facility] [address]   [address]   [place]: [phone]  F: [phone]   [] [place] [address] [facility] [address]  [place]: [phone]   F: [phone]     Physical Therapy Cancel/No Show note    Date: 2022  Patient: Carolyn Pearl  : 1960  MRN: [id number]    Cancels/No Shows to date:     For today's appointment patient:    [x]  Cancelled    [] Rescheduled appointment    [] No-show     Reason given by patient:    []  Patient ill    []  Conflicting appointment    [x] No transportation      [] Conflict with work    [] No reason given    [] Weather related    [] COVID-19    [] Other:      Comments: pt will call back to reschedule. No appts scheduled at this time.          [] Next appointment was confirmed    Electronically signed by: Carmen Durant PTA

## 2022-05-19 ENCOUNTER — HOSPITAL ENCOUNTER (OUTPATIENT)
Dept: PHYSICAL THERAPY | Facility: CLINIC | Age: 62
Setting detail: THERAPIES SERIES
Discharge: HOME | End: 2022-05-19
Payer: MEDICARE

## 2022-05-19 PROCEDURE — 97110 THERAPEUTIC EXERCISES: CPT

## 2022-05-19 PROCEDURE — 97140 MANUAL THERAPY 1/> REGIONS: CPT

## 2022-05-19 NOTE — FLOWSHEET NOTE
[] [facility]) [facility] &  Therapy  [address]  [phone]  F: [phone] [x] [address]  [address]   [address]  P: [phone]  F: [phone] [] [address]  [address]  P: [phone]  F: [phone] [] [address]  P: [phone]  F: [phone] [] [address]  [facility]   [address]   [place]: [phone]  F: [phone]      Physical Therapy Daily Treatment Note    Date:  2022  Patient Name:  Bernadine Blandon    :  1960  MRN: [id number]  Physician: Melissa Sibley DPM                                     Insurance: [facility] Medicare/[facility]  Medical Diagnosis:   M25.572, G89.29 (ICD-10-CM) - Chronic pain of left ankle   M25.372 (ICD-10-CM) - Left ankle instability   Rehab Codes: M25.572, M25.672, M25.675, R26.89, M62.81  Onset date: 3/29/22                           Next 's appt. : 22  Visit# / total visits: ; Progress note for Medicare patient due at visit 8     Cancels/No Shows: 0/0    Subjective:    Pain:  [x] Yes  [] No Location: L foot/ ankle Pain Rating: (0-10 scale) 7-8/10  Pain altered Tx:  [x] No  [] Yes  Action:     Comments: Pt that her foot and ankle pain woke her up last night. Pt states she hasn't been to therapy in a while secondary to having car trouble. Pt reports she tried to do her exercises since she has been gone but her foot hurt too bad so she didn't.      Objective:  Modalities: HP to left foot/ankle 10'  Precautions: latex allergy   Exercises:  Exercise       Reps/ Time Weight/ Level Comments   nustep  5 min  L1          Desensitization  2'  Towel rubbing    MFR to gastroc, soleus, plantar surface of foot, mobs to improve ROM, PROM 25'   Pt sitting with LE hanging off of the table. Grade II/III mobilizations A/P talocrural joint  Grade II/III medial/lateral calcaneal glides  Mobilizations of midfoot  MTP joint distraction and dorsal glides  MFR to the gastroc and soleus complex, medial/lateral mobilizations of achilles tendon          Passive INV/EV with towel 2x10 ea     Calf stretch 3x30\"   With strap         Ankle pumps -- AA with iso hold 10x        Ankle circles  2x10     Isometric PF/DF 5x5\" ea     Ankle INV/EV 2x5 ea  Target of therapists thumbs    Seated inversion isometrics against therapists hand 2x5     Heel slides   10x2       Heel raises  10x2       Toe raises  10x2       Toe curls 10x2        Inversion/eversion on slide board 2x10     Domers 10x2           Gait training  x  Educated on technique and body mechanics             Other:     Specific Instructions for next treatment: manual to gastroc-soleus complex, manual to forefoot, midfoot, and hindfoot; progress A/PROM, strengh, balance, and normalize gait        Treatment Charges: Mins Units   [x]  Modalities HP 10 0   [x]  Ther Exercise 25 1   [x]  Manual Therapy 25 2   []  Ther Activities     []  Aquatics     []  Vasocompression     []  Other: gait     Total Treatment time 50 3   Estimated Medicare Cost (as of 05/19/22) $406.91    Assessment: [] Progressing toward goals. [x] No change. Pt arrives to the clinic 1 month after her last appointment without compliance to previously provided HEP secondary to pain. Upon assessment, pt continues to have significantly restricted left foot and ankle joint mobility and calf tightness. Pt with poor to fair tolerance of left foot and ankle mobilizations with some improvements in joint play. Poor tolerance to medial gastroc MFR when moving more proximally. Continued to promote active and active-assist movement of the left foot. Pt with significant difficulty activating ankle DF this date, therefore AA was provided with isometric holds at end range.  Pt also demonstrates minimal inversion AROM of the left foot without compensations by LE rotation. Continued to complete manual isometrics with initial muscle fasciculations for muscle contraction which progressively improved. Pt tolerates minimal reps and sets of all activities this date. Pt continues to be hypersensitive over her left ankle incision, therefore, therapist demonstrated desensitization activities with a towel and had pt complete for 2 minutes in the clinic this date. Time was spent educating the pt on the need to increase physical activity progressively throughout the day to improve foot and ankle mobility. Pt is encouraged to walk laps around her house and increase time each day. Pt's HEP was not updated this date secondary to minimal compliance with current exercises. Pt is advised to increase compliance and consistency with current exercises to improve mobility and activity tolerance. [] Other:  [x] Patient would continue to benefit from skilled physical therapy services in order to: left foot/ankle pain, A/PROM, strength, and balance to promote improved tolerance to standing and walking. Problems:  left foot/ankle  [x]? ? Pain: 8-10/10  [x]? ? ROM: decreased A/PROM and joint mobility  [x]? ? Strength: decreased generalized strength and left foot/ankle  [x]? ? Function: FAAM: 20.2% MAX FUNCTION  [x]? ? Balance: unable to test due to increased pain  [x]?  Edema: increased swelling over lateral ankle  []? Postural Deviations  [x]?  Gait Deviations: antalgic gait; slowed gait speed  []? Other:                            STG: (to be met in 8 treatments)  1. ? Pain: Pt will report less than or equal to 6-7/10 L ankle and foot pain when standing and walking for 10-15 minutes in order to demonstrate an improved tolerance to standing when cooking and walking around the grocery store.   2. ? Gait: Pt will be able to ambulate 150' with a limited community ambulator speed with increased weight shift and stance time on the LLE to promote an efficient gait pattern. 3. ? ROM: Pt will demonstrate improvements in L ankle AROM and gastroc flexibility in order to improve joint mobility and assist with progressions to independent mobility in a shoe. a. Gastroc flexibility  i. Knee flexed: -5 deg  ii. Knee extended: -5 deg  b. DF: 0 deg  c. PF: 45 deg  d. INV: 5 deg  e. EV: 5 deg  f. Great toe extension: 45 deg (passive)  4. ? Strength: Pt will demonstrate 3-/5 L ankle and foot strength in order to assist with progressions of ankle/foot stability as she returns to ambulating in a shoe.   5. ? Function: Pt will score greater than or equal to 25/84 on the FAAM in order to demonstrate improved function.   6. Independent with Home Exercise Programs without verbal cueing for correct execution.   7. Demonstrate Knowledge of fall prevention     LTG: (to be met in 16 treatments)-   8. ? Pain: Pt will report less than or equal to 4-5/10 L ankle/foot pain with her WBing in a shoe when standing for extended periods of time, walking on even/uneven surfaces, and negotiating stairs in order to return to prior level of activity.   9. ? Gait: Pt will be able to ambulate 300' without an AD and a community ambulator gait speed with evidence of appropriate gait mechanics of heel strike and push-off in order to promote an efficient gait pattern.   10. ? ROM: Pt will demonstrate improvements in L ankle AROM and gastroc flexibility in order to improve joint mobility and improve tolerance to standing and walking for extended periods of time and negotiating stairs. a. Gastroc flexibility  i. Knee flexed: 5 deg  ii. Knee extended: 0 deg   b. DF: 5-10 deg  c. PF: 55 deg  d. INV: 10 deg  e.  EV: 10 deg   f. Great toe extension: 55-60 deg (passive)  11. ? Strength: Pt will demonstrate 4/5 L ankle and foot and 3/5 L plantarflexor strength in order to assist with progressions of ankle/foot stability as to improve ability to stand, walk, and negotiate stairs.   a. Pt will demonstrate 5/5 hip girdle and BLE strength in sitting in order to assist with standing and walking. 12. Balance: Pt will be able to complete tandem balance on the BLE for greater than or equal to 30\" in order to promote WBing tolerance and balance reactions to improve ankle   when standing, walking, and negotiating stairs.   13. ? Function: Pt will score greater than or equal to 33/84 on the FAAM in order to demonstrate improved function.                           Patient goals: \"to help with my pain\"    Pt. Education:  [x] Yes  [] No  [x] Reviewed Prior HEP/Ed  Method of Education: [x] Verbal[] Demo  [] Written  Access Code: [email or web address]  Exercises  Calf Mobilization with Small Ball - 1 x daily - 10-30\" hold  Long Sitting Calf Stretch with Strap - 3 x daily - 3 sets - 30\" hold  4/14- reviewed on 5/19 due to minimal compliance  Seated Heel Raise - 3 x daily - 2 sets - 10 reps  Ankle Inversion Eversion Towel Slide - 3 x daily - 2 sets - 10 reps  Towel Scrunches - 3 x daily - 2 sets - 10 reps  Towel Desensitization  Comprehension of Education:  [x] Verbalizes understanding. [x] Demonstrates understanding. [x] Needs review. [] Demonstrates/verbalizes HEP/Ed previously given. Plan: [x] Continue current frequency toward long and short term goals. [x] Specific Instructions for subsequent treatments: progress as able.        Time In: 100 pm        Time Out:  210 pm    Electronically signed by:  Jarvis Isbell, PT

## 2022-05-31 ENCOUNTER — OFFICE VISIT (OUTPATIENT)
Dept: PODIATRY | Age: 62
End: 2022-05-31
Payer: MEDICARE

## 2022-05-31 VITALS — BODY MASS INDEX: 51.41 KG/M2 | HEIGHT: 59 IN | WEIGHT: 255 LBS

## 2022-05-31 DIAGNOSIS — M79.672 LEFT FOOT PAIN: Primary | ICD-10-CM

## 2022-05-31 DIAGNOSIS — R26.2 TROUBLE WALKING: ICD-10-CM

## 2022-05-31 DIAGNOSIS — M19.072 DEGENERATIVE JOINT DISEASE OF BOTH ANKLES AND FEET: ICD-10-CM

## 2022-05-31 DIAGNOSIS — B35.1 DERMATOPHYTOSIS OF NAIL: ICD-10-CM

## 2022-05-31 DIAGNOSIS — M19.071 DEGENERATIVE JOINT DISEASE OF BOTH ANKLES AND FEET: ICD-10-CM

## 2022-05-31 DIAGNOSIS — M25.372 LEFT ANKLE INSTABILITY: ICD-10-CM

## 2022-05-31 DIAGNOSIS — M79.672 PAIN IN BOTH FEET: ICD-10-CM

## 2022-05-31 DIAGNOSIS — M79.671 PAIN IN BOTH FEET: ICD-10-CM

## 2022-05-31 PROCEDURE — G8417 CALC BMI ABV UP PARAM F/U: HCPCS | Performed by: PODIATRIST

## 2022-05-31 PROCEDURE — 11721 DEBRIDE NAIL 6 OR MORE: CPT | Performed by: PODIATRIST

## 2022-05-31 PROCEDURE — 99213 OFFICE O/P EST LOW 20 MIN: CPT | Performed by: PODIATRIST

## 2022-05-31 PROCEDURE — 3017F COLORECTAL CA SCREEN DOC REV: CPT | Performed by: PODIATRIST

## 2022-05-31 PROCEDURE — 1036F TOBACCO NON-USER: CPT | Performed by: PODIATRIST

## 2022-05-31 PROCEDURE — G8427 DOCREV CUR MEDS BY ELIG CLIN: HCPCS | Performed by: PODIATRIST

## 2022-05-31 NOTE — PATIENT INSTRUCTIONS
Schedule a Vaccine  When you qualify to receive the vaccine, call the [facility]) COVID-19 Vaccination Hotline to schedule your appointment or to get additional information about the [facility]) locations which are offering the COVID-19 vaccine. To be 94% effective, it's important that you receive two doses of one of the COVID-19 vaccines. -If you are receiving the George Peter vaccine, your second shot will be scheduled as close to 21 days after the first shot as possible. -If you are receiving the Moderna vaccine, your second shot will be scheduled as close to 28 days after the first shot as possible. [facility]) COVID-19 Vaccination Hotline: [phone]    Links to [facility]) website and [facility] website:    [email or web address]    [email or web address]

## 2022-05-31 NOTE — PROGRESS NOTES
[address] [facility] PODIATRY [facility]  [address] [place] [address]  Dept: [phone]  Dept Fax: [phone]     PAIN PROGRESS NOTE  Date of patient's visit: 5/31/2022  Patient's Name:  Paresh Crowe YOB: 1960            Patient Care Team:  JJ Frey CNP as PCP - General (Nurse Practitioner)  JJ Frey CNP as PCP - [facility]  Louie Beavers DPM as Physician (Podiatry)      Chief Complaint   Patient presents with    Foot Pain     bilateral     Nail Problem     toenail trim       Subjective: This Paresh Crowe comes to clinic for foot and nail care. Pt currently has complaint of thickened, painful, elongated nails that he/she cannot manage by themselves. Pt. Relates pain to nails with shoe gear. Pt's primary care physician is JJ Frey CNP last seen 3/10/22. Pt has a new complaint of not working well with PT and that the pain is still there. Pt states she is unable to go up the stairs and her bathroom is up stairs . Mariluz Bauer Past Medical History:   Diagnosis Date    Allergic rhinitis 12/4/2014    Anxiety 7/5/2018    Arthritis     Asthma     Chest pain     occurs every other day, pt states they occur when she doesn't eat right or forget to take her bp med, occurs left side of chest with no radiation, pt states \"it's new\" and has not had any testing done or seen anyone for it.     Constipation     Depressive disorder 7/5/2018    Generalized osteoarthritis 6/26/2014    History of kidney stones     Hypertension     Obesity, morbid, BMI 40.0-49.9 ([facility] [address] 2/28/2017    PONV (postoperative nausea and vomiting)     Sleep apnea     does not use a machine       Allergies   Allergen Reactions    Latex Hives     Latex gloves    Asa [Aspirin] Hives    Codeine Hives    Lisinopril-Hydrochlorothiazide Hives    Motrin [Ibuprofen] Hives    Other Hives and Swelling     Cherries, almonds    Pcn [Penicillins] Hives    Sulfa Antibiotics      Current Outpatient Medications on File Prior to Visit   Medication Sig Dispense Refill    losartan (COZAAR) 100 MG tablet Take 1 tablet by mouth daily 90 tablet 1    hydroCHLOROthiazide (HYDRODIURIL) 25 MG tablet Take 1 tablet by mouth every morning 90 tablet 1    amLODIPine (NORVASC) 5 MG tablet Take 1 tablet by mouth daily 90 tablet 1    fluticasone-salmeterol (WIXELA INHUB) 250-50 MCG/DOSE AEPB Inhale 1 puff into the lungs 2 times daily 180 each 1    hydrOXYzine (ATARAX) 25 MG tablet Take 1 tablet by mouth every 6 hours as needed for Itching or Anxiety 60 tablet 5    fluticasone (FLONASE) 50 MCG/ACT nasal spray 2 sprays by Nasal route daily 16 g 5    clobetasol (TEMOVATE) 0.05 % ointment Apply to rash twice daily until resolved (not face, armpit or groin) 60 g 5    RA VITAMIN D-3 50 MCG (2000 UT) CAPS take 1 capsule by mouth once daily MUST MAKE APPOINTMENT FOR FURTHER REFILL 90 capsule 1    albuterol sulfate HFA (VENTOLIN HFA) 108 (90 Base) MCG/ACT inhaler Inhale 2 puffs into the lungs every 6 hours as needed for Wheezing 1 Inhaler 3    lidocaine (L-M-X 4) 4 % cream Apply a thick layer 30 minutes prior to procedure, covering in saran wrap 15 g 0    Misc. Devices (BATHTUB SAFETY RAIL) MISC 4 each by Does not apply route daily 4 each 0    Misc. Devices (EXTENDABLE BEDSIDE RAIL) MISC 4 each by Does not apply route daily 4 each 0    Spacer/Aero-Holding Chambers PRICE 1 Device by Does not apply route daily as needed (wheezing) 1 Device 0    Misc. Devices (WALL GRAB BAR) MISC GRAB BARS FOR BOTH SHOWER AND TOILET, USE AS DIRECTED 6 each 0    Misc. Devices MISC OUTSIDE RAMP THAT INCLUDES RAILINGS, USE AS DIRECTED 1 Device 0    Misc.  Devices (RAISED TOILET SEAT) MISC USE AS DIRECTED 1 each 0    hydrocortisone 2.5 % cream apply to affected area twice a day 30 g 1    Elastic Bandages & Supports (JOBST KNEE HIGH COMPRESSION SM) MISC Dispense Bilateral Jobst Below Knee 20-30mmHg compression stockings 3 each 3    EPINEPHrine (EPIPEN) 0.3 MG/0.3ML SOAJ injection Inject 0.3 mLs into the muscle once for 1 dose Use as directed for allergic reaction 1 each 0    Misc. Devices [facility]) MISC 1 Device by Does not apply route once for 1 dose 1 each 0     No current facility-administered medications on file prior to visit. Review of Systems. Review of Systems:   History obtained from chart review and the patient  General ROS: negative for - chills, fatigue, fever, night sweats or weight gain  Constitutional: Negative for chills, diaphoresis, fatigue, fever and unexpected weight change. Musculoskeletal: Positive for arthralgias, gait problem and joint swelling. Neurological ROS: negative for - behavioral changes, confusion, headaches or seizures. Negative for weakness and numbness. Dermatological ROS: negative for - mole changes, rash  Cardiovascular: Negative for leg swelling. Gastrointestinal: Negative for constipation, diarrhea, nausea and vomiting. Objective:  Dermatologic Exam:  Skin lesion/ulceration Absent . Skin No rashes or nodules noted. .   Skin is thin, with flaky sloughing skin as well as decreased hair growth to the lower leg  Small red hemosiderin deposits seen dorsal foot   Musculoskeletal:     1st MPJ ROM decreased, Bilateral.  Muscle strength 5/5, Bilateral.  Pain present upon palpation of toenails 1-5, Bilateral. decreased medial longitudinal arch, Bilateral.  Ankle ROM decreased,Bilateral.    Dorsally contracted digits present digits 2, Bilateral.     Vascular: DP pulses 1/4 bilateral.  PT pulses 0/4 bilateral.   CFT <5 seconds, Bilateral.  Hair growth absent to the level of the digits, Bilateral.  Edema present, Bilateral.  Varicosities absent, Bilateral. Erythema absent, Bilateral    Neurological: Sensation diminshed to light touch to level of digits, Bilateral.  Protective sensation intact 6/10 sites via 5.07/10g [place]-Daniela Monofilament, Bilateral.  negative Tinel's, Bilateral.  negative Valleix sign, Bilateral.      Integument: Warm, dry, supple, Bilateral.  Open lesion absent, Bilateral.  Interdigital maceration absent to web spaces 4, Bilateral.  Nails 1-5 left and 1-5 right thickened > 3.0 mm, dystrophic and crumbly, discolored with subungual debris. Fissures absent, Bilateral.   General: AAO x 3 in NAD. Derm  Toenail Description  Sites of Onychomycosis Involvement (Check affected area)  [x] [x] [x] [x] [x] [x] [x] [x] [x] [x]  5 4 3 2 1 1 2 3 4 5                          Right                                        Left    Thickness  [x] [x] [x] [x] [x] [x] [x] [x] [x] [x]  5 4 3 2 1 1 2 3 4 5                         Right                                        Left    Dystrophic Changes   [x] [x] [x] [x] [x] [x] [x] [x] [x] [x]  5 4 3 2 1 1 2 3 4 5                         Right                                        Left    Color  [x] [x] [x] [x] [x] [x] [x] [x] [x] [x]  5 4 3 2 1 1 2 3 4 5                          Right                                        Left    Incurvation/Ingrowin   [] [] [] [] [] [] [] [] [] []  5 4 3 2 1 1 2 3 4 5                         Right                                        Left    Inflammation/Pain   [x] [x] [x] [x] [x] [x] [x] [x] [x] [x]  5 4 3  2 1 1 2 3 4 5                         Right                                        Left       Nails are painful 1-10 with direct palpation. Q7   []Yes  []No                Q8   [x]Yes  []No                     Q9   []Yes    []No  Assessment:  63 y.o. female with:    Diagnosis Orders   1. Left foot pain  33016 - [place] DEBRIDEMENT OF NAILS, 6 OR MORE   2. Dermatophytosis of nail  02326 - [place] DEBRIDEMENT OF NAILS, 6 OR MORE   3. Trouble walking  Misc. Devices (COMMODE BEDSIDE) MISC    85830 - [place] DEBRIDEMENT OF NAILS, 6 OR MORE   4. Pain in both feet  04084 - [place] DEBRIDEMENT OF NAILS, 6 OR MORE   5. Left ankle instability  Misc. Devices (COMMODE BEDSIDE) MISC    03529 - [place] DEBRIDEMENT OF NAILS, 6 OR MORE   6. Degenerative joint disease of both ankles and feet  Misc. Devices (COMMODE BEDSIDE) MISC    80876 - [place] DEBRIDEMENT OF NAILS, 6 OR MORE       Plan:   Pt was evaluated and examined. Patient was given personalized discharge instructions. For the issue with the bathroom I gave the patient a Rx for a bedside commode. Pt is confined to one floor of her house and her bathroom is up stairs. She is Floor confinend      Nails 1-10 were debrided in length and thickness sharply with a nail nipper and  without incident. Pt will follow up in 9 weeks or sooner if any problems arise. Diagnosis was discussed with the pt and all of their questions were answered in detail. Proper foot hygiene and care was discussed with the pt. Patient to check feet daily and contact the office with any questions/problems/concerns. Other comorbidity noted and will be managed by PCP. Pain waiver discussed with patient and confirmed    All labs were reviewed and all imagining including the above findings were reviewed PRIOR to the patients arrival and with the patient today. Previous patient encounter was reviewed. Encounters from the patients other medical providers were reviewed and noted. Time was spent educating the patient and their families/caregivers on proper care of the feet and ankles. All the above diagnosis were addressed at todays visit and all questions were answered. A total of 20 minutes was spent with this patients encounter which included charting after the patients visit  .    5/31/2022      Electronically signed by Citlalli Mi DPM on 5/31/2022 at 10:47 AM  5/31/2022

## 2022-06-01 ENCOUNTER — HOSPITAL ENCOUNTER (OUTPATIENT)
Dept: PHYSICAL THERAPY | Facility: CLINIC | Age: 62
Setting detail: THERAPIES SERIES
Discharge: HOME | End: 2022-06-01
Payer: MEDICARE

## 2022-06-01 PROCEDURE — 97140 MANUAL THERAPY 1/> REGIONS: CPT

## 2022-06-01 PROCEDURE — 97110 THERAPEUTIC EXERCISES: CPT

## 2022-06-01 NOTE — FLOWSHEET NOTE
[] [facility]) [facility] &  Therapy  [address]  [phone]  F: [phone] [x] [address]  [address] [place] [address]   [address]  P: [phone]  F: [phone] [] [address]  [address]  P: [phone]  F: [phone] [] [address]  P: [phone]  F: [phone] [] [address]  [facility]   [address]   [place]: [phone]  F: [phone]      Physical Therapy Daily Treatment Note    Date:  2022  Patient Name:  Elmer Wilkes    :  1960  MRN: [id number]  Physician: Ashwin Howard DPM                                     Insurance: [facility]  Medical Diagnosis:   M25.572, G89.29 (ICD-10-CM) - Chronic pain of left ankle   M25.372 (ICD-10-CM) - Left ankle instability   Rehab Codes: M25.572, M25.672, M25.675, R26.89, M62.81  Onset date: 3/29/22                           Next 's appt. : 22  Visit# / total visits: ; Progress note for Medicare patient due at visit 8     Cancels/No Shows:     Subjective:    Pain:  [x] Yes  [] No Location: L foot/ ankle   Pain Rating: (0-10 scale) 7-8/10  Pain altered Tx:  [x] No  [] Yes  Action:     Comments: Pt states she went to her foot doctor yesterday which suggested a 3rd surgery for a fusion but pt declines. Notes her pain is constantly a 7-8/10.    Objective:  Modalities: HP to left foot/ankle rested on chair 10' at end of session  Precautions: latex allergy   Exercises:  Exercise in seated        Reps/ Time Weight/ Level Comments   nustep  8 min  L1          Desensitization  1'  Towel rubbing; could only tolerate 1' on    MFR to gastroc, soleus, plantar surface of foot, mobs to improve ROM, PROM 27'   Pt sitting with LE hanging off of the table.   Grade II/III mobilizations A/P talocrural joint  Grade II/III medial/lateral calcaneal glides  Mobilizations of midfoot  MTP joint distraction and dorsal glides  MFR to the gastroc and soleus complex, medial/lateral mobilizations of achilles tendon          Passive INV/EV with towel 2x10 ea     Calf stretch 3x30\"   With strap         Ankle pumps -- AA with iso hold 10x        Ankle circles  2x10     Isometric PF/DF 5x5\" ea     Ankle INV/EV 2x5 ea  Target of therapists thumbs    Seated inversion isometrics against therapists hand 2x5     Heel slides   10x2       Heel raises  10x2       Toe raises  10x2       Toe curls  10x2        Inversion/eversion on slide board 2x10     Domers 10x           Gait training  x  Educated on technique and body mechanics             Other: sleeps in recliner- only tolerates sitting position     Specific Instructions for next treatment: manual to gastroc-soleus complex, manual to forefoot, midfoot, and hindfoot; progress A/PROM, strength, balance, and normalize gait        Treatment Charges: Mins Units   [x]  Modalities HP 10 --   [x]  Ther Exercise 25 1   [x]  Manual Therapy 27 2   []  Ther Activities     []  Aquatics     []  Vasocompression     []  Other: gait     Total Treatment time 52 3   Estimated Medicare Cost (as of 06/01/22) $478.01    Assessment: [] Progressing toward goals. [x] No change. Initiated session with increased time on nu step to improve endurance. Applied manual techniques as detailed above to improve overall mobility and ankle ROM. Increased tenderness at proximal gastroc. Notes relief with talocrural mobs this date. Pt could only tolerate 1 minute of desensitization and performed herself noting increased pain if someone else does it. Continued charted exercises with cueing as needed for proper form. Ended with application of HP to L foot/ankle complex in seated with LE propped on chair.  Time spent educating pt on performing stretches, exercises, and ambulation on her own to progress towards her goals in therapy. Pt notes relief post tx. Emphasized importance of daily HEP to receive maximum benefit of PT. Will follow up on compliance in upcoming sessions. [] Other:  [x] Patient would continue to benefit from skilled physical therapy services in order to: left foot/ankle pain, A/PROM, strength, and balance to promote improved tolerance to standing and walking. Problems:  left foot/ankle  [x]? ? Pain: 8-10/10  [x]? ? ROM: decreased A/PROM and joint mobility  [x]? ? Strength: decreased generalized strength and left foot/ankle   [x]? ? Function: FAAM: 20.2% MAX FUNCTION  [x]? ? Balance: unable to test due to increased pain  [x]?  Edema: increased swelling over lateral ankle  []? Postural Deviations  [x]?  Gait Deviations: antalgic gait; slowed gait speed  []? Other:                            STG: (to be met in 8 treatments)  1. ? Pain: Pt will report less than or equal to 6-7/10 L ankle and foot pain when standing and walking for 10-15 minutes in order to demonstrate an improved tolerance to standing when cooking and walking around the grocery store. 2. ? Gait: Pt will be able to ambulate 150' with a limited community ambulator speed with increased weight shift and stance time on the LLE to promote an efficient gait pattern. 3. ? ROM: Pt will demonstrate improvements in L ankle AROM and gastroc flexibility in order to improve joint mobility and assist with progressions to independent mobility in a shoe. a. Gastroc flexibility  i. Knee flexed: -5 deg  ii. Knee extended: -5 deg  b. DF: 0 deg  c. PF: 45 deg  d. INV: 5 deg  e.  EV: 5 deg  f. Great toe extension: 45 deg (passive)  4. ? Strength: Pt will demonstrate 3-/5 L ankle and foot strength in order to assist with progressions of ankle/foot stability as she returns to ambulating in a shoe.   5. ? Function: Pt will score greater than or equal to 25/84 on the FAAM in order to demonstrate improved function.   6. Independent with Home Exercise Programs without verbal cueing for correct execution.   7. Demonstrate Knowledge of fall prevention     LTG: (to be met in 16 treatments)-   8. ? Pain: Pt will report less than or equal to 4-5/10 L ankle/foot pain with her WBing in a shoe when standing for extended periods of time, walking on even/uneven surfaces, and negotiating stairs in order to return to prior level of activity.   9. ? Gait: Pt will be able to ambulate 300' without an AD and a community ambulator gait speed with evidence of appropriate gait mechanics of heel strike and push-off in order to promote an efficient gait pattern.   10. ? ROM: Pt will demonstrate improvements in L ankle AROM and gastroc flexibility in order to improve joint mobility and improve tolerance to standing and walking for extended periods of time and negotiating stairs. a. Gastroc flexibility  i. Knee flexed: 5 deg  ii. Knee extended: 0 deg   b. DF: 5-10 deg  c. PF: 55 deg  d. INV: 10 deg  e. EV: 10 deg   f. Great toe extension: 55-60 deg (passive)  11. ? Strength: Pt will demonstrate 4/5 L ankle and foot and 3/5 L plantarflexor strength in order to assist with progressions of ankle/foot stability as to improve ability to stand, walk, and negotiate stairs.   a. Pt will demonstrate 5/5 hip girdle and BLE strength in sitting in order to assist with standing and walking. 12. Balance: Pt will be able to complete tandem balance on the BLE for greater than or equal to 30\" in order to promote WBing tolerance and balance reactions to improve ankle   when standing, walking, and negotiating stairs.   13. ? Function: Pt will score greater than or equal to 33/84 on the FAAM in order to demonstrate improved function.                           Patient goals: \"to help with my pain\"    Pt.  Education:  [x] Yes  [] No  [x] Reviewed Prior HEP/Ed  Method of Education: [x] Verbal[] Demo  [] Written  Access Code: [email or web address]  Exercises  Calf Mobilization with Small Ball - 1 x daily - 10-30\" hold  Long Sitting Calf Stretch with Strap - 3 x daily - 3 sets - 30\" hold  4/14- reviewed on 5/19 due to minimal compliance  Seated Heel Raise - 3 x daily - 2 sets - 10 reps  Ankle Inversion Eversion Towel Slide - 3 x daily - 2 sets - 10 reps  Towel Scrunches - 3 x daily - 2 sets - 10 reps  Towel Desensitization  Comprehension of Education:  [x] Verbalizes understanding. [x] Demonstrates understanding. [x] Needs review. [] Demonstrates/verbalizes HEP/Ed previously given. Plan: [x] Continue current frequency toward long and short term goals. [x] Specific Instructions for subsequent treatments: progress as able.        Time In: 9:00 am       Time Out:  10:10 am    Electronically signed by:  Elliott Weeks PTA

## 2022-06-02 ENCOUNTER — APPOINTMENT (OUTPATIENT)
Dept: GENERAL RADIOLOGY | Age: 62
End: 2022-06-02
Payer: MEDICARE

## 2022-06-02 ENCOUNTER — HOSPITAL ENCOUNTER (EMERGENCY)
Age: 62
Discharge: HOME | End: 2022-06-02
Attending: EMERGENCY MEDICINE
Payer: MEDICARE

## 2022-06-02 VITALS
SYSTOLIC BLOOD PRESSURE: 145 MMHG | OXYGEN SATURATION: 96 % | RESPIRATION RATE: 15 BRPM | DIASTOLIC BLOOD PRESSURE: 72 MMHG | HEART RATE: 64 BPM | TEMPERATURE: 98.2 F

## 2022-06-02 DIAGNOSIS — M79.672 ACUTE FOOT PAIN, LEFT: Primary | ICD-10-CM

## 2022-06-02 PROCEDURE — 73630 X-RAY EXAM OF FOOT: CPT

## 2022-06-02 PROCEDURE — 99283 EMERGENCY DEPT VISIT LOW MDM: CPT

## 2022-06-02 ASSESSMENT — PAIN SCALES - GENERAL: PAINLEVEL_OUTOF10: 8

## 2022-06-02 ASSESSMENT — PAIN - FUNCTIONAL ASSESSMENT: PAIN_FUNCTIONAL_ASSESSMENT: 0-10

## 2022-06-02 NOTE — ED PROVIDER NOTES
EMERGENCY DEPARTMENT ENCOUNTER    Pt Name: Idania Jackson  MRN: [id number]  [place] 1960  Date of evaluation: 6/2/22  CHIEF COMPLAINT       Chief Complaint   Patient presents with    Foot Injury     Left foot injury after PT yesterday; pt reports previous fx in that foot     HISTORY OF PRESENT ILLNESS   Patient is a 26-year-old female with PMH of left foot tendon surgery who presents to the ED for evaluation of intense pain to lateral aspect of left foot after finishing PT yesterday. No falls, injuries, trauma reported other than manipulation by physical therapist yesterday. He is concerned that left foot is broken. REVIEW OF SYSTEMS     Review of Systems   All other systems reviewed and are negative. PASTMEDICAL HISTORY     Past Medical History:   Diagnosis Date    Allergic rhinitis 12/4/2014    Anxiety 7/5/2018    Arthritis     Asthma     Chest pain     occurs every other day, pt states they occur when she doesn't eat right or forget to take her bp med, occurs left side of chest with no radiation, pt states \"it's new\" and has not had any testing done or seen anyone for it.     Constipation     Depressive disorder 7/5/2018    Generalized osteoarthritis 6/26/2014    History of kidney stones     Hypertension     Obesity, morbid, BMI 40.0-49.9 ([facility] [address] 2/28/2017    PONV (postoperative nausea and vomiting)     Sleep apnea     does not use a machine     SURGICAL HISTORY       Past Surgical History:   Procedure Laterality Date    CHOLECYSTECTOMY      FOOT TENDON SURGERY Left 09/08/2017    posterior repair    FOOT TENDON SURGERY Left 06/29/2018    LEFT POSTERIOR  TENDON REPAIR FLEXOR TENDON TRANSFER (Left Foot)    LITHOTRIPSY      NE OFFICE/OUTPT VISIT,PROCEDURE ONLY Left 6/29/2018    LEFT POSTERIOR  TENDON REPAIR FLEXOR TENDON TRANSFER performed by Kym Matthew DPM at [address] [place] [address] Drive Left 9/8/2017    LEFT POSTERIOR TIBIAL TENDON REPAIR WITH LEFT TENOLYSIS  performed by Timothy Sumner, DPM at [address] [facility]       Previous Medications    ALBUTEROL SULFATE HFA (VENTOLIN HFA) 108 (90 BASE) MCG/ACT INHALER    Inhale 2 puffs into the lungs every 6 hours as needed for Wheezing    AMLODIPINE (NORVASC) 5 MG TABLET    Take 1 tablet by mouth daily    CLOBETASOL (TEMOVATE) 0.05 % OINTMENT    Apply to rash twice daily until resolved (not face, armpit or groin)    ELASTIC BANDAGES & SUPPORTS (JOBST KNEE HIGH COMPRESSION SM) MISC    Dispense Bilateral Jobst Below Knee 20-30mmHg compression stockings    EPINEPHRINE (EPIPEN) 0.3 MG/0.3ML SOAJ INJECTION    Inject 0.3 mLs into the muscle once for 1 dose Use as directed for allergic reaction    FLUTICASONE (FLONASE) 50 MCG/ACT NASAL SPRAY    2 sprays by Nasal route daily    FLUTICASONE-SALMETEROL (WIXELA INHUB) 250-50 MCG/DOSE AEPB    Inhale 1 puff into the lungs 2 times daily    HYDROCHLOROTHIAZIDE (HYDRODIURIL) 25 MG TABLET    Take 1 tablet by mouth every morning    HYDROCORTISONE 2.5 % CREAM    apply to affected area twice a day    HYDROXYZINE (ATARAX) 25 MG TABLET    Take 1 tablet by mouth every 6 hours as needed for Itching or Anxiety    LIDOCAINE (L-M-X 4) 4 % CREAM    Apply a thick layer 30 minutes prior to procedure, covering in saran wrap    LOSARTAN (COZAAR) 100 MG TABLET    Take 1 tablet by mouth daily    MISC. DEVICES (BATHTUB SAFETY RAIL) MISC    4 each by Does not apply route daily    MISC. DEVICES (COMMODE BEDSIDE) MISC    1 Device by Does not apply route daily    MISC. DEVICES (EXTENDABLE BEDSIDE RAIL) MISC    4 each by Does not apply route daily    MISC. DEVICES (RAISED TOILET SEAT) MISC    USE AS DIRECTED    MISC. DEVICES (WALL GRAB BAR) MISC    GRAB BARS FOR BOTH SHOWER AND TOILET, USE AS DIRECTED    MISC. DEVICES MERIT [facility]) MISC    1 Device by Does not apply route once for 1 dose    MISC.  DEVICES MISC    OUTSIDE RAMP THAT INCLUDES RAILINGS, USE AS DIRECTED    RA VITAMIN D-3 50 MCG (2000 UT) CAPS    take 1 capsule by mouth once daily MUST MAKE APPOINTMENT FOR FURTHER REFILL    SPACER/AERO-HOLDING CHAMBERS PRICE    1 Device by Does not apply route daily as needed (wheezing)     ALLERGIES     is allergic to latex, asa [aspirin], codeine, lisinopril-hydrochlorothiazide, motrin [ibuprofen], other, pcn [penicillins], and sulfa antibiotics. FAMILY HISTORY     She indicated that her mother is . She indicated that her father is . She indicated that her sister is alive. She indicated that her brother is alive. SOCIAL HISTORY       Social History     Tobacco Use    Smoking status: Former Smoker     Packs/day: 3.00     Years: 20.00     Pack years: 60.00     Types: Cigarettes     Start date: 1981     Quit date: 1998     Years since quittin.4    Smokeless tobacco: Never Used    Tobacco comment: quit smoking 15-20 yrs ago   Vaping Use    Vaping Use: Never used   Substance Use Topics    Alcohol use: No    Drug use: No     PHYSICAL EXAM     INITIAL VITALS: BP (!) 145/72   Pulse 64   Temp 98.2 °F (36.8 °C) (Oral)   Resp 15   SpO2 96%    Physical Exam  HENT:      Head: Normocephalic. Right Ear: External ear normal.      Left Ear: External ear normal.      Nose: Nose normal.   Eyes:      Conjunctiva/sclera: Conjunctivae normal.   Cardiovascular:      Rate and Rhythm: Normal rate. Pulmonary:      Effort: Pulmonary effort is normal.   Abdominal:      General: Abdomen is flat. Musculoskeletal:      Comments: Tenderness lateral aspect left foot, no erythema, signs of trauma. Skin:     General: Skin is dry. Neurological:      Mental Status: She is alert. Mental status is at baseline. Psychiatric:         Mood and Affect: Mood normal.         Behavior: Behavior normal.         MEDICAL DECISION MAKING:   The patient is hemodynamically stable, afebrile, nontoxic-appearing. Physical exam notable for tenderness lateral aspect left foot. Based on history and exam likely contusion. Low suspicion for fracture. ED plan for x-ray, reassess. DIAGNOSTIC RESULTS   EKG:All EKG's are interpreted by the Emergency Department Physician who either signs or Co-signs this chart in the absence of a cardiologist.        RADIOLOGY:All plain film, CT, MRI, and formal ultrasound images (except ED bedside ultrasound) are read by the radiologist, see reports below, unless otherwisenoted in MDM or here. XR FOOT LEFT (MIN 3 VIEWS)   Final Result   Osseous demineralization and remote posttraumatic remodeling of the 5th   metatarsal.  No definite acute bony abnormality. Degenerative changes of the midfoot and hindfoot. Posterior and plantar calcaneal enthesophytes. LABS: All lab results were reviewed by myself, and all abnormals are listed below. Labs Reviewed - No data to display    EMERGENCY DEPARTMENTCOURSE:   Patient did well in the ED. X-ray negative for acute osseous injury. No further work-up indicated at this time. Nursing notes reviewed. At this time this is what I find, the patient appears well and does not appear sick or toxic. I gave my usual and customary discussion of the risks and benefits of discharge versus admission. I answered the patient's questions. I gave the patient strict return precautions. Patient expressed understanding of the discharge instructions. The care is provided during an unprecedented national emergency due to the novel coronavirus, COVID-19. Dictated but not reviewed. Vitals:    Vitals:    06/02/22 1052   BP: (!) 145/72   Pulse: 64   Resp: 15   Temp: 98.2 °F (36.8 °C)   TempSrc: Oral   SpO2: 96%       The patient was given the following medications while in the emergency department:  No orders of the defined types were placed in this encounter. CONSULTS:  None    FINAL IMPRESSION      1.  Acute foot pain, left          DISPOSITION/PLAN   DISPOSITION Decision To Discharge 06/02/2022 12:53:31 PM      PATIENT REFERRED TO:  Betty Stack, APRN - CNP  [address]  [phone]    In 2 days      DISCHARGE MEDICATIONS:  New Prescriptions    No medications on file     Mavis Shipman MD  Attending Emergency Physician                    Rudi Dubois MD  06/02/22 2186

## 2022-06-03 ENCOUNTER — HOSPITAL ENCOUNTER (OUTPATIENT)
Dept: PHYSICAL THERAPY | Facility: CLINIC | Age: 62
Setting detail: THERAPIES SERIES
Discharge: HOME | End: 2022-06-03
Payer: MEDICARE

## 2022-06-03 NOTE — FLOWSHEET NOTE
[] [facility] ([facility]) - [facility] &  Therapy  [address]    [phone]  F: [phone]   [x] [address]  [facility] [address]   [address]  P: [phone]  F: [phone]  [] [address] &  Therapy  [address]  P: [phone]  F: [phone] [] [address]  P: [phone]  F: [phone]  [] [address] [facility]   [address]   [place]: [phone]  F: [phone]   [] [address]  [place]: [phone]   F: [phone]     Physical Therapy Cancel/No Show note    Date: 6/3/2022  Patient: Bernadine Blandon  : 1960  MRN: [id number]    Cancels/No Shows to date:    For today's appointment patient:    [x]  Cancelled    [] Rescheduled appointment    [] No-show     Reason given by patient:    []  Patient ill    [x]  Conflicting appointment    [] No transportation      [] Conflict with work    [] No reason given    [] Weather related     [] [id number]    [] Other:      Comments:       [x] Next appointment was confirmed    Electronically signed by: Sydnee Coe, PT

## 2022-06-07 ENCOUNTER — TELEPHONE (OUTPATIENT)
Dept: FAMILY MEDICINE CLINIC | Age: 62
End: 2022-06-07

## 2022-06-07 ENCOUNTER — HOSPITAL ENCOUNTER (OUTPATIENT)
Dept: PHYSICAL THERAPY | Facility: CLINIC | Age: 62
Setting detail: THERAPIES SERIES
Discharge: HOME | End: 2022-06-07
Payer: MEDICARE

## 2022-06-07 DIAGNOSIS — I10 ESSENTIAL HYPERTENSION: Primary | ICD-10-CM

## 2022-06-07 PROCEDURE — 97110 THERAPEUTIC EXERCISES: CPT

## 2022-06-07 RX ORDER — LOSARTAN POTASSIUM AND HYDROCHLOROTHIAZIDE 25; 100 MG/1; MG/1
1 TABLET ORAL DAILY
Qty: 90 TABLET | Refills: 1 | Status: SHIPPED | OUTPATIENT
Start: 2022-06-07

## 2022-06-07 NOTE — TELEPHONE ENCOUNTER
Pharmacy called and pt would like to have a script sent into [facility] for her Losartan 100 mg and Hydrochlorothiazide 25 mg. be combined together as 1 pill please.     Thank you,   Liv Barlow

## 2022-06-07 NOTE — TELEPHONE ENCOUNTER
This was changed to 2 because the pharmacy was out of the combo - they will need to make sure they have this before I change the Rx again

## 2022-06-07 NOTE — FLOWSHEET NOTE
[] [facility]) [facility] &  Therapy  [address]  [phone]  F: [phone] [x] [address]  [facility]   [address]  P: [phone]  F: [phone] [] [address]  [address] [facility]  P: [phone]  F: [phone] [] [address]  P: [phone]  F: [phone] [] [address]  [facility]   [address]   [place]: [phone]  F: [phone]      Physical Therapy Daily Treatment Note    Date:  2022  Patient Name:  Candido Brown    :  1960  MRN: [id number]  Physician: Pillo Decker DPM                                     Insurance: [facility] Medicare/[facility]  Medical Diagnosis:   M25.572, G89.29 (ICD-10-CM) - Chronic pain of left ankle   M25.372 (ICD-10-CM) - Left ankle instability   Rehab Codes: M25.572, M25.672, M25.675, R26.89, M62.81  Onset date: 3/29/22                           Next 's appt. : 22  Visit# / total visits: ; Progress note for Medicare patient due at visit 8     Cancels/No Shows:     Subjective:    Pain:  [x] Yes  [] No Location: L foot/ ankle   Pain Rating: (0-10 scale) 8.5/10  Pain altered Tx:  [x] No  [] Yes  Action:     Comments: Pt notes she is in \"pain\" but that is normal but this is the \"extra\" pain. Pt asked therapist to not \"touch that side of the foot,\" pointing to the lateral aspect of the foot. Pt reports most of the pain is on the outside of the foot.      Objective:  Modalities: HP to left foot/ankle with pt in long sitting 15' at end of session (offered pt to rest against wall for back support, pt declined)  Precautions: latex allergy   Exercises:  Exercise in seated        Reps/ Time Weight/ Level Comments   nustep  5 min  L1          Desensitization  1'  Towel rubbing; could only tolerate 1' on 6/1   MFR to gastroc, soleus, plantar surface of foot, mobs to improve ROM, PROM 27'   Pt sitting with LE hanging off of the table. Grade II/III mobilizations A/P talocrural joint  Grade II/III medial/lateral calcaneal glides  Mobilizations of midfoot  MTP joint distraction and dorsal glides  MFR to the gastroc and soleus complex, medial/lateral mobilizations of achilles tendon          Passive INV/EV with towel 2x10 ea     Calf stretch 3x30\"   With strap         Ankle pumps  10x2        Ankle circles  2x10     Isometric PF/DF 5x5\" ea     Ankle INV/EV 2x5 ea  Tapping behind respective malleolus for tactile cueing to promote muscle activation  Didn't require tapping for 2nd set of eversion   Seated    Feet on floor    Hip adduction 10x5\" holds ball    Hip aBduction 10x  orange     LAQ 20x 2#    HS curls 20X BLUE    Heel raises  10x2       Toe raises 10x   Unable to complete 2nd set due to increased pain over the lateral foot   Toe curls 10x       Toe extension 10x     Toe splay 10x     Domers 10x           Gait training  x  Educated on technique and body mechanics             Other: sleeps in recliner- only tolerates sitting position     Specific Instructions for next treatment: manual to gastroc-soleus complex, manual to forefoot, midfoot, and hindfoot; progress A/PROM, strength, balance, and normalize gait        Treatment Charges: Mins Units   [x]  Modalities HP 15 --   [x]  Ther Exercise 34 2   []  Manual Therapy     []  Ther Activities     []  Aquatics     []  Vasocompression     []  Other: gait     Total Treatment time 34 2   Estimated Medicare Cost (as of 06/07/22) $530.06    Assessment: [] Progressing toward goals. [x] No change. Pt without reports of improvement in pain at the end of the session but does note she likes the hot pack. Attempted to complete manual therapy to the left calf, however, was not able to complete due to lower body dressing.  Pt advised therapist to not touch the lateral side of her foot this treatment. Continued to complete gastroc stretching and passive ROM into inversion and eversion completed by the pt with use of a towel. Pt continues to lack inversion of the left ankle without significant IR of the LLE. Use of visual target for therapist to move towards was completed while stabilizing lower leg. Pt with minimal movement observed. Greater movement is observed in eversion, DF, and PF compared to previous sessions. Implemented seated hip girdle strengthening to improve LE strength with good tolerance. Pt was only able to complete 1 set of toe taps secondary to pain and tingling along the lateral border of the foot. Pt reported tingling in the left foot with it sitting in a dependent position which resolved when the foot was supported on the table. Encouraged pt of the good session this date. [] Other:  [x] Patient would continue to benefit from skilled physical therapy services in order to: left foot/ankle pain, A/PROM, strength, and balance to promote improved tolerance to standing and walking. Problems:  left foot/ankle  [x]? ? Pain: 8-10/10  [x]? ? ROM: decreased A/PROM and joint mobility  [x]? ? Strength: decreased generalized strength and left foot/ankle   [x]? ? Function: FAAM: 20.2% MAX FUNCTION  [x]? ? Balance: unable to test due to increased pain  [x]?  Edema: increased swelling over lateral ankle  []? Postural Deviations  [x]?  Gait Deviations: antalgic gait; slowed gait speed  []? Other:                            STG: (to be met in 8 treatments)  1. ? Pain: Pt will report less than or equal to 6-7/10 L ankle and foot pain when standing and walking for 10-15 minutes in order to demonstrate an improved tolerance to standing when cooking and walking around the grocery store.   2. ? Gait: Pt will be able to ambulate 150' with a limited community ambulator speed with increased weight shift and stance time on the LLE to promote an efficient gait pattern. 3. ? ROM: Pt will demonstrate improvements in L ankle AROM and gastroc flexibility in order to improve joint mobility and assist with progressions to independent mobility in a shoe. a. Gastroc flexibility  i. Knee flexed: -5 deg  ii. Knee extended: -5 deg  b. DF: 0 deg  c. PF: 45 deg  d. INV: 5 deg  e. EV: 5 deg  f. Great toe extension: 45 deg (passive)  4. ? Strength: Pt will demonstrate 3-/5 L ankle and foot strength in order to assist with progressions of ankle/foot stability as she returns to ambulating in a shoe.   5. ? Function: Pt will score greater than or equal to 25/84 on the FAAM in order to demonstrate improved function.   6. Independent with Home Exercise Programs without verbal cueing for correct execution.   7. Demonstrate Knowledge of fall prevention     LTG: (to be met in 16 treatments)-   8. ? Pain: Pt will report less than or equal to 4-5/10 L ankle/foot pain with her WBing in a shoe when standing for extended periods of time, walking on even/uneven surfaces, and negotiating stairs in order to return to prior level of activity.   9. ? Gait: Pt will be able to ambulate 300' without an AD and a community ambulator gait speed with evidence of appropriate gait mechanics of heel strike and push-off in order to promote an efficient gait pattern.   10. ? ROM: Pt will demonstrate improvements in L ankle AROM and gastroc flexibility in order to improve joint mobility and improve tolerance to standing and walking for extended periods of time and negotiating stairs. a. Gastroc flexibility  i. Knee flexed: 5 deg  ii. Knee extended: 0 deg   b. DF: 5-10 deg  c. PF: 55 deg  d. INV: 10 deg  e.  EV: 10 deg   f. Great toe extension: 55-60 deg (passive)  11. ? Strength: Pt will demonstrate 4/5 L ankle and foot and 3/5 L plantarflexor strength in order to assist with progressions of ankle/foot stability as to improve ability to stand, walk, and negotiate stairs.   a. Pt will demonstrate 5/5 hip girdle and BLE strength in sitting in order to assist with standing and walking. 12. Balance: Pt will be able to complete tandem balance on the BLE for greater than or equal to 30\" in order to promote WBing tolerance and balance reactions to improve ankle   when standing, walking, and negotiating stairs.   13. ? Function: Pt will score greater than or equal to 33/84 on the FAAM in order to demonstrate improved function.                           Patient goals: \"to help with my pain\"    Pt. Education:  [x] Yes  [] No  [x] Reviewed Prior HEP/Ed  Method of Education: [x] Verbal[] Demo  [] Written  Access Code: [email or web address]  Exercises  Calf Mobilization with Small Ball - 1 x daily - 10-30\" hold  Long Sitting Calf Stretch with Strap - 3 x daily - 3 sets - 30\" hold  4/14- reviewed on 5/19 due to minimal compliance  Seated Heel Raise - 3 x daily - 2 sets - 10 reps  Ankle Inversion Eversion Towel Slide - 3 x daily - 2 sets - 10 reps  Towel Scrunches - 3 x daily - 2 sets - 10 reps  Towel Desensitization  Comprehension of Education:  [x] Verbalizes understanding. [x] Demonstrates understanding. [x] Needs review. [] Demonstrates/verbalizes HEP/Ed previously given. Plan: [x] Continue current frequency toward long and short term goals. [x] Specific Instructions for subsequent treatments: progress as able.        Time In: 1001 am       Time Out:  10:55    Electronically signed by:  Nathan Ortiz, PT

## 2022-06-09 ENCOUNTER — APPOINTMENT (OUTPATIENT)
Dept: PHYSICAL THERAPY | Facility: CLINIC | Age: 62
End: 2022-06-09
Payer: MEDICARE

## 2022-06-14 ENCOUNTER — HOSPITAL ENCOUNTER (OUTPATIENT)
Dept: PHYSICAL THERAPY | Facility: CLINIC | Age: 62
Setting detail: THERAPIES SERIES
Discharge: HOME | End: 2022-06-14
Payer: MEDICARE

## 2022-06-14 PROCEDURE — 97110 THERAPEUTIC EXERCISES: CPT

## 2022-06-14 NOTE — FLOWSHEET NOTE
[] [facility]  [address]  [phone]  F: [phone] [x] [address]  [facility]   [address]  P: [phone]  F: [phone] [] [address]  [address]  P: [phone]  F: [phone] [] [address]  P: [phone]  F: [phone] [] [address]  [facility]   [address]   [place]: [phone]  F: [phone]      Physical Therapy Daily Treatment Note    Date:  2022  Patient Name:  Keyon Oneill    :  1960  MRN: [id number]  Physician: Arcadio Christianson DPM                                     Insurance: [facility] Medicare/[facility]  Medical Diagnosis:   M25.572, G89.29 (ICD-10-CM) - Chronic pain of left ankle   M25.372 (ICD-10-CM) - Left ankle instability   Rehab Codes: M25.572, M25.672, M25.675, R26.89, M62.81  Onset date: 3/29/22                           Next 's appt. : 22   Visit# / total visits: ; Progress note for Medicare patient due at visit 8     Cancels/No Shows:     Subjective:    Pain:  [x] Yes  [] No Location: L foot/ ankle   Pain Rating: (0-10 scale) 8.5/10  Pain altered Tx:  [x] No  [] Yes  Action:     Comments: Pt arrives with no changes in pain levels or sensitivity on the lateral aspect of the foot.        Objective:  Modalities: HP to left foot/ankle with pt in long sitting 15' at end of session (offered pt to rest against wall for back support, pt declined)  Precautions: latex allergy     Exercises:  Exercise in seated        Reps/ Time Weight/ Level Comments   nustep  5 min  L1          Desensitization  1'  Towel rubbing; could only tolerate 1' on    MFR to gastroc, soleus, plantar surface of foot, mobs to improve ROM, PROM 27'   Pt sitting with LE hanging off of the table. Grade II/III mobilizations A/P talocrural joint  Grade II/III medial/lateral calcaneal glides  Mobilizations of midfoot  MTP joint distraction and dorsal glides  MFR to the gastroc and soleus complex, medial/lateral mobilizations of achilles tendon          Passive INV/EV with towel 2x10 ea     Calf stretch 3x30\"   With strap         Ankle pumps  10x2        Ankle circles  2x10     Isometric PF/DF 5x5\" ea     Ankle INV/EV 2x5 ea  Tapping behind respective malleolus for tactile cueing to promote muscle activation  Didn't require tapping for 2nd set of eversion         Seated    Feet on floor    Hip adduction 10x5\" holds ball    Hip aBduction 10x  orange     LAQ 20x 2#    HS curls 20X BLUE    Heel raises  10x2       Toe raises 10x   Unable to complete 2nd set due to increased pain over the lateral foot   Toe curls 10x       Toe extension 10x     Toe splay 10x     Domers 10x           Gait training  x  Educated on technique and body mechanics             Other: sleeps in recliner- only tolerates sitting position     Specific Instructions for next treatment: manual to gastroc-soleus complex, manual to forefoot, midfoot, and hindfoot; progress A/PROM, strength, balance, and normalize gait        Treatment Charges: Mins Units   [x]  Modalities HP 20 --   [x]  Ther Exercise 35 2   []  Manual Therapy     []  Ther Activities     []  Aquatics     []  Vasocompression     []  Other: gait     Total Treatment time 35 2   Estimated Medicare Cost (as of 06/14/22) $582.11    Assessment: [] Progressing toward goals. [x] No change. Continued with current charted exercises as pt presents with poor tolerance to all. Pt continued to advised therapist to not touch the lateral side of her foot this treatment. Resumed domers as pt reports \"I can't do it\" following demonstration and cueing of all forms.  Trailed very light lacrosse ball roll outs on the plantar fascia as pt states \"it hurts too much\" FAAM in order to demonstrate improved function.   6. Independent with Home Exercise Programs without verbal cueing for correct execution.   7. Demonstrate Knowledge of fall prevention     LTG: (to be met in 16 treatments)-   8. ? Pain: Pt will report less than or equal to 4-5/10 L ankle/foot pain with her WBing in a shoe when standing for extended periods of time, walking on even/uneven surfaces, and negotiating stairs in order to return to prior level of activity.   9. ? Gait: Pt will be able to ambulate 300' without an AD and a community ambulator gait speed with evidence of appropriate gait mechanics of heel strike and push-off in order to promote an efficient gait pattern.   10. ? ROM: Pt will demonstrate improvements in L ankle AROM and gastroc flexibility in order to improve joint mobility and improve tolerance to standing and walking for extended periods of time and negotiating stairs. a. Gastroc flexibility  i. Knee flexed: 5 deg  ii. Knee extended: 0 deg   b. DF: 5-10 deg  c. PF: 55 deg  d. INV: 10 deg  e. EV: 10 deg   f. Great toe extension: 55-60 deg (passive)  11. ? Strength: Pt will demonstrate 4/5 L ankle and foot and 3/5 L plantarflexor strength in order to assist with progressions of ankle/foot stability as to improve ability to stand, walk, and negotiate stairs.   a. Pt will demonstrate 5/5 hip girdle and BLE strength in sitting in order to assist with standing and walking. 12. Balance: Pt will be able to complete tandem balance on the BLE for greater than or equal to 30\" in order to promote WBing tolerance and balance reactions to improve ankle   when standing, walking, and negotiating stairs.   13. ? Function: Pt will score greater than or equal to 33/84 on the FAAM in order to demonstrate improved function.                           Patient goals: \"to help with my pain\"    Pt.  Education:  [x] Yes  [] No  [x] Reviewed Prior HEP/Ed  Method of Education: [x] Verbal[] Demo  [] Written  Access

## 2022-06-15 ENCOUNTER — HOSPITAL ENCOUNTER (OUTPATIENT)
Dept: PHYSICAL THERAPY | Facility: CLINIC | Age: 62
Setting detail: THERAPIES SERIES
Discharge: HOME | End: 2022-06-15
Payer: MEDICARE

## 2022-06-15 PROCEDURE — 97140 MANUAL THERAPY 1/> REGIONS: CPT

## 2022-06-15 PROCEDURE — 97110 THERAPEUTIC EXERCISES: CPT

## 2022-06-15 NOTE — FLOWSHEET NOTE
[] [facility]  [address]  [phone]  F: [phone] [x] [address]  [place] [address]   [address]  P: [phone]  F: [phone] [] [address]  [address] [facility]  P: [phone]  F: [phone] [] [address]  P: [phone]  F: [phone] [] [address]  [facility]   [address]   [place]: [phone]  F: [phone]      Physical Therapy Daily Treatment Note    Date:  6/15/2022  Patient Name:  Carolyn Pearl    :  1960  MRN: [id number]  Physician: Valerio Dueñas DPM                                     Insurance: [facility] Medicare/[facility]  Medical Diagnosis:   M25.572, G89.29 (ICD-10-CM) - Chronic pain of left ankle   M25.372 (ICD-10-CM) - Left ankle instability   Rehab Codes: M25.572, M25.672, M25.675, R26.89, M62.81  Onset date: 3/29/22                           Next 's appt. : 22   Visit# / total visits: ; Progress note for Medicare patient due at visit 8     Cancels/No Shows:     Subjective:    Pain:  [x] Yes  [] No Location: L foot/ ankle   Pain Rating: (0-10 scale) 8.5/10  Pain altered Tx:  [x] No  [] Yes  Action:     Comments: Pt reports she has had increased pain to her foot since her  PT appointment when \"trauma\" was caused to her foot because it was \"jerked right before I left. \"  Pt states she's still trying to figure out why this was done at PT. Pt takes 1/2 Tramadol as needed (every couple days) usually at night before bed. She denies using ice. Works a job that allows her to sit 7 hours of her 8 hour shift.   Pt was told by her doctor to stop going to PT if it is hurting too much but pt states \"every little bit helps and doesn't want to give up on PT because I don't want to get that third operation. \"      Objective:  Modalities: HP to left foot/ankle with pt in long sitting 15' at end of session (offered pt to rest against wall for back support, pt declined)  Precautions: latex allergy     Exercises:  Exercise in seated        Reps/ Time Weight/ Level Comments   nustep  5 min  L1          Desensitization  1'  Towel rubbing; could only tolerate 1' on 6/1   MFR to gastroc, soleus, plantar surface of foot, mobs to improve ROM, PROM 27'   Pt sitting with LE hanging off of the table. Grade II/III mobilizations A/P talocrural joint  Grade II/III medial/lateral calcaneal glides  Mobilizations of midfoot  MTP joint distraction and dorsal glides  MFR to the gastroc and soleus complex, medial/lateral mobilizations of achilles tendon    STM, desensitization 16'  STM to gastroc, soleus, medial and lateral forefoot & ankle. Target for AROM with inv & eversion   Passive INV/EV with towel 2x10 ea     Calf stretch 3x30\"   With towel on 6/15         Ankle pumps  10x2        Ankle circles  2x10     Isometric PF/DF 5x5\" ea     Ankle INV/EV 2x5 ea  Tapping behind respective malleolus for tactile cueing to promote muscle activation  Didn't require tapping for 2nd set of eversion - did not tap 6/15, but rather PTA used hand as target promoting increased ROM. Seated    Feet on floor    Hip adduction 10x5\" holds ball    Hip aBduction 10x  orange     LAQ 20x 2#    HS curls 20X BLUE    Heel raises  10x2       Toe raises 10x      Toe curls 10x       Toe extension 10x     Toe splay 10x  Not done 6/15   Domers 10x  Not done 6/15         Gait training  x  Educated on technique and body mechanics             Other: sleeps in recliner- only tolerates sitting position     Specific Instructions for next treatment: manual to gastroc-soleus complex, manual to forefoot, midfoot, and hindfoot; progress A/PROM, strength, balance, and normalize gait.  Pt is due for 8th visit reassess and after speaking to prior treating PTA, evaluating PT is aware. Treatment Charges: Mins Units   [x]  Modalities HP 15 --   [x]  Ther Exercise 35 2   [x]  Manual Therapy 18 2   []  Ther Activities     []  Aquatics     []  Vasocompression     []  Other: gait     Total Treatment time 53 4   Estimated Medicare Cost (as of 06/15/22) $677.95    Assessment: [] Progressing toward goals. [x] No change. Began treatment with manual therapy as outlined above. Instantly pt tells PTA to not touch \"outside\" (lateral aspect) of left ankle/foot because it \"hurts too much. \"  As for the medial aspect, pt informs PTA she needed to move her hands slower. PTA then started at posterior aspect of lower leg and was able to proceed to ankle and foot. Minimal pressure applied by PTA due to subjective information. Pt then completed bolded therapeutic exercises as noted above. Pt request not to do Nu Step but rather have heat applied. Pt states she is taking a week off therapy and is scheduled according. [] Other:  [x] Patient would continue to benefit from skilled physical therapy services in order to: left foot/ankle pain, A/PROM, strength, and balance to promote improved tolerance to standing and walking. Problems:  left foot/ankle  [x]? ? Pain: 8-10/10  [x]? ? ROM: decreased A/PROM and joint mobility  [x]? ? Strength: decreased generalized strength and left foot/ankle   [x]? ? Function: FAAM: 20.2% MAX FUNCTION  [x]? ? Balance: unable to test due to increased pain  [x]?  Edema: increased swelling over lateral ankle  []? Postural Deviations  [x]?  Gait Deviations: antalgic gait; slowed gait speed  []? Other:                            STG: (to be met in 8 treatments) Primary PT to reassess 6/28/22  1. ? Pain: Pt will report less than or equal to 6-7/10 L ankle and foot pain when standing and walking for 10-15 minutes in order to demonstrate an improved tolerance to standing when cooking and walking around the grocery store  2. ? Gait: Pt will be able to ambulate 150' with a limited community ambulator speed with increased weight shift and stance time on the LLE to promote an efficient gait pattern. 3. ? ROM: Pt will demonstrate improvements in L ankle AROM and gastroc flexibility in order to improve joint mobility and assist with progressions to independent mobility in a shoe. a. Gastroc flexibility  i. Knee flexed: -5 deg   ii. Knee extended: -5 deg  b. DF: 0 deg  c. PF: 45 deg  d. INV: 5 deg  e. EV: 5 deg  f. Great toe extension: 45 deg (passive)  4. ? Strength: Pt will demonstrate 3-/5 L ankle and foot strength in order to assist with progressions of ankle/foot stability as she returns to ambulating in a shoe.   5. ? Function: Pt will score greater than or equal to 25/84 on the FAAM in order to demonstrate improved function.   6. Independent with Home Exercise Programs without verbal cueing for correct execution.   7. Demonstrate Knowledge of fall prevention     LTG: (to be met in 16 treatments)-   8. ? Pain: Pt will report less than or equal to 4-5/10 L ankle/foot pain with her WBing in a shoe when standing for extended periods of time, walking on even/uneven surfaces, and negotiating stairs in order to return to prior level of activity.   9. ? Gait: Pt will be able to ambulate 300' without an AD and a community ambulator gait speed with evidence of appropriate gait mechanics of heel strike and push-off in order to promote an efficient gait pattern.   10. ? ROM: Pt will demonstrate improvements in L ankle AROM and gastroc flexibility in order to improve joint mobility and improve tolerance to standing and walking for extended periods of time and negotiating stairs. a. Gastroc flexibility  i. Knee flexed: 5 deg  ii. Knee extended: 0 deg   b. DF: 5-10 deg  c. PF: 55 deg  d. INV: 10 deg  e.  EV: 10 deg   f. Great toe extension: 55-60 deg (passive)  11. ? Strength: Pt will demonstrate 4/5 L ankle and foot and 3/5 L plantarflexor strength in order to assist with progressions of ankle/foot stability as to improve ability to stand, walk, and negotiate stairs.   a. Pt will demonstrate 5/5 hip girdle and BLE strength in sitting in order to assist with standing and walking. 12. Balance: Pt will be able to complete tandem balance on the BLE for greater than or equal to 30\" in order to promote WBing tolerance and balance reactions to improve ankle   when standing, walking, and negotiating stairs.   13. ? Function: Pt will score greater than or equal to 33/84 on the FAAM in order to demonstrate improved function.                     Patient goals: \"to help with my pain\"    Pt. Education:  [x] Yes  [] No  [x] Reviewed Prior HEP/Ed  Method of Education: [x] Verbal[] Demo  [] Written  Access Code: [email or web address]  Exercises  Calf Mobilization with Small Ball - 1 x daily - 10-30\" hold  Long Sitting Calf Stretch with Strap - 3 x daily - 3 sets - 30\" hold  4/14- reviewed on 5/19 due to minimal compliance  Seated Heel Raise - 3 x daily - 2 sets - 10 reps  Ankle Inversion Eversion Towel Slide - 3 x daily - 2 sets - 10 reps  Towel Scrunches - 3 x daily - 2 sets - 10 reps  Towel Desensitization  Comprehension of Education:   [x] Verbalizes understanding. [x] Demonstrates understanding. [x] Needs review. - Compliance   [] Demonstrates/verbalizes HEP/Ed previously given. Plan: [x] Continue current frequency toward long and short term goals. [x] Specific Instructions for subsequent treatments: progress as able.        Time In: 9:45 am       Time Out:  11:03 am    Electronically signed by:  Jackie Sen PTA

## 2022-06-18 NOTE — TELEPHONE ENCOUNTER
Noted.      Received call from [place]  at [facility]-service center [facility]) [place] with [employer] Complaint. Brief description of triage: Dysuria, frequency, urgency and pain on right side of her abdomen and right flank since yesterday. Denies fever. Triage indicates for patient to be seen today. Writer discussed that if unable to get an appointment, another option is to go to an [facility] or ER. Care advice provided, patient verbalizes understanding; denies any other questions or concerns; instructed to call back for any new or worsening symptoms. Writer provided warm transfer to [facility] for appointment scheduling. Attention Provider: Thank you for allowing me to participate in the care of your patient. The patient was connected to triage in response to information provided to the ECC. Please do not respond through this encounter as the response is not directed to a shared pool. Reason for Disposition   Side (flank) or lower back pain present    Answer Assessment - Initial Assessment Questions  1. SYMPTOM: \"What's the main symptom you're concerned about? \" (e.g., frequency, incontinence)      Burning with urination, frequency of urination, right sided abdominal pain and right back pain. 2. ONSET: \"When did the symptoms start? \"      Yesterday     3. PAIN: \"Is there any pain? \" If so, ask: \"How bad is it? \" (Scale: 1-10; mild, moderate, severe)      Rates current pain 9.5/10 in her abdomen and right flank. She took tylenol for pain and helped somewhat. 4. CAUSE: \"What do you think is causing the symptoms? \"      UTI     5. OTHER SYMPTOMS: \"Do you have any other symptoms? \" (e.g., fever, flank pain, blood in urine, pain with urination)      Right flank pain, dysuria every time she urinates and has a \"lot of pressure\"      Denies fever or blood in her urine. Last BM this morning and she took 2 stool softeners and states she isn't going as she should. 6.  PREGNANCY: \"Is there any chance you are pregnant? \" \"When was your last menstrual period? \"     N/A    Protocols used: URINARY SYMPTOMS-ADULT-[place]

## 2022-06-28 ENCOUNTER — HOSPITAL ENCOUNTER (OUTPATIENT)
Dept: PHYSICAL THERAPY | Facility: CLINIC | Age: 62
Setting detail: THERAPIES SERIES
Discharge: HOME | End: 2022-06-28
Payer: MEDICARE

## 2022-06-28 PROCEDURE — 97110 THERAPEUTIC EXERCISES: CPT

## 2022-06-28 NOTE — DISCHARGE SUMMARY
[] [facility]) - [facility] &  Therapy  [address]  [phone]  F: [phone] [x] [address]  [facility]   [address]  P: [phone]  F: [phone] [] [address] &  Therapy  [address]  P: [phone]  F: [phone] [] [address]  P: [phone]  F: [phone] [] [address]  [address] [facility]   [address]   [place]: [phone]  F: [phone]      Physical Therapy Discharge Note    Date: 2022      Patient: Estelle Tian  : 1960  MRN: [id number]    Physician: Noble Bangura DPM                                     Insurance: [facility] Medicare/[facility] [address] Diagnosis:   M25.572, G89.29 (ICD-10-CM) - Chronic pain of left ankle   M25.372 (ICD-10-CM) - Left ankle instability   Rehab Codes: M25.572, M25.672, M25.675, R26.89, M62.81  Onset date: 3/29/22                           Next 's appt. : 22   Visit# / total visits: 10/16; Progress note for Medicare patient due at visit 8                                   Cancels/No Shows:       Date of initial visit: 22                Date of final visit: 22       Subjective:    Pain:  [x]? Yes  []? No   Location: L foot/ ankle   Pain Rating: (0-10 scale) 7-8/10  Pain altered Tx:  [x]? No  []? Yes  Action:      Comments: Pt reports her pain is \"always a 7 or 8. \" Pt reports she didn't do a lot of work and notes that the inside part of her foot was hurting so bad she could hardly stand up. Pt reports she does not feel like she is getting any better. Pt notes \"I know I have to accept my disability. I will never be able to walk around [facility]. \" Pt reports she does her exercises almost 2x/day. Pt notes she almost had a fall but she caught herself. Pt notes she has a struggle getting up and down to her bathroom secondary to Left foot pain. Pt reports she does have a commode on the 1st floor. Pt reports she does have bilateral railings for her second floor and basement. Pt notes she goes into the basement 2x/month. Pt reports she cannot bring groceries into the house and uses a scooter around the grocery store. Pt states she does have help for her groceries. Pt notes she cant go up the curb without losing her balance. Pt notes when she uses a cane when she is hurting more.       Objective:  Gait speed: .17 m/s with cane  Stair training: attempted, pt refused to trial leading with the LLE when descending stairs due to the \"leg giving out on her. \" Pt was educated that this is less stress than her current technique, however, pt defers trial.              ROM  ° A/P STRENGTH     Eval 6/28 Eval 6/28   Ankle PF 33 35° 2 ---   DF  (knee straight) P: -17 P: -21 3- 3-   DF   (knee flexed) P: -10 pain  A: -5 pain A:-10  --- ---   Inv P: 3  A: 0 A: 0 2- 1/5   Ever P: 5  A: 0 A 10 2- 2+   1st MTP DF P: 10    P: 10    ---- ---        Eval 6/28   FUNCTION: FAAM 17/84  20.2% max function 23/84  27.4% max function      Assessment:  Estelle Tian has completed 10 physical therapy appointments with minimal to no improvement in pain and function. Pt continues to report consistent pain of 7-8/10 with diffuse tenderness noted throughout the Left lower leg/foot. Pt continues to report significant disability on her FAAM score (73% disability). Pt also continues to lack AROM in all planes with no active ankle inversion. Assessed pt's gait speed with use of a cane secondary to pt reporting increased pain. Pt's self-selected gait speed is 0.17 m/s with noted step to gait pattern and decreased stance time on the RLE. Pt with significant aversion to touch to the calf and lateral aspect of the foot, therefore manual therapy was minimally effective.  Pt also with limited tolerance to P/AA/AROM exercises and due to decreased ROM, banded strengthening exercises were not appropriate. Pt with poor activity tolerance and was not able to progress exercises off the mat table. At this time, it was discussed with pt we will cease therapy secondary to minimal to no benefit perceived by the pt and no significant change in pain and function. Pt has been strongly encouraged to continue working on her HEP, use of AD for walking, and fall prevention handout was provided. Pt verbalizes understanding. Did offer the pt aquatic therapy, however, pt defers secondary to inability to swim, fear of falling, and not being comfortable in the water. Pt will be discharged at this time. Problems:  left foot/ankle  [x]? ? ? Pain: 8-10/10  [x]? ? ? ROM: decreased A/PROM and joint mobility  [x]? ? ? Strength: decreased generalized strength and left foot/ankle   [x]? ? ? Function: FAAM: 20.2% MAX FUNCTION  [x]?? ? Balance: unable to test due to increased pain  [x]? ? Edema: increased swelling over lateral ankle  []? ? Postural Deviations  [x]? ? Gait Deviations: antalgic gait; slowed gait speed  []? ? Other:                            STG: (to be met in 8 treatments) Primary PT to reassess 6/28/22  1. ? Pain: Pt will report less than or equal to 6-7/10 L ankle and foot pain when standing and walking for 10-15 minutes in order to demonstrate an improved tolerance to standing when cooking and walking around the grocery store. NOT MET 6/28/22  2. ? Gait: Pt will be able to ambulate 150' with a limited community ambulator speed with increased weight shift and stance time on the LLE to promote an efficient gait pattern. 3. ? ROM: Pt will demonstrate improvements in L ankle AROM and gastroc flexibility in order to improve joint mobility and assist with progressions to independent mobility in a shoe. NOT MET 6/28/22  a. Gastroc flexibility  i. Knee flexed: -5 deg   ii. Knee extended: -5 deg; -21°  b.  DF: 0 deg; the BLE for greater than or equal to 30\" in order to promote WBing tolerance and balance reactions to improve ankle   when standing, walking, and negotiating stairs.   13. ? Function: Pt will score greater than or equal to 33/84 on the FAAM in order to demonstrate improved function.                     Patient goals: \"to help with my pain\"    Treatment to Date:  [x] Therapeutic Exercise    [] Modalities:  [] Therapeutic Activity    [] Ultrasound  [] Electrical Stimulation  [] Gait Training     [] Massage       [] Lumbar/Cervical Traction  [] Neuromuscular Re-education [x] Cold/hotpack [] Iontophoresis: 4 mg/mL  [x] Instruction in Home Exercise Program                     Dexamethasone Sodium  [x] Manual Therapy             Phosphate 40-80 mAmin  [] Aquatic Therapy                   [] Vasocompression/    [] Other:             Game Ready    Discharge Status:     [] Pt recovered from conditions. Treatment goals were met. [x] Pt received maximum benefit. No further therapy indicated at this time. [] Pt to continue exercise/home instructions independently. [] Therapy interrupted due to:    [] Pt has 2 or more no shows/cancels, is discontinued per our policy. [] Pt has completed prescribed number of treatment sessions. [] Other:         Electronically signed by Rickie Mccain PT on 6/28/2022 at 1:07 PM      If you have any questions or concerns, please don't hesitate to call.   Thank you for your referral.

## 2022-06-28 NOTE — FLOWSHEET NOTE
[] [facility]) - [facility]  [address]  [phone]  F: [phone] [x] [address]  [facility]   [address]  P: [phone]  F: [phone] [] [address]  [address]  P: [phone]  F: [phone] [] [address]  P: [phone]  F: [phone] [] [address]  [facility]   [address]   [place]: [phone]  F: [phone]      Physical Therapy Daily Treatment Note    Date:  2022  Patient Name:  John Peña    :  1960  MRN: [id number]  Physician: Shannon Hwang DPM                                     Insurance: [facility] Medicare/[facility]  Medical Diagnosis:   M25.572, G89.29 (ICD-10-CM) - Chronic pain of left ankle   M25.372 (ICD-10-CM) - Left ankle instability   Rehab Codes: M25.572, M25.672, M25.675, R26.89, M62.81  Onset date: 3/29/22                           Next 's appt. : 22   Visit# / total visits: 10/16; Progress note for Medicare patient due at visit 8     Cancels/No Shows:     Subjective:    Pain:  [x] Yes  [] No Location: L foot/ ankle   Pain Rating: (0-10 scale) 7-8/10  Pain altered Tx:  [x] No  [] Yes  Action:     Comments: Pt reports her pain is \"always a 7 or 8. \" Pt reports she didn't do a lot of work and notes that the inside part of her foot was hurting so bad she could hardly stand up. Pt reports she does not feel like she is getting any better. Pt notes \"I know I have to accept my disability. I will never be able to walk around [address] [place] [address]. \" Pt reports she does her exercises almost 2x/day. Pt notes she almost had a fall but she caught herself. Pt notes she has a struggle getting up and down to her bathroom secondary to Left foot pain.  Pt reports she does have a commode on the 1st floor. Pt reports she does have bilateral railings for her second floor and basement. Pt notes she goes into the basement 2x/month. Pt reports she cannot bring groceries into the house and uses a scooter around the grocery store. Pt states she does have help for her groceries. Pt notes she cant go up the curb without losing her balance. Pt notes when she uses a cane when she is hurting more. Objective:   Modalities:   Precautions: latex allergy     Exercises:  Exercise in seated        Reps/ Time Weight/ Level Comments   nustep  5 min  L1          Desensitization  1'  Towel rubbing; could only tolerate 1' on 6/1   MFR to gastroc, soleus, plantar surface of foot, mobs to improve ROM, PROM 27'   Pt sitting with LE hanging off of the table. Grade II/III mobilizations A/P talocrural joint  Grade II/III medial/lateral calcaneal glides  Mobilizations of midfoot  MTP joint distraction and dorsal glides  MFR to the gastroc and soleus complex, medial/lateral mobilizations of achilles tendon    STM, desensitization 16'  STM to gastroc, soleus, medial and lateral forefoot & ankle. Target for AROM with inv & eversion   Passive INV/EV with towel 2x10 ea     Calf stretch 4x30\"   With towel on 6/15         Ankle pumps  10x2        Ankle circles  2x10     Isometric PF/DF 5x5\" ea     Ankle INV/EV 2x5 ea  Tapping behind respective malleolus for tactile cueing to promote muscle activation  Didn't require tapping for 2nd set of eversion - did not tap 6/15, but rather PTA used hand as target promoting increased ROM. Seated    Feet on floor    Hip adduction 10x5\" holds ball    Hip aBduction 10x  orange     LAQ 2x10 ea 3#    HS curls 10x R  x10L BLUE UNABLE TO COMPLETE SECOND SET DUE TO \"PAIN IN THE HEEL BONE. \" Modified foot position and band position (more proximal)- no change in pain, pain is a \"10 just now\"  Pt became tearful due to pain and time was provided to pt    Heel raises  10x2     Toe raises 10x      Toe curls 10x       Toe extension 10x     Toe splay 10x     Domers 10x           Gait training  x  Walked 6m to gauge gait speed   Stair training x   See below        Gait speed: .17 m/s with cane  Stair training: attempted, pt refused to trial leading with the LLE when descending stairs due to the \"leg giving out on her. \" Pt was educated that this is less stress than her current technique, however, pt defers trial.     ROM  ° A/P STRENGTH     Eval 6/28 Eval 6/28   Ankle PF 33 35° 2 ---   DF  (knee straight) P: -17 P: -21 3- 3-   DF   (knee flexed) P: -10 pain  A: -5 pain A:-10  --- ---   Inv P: 3  A: 0 A: 0 2- 1/5   Ever P: 5  A: 0 A 10 2- 2+   1st MTP DF P: 10    P: 10    ---- ---      Eval 6/28   FUNCTION: FAAM 17/84  20.2% max function 23/84  27.4% max function         Treatment Charges: Mins Units   []  Modalities HP     [x]  Ther Exercise 38 3   []  Manual Therapy     []  Ther Activities     []  Aquatics     []  Vasocompression     []  Other: gait     Total Treatment time 38 3   Estimated Medicare Cost (as of 06/28/22) $751.03    Assessment: [] Progressing toward goals. [x] No change. Kayce Muñiz has completed 10 physical therapy appointments with minimal to no improvement in pain and function. Pt continues to report consistent pain of 7-8/10 with diffuse tenderness noted throughout the Left lower leg/foot. Pt continues to report significant disability on her FAAM score (73% disability). Pt also continues to lack AROM in all planes with no active ankle inversion. Assessed pt's gait speed with use of a cane secondary to pt reporting increased pain. Pt's self-selected gait speed is 0.17 m/s with noted step to gait pattern and decreased stance time on the RLE. Pt with significant aversion to touch to the calf and lateral aspect of the foot, therefore manual therapy was minimally effective.  Pt also with limited tolerance to P/AA/AROM exercises and due to decreased ROM, banded strengthening exercises were not appropriate. Pt with poor activity tolerance and was not able to progress exercises off the mat table. At this time, it was discussed with pt we will cease therapy secondary to minimal to no benefit perceived by the pt and no significant change in pain and function. Pt has been strongly encouraged to continue working on her HEP, use of AD for walking, and fall prevention handout was provided. Pt verbalizes understanding. Did offer the pt aquatic therapy, however, pt defers secondary to inability to swim, fear of falling, and not being comfortable in the water. Pt will be discharged at this time. [x] Other: Attempted to address stair negotiation with the pt, however, pt deferred attempt to trial descending leading with the LLE secondary to fear of the LLE buckling on her. Pt tearful this date due to pain in the \"heel bone. \" Noted minimal tolerance to treatment this date. Time was spent educating pt on the benefits of increasing activity as tolerated, use of AD, and stair negotiation. [] Patient would continue to benefit from skilled physical therapy services in order to: left foot/ankle pain, A/PROM, strength, and balance to promote improved tolerance to standing and walking. Problems:  left foot/ankle  [x]? ? Pain: 8-10/10  [x]? ? ROM: decreased A/PROM and joint mobility  [x]? ? Strength: decreased generalized strength and left foot/ankle   [x]? ? Function: FAAM: 20.2% MAX FUNCTION  [x]? ? Balance: unable to test due to increased pain  [x]?  Edema: increased swelling over lateral ankle  []? Postural Deviations  [x]?  Gait Deviations: antalgic gait; slowed gait speed  []? Other:                            STG: (to be met in 8 treatments) Primary PT to reassess 6/28/22  1. ? Pain: Pt will report less than or equal to 6-7/10 L ankle and foot pain when standing and walking for 10-15 minutes in order to demonstrate an improved tolerance to standing when cooking and walking around the grocery store. NOT MET 6/28/22  2. ? Gait: Pt will be able to ambulate 150' with a limited community ambulator speed with increased weight shift and stance time on the LLE to promote an efficient gait pattern. 3. ? ROM: Pt will demonstrate improvements in L ankle AROM and gastroc flexibility in order to improve joint mobility and assist with progressions to independent mobility in a shoe. NOT MET 6/28/22  a. Gastroc flexibility  i. Knee flexed: -5 deg   ii. Knee extended: -5 deg; -21°  b. DF: 0 deg; -10°  c. PF: 45 deg; 35°  d. INV: 5 deg; 0°  e. EV: 5 deg; 10°  f. Great toe extension: 45 deg (passive); 10°  4. ? Strength: Pt will demonstrate 3-/5 L ankle and foot strength in order to assist with progressions of ankle/foot stability as she returns to ambulating in a shoe.  NOT MET 6/8/22; DF: 3-/5, INV: 1/5. EV: 2+/5  5. ? Function: Pt will score greater than or equal to 25/84 on the FAAM in order to demonstrate improved function. Ongoing 6/28 (23/84, 27.4% max function)  6. Independent with Home Exercise Programs without verbal cueing for correct execution.  MET 6/28/22  7. Demonstrate Knowledge of fall prevention MET 6/28/22     LTG: (to be met in 16 treatments)- NOT ASSESSED 6/28  8. ? Pain: Pt will report less than or equal to 4-5/10 L ankle/foot pain with her WBing in a shoe when standing for extended periods of time, walking on even/uneven surfaces, and negotiating stairs in order to return to prior level of activity.   9. ? Gait: Pt will be able to ambulate 300' without an AD and a community ambulator gait speed with evidence of appropriate gait mechanics of heel strike and push-off in order to promote an efficient gait pattern.   10. ? ROM: Pt will demonstrate improvements in L ankle AROM and gastroc flexibility in order to improve joint mobility and improve tolerance to standing and walking for extended periods of time and negotiating stairs. a. Gastroc flexibility  i. Knee flexed: 5 deg  ii.  Knee extended: 0 deg   b. DF: 5-10 deg  c. PF: 55 deg  d. INV: 10 deg  e. EV: 10 deg   f. Great toe extension: 55-60 deg (passive)  11. ? Strength: Pt will demonstrate 4/5 L ankle and foot and 3/5 L plantarflexor strength in order to assist with progressions of ankle/foot stability as to improve ability to stand, walk, and negotiate stairs.   a. Pt will demonstrate 5/5 hip girdle and BLE strength in sitting in order to assist with standing and walking. 12. Balance: Pt will be able to complete tandem balance on the BLE for greater than or equal to 30\" in order to promote WBing tolerance and balance reactions to improve ankle   when standing, walking, and negotiating stairs.   13. ? Function: Pt will score greater than or equal to 33/84 on the FAAM in order to demonstrate improved function.                     Patient goals: \"to help with my pain\"    Pt. Education:  [x] Yes  [] No  [x] Reviewed Prior HEP/Ed  Method of Education: [x] Verbal[] Demo  [] Written  Access Code: [email or web address]  Exercises  Calf Mobilization with Small Ball - 1 x daily - 10-30\" hold  Long Sitting Calf Stretch with Strap - 3 x daily - 3 sets - 30\" hold  4/14- reviewed on 5/19 due to minimal compliance  Seated Heel Raise - 3 x daily - 2 sets - 10 reps  Ankle Inversion Eversion Towel Slide - 3 x daily - 2 sets - 10 reps  Towel Scrunches - 3 x daily - 2 sets - 10 reps  Towel Desensitization  Comprehension of Education:   [x] Verbalizes understanding. [x] Demonstrates understanding. [x] Needs review. - Compliance   [] Demonstrates/verbalizes HEP/Ed previously given. Plan: [x] Discharge 06/28/22   [] Specific Instructions for subsequent treatments: progress as able.        Time In: 1000 am       Time Out:  [id number]    Electronically signed by:  Yair Watson, PT

## 2022-06-29 ENCOUNTER — APPOINTMENT (OUTPATIENT)
Dept: PHYSICAL THERAPY | Facility: CLINIC | Age: 62
End: 2022-06-29
Payer: MEDICARE

## 2022-07-12 ENCOUNTER — OFFICE VISIT (OUTPATIENT)
Dept: FAMILY MEDICINE CLINIC | Age: 62
End: 2022-07-12
Payer: MEDICARE

## 2022-07-12 VITALS
WEIGHT: 249 LBS | HEART RATE: 73 BPM | OXYGEN SATURATION: 96 % | TEMPERATURE: 97.3 F | BODY MASS INDEX: 50.29 KG/M2 | DIASTOLIC BLOOD PRESSURE: 73 MMHG | SYSTOLIC BLOOD PRESSURE: 128 MMHG

## 2022-07-12 DIAGNOSIS — G89.29 CHRONIC LEFT-SIDED LOW BACK PAIN WITH LEFT-SIDED SCIATICA: Primary | ICD-10-CM

## 2022-07-12 DIAGNOSIS — M54.42 CHRONIC LEFT-SIDED LOW BACK PAIN WITH LEFT-SIDED SCIATICA: Primary | ICD-10-CM

## 2022-07-12 PROCEDURE — 96372 THER/PROPH/DIAG INJ SC/IM: CPT | Performed by: NURSE PRACTITIONER

## 2022-07-12 PROCEDURE — G8417 CALC BMI ABV UP PARAM F/U: HCPCS | Performed by: NURSE PRACTITIONER

## 2022-07-12 PROCEDURE — G8427 DOCREV CUR MEDS BY ELIG CLIN: HCPCS | Performed by: NURSE PRACTITIONER

## 2022-07-12 PROCEDURE — 99214 OFFICE O/P EST MOD 30 MIN: CPT | Performed by: NURSE PRACTITIONER

## 2022-07-12 PROCEDURE — 1036F TOBACCO NON-USER: CPT | Performed by: NURSE PRACTITIONER

## 2022-07-12 PROCEDURE — 3017F COLORECTAL CA SCREEN DOC REV: CPT | Performed by: NURSE PRACTITIONER

## 2022-07-12 RX ORDER — PREDNISONE 10 MG/1
10 TABLET ORAL
Qty: 15 TABLET | Refills: 0 | Status: SHIPPED | OUTPATIENT
Start: 2022-07-12 | End: 2022-07-17

## 2022-07-12 RX ORDER — METHYLPREDNISOLONE SODIUM SUCCINATE 125 MG/2ML
125 INJECTION, POWDER, LYOPHILIZED, FOR SOLUTION INTRAMUSCULAR; INTRAVENOUS ONCE
Status: COMPLETED | OUTPATIENT
Start: 2022-07-12 | End: 2022-07-12

## 2022-07-12 RX ADMIN — METHYLPREDNISOLONE SODIUM SUCCINATE 125 MG: 125 INJECTION, POWDER, LYOPHILIZED, FOR SOLUTION INTRAMUSCULAR; INTRAVENOUS at 10:15

## 2022-07-12 SDOH — ECONOMIC STABILITY: FOOD INSECURITY: WITHIN THE PAST 12 MONTHS, THE FOOD YOU BOUGHT JUST DIDN'T LAST AND YOU DIDN'T HAVE MONEY TO GET MORE.: NEVER TRUE

## 2022-07-12 SDOH — ECONOMIC STABILITY: FOOD INSECURITY: WITHIN THE PAST 12 MONTHS, YOU WORRIED THAT YOUR FOOD WOULD RUN OUT BEFORE YOU GOT MONEY TO BUY MORE.: NEVER TRUE

## 2022-07-12 ASSESSMENT — PATIENT HEALTH QUESTIONNAIRE - PHQ9
SUM OF ALL RESPONSES TO PHQ QUESTIONS 1-9: 0
SUM OF ALL RESPONSES TO PHQ9 QUESTIONS 1 & 2: 0
2. FEELING DOWN, DEPRESSED OR HOPELESS: 0
10. IF YOU CHECKED OFF ANY PROBLEMS, HOW DIFFICULT HAVE THESE PROBLEMS MADE IT FOR YOU TO DO YOUR WORK, TAKE CARE OF THINGS AT HOME, OR GET ALONG WITH OTHER PEOPLE: 0
1. LITTLE INTEREST OR PLEASURE IN DOING THINGS: 0
SUM OF ALL RESPONSES TO PHQ QUESTIONS 1-9: 0
6. FEELING BAD ABOUT YOURSELF - OR THAT YOU ARE A FAILURE OR HAVE LET YOURSELF OR YOUR FAMILY DOWN: 0
7. TROUBLE CONCENTRATING ON THINGS, SUCH AS READING THE NEWSPAPER OR WATCHING TELEVISION: 0
SUM OF ALL RESPONSES TO PHQ QUESTIONS 1-9: 0
3. TROUBLE FALLING OR STAYING ASLEEP: 0
4. FEELING TIRED OR HAVING LITTLE ENERGY: 0
9. THOUGHTS THAT YOU WOULD BE BETTER OFF DEAD, OR OF HURTING YOURSELF: 0
8. MOVING OR SPEAKING SO SLOWLY THAT OTHER PEOPLE COULD HAVE NOTICED. OR THE OPPOSITE, BEING SO FIGETY OR RESTLESS THAT YOU HAVE BEEN MOVING AROUND A LOT MORE THAN USUAL: 0
SUM OF ALL RESPONSES TO PHQ QUESTIONS 1-9: 0
5. POOR APPETITE OR OVEREATING: 0

## 2022-07-12 ASSESSMENT — SOCIAL DETERMINANTS OF HEALTH (SDOH): HOW HARD IS IT FOR YOU TO PAY FOR THE VERY BASICS LIKE FOOD, HOUSING, MEDICAL CARE, AND HEATING?: NOT HARD AT ALL

## 2022-07-12 NOTE — PATIENT INSTRUCTIONS
Patient Education      Patient Education      Patient Education        Sciatica: Exercises  Introduction  Here are some examples of typical rehabilitation exercises for your condition. Start each exercise slowly. Ease off the exercise if you start to have pain. Your doctor or physical therapist will tell you when you can start theseexercises and which ones will work best for you. When you are not being active, find a comfortable position for rest. Some people are comfortable on the floor or a medium-firm bed with a small pillow under their head and another under their knees. Some people prefer to lie on their side with a pillow between their knees. Don't stay in one position fortoo long. Take short walks (10 to 20 minutes) every 2 to 3 hours. Avoid slopes, hills, and stairs until you feel better. Walk only distances you can manage withoutpain, especially leg pain. How to do the exercises  Back stretches    1. Get down on your hands and knees on the floor. 2. Relax your head and allow it to droop. Round your back up toward the ceiling until you feel a nice stretch in your upper, middle, and lower back. Hold this stretch for as long as it feels comfortable, or about 15 to 30 seconds. 3. Return to the starting position with a flat back while you are on your hands and knees. 4. Let your back sway by pressing your stomach toward the floor. Lift your buttocks toward the ceiling. 5. Hold this position for 15 to 30 seconds. 6. Repeat 2 to 4 times. Follow-up care is a key part of your treatment and safety. Be sure to make and go to all appointments, and call your doctor if you are having problems. It's also a good idea to know your test results and keep alist of the medicines you take. Where can you learn more? Go to https://milagros."[employer]. org and sign in to your [email or web address] account. Enter N896 in the [facility] box to learn more about \"Sciatica: Exercises. \"     If you do not have an account, please click on the \"Sign Up Now\" link. Current as of: March 9, 2022               Content Version: 13.3  © 2006-2022 [employer]. Care instructions adapted under license by [facility] ([facility]). If you have questions about a medical condition or this instruction, always ask your healthcare professional. [address] any warranty or liability for your use of this information. Sciatica: Care Instructions  Overview     Sciatica (say \"nyy-BV-to-kuh\") is an irritation of one of the sciatic nerves, which come from the spinal cord in the lower back. The sciatic nerves and their branches extend down through the buttock to the foot. Sciatica can develop when an injured disc or arthritis in the back irritates or presses against a spinal nerve root. Its main symptom is pain, numbness, or weakness that is often worsein the leg or foot than in the back. Sciatica often will improve and go away with time. Early treatment usuallyincludes medicines and exercises to relieve pain. Follow-up care is a key part of your treatment and safety. Be sure to make and go to all appointments, and call your doctor if you are having problems. It's also a good idea to know your test results and keep alist of the medicines you take. How can you care for yourself at home?  Take pain medicines exactly as directed. ? If the doctor gave you a prescription medicine for pain, take it as prescribed. ? If you are not taking a prescription pain medicine, ask your doctor if you can take an over-the-counter medicine.  Use heat or ice to relieve pain. ? To apply heat, put a warm water bottle, heating pad set on low, or warm cloth on your back. Do not go to sleep with a heating pad on your skin. ? To use ice, put ice or a cold pack on the area for 10 to 20 minutes at a time. Put a thin cloth between the ice and your skin.  Avoid sitting if possible, unless it feels better than standing.    Alternate lying down with short walks. Increase your walking distance as you are able to without making your symptoms worse.  Do not do anything that makes your symptoms worse. When should you call for help? Call 911 anytime you think you may need emergency care. For example, call if:     You are unable to move a leg at all. Call your doctor now or seek immediate medical care if:     You have new or worse symptoms in your legs or buttocks. Symptoms may include:  ? Numbness or tingling. ? Weakness. ? Pain.      You lose bladder or bowel control. Watch closely for changes in your health, and be sure to contact your doctor if:     You are not getting better as expected. Where can you learn more? Go to https://Endorse.[employer]Amb[employer] Bioscienceseweb.[employer]. org and sign in to your [employer] account. Enter [phone] in the [employer] box to learn more about \"Sciatica: Care Instructions. \"     If you do not have an account, please click on the \"Sign Up Now\" link. Current as of: March 9, 2022               Content Version: 13.3  © 2006-2022 "[employer]. Care instructions adapted under license by [facility]. If you have questions about a medical condition or this instruction, always ask your healthcare professional. [place] [address] any warranty or liability for your use of this information. Piriformis Syndrome: Exercises  Introduction  Here are some examples of exercises for you to try. The exercises may be suggested for a condition or for rehabilitation. Start each exercise slowly. Ease off the exercises if you start to have pain. You will be told when to start these exercises and which ones will work bestfor you. How to do the exercises  Hip rotator stretch    1. Lie on your back with both knees bent and your feet flat on the floor. 2. Put the ankle of your affected leg on your opposite thigh near your knee.   3. Use your hand to gently push your knee (on your affected leg) away from your body until you feel a gentle stretch around your hip. 4. Hold the stretch for 15 to 30 seconds. 5. Repeat 2 to 4 times. 6. Switch legs and repeat steps 1 through 5. Piriformis stretch    1. Lie on your back with your legs straight. 2. Lift your affected leg and bend your knee. With your opposite hand, reach across your body, and then gently pull your knee toward your opposite shoulder. 3. Hold the stretch for 15 to 30 seconds. 4. Repeat with your other leg. 5. Repeat 2 to 4 times on each side. Lower abdominal strengthening    1. Lie on your back with your knees bent and your feet flat on the floor. 2. Tighten your belly muscles by pulling your belly button in toward your spine. 3. Lift one foot off the floor and bring your knee toward your chest, so that your knee is straight above your hip and your leg is bent like the letter \"L. \"  4. Lift the other knee up to the same position. 5. Lower one leg at a time to the starting position. 6. Keep alternating legs until you have lifted each leg 8 to 12 times. 7. Be sure to keep your belly muscles tight and your back still as you are moving your legs. Be sure to breathe normally. Follow-up care is a key part of your treatment and safety. Be sure to make and go to all appointments, and call your doctor if you are having problems. It's also a good idea to know your test results and keep alist of the medicines you take. Current as of: March 9, 2022               Content Version: 13.3  © 2006-2022 [employer]. Care instructions adapted under license by [employer]. If you have questions about a medical condition or this instruction, always ask your healthcare professional. [place] [address] any warranty or liability for your use of this information.

## 2022-07-12 NOTE — PROGRESS NOTES
Prateek Nowak, [facility]  [address]  [id number] [facility] [place]. [facility]  [facility], [place]  [phone]  [phone]    Hyun Benavides is a 63 y.o. female presents today for Chief Complaint: Hip Pain      Patient is Accompanied by: n/a    HPI - Hyun Benavides is here today for the following:     Left hip pain   - chronic for the past 4 years ago and recent flare up about 3 days ago   - she points to her left buttock   - she has been taking tylenol and this has not helped   - she reports she has had solumedrol injection for this in the past and this resolved her pain   - she denies cauda equina, numbness/tingling to lower extremities      Patient Active Problem List   Diagnosis    Essential hypertension    YAMEL (obstructive sleep apnea)    Allergic asthma    Obesity, morbid, BMI 40.0-49.9 ([address]    Urticaria    Contracture of left ankle    Chronic pain of left ankle    Spontaneous rupture of flexor tendon of left lower leg    Allergic rhinitis    Anxiety    Benign neoplasm of skin    Constipation    Cyst of ovary    Generalized osteoarthritis    Depressive disorder    Edema    Food allergy    Osteoarthrosis involving multiple sites but not designated as generalized    Reactive airway disease    Rupture of posterior tibialis tendon    Vitamin D deficiency    Morbid obesity with BMI of 50.0-59.9, adult ([address]    Status post left foot surgery    Mild intermittent asthma without complication     Past Medical History:   Diagnosis Date    Allergic rhinitis 12/4/2014    Anxiety 7/5/2018    Arthritis     Asthma     Chest pain     occurs every other day, pt states they occur when she doesn't eat right or forget to take her bp med, occurs left side of chest with no radiation, pt states \"it's new\" and has not had any testing done or seen anyone for it.     Constipation     Depressive disorder 7/5/2018    Generalized osteoarthritis 6/26/2014    History of kidney stones     Hypertension  Obesity, morbid, BMI 40.0-49.9 ([address] 2017    PONV (postoperative nausea and vomiting)     Sleep apnea     does not use a machine      Past Surgical History:   Procedure Laterality Date    CHOLECYSTECTOMY      FOOT TENDON SURGERY Left 2017    posterior repair    FOOT TENDON SURGERY Left 2018    LEFT POSTERIOR  TENDON REPAIR FLEXOR TENDON TRANSFER (Left Foot)    LITHOTRIPSY      [place] OFFICE/OUTPT VISIT,PROCEDURE ONLY Left 2018    LEFT POSTERIOR  TENDON REPAIR FLEXOR TENDON TRANSFER performed by Leonie Farias DPM at [address] Left 2017    LEFT POSTERIOR TIBIAL TENDON REPAIR WITH LEFT TENOLYSIS  performed by Leonie Farias DPM at [address] [place] [address] History   Problem Relation Age of Onset    Depression Mother     Diabetes Mother     High Blood Pressure Mother     Mental Illness Mother     Substance Abuse Father     Depression Sister     Diabetes Sister     High Blood Pressure Sister     Mental Illness Sister     Depression Brother     Mental Illness Brother     Substance Abuse Brother      Social History     Tobacco Use    Smoking status: Former Smoker     Packs/day: 3.00     Years: 20.00     Pack years: 60.00     Types: Cigarettes     Start date: 1981     Quit date: 1998     Years since quittin.5    Smokeless tobacco: Never Used    Tobacco comment: quit smoking 15-20 yrs ago   Substance Use Topics    Alcohol use: No     ALLERGIES:    Allergies   Allergen Reactions    Latex Hives     Latex gloves    Asa [Aspirin] Hives    Codeine Hives    Lisinopril-Hydrochlorothiazide Hives    Motrin [Ibuprofen] Hives    Other Hives and Swelling     Cherries, almonds    Pcn [Penicillins] Hives    Sulfa Antibiotics           Subjective     · Constitutional:  Negative for activity change, appetite change,unexpected weight change, chills, fever, and fatigue.     · HENT: Negative for ear pain, sore throat,  Rhinorrhea, sinus pain, sinus pressure, congestion. · Eyes:  Negative for pain and discharge. · Respiratory:  Negative for chest tightness, shortness of breath, wheezing, and cough. · Cardiovascular:  Negative for chest pain, palpitations and leg swelling. · Gastrointestinal: Negative for abdominal pain, blood in stool, constipation,diarrhea, nausea and vomiting. · Endocrine: Negative for cold intolerance, heat intolerance, polydipsia, polyphagia and polyuria. · Genitourinary: Negative for difficulty urinating, dysuria, flank pain, frequency, hematuria and urgency. · Musculoskeletal: Negative for arthralgias, joint swelling, myalgias, neck pain and neck stiffness. Positive for low back pain  · Skin: Negative for rash and wound. · Allergic/Immunologic: Negative for environmental allergies and food allergies. · Neurological:  Negative for dizziness, light-headedness, numbness and headaches. · Hematological:  Negative for adenopathy. Does not bruise/bleed easily. · Psychiatric/Behavioral: Negative for self-injury, sleep disturbance and suicidal ideas. Objective     PHYSICAL EXAM:   · Constitutional: Rosario Gomez is oriented to person, place, and time. Vital signs are normal. Appears well-developed and well-nourished. · HEENT:   · Head: Normocephalic and atraumatic. Eyes:PERRL, EOMI, Conjunctiva normal, No discharge. · Neck: Full passive range of motion. · Cardiovascular: Normal rate, regular rhythm, S1, S2, no murmur, no gallop, no friction rub, intact distal pulses. No carotid bruit. No lower extremity edema  · Pulmonary/Chest: Breath sounds are clear throughout, No respiratory distress, No wheezing, No chest tenderness. Effort normal  Musculoskeletal: Physical Exam  Musculoskeletal:      Lumbar back: Tenderness present. No swelling, deformity, spasms or bony tenderness. Normal range of motion. No scoliosis. Back:      · Neurological: Alert and oriented to person, place, and time.  Normal motor function, Normal sensory function, No focal deficits noted. He has normal strength. · Skin: Skin is warm, dry and intact. No obvious lesions on exposed skin  · Psychiatric: Normal mood and affect. Speech is normal and behavior is normal.     Nursing note and vitals reviewed. Blood pressure 128/73, pulse 73, temperature 97.3 °F (36.3 °C), temperature source Temporal, weight 249 lb (112.9 kg), SpO2 96 %, not currently breastfeeding. Body mass index is 50.29 kg/m². Wt Readings from Last 3 Encounters:   07/12/22 249 lb (112.9 kg)   05/31/22 255 lb (115.7 kg)   03/29/22 255 lb (115.7 kg)     BP Readings from Last 3 Encounters:   07/12/22 128/73   06/02/22 (!) 145/72   03/15/22 132/78       No results found for this visit on 07/12/22. Completed Orders/Prescriptions   Orders Placed This Encounter   Medications    methylPREDNISolone sodium (SOLU-MEDROL) injection 125 mg    predniSONE (DELTASONE) 10 MG tablet     Sig: Take 1 tablet by mouth 3 times daily (with meals) for 5 days     Dispense:  15 tablet     Refill:  0    diclofenac (VOLTAREN) 50 MG EC tablet     Sig: Take 1 tablet by mouth 3 times daily as needed for Pain     Dispense:  60 tablet     Refill:  3       Administrations This Visit     methylPREDNISolone sodium (SOLU-MEDROL) injection 125 mg     Admin Date  07/12/2022  10:15 Action  Given Dose  125 mg Route  IntraMUSCular Site  Dorsogluteal Right Administered By  Mykel Montgomery MA    Ordering Provider: JJ Bruce CNP    NDC: 2890-5969-44    Lot#: [id number]    : [id number]. Patient Supplied?: No                    AssessmentPlan/Medical Decision Making     1. Chronic left-sided low back pain with left-sided sciatica  - if no improvement, she should consider PT. However, she has had recent bad experience with PT  - methylPREDNISolone sodium (SOLU-MEDROL) injection 125 mg  - predniSONE (DELTASONE) 10 MG tablet;  Take 1 tablet by mouth 3 times daily (with meals) for 5 days  Dispense: 15 tablet; Refill: 0  - diclofenac (VOLTAREN) 50 MG EC tablet; Take 1 tablet by mouth 3 times daily as needed for Pain  Dispense: 60 tablet; Refill: 3      Return in about 2 weeks (around 7/26/2022) for back pain. 1.  Ev received counseling on the following healthy behaviors: nutrition, exercise and medication adherence  2. Patient given educational materials - see patient instructions  3. Was a self-tracking handout given in paper form or via [email or web address]? No  If yes, see orders or list here. 4.  Discussed use, benefit, and side effects of prescribed medications. Barriers to medication compliance addressed. All patient questions answered. Pt voiced understanding. 5.  Reviewed prior labs, imaging, consultation, follow up, and health maintenance  6. Continue current medications, diet and exercise. 7. Discussed use, benefit, and side effects of prescribed medications. Barriers to medication compliance addressed. All her questions were answered. Pt voiced understanding. Charlotte Ann will continue current medications, diet and exercise. Patient given education on back stretches    Medical Decision Making: Moderate    Of the 25 minute duration appointment visit, Teo Claudio CNP spent at least 50% of the face-to-face time in counseling, explanation of diagnosis, planning of further management, and answering all questions. Signed:  Teo Claudio CNP    This note is created with the assistance of a speech-recognition program.  While intending to generate a document that actually reflects the content of the visit, no guarantees can be provided that every mistake has been identified and corrected by editing.

## 2022-08-02 ENCOUNTER — OFFICE VISIT (OUTPATIENT)
Dept: PODIATRY | Age: 62
End: 2022-08-02
Payer: MEDICARE

## 2022-08-02 VITALS — BODY MASS INDEX: 50.2 KG/M2 | HEIGHT: 59 IN | WEIGHT: 249 LBS

## 2022-08-02 DIAGNOSIS — I73.9 PVD (PERIPHERAL VASCULAR DISEASE) (HCC): ICD-10-CM

## 2022-08-02 DIAGNOSIS — R26.2 TROUBLE WALKING: ICD-10-CM

## 2022-08-02 DIAGNOSIS — G89.29 CHRONIC PAIN OF LEFT ANKLE: ICD-10-CM

## 2022-08-02 DIAGNOSIS — M25.572 SINUS TARSITIS OF LEFT FOOT: Primary | ICD-10-CM

## 2022-08-02 DIAGNOSIS — M79.672 PAIN IN BOTH FEET: ICD-10-CM

## 2022-08-02 DIAGNOSIS — M79.672 LEFT FOOT PAIN: ICD-10-CM

## 2022-08-02 DIAGNOSIS — M25.372 LEFT ANKLE INSTABILITY: ICD-10-CM

## 2022-08-02 DIAGNOSIS — M25.572 CHRONIC PAIN OF LEFT ANKLE: ICD-10-CM

## 2022-08-02 DIAGNOSIS — M79.671 PAIN IN BOTH FEET: ICD-10-CM

## 2022-08-02 DIAGNOSIS — B35.1 DERMATOPHYTOSIS OF NAIL: ICD-10-CM

## 2022-08-02 PROCEDURE — 99999 PR OFFICE/OUTPT VISIT,PROCEDURE ONLY: CPT | Performed by: PODIATRIST

## 2022-08-02 PROCEDURE — 11721 DEBRIDE NAIL 6 OR MORE: CPT | Performed by: PODIATRIST

## 2022-08-09 NOTE — PROGRESS NOTES
[facility] PODIATRY [facility]  [address] [address] [address]  Dept: [phone]  Dept Fax: [phone]    RETURN PATIENT PROGRESS NOTE  Date of patient's visit: 8/9/2022  Patient's Name:  Ankit Craig YOB: 1960            Patient Care Team:  JJ Kelly CNP as PCP - General (Nurse Practitioner)  JJ Kelly CNP as PCP - [facility] Empaneled Provider  Shari Benson DPM as Physician (Podiatry)       Ankit Craig 63 y.o. female that presents for follow-up of   Chief Complaint   Patient presents with    Foot Pain     Left foot    Nail Problem     Toe nail trim     Pt's primary care physician is JJ Kelly CNP last seen6/24/2022  Symptoms began 2 year(s) ago and are increased . Patient relates pain is Present. Pain is rated 3 out of 10 and is described as moderate. Treatments prior to today's visit include: previous surgey to her posterior tibial tendon. Pt has pain to the lateral ankle at this time      Pt relates to pain to her toenails to the right and left foot . She states they are thickened and she cannot cut them herself . Currently denies F/C/N/V. Allergies   Allergen Reactions    Latex Hives     Latex gloves    Asa [Aspirin] Hives    Codeine Hives    Lisinopril-Hydrochlorothiazide Hives    Motrin [Ibuprofen] Hives    Other Hives and Swelling     Cherries, almonds    Pcn [Penicillins] Hives    Sulfa Antibiotics        Past Medical History:   Diagnosis Date    Allergic rhinitis 12/4/2014    Anxiety 7/5/2018    Arthritis     Asthma     Chest pain     occurs every other day, pt states they occur when she doesn't eat right or forget to take her bp med, occurs left side of chest with no radiation, pt states \"it's new\" and has not had any testing done or seen anyone for it.     Constipation     Depressive disorder 7/5/2018    Generalized osteoarthritis 6/26/2014    History of kidney stones Hypertension     Obesity, morbid, BMI 40.0-49.9 ([facility] 2/28/2017    PONV (postoperative nausea and vomiting)     Sleep apnea     does not use a machine       Prior to Admission medications    Medication Sig Start Date End Date Taking? Authorizing Provider   diclofenac (VOLTAREN) 50 MG EC tablet Take 1 tablet by mouth 3 times daily as needed for Pain 7/12/22  Yes JJ Henriquez CNP   losartan-hydroCHLOROthiazide (HYZAAR) 100-25 MG per tablet Take 1 tablet by mouth daily 6/7/22  Yes JJ Henriquez CNP   Misc. Devices (COMMODE BEDSIDE) MISC 1 Device by Does not apply route daily 5/31/22  Yes Gail Johnson DPM   amLODIPine (NORVASC) 5 MG tablet Take 1 tablet by mouth daily 3/10/22  Yes JJ Cohen CNP   fluticasone-salmeterol (WIXELA INHUB) 250-50 MCG/DOSE AEPB Inhale 1 puff into the lungs 2 times daily 3/10/22  Yes JJ Cohen CNP   hydrOXYzine (ATARAX) 25 MG tablet Take 1 tablet by mouth every 6 hours as needed for Itching or Anxiety 9/28/21  Yes JJ Henriquez CNP   fluticasone (FLONASE) 50 MCG/ACT nasal spray 2 sprays by Nasal route daily 9/28/21  Yes JJ Cohen CNP   clobetasol (TEMOVATE) 0.05 % ointment Apply to rash twice daily until resolved (not face, armpit or groin) 9/28/21  Yes JJ Cohen CNP   RA VITAMIN D-3 50 MCG (2000 UT) CAPS take 1 capsule by mouth once daily MUST MAKE APPOINTMENT FOR FURTHER REFILL 5/25/21  Yes JJ Cohen CNP   albuterol sulfate HFA (VENTOLIN HFA) 108 (90 Base) MCG/ACT inhaler Inhale 2 puffs into the lungs every 6 hours as needed for Wheezing 3/26/21  Yes JJ Cohen CNP   lidocaine (L-M-X 4) 4 % cream Apply a thick layer 30 minutes prior to procedure, covering in saran wrap 6/26/19  Yes Kiki Brenner MD   Misc. Devices (BATHTUB SAFETY RAIL) MISC 4 each by Does not apply route daily 12/17/18  Yes Gail Johnson DPM   Misc.  Devices (EXTENDABLE BEDSIDE RAIL) MISC 4 each by Does not apply route daily 12/17/18 Yes Trevon Almazan DPM   Spacer/Aero-Holding Vitor Fuentes PRICE 1 Device by Does not apply route daily as needed (wheezing) 11/26/18  Yes JJ Solano CNP   Misc. Devices (WALL GRAB BAR) MISC GRAB BARS FOR BOTH SHOWER AND TOILET, USE AS DIRECTED 10/23/18  Yes Trevon Almazan DPM   Misc. Devices MISC OUTSIDE RAMP THAT INCLUDES RAILINGS, USE AS DIRECTED 10/23/18  Yes LEONOR Lozanoc. Devices (RAISED TOILET SEAT) MISC USE AS DIRECTED 10/23/18  Yes Trevon Almazan DPM   hydrocortisone 2.5 % cream apply to affected area twice a day 10/17/18  Yes Trevon Almazan DPM   Elastic Bandages & Supports (JOBST KNEE HIGH COMPRESSION SM) MISC Dispense Bilateral Jobst Below Knee 20-30mmHg compression stockings 6/30/14  Yes José Miguel Cespedes DPM   EPINEPHrine [facility]) 0.3 MG/0.3ML SOAJ injection Inject 0.3 mLs into the muscle once for 1 dose Use as directed for allergic reaction 5/7/19 12/3/20  JJ Solano CNP   Misc. Devices [facility]) MISC 1 Device by Does not apply route once for 1 dose 4/24/18 12/3/20  Trevon Almazan DPM       Review of Systems    Review of Systems:  History obtained from chart review and the patient  General ROS: negative for - chills, fatigue, fever, night sweats or weight gain  Constitutional: Negative for chills, diaphoresis, fatigue, fever and unexpected weight change. Musculoskeletal: Positive for arthralgias, gait problem and joint swelling. Neurological ROS: negative for - behavioral changes, confusion, headaches or seizures. Negative for weakness and numbness. Dermatological ROS: negative for - mole changes, rash  Cardiovascular: Negative for leg swelling. Gastrointestinal: Negative for constipation, diarrhea, nausea and vomiting. Lower Extremity Physical Examination:     Vitals: There were no vitals filed for this visit. General: AAO x 3 in NAD. Dermatologic Exam:  Skin lesion/ulceration Absent . Skin No rashes or nodules noted. .   Skin is pain.  Orthotics dispensed to the patient today to be used at all time. Pt does now have a sit down job which will help her   pt to continue to use her lace up ankle brace when she weight bears       Nails 1,2,3,4,5 Right and 1,2,3,4,5 Left were debrided and ground smooth with a dremmel. The patient tolerated the procedure well without apparent complications. All labs were reviewed and all imagining including the above findings were reviewed PRIOR to the patients arrival and with the patient today. Previous patient encounter was reviewed. Encounters from the patients other medical providers were reviewed and noted. Time was spent educating the patient and their families/caregivers on proper care of the feet and ankles. All the above diagnosis were addressed at todays visit and all questions were answered. A total of 20 minutes was spent with this patients encounter which included charting after the patients visit      . Verbal and written instructions given to patient. Contact office with any questions/problems/concerns. No orders of the defined types were placed in this encounter. No orders of the defined types were placed in this encounter. RTC in 9week(s).     8/2/2022      Electronically signed by Jeniffer Canales DPM on 8/9/2022 at 6:47 AM  8/2/2022

## 2022-08-31 DIAGNOSIS — I10 ESSENTIAL HYPERTENSION: ICD-10-CM

## 2022-08-31 DIAGNOSIS — J45.20 MILD INTERMITTENT ASTHMA WITHOUT COMPLICATION: ICD-10-CM

## 2022-08-31 RX ORDER — FLUTICASONE PROPIONATE AND SALMETEROL 250; 50 UG/1; UG/1
POWDER RESPIRATORY (INHALATION)
Qty: 180 EACH | Refills: 0 | Status: SHIPPED | OUTPATIENT
Start: 2022-08-31 | End: 2022-12-01 | Stop reason: SDUPTHER

## 2022-08-31 RX ORDER — AMLODIPINE BESYLATE 5 MG/1
TABLET ORAL
Qty: 90 TABLET | Refills: 0 | Status: SHIPPED | OUTPATIENT
Start: 2022-08-31 | End: 2022-12-01 | Stop reason: SDUPTHER

## 2022-08-31 NOTE — TELEPHONE ENCOUNTER
Jaimie Pee is calling to request a refill on the following medication(s):    Medication Request:  Requested Prescriptions     Pending Prescriptions Disp Refills    amLODIPine (NORVASC) 5 MG tablet [Pharmacy Med Name: AMLODIPINE BESYLATE 5 MG TAB] 90 tablet 1     Sig: TAKE 1 TABLET BY MOUTH ONCE A DAY    fluticasone-salmeterol (ADVAIR) 250-50 MCG/ACT AEPB diskus inhaler [Pharmacy Med Name: FLUTICASONE-SALMETEROL 250-50]       Sig: INHALE 1 PUFF BY MOUTH INTO THE LUNGS 2 TIMES A DAY       Last Visit Date (If Applicable):  2/26/0792    Next Visit Date:    Visit date not found

## 2022-09-01 DIAGNOSIS — J45.20 MILD INTERMITTENT ASTHMA WITHOUT COMPLICATION: ICD-10-CM

## 2022-09-01 RX ORDER — ALBUTEROL SULFATE 90 UG/1
AEROSOL, METERED RESPIRATORY (INHALATION)
Qty: 8.5 G | Refills: 1 | Status: SHIPPED | OUTPATIENT
Start: 2022-09-01 | End: 2022-12-01 | Stop reason: SDUPTHER

## 2022-09-01 NOTE — TELEPHONE ENCOUNTER
Steven Mills is calling to request a refill on the following medication(s):    Medication Request:  Requested Prescriptions     Pending Prescriptions Disp Refills    albuterol sulfate HFA (PROVENTIL;VENTOLIN;PROAIR) 108 (90 Base) MCG/ACT inhaler [Pharmacy Med Name: ALBUTEROL HFA 90 MCG INHALER] 8.5 g 0     Sig: inhale 2 puffs by mouth INTO THE LUNGS every 6 hours if needed for wheezing       Last Visit Date (If Applicable):  0/20/9147    Next Visit Date:    Visit date not found

## 2022-12-01 ENCOUNTER — TELEPHONE (OUTPATIENT)
Dept: FAMILY MEDICINE CLINIC | Age: 62
End: 2022-12-01

## 2022-12-01 ENCOUNTER — OFFICE VISIT (OUTPATIENT)
Dept: FAMILY MEDICINE CLINIC | Age: 62
End: 2022-12-01
Payer: MEDICARE

## 2022-12-01 ENCOUNTER — HOSPITAL ENCOUNTER (OUTPATIENT)
Age: 62
Setting detail: SPECIMEN
Discharge: HOME | End: 2022-12-01

## 2022-12-01 VITALS
BODY MASS INDEX: 49.51 KG/M2 | OXYGEN SATURATION: 99 % | DIASTOLIC BLOOD PRESSURE: 76 MMHG | HEART RATE: 76 BPM | WEIGHT: 245.6 LBS | SYSTOLIC BLOOD PRESSURE: 130 MMHG | TEMPERATURE: 97.5 F | HEIGHT: 59 IN

## 2022-12-01 DIAGNOSIS — I10 ESSENTIAL HYPERTENSION: ICD-10-CM

## 2022-12-01 DIAGNOSIS — G47.33 OSA (OBSTRUCTIVE SLEEP APNEA): ICD-10-CM

## 2022-12-01 DIAGNOSIS — Z12.31 ENCOUNTER FOR SCREENING MAMMOGRAM FOR MALIGNANT NEOPLASM OF BREAST: ICD-10-CM

## 2022-12-01 DIAGNOSIS — Z00.00 HEALTHCARE MAINTENANCE: ICD-10-CM

## 2022-12-01 DIAGNOSIS — R73.01 IFG (IMPAIRED FASTING GLUCOSE): ICD-10-CM

## 2022-12-01 DIAGNOSIS — Z76.89 ENCOUNTER TO ESTABLISH CARE: Primary | ICD-10-CM

## 2022-12-01 DIAGNOSIS — J45.40 MODERATE PERSISTENT ASTHMA WITHOUT COMPLICATION: ICD-10-CM

## 2022-12-01 PROBLEM — F32.A DEPRESSIVE DISORDER: Status: RESOLVED | Noted: 2018-07-05 | Resolved: 2022-12-01

## 2022-12-01 PROBLEM — K59.00 CONSTIPATION: Status: RESOLVED | Noted: 2018-07-05 | Resolved: 2022-12-01

## 2022-12-01 PROBLEM — J45.909 REACTIVE AIRWAY DISEASE: Status: RESOLVED | Noted: 2018-07-05 | Resolved: 2022-12-01

## 2022-12-01 PROBLEM — L50.9 URTICARIA: Status: RESOLVED | Noted: 2017-02-28 | Resolved: 2022-12-01

## 2022-12-01 LAB
ABSOLUTE EOS #: 0.34 K/UL (ref 0–0.44)
ABSOLUTE IMMATURE GRANULOCYTE: <0.03 K/UL (ref 0–0.3)
ABSOLUTE LYMPH #: 2.45 K/UL (ref 1.1–3.7)
ABSOLUTE MONO #: 0.53 K/UL (ref 0.1–1.2)
ALBUMIN SERPL-MCNC: 4.1 G/DL (ref 3.5–5.2)
ALBUMIN/GLOBULIN RATIO: 1.3 (ref 1–2.5)
ALP BLD-CCNC: 92 U/L (ref 35–104)
ALT SERPL-CCNC: 16 U/L (ref 5–33)
ANION GAP SERPL CALCULATED.3IONS-SCNC: 9 MMOL/L (ref 9–17)
AST SERPL-CCNC: 17 U/L
BASOPHILS # BLD: 0 % (ref 0–2)
BASOPHILS ABSOLUTE: 0.03 K/UL (ref 0–0.2)
BILIRUB SERPL-MCNC: 0.3 MG/DL (ref 0.3–1.2)
BUN BLDV-MCNC: 15 MG/DL (ref 8–23)
CALCIUM SERPL-MCNC: 9.4 MG/DL (ref 8.6–10.4)
CHLORIDE BLD-SCNC: 98 MMOL/L (ref 98–107)
CHOLESTEROL, FASTING: 166 MG/DL
CHOLESTEROL/HDL RATIO: 2.6
CO2: 29 MMOL/L (ref 20–31)
CREAT SERPL-MCNC: 0.53 MG/DL (ref 0.5–0.9)
EOSINOPHILS RELATIVE PERCENT: 4 % (ref 1–4)
ESTIMATED AVERAGE GLUCOSE: 123 MG/DL
GFR SERPL CREATININE-BSD FRML MDRD: >60 ML/MIN/1.73M2
GLUCOSE BLD-MCNC: 112 MG/DL (ref 70–99)
HBA1C MFR BLD: 5.9 % (ref 4–6)
HCT VFR BLD CALC: 45.4 % (ref 36.3–47.1)
HDLC SERPL-MCNC: 64 MG/DL
HEMOGLOBIN: 13.8 G/DL (ref 11.9–15.1)
IMMATURE GRANULOCYTES: 0 %
LDL CHOLESTEROL: 88 MG/DL (ref 0–130)
LYMPHOCYTES # BLD: 28 % (ref 24–43)
MCH RBC QN AUTO: 27.3 PG (ref 25.2–33.5)
MCHC RBC AUTO-ENTMCNC: 30.4 G/DL (ref 28.4–34.8)
MCV RBC AUTO: 89.7 FL (ref 82.6–102.9)
MONOCYTES # BLD: 6 % (ref 3–12)
NRBC AUTOMATED: 0 PER 100 WBC
PDW BLD-RTO: 15.3 % (ref 11.8–14.4)
PLATELET # BLD: 340 K/UL (ref 138–453)
PMV BLD AUTO: 9.6 FL (ref 8.1–13.5)
POTASSIUM SERPL-SCNC: 4 MMOL/L (ref 3.7–5.3)
RBC # BLD: 5.06 M/UL (ref 3.95–5.11)
RBC # BLD: ABNORMAL 10*6/UL
SEG NEUTROPHILS: 62 % (ref 36–65)
SEGMENTED NEUTROPHILS ABSOLUTE COUNT: 5.45 K/UL (ref 1.5–8.1)
SODIUM BLD-SCNC: 136 MMOL/L (ref 135–144)
TOTAL PROTEIN: 7.3 G/DL (ref 6.4–8.3)
TRIGLYCERIDE, FASTING: 68 MG/DL
WBC # BLD: 8.8 K/UL (ref 3.5–11.3)

## 2022-12-01 PROCEDURE — G8484 FLU IMMUNIZE NO ADMIN: HCPCS | Performed by: NURSE PRACTITIONER

## 2022-12-01 PROCEDURE — 99214 OFFICE O/P EST MOD 30 MIN: CPT | Performed by: NURSE PRACTITIONER

## 2022-12-01 PROCEDURE — G8417 CALC BMI ABV UP PARAM F/U: HCPCS | Performed by: NURSE PRACTITIONER

## 2022-12-01 PROCEDURE — 3017F COLORECTAL CA SCREEN DOC REV: CPT | Performed by: NURSE PRACTITIONER

## 2022-12-01 PROCEDURE — 1036F TOBACCO NON-USER: CPT | Performed by: NURSE PRACTITIONER

## 2022-12-01 PROCEDURE — 3074F SYST BP LT 130 MM HG: CPT | Performed by: NURSE PRACTITIONER

## 2022-12-01 PROCEDURE — 3078F DIAST BP <80 MM HG: CPT | Performed by: NURSE PRACTITIONER

## 2022-12-01 PROCEDURE — G8427 DOCREV CUR MEDS BY ELIG CLIN: HCPCS | Performed by: NURSE PRACTITIONER

## 2022-12-01 RX ORDER — ALBUTEROL SULFATE 90 UG/1
2 AEROSOL, METERED RESPIRATORY (INHALATION) EVERY 6 HOURS PRN
Qty: 1 EACH | Refills: 2 | Status: SHIPPED | OUTPATIENT
Start: 2022-12-01

## 2022-12-01 RX ORDER — TRAMADOL HYDROCHLORIDE 50 MG/1
50 TABLET ORAL EVERY 4 HOURS PRN
COMMUNITY

## 2022-12-01 RX ORDER — LORATADINE 10 MG/1
TABLET ORAL DAILY
COMMUNITY
Start: 2022-09-01

## 2022-12-01 RX ORDER — FLUTICASONE PROPIONATE AND SALMETEROL 250; 50 UG/1; UG/1
1 POWDER RESPIRATORY (INHALATION) 2 TIMES DAILY
Qty: 180 EACH | Refills: 1 | Status: SHIPPED | OUTPATIENT
Start: 2022-12-01

## 2022-12-01 RX ORDER — LOSARTAN POTASSIUM AND HYDROCHLOROTHIAZIDE 25; 100 MG/1; MG/1
1 TABLET ORAL DAILY
Qty: 90 TABLET | Refills: 1 | Status: SHIPPED | OUTPATIENT
Start: 2022-12-01

## 2022-12-01 RX ORDER — AMLODIPINE BESYLATE 5 MG/1
5 TABLET ORAL DAILY
Qty: 90 TABLET | Refills: 1 | Status: SHIPPED | OUTPATIENT
Start: 2022-12-01

## 2022-12-01 ASSESSMENT — PATIENT HEALTH QUESTIONNAIRE - PHQ9
3. TROUBLE FALLING OR STAYING ASLEEP: 0
SUM OF ALL RESPONSES TO PHQ9 QUESTIONS 1 & 2: 0
SUM OF ALL RESPONSES TO PHQ QUESTIONS 1-9: 0
SUM OF ALL RESPONSES TO PHQ QUESTIONS 1-9: 0
7. TROUBLE CONCENTRATING ON THINGS, SUCH AS READING THE NEWSPAPER OR WATCHING TELEVISION: 0
10. IF YOU CHECKED OFF ANY PROBLEMS, HOW DIFFICULT HAVE THESE PROBLEMS MADE IT FOR YOU TO DO YOUR WORK, TAKE CARE OF THINGS AT HOME, OR GET ALONG WITH OTHER PEOPLE: 0
1. LITTLE INTEREST OR PLEASURE IN DOING THINGS: 0
5. POOR APPETITE OR OVEREATING: 0
4. FEELING TIRED OR HAVING LITTLE ENERGY: 0
2. FEELING DOWN, DEPRESSED OR HOPELESS: 0
8. MOVING OR SPEAKING SO SLOWLY THAT OTHER PEOPLE COULD HAVE NOTICED. OR THE OPPOSITE, BEING SO FIGETY OR RESTLESS THAT YOU HAVE BEEN MOVING AROUND A LOT MORE THAN USUAL: 0
6. FEELING BAD ABOUT YOURSELF - OR THAT YOU ARE A FAILURE OR HAVE LET YOURSELF OR YOUR FAMILY DOWN: 0
SUM OF ALL RESPONSES TO PHQ QUESTIONS 1-9: 0
SUM OF ALL RESPONSES TO PHQ QUESTIONS 1-9: 0
9. THOUGHTS THAT YOU WOULD BE BETTER OFF DEAD, OR OF HURTING YOURSELF: 0

## 2022-12-01 ASSESSMENT — ENCOUNTER SYMPTOMS
VOMITING: 0
COUGH: 0
COLOR CHANGE: 0
DIARRHEA: 0
SHORTNESS OF BREATH: 0
GASTROINTESTINAL NEGATIVE: 1
CHEST TIGHTNESS: 0
WHEEZING: 0
RESPIRATORY NEGATIVE: 1
EYES NEGATIVE: 1
ALLERGIC/IMMUNOLOGIC NEGATIVE: 1
NAUSEA: 0

## 2022-12-01 NOTE — TELEPHONE ENCOUNTER
I feel they should be ordering these if they are requiring them, however the orders are placed in Epic for patient to complete. If she would like to see a different Pulmonary office please let me know.

## 2022-12-01 NOTE — TELEPHONE ENCOUNTER
Sunrise with [facility] respiratory called and said she needs pt to have a PFT and a CXR before they can see her for her referral Dx: sleep and asthma    Thank you,  Ortiz Vallejo

## 2022-12-01 NOTE — PROGRESS NOTES
[facility]  [address] [facility]  Dept: [phone]    Reinier Baxter is a 63 y.o. female who presents today for her medical conditions/complaintsas noted below. Reinier Baxter is here today c/o Establish Care and Medication Refill    Past Medical History:   Diagnosis Date    Allergic rhinitis 12/04/2014    Anxiety 07/05/2018    Arthritis     Asthma     Chest pain     occurs every other day, pt states they occur when she doesn't eat right or forget to take her bp med, occurs left side of chest with no radiation, pt states \"it's new\" and has not had any testing done or seen anyone for it.     Depressive disorder 07/05/2018    Generalized osteoarthritis 06/26/2014    History of kidney stones     Hypertension     Obesity, morbid, BMI 40.0-49.9 ([address] 02/28/2017    PONV (postoperative nausea and vomiting)     Sleep apnea     does not use a machine      Past Surgical History:   Procedure Laterality Date    CHOLECYSTECTOMY      FOOT TENDON SURGERY Left 09/08/2017    posterior repair    FOOT TENDON SURGERY Left 06/29/2018    LEFT POSTERIOR  TENDON REPAIR FLEXOR TENDON TRANSFER (Left Foot)    LITHOTRIPSY      MS OFFICE/OUTPT VISIT,PROCEDURE ONLY Left 6/29/2018    LEFT POSTERIOR  TENDON REPAIR FLEXOR TENDON TRANSFER performed by Mitchell Gunter DPM at [address] [facility] Left 9/8/2017    LEFT POSTERIOR TIBIAL TENDON REPAIR WITH LEFT TENOLYSIS  performed by Mitchell Gunter DPM at [address] History   Problem Relation Age of Onset    Depression Mother     Diabetes Mother     High Blood Pressure Mother     Mental Illness Mother     Substance Abuse Father     Depression Sister     Diabetes Sister     High Blood Pressure Sister     Mental Illness Sister     Depression Brother     Mental Illness Brother     Substance Abuse Brother        Social History     Tobacco Use    Smoking status: Former     Packs/day: 1.00     Years: 8.00     Pack years: 8.00     Types: Cigarettes     Start date: 1981     Quit date: 1998     Years since quittin.9    Smokeless tobacco: Never    Tobacco comments:     quit smoking 15-20 yrs ago   Substance Use Topics    Alcohol use: No      Current Outpatient Medications   Medication Sig Dispense Refill    loratadine (CLARITIN) 10 MG tablet daily      traMADol (ULTRAM) 50 MG tablet Take 50 mg by mouth every 4 hours as needed for Pain. albuterol sulfate HFA (PROVENTIL;VENTOLIN;PROAIR) 108 (90 Base) MCG/ACT inhaler inhale 2 puffs by mouth INTO THE LUNGS every 6 hours if needed for wheezing 8.5 g 1    amLODIPine (NORVASC) 5 MG tablet TAKE 1 TABLET BY MOUTH ONCE A DAY 90 tablet 0    fluticasone-salmeterol (ADVAIR) 250-50 MCG/ACT AEPB diskus inhaler INHALE 1 PUFF BY MOUTH INTO THE LUNGS 2 TIMES A  each 0    losartan-hydroCHLOROthiazide (HYZAAR) 100-25 MG per tablet Take 1 tablet by mouth daily 90 tablet 1    hydrOXYzine (ATARAX) 25 MG tablet Take 1 tablet by mouth every 6 hours as needed for Itching or Anxiety 60 tablet 5    fluticasone (FLONASE) 50 MCG/ACT nasal spray 2 sprays by Nasal route daily 16 g 5    clobetasol (TEMOVATE) 0.05 % ointment Apply to rash twice daily until resolved (not face, armpit or groin) 60 g 5    EPINEPHrine (EPIPEN) 0.3 MG/0.3ML SOAJ injection Inject 0.3 mLs into the muscle once for 1 dose Use as directed for allergic reaction 1 each 0     No current facility-administered medications for this visit.      Allergies   Allergen Reactions    Latex Hives     Latex gloves    Asa [Aspirin] Hives    Codeine Hives    Lisinopril-Hydrochlorothiazide Hives    Motrin [Ibuprofen] Hives    Other Hives and Swelling     Cherries, almonds    Pcn [Penicillins] Hives    Sulfa Antibiotics          HPI:     HPI    Presents today c/o NTP, last PCP BS    Specialists -    Dermatology - Dr. Ottoniel Greene - h/o left tendon injury & repair     Sleep Medicine - h/o YAMEL     H/o HTN  Taking losartan/hctz & amlodipine Tolerating medications without side effects  Doesn't monitor BP at home  Exercises occasionally but limited due to foot injury     H/o asthma  Taking Advair & albuterol PRN [takes at 4 AM due to mold in house]  Triggers include mold, allergies, etc.       Health Maintenance:      Subjective:     Review of Systems   Constitutional:  Negative for appetite change, chills, diaphoresis, fatigue, fever and unexpected weight change (dec. by 10 lbs). HENT: Negative. Eyes: Negative. Respiratory: Negative. Negative for cough, chest tightness, shortness of breath and wheezing. Cardiovascular: Negative. Negative for chest pain and leg swelling. Gastrointestinal: Negative. Negative for diarrhea, nausea and vomiting. Endocrine: Negative. Genitourinary: Negative. Negative for difficulty urinating and dysuria. Musculoskeletal: Negative. Skin: Negative. Negative for color change, pallor, rash and wound. Allergic/Immunologic: Negative. Neurological: Negative. Negative for dizziness, seizures, syncope, light-headedness and headaches. Hematological: Negative. Psychiatric/Behavioral: Negative. Negative for dysphoric mood. The patient is not nervous/anxious. Objective:     Vitals:    12/01/22 0810   BP: 130/76   Pulse: 76   Temp: 97.5 °F (36.4 °C)   SpO2: 99%       Body mass index is 49.61 kg/m². Physical Exam  Constitutional:       General: She is not in acute distress. Appearance: Normal appearance. She is well-developed. She is obese. She is not ill-appearing, toxic-appearing or diaphoretic. HENT:      Head: Normocephalic and atraumatic. Right Ear: External ear normal.      Left Ear: External ear normal.      Nose: Nose normal.   Eyes:      General: No scleral icterus. Right eye: No discharge. Left eye: No discharge. Conjunctiva/sclera: Conjunctivae normal.      Pupils: Pupils are equal, round, and reactive to light.    Neck:      Trachea: No tracheal deviation. Cardiovascular:      Rate and Rhythm: Normal rate and regular rhythm. Heart sounds: Normal heart sounds. No murmur heard. No friction rub. No gallop. Pulmonary:      Effort: Pulmonary effort is normal. No tachypnea, accessory muscle usage or respiratory distress. Breath sounds: Normal breath sounds. No stridor. No decreased breath sounds, wheezing, rhonchi or rales. Abdominal:      Palpations: Abdomen is soft. Musculoskeletal:         General: No tenderness or deformity. Normal range of motion. Cervical back: Normal range of motion and neck supple. Skin:     General: Skin is warm and dry. Coloration: Skin is not pale. Findings: No erythema or rash. Neurological:      Mental Status: She is alert and oriented to person, place, and time. GCS: GCS eye subscore is 4. GCS verbal subscore is 5. GCS motor subscore is 6. Gait: Gait abnormal.   Psychiatric:         Speech: Speech normal.         Behavior: Behavior normal.         Thought Content: Thought content normal.         Judgment: Judgment normal.         Assessment:         1. Encounter to establish care    2. Essential hypertension    3. IFG (impaired fasting glucose)    4. Moderate persistent asthma without complication    5. YAMEL (obstructive sleep apnea)    6. Encounter for screening mammogram for malignant neoplasm of breast    7. Healthcare maintenance        Plan:     1. Encounter to establish care      2. Essential hypertension    - CBC with Auto Differential; Future  - Comprehensive Metabolic Panel; Future  - Lipid, Fasting; Future  - amLODIPine (NORVASC) 5 MG tablet; Take 1 tablet by mouth daily  Dispense: 90 tablet; Refill: 1  - losartan-hydroCHLOROthiazide (HYZAAR) 100-25 MG per tablet; Take 1 tablet by mouth daily  Dispense: 90 tablet; Refill: 1    BP controlled, continue meds  Update labs  Lifestyle modifications encouraged     3. IFG (impaired fasting glucose)    - Hemoglobin A1C; Future    4. Moderate persistent asthma without complication    - [facility] - Tomasz Theodore MD, Pulmonology, [place]  - fluticasone-salmeterol (ADVAIR) 250-50 MCG/ACT AEPB diskus inhaler; Inhale 1 puff into the lungs 2 times daily  Dispense: 180 each; Refill: 1  - albuterol sulfate HFA (PROVENTIL;VENTOLIN;PROAIR) 108 (90 Base) MCG/ACT inhaler; Inhale 2 puffs into the lungs every 6 hours as needed for Wheezing or Shortness of Breath  Dispense: 1 each; Refill: 2    Not at goal  Immunizations encouraged  Pulmonary follow-up encouraged   Consider LAMA if needed     5. YAMEL (obstructive sleep apnea)    - Jim Aguiar MD, Pulmonology, [place]    6. Encounter for screening mammogram for malignant neoplasm of breast    - [facility] DIGITAL SCREEN BILATERAL; Future    7. Mike Flores MD, Gynecology, [email or web address]@        Discussed use, benefit, and side effects of prescribed medications. All patient questions answered. Pt voiced understanding. Reviewed health maintenance. Instructed to continue current medications, diet and exercise. Patient agreedwith treatment plan. Follow up as directed.      Electronically signed by JJ Rodríguez CNP on 12/1/2022

## 2022-12-09 ENCOUNTER — OFFICE VISIT (OUTPATIENT)
Dept: DERMATOLOGY | Age: 62
End: 2022-12-09

## 2022-12-09 VITALS
BODY MASS INDEX: 49.4 KG/M2 | SYSTOLIC BLOOD PRESSURE: 130 MMHG | OXYGEN SATURATION: 95 % | HEART RATE: 81 BPM | DIASTOLIC BLOOD PRESSURE: 79 MMHG | WEIGHT: 244.6 LBS | TEMPERATURE: 97.7 F

## 2022-12-09 DIAGNOSIS — D48.9 NEOPLASM OF UNCERTAIN BEHAVIOR: ICD-10-CM

## 2022-12-09 DIAGNOSIS — L82.0 INFLAMED SEBORRHEIC KERATOSIS: ICD-10-CM

## 2022-12-09 DIAGNOSIS — L82.1 SEBORRHEIC KERATOSES: ICD-10-CM

## 2022-12-09 DIAGNOSIS — L40.9 PSORIASIS: Primary | ICD-10-CM

## 2022-12-09 RX ORDER — LIDOCAINE HYDROCHLORIDE 10 MG/ML
5 INJECTION, SOLUTION INFILTRATION; PERINEURAL ONCE
Status: SHIPPED | OUTPATIENT
Start: 2022-12-09

## 2022-12-09 RX ORDER — CLOBETASOL PROPIONATE 0.5 MG/G
OINTMENT TOPICAL
Qty: 60 G | Refills: 5 | Status: SHIPPED | OUTPATIENT
Start: 2022-12-09

## 2022-12-09 NOTE — PATIENT INSTRUCTIONS
Cryotherapy    Liquid Nitrogen - \"freeze\" (Cryotherapy)  Your doctor has treated your skin lesions with a very cold substance. The liquid nitrogen is so cold that it may feel like the skin is burning during application. A clear blister or blood blister may form after treatment and may later form a scab. Leave the area alone. Usually this scab will fall of within 1-2 weeks. The area should be kept clean and can be covered with Vaseline and a Band-Aid if needed. If a large blister develops it is ok to use a clean needle to gently pop the blister. Please call our office with any concerns at [phone]. BIOPSY WOUND CARE    A biopsy is where a small piece of skin tissue is removed and examined by a pathologist.  When a biopsy is done, there is a small wound site that requires proper care to prevent infection and scarring. Some biopsies require sutures and their removal.    How to Care for Biopsy Wound    A.  Leave band-aid or dressing on for 24 hours. B. Wash two times a day with soap and water. C.  Let the wound air dry, then apply Vaseline ointment and cover with a Band-Aid       unless otherwise instructed by your provider. D. If there is slight discomfort, you may give acetaminophen or ibuprofen. When To Call the Doctor    Call the [facility] or your doctor if any of the following occur:    A. Redness and swelling  B. Tenderness and warm to touch  C.  Drainage from wound  D. Fever    Biopsy Results    Biopsy results are usually available in 1-2 weeks. We provide biopsy results in letters or via [employer] for benign results or we will call for any concerning results. If you have not heard from our staff please call the office within 2 weeks. Please call our office with any concerns at [phone]. Psoriasis    What is psoriasis? Psoriasis (sore-EYE-ah-sis) is a chronic (long-lasting) disease.  It develops when a persons immune system sends faulty signals that tell skin cells to grow too quickly. New skin cells form in days rather than weeks. The body does not shed these excess skin cells. The skin cells pile up on the surface of the skin, causing patches of psoriasis to appear. Psoriasis may look contagious, but it's not. You cannot get psoriasis from touching someone who has it. To get psoriasis, a person must inherit the genes that cause it. Types of psoriasis If you have psoriasis, you will have one or more of these types: Plaque (also called psoriasis vulgaris). Guttate. Inverse (also called flexural psoriasis or intertriginous psoriasis). Pustular. Erythrodermic (also called exfoliative psoriasis). Some people get more than one type. Sometimes a person gets one type of psoriasis, and then the type of psoriasis changes. Signs and symptoms:  What you see and feel depends on the type of psoriasis you have. You may have just a few of the signs and symptoms listed below, or you may have many. Plaque psoriasis  (also called psoriasis vulgaris)  Raised, reddish patches on the skin called plaque (plak). Patches may be covered with a silvery-white coating, which dermatologists call scale. Patches can appear anywhere on the skin. Most patches appear on the knees, elbows, lower back, and scalp. Patches can itch. Scratching the itchy patches often causes the patches to thicken. Patches vary in size and can appear as separate patches or join together to cover a large area. Nail problems -- pits in the nails, crumbling nail, nail falls off. What you see and feel depends on the type of psoriasis you have. You may have just a few of the signs and symptoms listed below, or you may have many. Plaque psoriasis (also called psoriasis vulgaris) Raised, reddish patches on the skin called plaque (plak). Patches may be covered with a silvery-white coating, which dermatologists call scale. Patches can appear anywhere on the skin. Most patches appear on the knees, elbows, lower back, and scalp. Patches can itch. Scratching the itchy patches often causes the patches to thicken. Patches vary in size and can appear as separate patches or join together to cover a large area. Nail problems -- pits in the nails, crumbling nail, nail falls off. Guttate psoriasis  Small, red spots (usually on the trunk, arms, and legs but can appear on the scalp, face, and ears). Spots can show up all over the skin. Spots often appear after an illness, especially strep throat. Spots may clear up in a few weeks or months without treatment. Spots may appear where the person had plaque psoriasis. Pustular psoriasis  Skin red, swollen, and dotted with pus-filled bumps. Bumps usually appear only on the palms and soles. Soreness and pain where the bumps appear. Pus-filled bumps will dry, and leave behind brown dots and/or scale on the skin. When pus-filled bumps cover the body, the person also may have:  Guttate psoriasis Small, red spots (usually on the trunk, arms, and legs but can appear on the scalp, face, and ears). Spots can show up all over the skin. Spots often appear after an illness, especially strep throat. Spots may clear up in a few weeks or months without treatment. Spots may appear where the person had plaque psoriasis. Pustular psoriasis Skin red, swollen, and dotted with pus-filled bumps. Bumps usually appear only on the palms and soles. Soreness and pain where the bumps appear. Pus-filled bumps will dry, and leave behind brown dots and/or scale on the skin. When pus-filled bumps cover the body, the person also may have:  Bright-red skin. Been feeling sick and exhausted. Fever. Chills. Severe itching. Rapid pulse. Loss of appetite. Muscle weakness. Inverse psoriasis   (also called flexural psoriasis or intertriginous psoriasis)Inverse psoriasis (also called flexural psoriasis or intertriginous psoriasis)  Smooth, red patches of skin that look raw.    Patches only develop where skin touches skin, such as the armpits, around the groin, genitals, and buttocks. Women can develop a red, raw patch under their breasts. Skin feels very sore where inverse psoriasis appears. Erythrodermic psoriasis  (also called exfoliative psoriasis)  Skin looks like it is burned. Most (or all) of the skin on the body turns bright red. Body cannot maintain its normal temperature of 98.6° F. Person gets very hot or very cold. Heart beats too fast.   Intense itching. Intense pain. Smooth, red patches of skin that look raw. Patches only develop where skin touches skin, such as the armpits, around the groin, genitals, and buttocks. Women can develop a red, raw patch under their breasts. Skin feels very sore where inverse psoriasis appears. Erythrodermic psoriasis (also called exfoliative psoriasis) Skin looks like it is burned. Most (or all) of the skin on the body turns bright red. Body cannot maintain its normal temperature of 98.6° F. Person gets very hot or very cold. Heart beats too fast. Intense itching. Intense pain. If it looks like a person has erythrodermic psoriasis, get the person to a hospital right away. The persons life may be in danger. Who gets psoriasis? People who get psoriasis usually have one or more person in their family who has psoriasis. Not everyone who has a family member with psoriasis will get psoriasis. But psoriasis is common. In the [place], about 7.5 million people have psoriasis. Most people, about 80%, have plaque psoriasis. Psoriasis can begin at any age. Most people get psoriasis between 13and 27years of age. By age 36, most people who will get psoriasis, about 75%, have psoriasis. Another common time for psoriasis to begin is between 48and 61years of age. Whites get psoriasis more often than other races. Infants and young children are more likely to get inverse psoriasis and guttate psoriasis. What causes psoriasis?  Scientists are still trying to learn everything that happens inside the body to cause psoriasis. We know that psoriasis is not contagious. You cannot get psoriasis from swimming in the same pool or having sex. Scientists have learned that a persons immune system and genes play important roles. It seems that many genes must interact to cause psoriasis. Scientists also know that not everyone who inherits the genes for psoriasis will get psoriasis. It seems that a person must inherit the right mix of genes. Then the person must be exposed to a trigger. Many people say that their psoriasis began after they experienced one of these common psoriasis triggers: A stressful event. Strep throat. Taking certain medicines, such as lithium, or medicine to prevent malaria. Cold, dry weather. A cut, scratch, or bad sunburn. How does a dermatologist diagnose psoriasis? To diagnose psoriasis, a dermatologist:  Examines a patients skin, nails, and scalp for signs of psoriasis. Asks whether family members have psoriasis. Learns about what has been happening in the patients life. A dermatologist may want to know whether a patient has been under a lot of stress, had a recent illness, or just started taking a medicine. Sometimes a dermatologist also removes a bit of skin. A dermatologist may call this confirming the diagnosis. By looking at the removed skin under a microscope, one can confirm whether a person has psoriasis. How do dermatologists treat psoriasis? Treating psoriasis has benefits. Treatment can reduce signs and symptoms of psoriasis, which usually makes a person feel better. With treatment, some people see their skin completely clear. Treatment can even improve a person's quality of life. Thanks to ongoing research, there are many treatments for psoriasis. It is important to work with a dermatologist to find treatment that works for you and fits your lifestyle. Every treatment has benefits, drawbacks, and possible side effects.      Outcome  Psoriasis is a chronic (long-lasting) disease of the immune system. It cannot be cured. This means that most people have psoriasis for life. By teaming up with a dermatologist who treats psoriasis, you can find a treatment plan that works for you. Dermatologists encourage their patients who have psoriasis to take an active role in managing this disease. By taking an active role, you can reduce the effects that psoriasis has on your quality of life. How does a dermatologist diagnose psoriasis? To diagnose psoriasis, a dermatologist: Examines a patients skin, nails, and scalp for signs of psoriasis. Asks whether family members have psoriasis. Learns about what has been happening in the patients life. A dermatologist may want to know whether a patient has been under a lot of stress, had a recent illness, or just started taking a medicine. Sometimes a dermatologist also removes a bit of skin. A dermatologist may call this confirming the diagnosis. By looking at the removed skin under a microscope, one can confirm whether a person has psoriasis. How do dermatologists treat psoriasis? Treating psoriasis has benefits. Treatment can reduce signs and symptoms of psoriasis, which usually makes a person feel better. With treatment, some people see their skin completely clear. Treatment can even improve a person's quality of life. Thanks to ongoing research, there are many treatments for psoriasis. It is important to work with a dermatologist to find treatment that works for you and fits your lifestyle. Every treatment has benefits, drawbacks, and possible side effects. Outcome Psoriasis is a chronic (long-lasting) disease of the immune system. It cannot be cured. This means that most people have psoriasis for life. By teaming up with a dermatologist who treats psoriasis, you can find a treatment plan that works for you. Dermatologists encourage their patients who have psoriasis to take an active role in managing this disease.  By taking an active role, you can reduce the effects that psoriasis has on your quality of life. Psoriasis: Tips for managing  Psoriasis is a long-lasting disease. Here are some things you can do that will help you take control. Learn about psoriasis. Knowledge really is power. Learning about psoriasis will help you manage the disease, make informed decisions about how you treat psoriasis, and avoid things that can make psoriasis worse. It will also help you talk about psoriasis with others. Take good care of yourself. Eating a healthy diet, exercising, not smoking, and drinking very little alcohol will help. Smoking, drinking, and being overweight make psoriasis worse. These also can make treatment less effective. People who have psoriasis also have an increased risk for developing heart disease, diabetes, and other diseases, so taking good care of yourself is essential.  Be aware of your joints. If your joints feel stiff and sore, especially when you wake up, see a dermatologist. Stiff or sore joints can be the first sign of psoriatic (sore-EE-at-ic) arthritis. About 10% to 30% of people who have psoriasis get this type of arthritis. Treatment is essential. This type of arthritis can eat away the joints. Treatment can prevent deformed joints and disability. Notice your nails. If your nails begin to pull away from the nail bed or develop pitting, ridges, or a yellowish-orange color, see a dermatologist. These are signs of psoriatic arthritis. Pay attention to your mood. If you feel depressed, you may want to join a psoriasis support group or see a mental health professional. Depression, anxiety, and suicidal behavior are more common in people who have psoriasis. Getting help is not a sign of weakness. Learn about treatment for psoriasis. Some people choose not to treat psoriasis, but it is important to know your options. This will help you make an informed decision and feel in control.   Tell your dermatologist if you cannot afford the medicine. You may be eligible for financial assistance. To learn more about this assistance, visit Financial Assistance Available for Psoriasis Medication. Talk with your dermatologist before you stop taking medicine for psoriasis. Immediately stopping a medicine for psoriasis can have serious consequences. It can cause one type of psoriasis to turn into another, more serious type of psoriasis. Lets say a person who has plaque psoriasis takes a medicine called methotrexate. If the person just stops taking methotrexate, this can cause the plaque psoriasis to turn into guttate psoriasis or erythrodermic psoriasis. This can be very serious. Tips for managing Psoriasis:It is a long-lasting disease. Here are some things you can do that will help you take control. Learn about psoriasis. Knowledge really is power. Learning about psoriasis will help you manage the disease, make informed decisions about how you treat psoriasis, and avoid things that can make psoriasis worse. It will also help you talk about psoriasis with others. Take good care of yourself. Eating a healthy diet, exercising, not smoking, and drinking very little alcohol will help. Smoking, drinking, and being overweight make psoriasis worse. These also can make treatment less effective. People who have psoriasis also have an increased risk for developing heart disease, diabetes, and other diseases, so taking good care of yourself is essential. Be aware of your joints. If your joints feel stiff and sore, especially when you wake up, see a dermatologist. Stiff or sore joints can be the first sign of psoriatic (sore-EE-at-ic) arthritis. About 10% to 30% of people who have psoriasis get this type of arthritis. Treatment is essential. This type of arthritis can eat away the joints. Treatment can prevent deformed joints and disability. Notice your nails.  If your nails begin to pull away from the nail bed or develop pitting, ridges, or a yellowish-orange color, see a dermatologist. These are signs of psoriatic arthritis. Pay attention to your mood. If you feel depressed, you may want to join a psoriasis support group or see a mental health professional. Depression, anxiety, and suicidal behavior are more common in people who have psoriasis. Getting help is not a sign of weakness. Learn about treatment for psoriasis. Some people choose not to treat psoriasis, but it is important to know your options. This will help you make an informed decision and feel in control. Tell your dermatologist if you cannot afford the medicine. You may be eligible for financial assistance. To learn more about this assistance, visit Financial Assistance Available for Psoriasis Medication. Talk with your dermatologist before you stop taking medicine for psoriasis. Immediately stopping a medicine for psoriasis can have serious consequences. It can cause one type of psoriasis to turn into another, more serious type of psoriasis. Lets say a person who has plaque psoriasis takes a medicine called methotrexate. If the person just stops taking methotrexate, this can cause the plaque psoriasis to turn into guttate psoriasis or erythrodermic psoriasis. This can be very serious. [id number] [employer]   Seborrheic Keratosis  Seborrheic keratoses are common benign growths of unknown cause seen in adults due to a thickening of an area of the top skin layer. Who's At Risk  Although they can occur anytime after puberty, almost everyone over 48 has one or more of these and they increase in number with age. Some families have an inherited tendency to grow multiple lesions. Men and women are equally as likely to develop seborrheic keratoses. Dark-skinned people are less affected than those with light skin; a variant seen in blacks is called dermatosis papulosa nigra.   Signs & Symptoms  One or more spots can occur anywhere on the body, except for palms, soles, and mucous membranes (eg, in the mouth or rectum). They do not go away. They do not turn into cancers, but some cancers resemble seborrheic keratosis. They start as light brown to skin-colored, flat areas, which are round to oval and of varying size (usually less than a half inch, but sometimes much larger). As they grow thicker and rise above the skin surface, seborrheic keratoses may become dark brown to almost black with a \"stuck on\" appearance. The surface may feel smooth or rough. Self-Care Guidelines  No treatment is needed unless there is irritation from clothing with itching or bleeding. There is no way to prevent new spots from forming. Some lotions with alpha hydroxyl acids may make the areas feel smoother with regular use but will not eliminate them. OTC freezing techniques are available but usually not effective. When to [facility]  If a spot on the skin is growing, bleeding, painful, or itchy, or any other concerning changes, then see your doctor.

## 2022-12-09 NOTE — PROGRESS NOTES
Dermatology Patient Note  [address]  [address] [place] [id number]  Dept: [phone]  Dept Fax: [phone]      VISITDATE: 12/9/2022   REFERRING PROVIDER: No ref. provider found      Bernadine Bullock is a 63 y.o. female  who presents today in the office for:    Follow-up (1y, pt does not have any new concern,)      HISTORY OF PRESENT ILLNESS:  Pt states hat she has several pruritic \"moles\" on her upper back and shoulders. These seem to be irritated by her bra. She also notes pruritic lesion on left forearm x months. She is concerned that the lesion is malignant. Notes that scaly plaques continue intermittently on UE. These lesions are mildly pruritic. The lesions correlate with the appearance of scaling on her bilateral elbows.     MEDICAL PROBLEMS:  Patient Active Problem List    Diagnosis Date Noted    IFG (impaired fasting glucose) 12/01/2022     Priority: Medium    Mild intermittent asthma without complication 46/27/8948    Morbid obesity with BMI of 50.0-59.9, adult ([facility] [address] 12/04/2018    Status post left foot surgery 12/04/2018    Anxiety 07/05/2018    Food allergy 07/05/2018    Vitamin D deficiency 07/05/2018    Chronic pain of left ankle     Spontaneous rupture of flexor tendon of left lower leg     Rupture of posterior tibialis tendon 08/17/2017    Obesity, morbid, BMI 40.0-49.9 ([facility] [address] 02/28/2017    Essential hypertension     YAMEL (obstructive sleep apnea)     Allergic asthma     Allergic rhinitis 12/04/2014    Cyst of ovary 06/26/2014    Generalized osteoarthritis 06/26/2014       CURRENT MEDICATIONS:   Current Outpatient Medications   Medication Sig Dispense Refill    clobetasol (TEMOVATE) 0.05 % ointment Apply to rash twice daily until resolved (not face, armpit or groin) 60 g 5    loratadine (CLARITIN) 10 MG tablet daily      traMADol (ULTRAM) 50 MG tablet Take 50 mg by mouth every 4 hours as needed for Pain.      fluticasone-salmeterol (ADVAIR) 250-50 MCG/ACT AEPB diskus inhaler Inhale 1 puff into the lungs 2 times daily 180 each 1    amLODIPine (NORVASC) 5 MG tablet Take 1 tablet by mouth daily 90 tablet 1    losartan-hydroCHLOROthiazide (HYZAAR) 100-25 MG per tablet Take 1 tablet by mouth daily 90 tablet 1    albuterol sulfate HFA (PROVENTIL;VENTOLIN;PROAIR) 108 (90 Base) MCG/ACT inhaler Inhale 2 puffs into the lungs every 6 hours as needed for Wheezing or Shortness of Breath 1 each 2    hydrOXYzine (ATARAX) 25 MG tablet Take 1 tablet by mouth every 6 hours as needed for Itching or Anxiety 60 tablet 5    fluticasone (FLONASE) 50 MCG/ACT nasal spray 2 sprays by Nasal route daily 16 g 5    EPINEPHrine (EPIPEN) 0.3 MG/0.3ML SOAJ injection Inject 0.3 mLs into the muscle once for 1 dose Use as directed for allergic reaction 1 each 0     Current Facility-Administered Medications   Medication Dose Route Frequency Provider Last Rate Last Admin    lidocaine 1 % injection 5 mg  5 mg IntraDERmal Once Irasema Genao PA-C           ALLERGIES:   Allergies   Allergen Reactions    Latex Hives     Latex gloves    Asa [Aspirin] Hives    Codeine Hives    Lisinopril     Motrin [Ibuprofen] Hives    Other Hives and Swelling     Cherries, almonds    Pcn [Penicillins] Hives    Sulfa Antibiotics        SOCIAL HISTORY:  Social History     Tobacco Use    Smoking status: Former     Packs/day: 1.00     Years: 8.00     Pack years: 8.00     Types: Cigarettes     Start date: 1981     Quit date: 1998     Years since quittin.9    Smokeless tobacco: Never    Tobacco comments:     quit smoking 15-20 yrs ago   Substance Use Topics    Alcohol use: No       Pertinent ROS:  Review of Systems  Skin: Denies any new changing, growing or bleeding lesions or rashes except as described in the HPI   Constitutional: Denies fevers, chills, and malaise.     PHYSICAL EXAM:   /79   Pulse 81   Temp 97.7 °F (36.5 °C) (Temporal)   Wt 244 lb 9.6 oz (110.9 kg)   SpO2 95%   BMI 49.40 kg/m²     The patient is generally well appearing, well nourished, alert and conversational. Affect is normal.    Cutaneous Exam:  Physical Exam  Sun-exposed skin: head/face, neck, both arms, digits and nails were examined. + back    Facial covering was removed during examination. Diagnoses/exam findings/medical history pertinent to this visit are listed below:    Assessment:   Diagnosis Orders   1. Psoriasis  clobetasol (TEMOVATE) 0.05 % ointment      2. Neoplasm of uncertain behavior  CA PUNCH BIOPSY SKIN SINGLE LESION    Surgical Pathology    lidocaine 1 % injection 5 mg      3. Inflamed seborrheic keratosis  CA DESTRUCTION BENIGN LESIONS UP TO 14      4. Seborrheic keratoses  CA DESTRUCTION BENIGN LESIONS UP TO 14           Plan:  1. Psoriasis  - chronic illness, responding to treatment but not yet at goal   - clobetasol (TEMOVATE) 0.05 % ointment; Apply to rash twice daily until resolved (not face, armpit or groin)  Dispense: 60 g; Refill: 5    2. Neoplasm of uncertain behavior  Punch Biopsy: The procedure and its risks were explained including but not limited to pain, bleeding, infection, permanent scar, permanent pigment alteration and need for an additional procedure. Consent to proceed with the procedure was obtained from the patient or the parent. After cleaning with alcohol the lesion was anesthetized with 1% lidocaine with epinephrine and a 6 mm punch was performed. Hemostasis was achieved with suture closure of the defect with 4-0 Prolene and Vaseline and a bandage were applied.   - CA PUNCH BIOPSY SKIN SINGLE LESION  - Surgical Pathology; Future  - lidocaine 1 % injection 5 mg    3. Inflamed seborrheic keratosis  After discussion of the risks, benefits, and alternative therapies available, the patient elected to proceed.  After obtaining written informed consent, the patient's identity, procedure, and site were verified during a time out prior to proceeding procedure. The 13 lesions on the back and forearms were treated using liquid nitrogen spray gun for 6 second per cycle, 2 cycles total. The patient tolerated the procedure well and there were no immediate complications.   - [place] DESTRUCTION BENIGN LESIONS UP TO 14    4. Seborrheic keratoses  - reassurance and education       RTC per Bx    Future Appointments   Date Time Provider Zurdo Venegasi   12/12/2022 10:00 AM SCHEDULE, [facility]  DERMATOLOGY  derm [facility]   12/20/2022  9:00 AM STA PULM FUNCTION RM STAZ PFT Stephie Savant   12/22/2022  9:45 AM LEONOR [facility]   12/23/2022 11:30 AM STA SCR MAMMO RM 2 STAZ MAMMO STA Radiolog   6/1/2023  9:40 AM Oral Ledbetterelor, APRN - CNP [facility]         Patient Instructions   Cryotherapy    Liquid Nitrogen - \"freeze\" (Cryotherapy)  Your doctor has treated your skin lesions with a very cold substance. The liquid nitrogen is so cold that it may feel like the skin is burning during application. A clear blister or blood blister may form after treatment and may later form a scab. Leave the area alone. Usually this scab will fall of within 1-2 weeks. The area should be kept clean and can be covered with Vaseline and a Band-Aid if needed. If a large blister develops it is ok to use a clean needle to gently pop the blister. Please call our office with any concerns at [phone]. BIOPSY WOUND CARE    A biopsy is where a small piece of skin tissue is removed and examined by a pathologist.  When a biopsy is done, there is a small wound site that requires proper care to prevent infection and scarring. Some biopsies require sutures and their removal.    How to Care for Biopsy Wound    A.  Leave band-aid or dressing on for 24 hours. B. Wash two times a day with soap and water. C.  Let the wound air dry, then apply Vaseline ointment and cover with a Band-Aid       unless otherwise instructed by your provider. D.   If there is slight discomfort, you may give acetaminophen or ibuprofen. When To Call the Doctor    Call the Dermatology Clinic or your doctor if any of the following occur:    A. Redness and swelling  B. Tenderness and warm to touch  C.  Drainage from wound  D. Fever    Biopsy Results    Biopsy results are usually available in 1-2 weeks. We provide biopsy results in letters or via [employer] for benign results or we will call for any concerning results. If you have not heard from our staff please call the office within 2 weeks. Please call our office with any concerns at [phone]. Psoriasis    What is psoriasis? Psoriasis (sore-EYE-ah-sis) is a chronic (long-lasting) disease. It develops when a persons immune system sends faulty signals that tell skin cells to grow too quickly. New skin cells form in days rather than weeks. The body does not shed these excess skin cells. The skin cells pile up on the surface of the skin, causing patches of psoriasis to appear. Psoriasis may look contagious, but it's not. You cannot get psoriasis from touching someone who has it. To get psoriasis, a person must inherit the genes that cause it. Types of psoriasis If you have psoriasis, you will have one or more of these types: Plaque (also called psoriasis vulgaris). Guttate. Inverse (also called flexural psoriasis or intertriginous psoriasis). Pustular. Erythrodermic (also called exfoliative psoriasis). Some people get more than one type. Sometimes a person gets one type of psoriasis, and then the type of psoriasis changes. Signs and symptoms:  What you see and feel depends on the type of psoriasis you have. You may have just a few of the signs and symptoms listed below, or you may have many. Plaque psoriasis  (also called psoriasis vulgaris)  Raised, reddish patches on the skin called plaque (plak). Patches may be covered with a silvery-white coating, which dermatologists call scale. Patches can appear anywhere on the skin.    Most patches appear on the knees, elbows, lower back, and scalp. Patches can itch. Scratching the itchy patches often causes the patches to thicken. Patches vary in size and can appear as separate patches or join together to cover a large area. Nail problems -- pits in the nails, crumbling nail, nail falls off. What you see and feel depends on the type of psoriasis you have. You may have just a few of the signs and symptoms listed below, or you may have many. Plaque psoriasis (also called psoriasis vulgaris) Raised, reddish patches on the skin called plaque (plak). Patches may be covered with a silvery-white coating, which dermatologists call scale. Patches can appear anywhere on the skin. Most patches appear on the knees, elbows, lower back, and scalp. Patches can itch. Scratching the itchy patches often causes the patches to thicken. Patches vary in size and can appear as separate patches or join together to cover a large area. Nail problems -- pits in the nails, crumbling nail, nail falls off. Guttate psoriasis  Small, red spots (usually on the trunk, arms, and legs but can appear on the scalp, face, and ears). Spots can show up all over the skin. Spots often appear after an illness, especially strep throat. Spots may clear up in a few weeks or months without treatment. Spots may appear where the person had plaque psoriasis. Pustular psoriasis  Skin red, swollen, and dotted with pus-filled bumps. Bumps usually appear only on the palms and soles. Soreness and pain where the bumps appear. Pus-filled bumps will dry, and leave behind brown dots and/or scale on the skin. When pus-filled bumps cover the body, the person also may have:  Guttate psoriasis Small, red spots (usually on the trunk, arms, and legs but can appear on the scalp, face, and ears). Spots can show up all over the skin. Spots often appear after an illness, especially strep throat.  Spots may clear up in a few weeks or months without treatment. Spots may appear where the person had plaque psoriasis. Pustular psoriasis Skin red, swollen, and dotted with pus-filled bumps. Bumps usually appear only on the palms and soles. Soreness and pain where the bumps appear. Pus-filled bumps will dry, and leave behind brown dots and/or scale on the skin. When pus-filled bumps cover the body, the person also may have:  Bright-red skin. Been feeling sick and exhausted. Fever. Chills. Severe itching. Rapid pulse. Loss of appetite. Muscle weakness. Inverse psoriasis   (also called flexural psoriasis or intertriginous psoriasis)Inverse psoriasis (also called flexural psoriasis or intertriginous psoriasis)  Smooth, red patches of skin that look raw. Patches only develop where skin touches skin, such as the armpits, around the groin, genitals, and buttocks. Women can develop a red, raw patch under their breasts. Skin feels very sore where inverse psoriasis appears. Erythrodermic psoriasis  (also called exfoliative psoriasis)  Skin looks like it is burned. Most (or all) of the skin on the body turns bright red. Body cannot maintain its normal temperature of 98.6° F. Person gets very hot or very cold. Heart beats too fast.   Intense itching. Intense pain. Smooth, red patches of skin that look raw. Patches only develop where skin touches skin, such as the armpits, around the groin, genitals, and buttocks. Women can develop a red, raw patch under their breasts. Skin feels very sore where inverse psoriasis appears. Erythrodermic psoriasis (also called exfoliative psoriasis) Skin looks like it is burned. Most (or all) of the skin on the body turns bright red. Body cannot maintain its normal temperature of 98.6° F. Person gets very hot or very cold. Heart beats too fast. Intense itching. Intense pain. If it looks like a person has erythrodermic psoriasis, get the person to a hospital right away. The persons life may be in danger. Who gets psoriasis? People who get psoriasis usually have one or more person in their family who has psoriasis. Not everyone who has a family member with psoriasis will get psoriasis. But psoriasis is common. In the [facility], about 7.5 million people have psoriasis. Most people, about 80%, have plaque psoriasis. Psoriasis can begin at any age. Most people get psoriasis between 13and 27years of age. By age 36, most people who will get psoriasis, about 75%, have psoriasis. Another common time for psoriasis to begin is between 48and 61years of age. Whites get psoriasis more often than other races. Infants and young children are more likely to get inverse psoriasis and guttate psoriasis. What causes psoriasis? Scientists are still trying to learn everything that happens inside the body to cause psoriasis. We know that psoriasis is not contagious. You cannot get psoriasis from swimming in the same pool or having sex. Scientists have learned that a persons immune system and genes play important roles. It seems that many genes must interact to cause psoriasis. Scientists also know that not everyone who inherits the genes for psoriasis will get psoriasis. It seems that a person must inherit the right mix of genes. Then the person must be exposed to a trigger. Many people say that their psoriasis began after they experienced one of these common psoriasis triggers: A stressful event. Strep throat. Taking certain medicines, such as lithium, or medicine to prevent malaria. Cold, dry weather. A cut, scratch, or bad sunburn. How does a dermatologist diagnose psoriasis? To diagnose psoriasis, a dermatologist:  Examines a patients skin, nails, and scalp for signs of psoriasis. Asks whether family members have psoriasis. Learns about what has been happening in the patients life. A dermatologist may want to know whether a patient has been under a lot of stress, had a recent illness, or just started taking a medicine.   Sometimes a dermatologist also removes a bit of skin. A dermatologist may call this confirming the diagnosis. By looking at the removed skin under a microscope, one can confirm whether a person has psoriasis. How do dermatologists treat psoriasis? Treating psoriasis has benefits. Treatment can reduce signs and symptoms of psoriasis, which usually makes a person feel better. With treatment, some people see their skin completely clear. Treatment can even improve a person's quality of life. Thanks to ongoing research, there are many treatments for psoriasis. It is important to work with a dermatologist to find treatment that works for you and fits your lifestyle. Every treatment has benefits, drawbacks, and possible side effects. Outcome  Psoriasis is a chronic (long-lasting) disease of the immune system. It cannot be cured. This means that most people have psoriasis for life. By teaming up with a dermatologist who treats psoriasis, you can find a treatment plan that works for you. Dermatologists encourage their patients who have psoriasis to take an active role in managing this disease. By taking an active role, you can reduce the effects that psoriasis has on your quality of life. How does a dermatologist diagnose psoriasis? To diagnose psoriasis, a dermatologist: Examines a patients skin, nails, and scalp for signs of psoriasis. Asks whether family members have psoriasis. Learns about what has been happening in the patients life. A dermatologist may want to know whether a patient has been under a lot of stress, had a recent illness, or just started taking a medicine. Sometimes a dermatologist also removes a bit of skin. A dermatologist may call this confirming the diagnosis. By looking at the removed skin under a microscope, one can confirm whether a person has psoriasis. How do dermatologists treat psoriasis? Treating psoriasis has benefits.  Treatment can reduce signs and symptoms of psoriasis, which usually makes a person feel better. With treatment, some people see their skin completely clear. Treatment can even improve a person's quality of life. Thanks to ongoing research, there are many treatments for psoriasis. It is important to work with a dermatologist to find treatment that works for you and fits your lifestyle. Every treatment has benefits, drawbacks, and possible side effects. Outcome Psoriasis is a chronic (long-lasting) disease of the immune system. It cannot be cured. This means that most people have psoriasis for life. By teaming up with a dermatologist who treats psoriasis, you can find a treatment plan that works for you. Dermatologists encourage their patients who have psoriasis to take an active role in managing this disease. By taking an active role, you can reduce the effects that psoriasis has on your quality of life. Psoriasis: Tips for managing  Psoriasis is a long-lasting disease. Here are some things you can do that will help you take control. Learn about psoriasis. Knowledge really is power. Learning about psoriasis will help you manage the disease, make informed decisions about how you treat psoriasis, and avoid things that can make psoriasis worse. It will also help you talk about psoriasis with others. Take good care of yourself. Eating a healthy diet, exercising, not smoking, and drinking very little alcohol will help. Smoking, drinking, and being overweight make psoriasis worse. These also can make treatment less effective. People who have psoriasis also have an increased risk for developing heart disease, diabetes, and other diseases, so taking good care of yourself is essential.  Be aware of your joints. If your joints feel stiff and sore, especially when you wake up, see a dermatologist. Stiff or sore joints can be the first sign of psoriatic (sore-EE-at-ic) arthritis. About 10% to 30% of people who have psoriasis get this type of arthritis.    Treatment is essential. This type of arthritis can eat away the joints. Treatment can prevent deformed joints and disability. Notice your nails. If your nails begin to pull away from the nail bed or develop pitting, ridges, or a yellowish-orange color, see a dermatologist. These are signs of psoriatic arthritis. Pay attention to your mood. If you feel depressed, you may want to join a psoriasis support group or see a mental health professional. Depression, anxiety, and suicidal behavior are more common in people who have psoriasis. Getting help is not a sign of weakness. Learn about treatment for psoriasis. Some people choose not to treat psoriasis, but it is important to know your options. This will help you make an informed decision and feel in control. Tell your dermatologist if you cannot afford the medicine. You may be eligible for financial assistance. To learn more about this assistance, visit Financial Assistance Available for Psoriasis Medication. Talk with your dermatologist before you stop taking medicine for psoriasis. Immediately stopping a medicine for psoriasis can have serious consequences. It can cause one type of psoriasis to turn into another, more serious type of psoriasis. Lets say a person who has plaque psoriasis takes a medicine called methotrexate. If the person just stops taking methotrexate, this can cause the plaque psoriasis to turn into guttate psoriasis or erythrodermic psoriasis. This can be very serious. Tips for managing Psoriasis:It is a long-lasting disease. Here are some things you can do that will help you take control. Learn about psoriasis. Knowledge really is power. Learning about psoriasis will help you manage the disease, make informed decisions about how you treat psoriasis, and avoid things that can make psoriasis worse. It will also help you talk about psoriasis with others. Take good care of yourself. Eating a healthy diet, exercising, not smoking, and drinking very little alcohol will help. Smoking, drinking, and being overweight make psoriasis worse. These also can make treatment less effective. People who have psoriasis also have an increased risk for developing heart disease, diabetes, and other diseases, so taking good care of yourself is essential. Be aware of your joints. If your joints feel stiff and sore, especially when you wake up, see a dermatologist. Stiff or sore joints can be the first sign of psoriatic (sore-EE-at-ic) arthritis. About 10% to 30% of people who have psoriasis get this type of arthritis. Treatment is essential. This type of arthritis can eat away the joints. Treatment can prevent deformed joints and disability. Notice your nails. If your nails begin to pull away from the nail bed or develop pitting, ridges, or a yellowish-orange color, see a dermatologist. These are signs of psoriatic arthritis. Pay attention to your mood. If you feel depressed, you may want to join a psoriasis support group or see a mental health professional. Depression, anxiety, and suicidal behavior are more common in people who have psoriasis. Getting help is not a sign of weakness. Learn about treatment for psoriasis. Some people choose not to treat psoriasis, but it is important to know your options. This will help you make an informed decision and feel in control. Tell your dermatologist if you cannot afford the medicine. You may be eligible for financial assistance. To learn more about this assistance, visit Financial Assistance Available for Psoriasis Medication. Talk with your dermatologist before you stop taking medicine for psoriasis. Immediately stopping a medicine for psoriasis can have serious consequences. It can cause one type of psoriasis to turn into another, more serious type of psoriasis. Lets say a person who has plaque psoriasis takes a medicine called methotrexate.  If the person just stops taking methotrexate, this can cause the plaque psoriasis to turn into guttate psoriasis or erythrodermic psoriasis. This can be very serious. [place]:1230 [employer]   Seborrheic Keratosis  Seborrheic keratoses are common benign growths of unknown cause seen in adults due to a thickening of an area of the top skin layer. Who's At Risk  Although they can occur anytime after puberty, almost everyone over 48 has one or more of these and they increase in number with age. Some families have an inherited tendency to grow multiple lesions. Men and women are equally as likely to develop seborrheic keratoses. Dark-skinned people are less affected than those with light skin; a variant seen in blacks is called dermatosis papulosa nigra. Signs & Symptoms  One or more spots can occur anywhere on the body, except for palms, soles, and mucous membranes (eg, in the mouth or rectum). They do not go away. They do not turn into cancers, but some cancers resemble seborrheic keratosis. They start as light brown to skin-colored, flat areas, which are round to oval and of varying size (usually less than a half inch, but sometimes much larger). As they grow thicker and rise above the skin surface, seborrheic keratoses may become dark brown to almost black with a \"stuck on\" appearance. The surface may feel smooth or rough. Self-Care Guidelines  No treatment is needed unless there is irritation from clothing with itching or bleeding. There is no way to prevent new spots from forming. Some lotions with alpha hydroxyl acids may make the areas feel smoother with regular use but will not eliminate them. OTC freezing techniques are available but usually not effective. When to [facility]  If a spot on the skin is growing, bleeding, painful, or itchy, or any other concerning changes, then see your doctor.        Electronically signed by Shanna Beebe PA-C on 12/9/22 at 1:22 PM EST

## 2022-12-12 ENCOUNTER — HOSPITAL ENCOUNTER (OUTPATIENT)
Age: 62
Setting detail: SPECIMEN
Discharge: HOME | End: 2022-12-12

## 2022-12-12 ENCOUNTER — NURSE ONLY (OUTPATIENT)
Dept: DERMATOLOGY | Age: 62
End: 2022-12-12

## 2022-12-12 VITALS
BODY MASS INDEX: 49.19 KG/M2 | HEART RATE: 78 BPM | OXYGEN SATURATION: 98 % | WEIGHT: 244 LBS | HEIGHT: 59 IN | TEMPERATURE: 97.9 F

## 2022-12-12 DIAGNOSIS — Z48.02 VISIT FOR SUTURE REMOVAL: Primary | ICD-10-CM

## 2022-12-14 LAB — DERMATOLOGY PATHOLOGY REPORT: NORMAL

## 2022-12-14 NOTE — RESULT ENCOUNTER NOTE
We have received and reviewed your biopsy results, which demonstrated a benign skin lesion called a dermatofibroma. No further Tx is required for this lesion.

## 2022-12-16 ENCOUNTER — NURSE ONLY (OUTPATIENT)
Dept: DERMATOLOGY | Age: 62
End: 2022-12-16

## 2022-12-16 VITALS
OXYGEN SATURATION: 97 % | HEART RATE: 81 BPM | WEIGHT: 244 LBS | BODY MASS INDEX: 49.19 KG/M2 | TEMPERATURE: 97.4 F | HEIGHT: 59 IN

## 2022-12-16 DIAGNOSIS — Z48.02 VISIT FOR SUTURE REMOVAL: Primary | ICD-10-CM

## 2022-12-16 PROCEDURE — 99024 POSTOP FOLLOW-UP VISIT: CPT | Performed by: PHYSICIAN ASSISTANT

## 2022-12-20 ENCOUNTER — TELEPHONE (OUTPATIENT)
Dept: FAMILY MEDICINE CLINIC | Age: 62
End: 2022-12-20

## 2022-12-20 ENCOUNTER — HOSPITAL ENCOUNTER (OUTPATIENT)
Dept: PULMONOLOGY | Age: 62
Discharge: HOME | End: 2022-12-20
Payer: MEDICARE

## 2022-12-20 DIAGNOSIS — J45.40 MODERATE PERSISTENT ASTHMA WITHOUT COMPLICATION: ICD-10-CM

## 2022-12-20 DIAGNOSIS — L29.9 GENERALIZED PRURITUS: ICD-10-CM

## 2022-12-20 LAB
DLCO %PRED: NORMAL
DLCO PRED: NORMAL
DLCO/VA %PRED: NORMAL
DLCO/VA PRED: NORMAL
DLCO/VA: NORMAL
DLCO: NORMAL
EXPIRATORY TIME-POST: NORMAL
EXPIRATORY TIME: NORMAL
FEF 25-75% %CHNG: NORMAL
FEF 25-75% %PRED-POST: NORMAL
FEF 25-75% %PRED-PRE: NORMAL
FEF 25-75% PRED: NORMAL
FEF 25-75%-POST: NORMAL
FEF 25-75%-PRE: NORMAL
FEV1 %PRED-POST: NORMAL
FEV1 %PRED-PRE: NORMAL
FEV1 PRED: NORMAL
FEV1-POST: NORMAL
FEV1-PRE: NORMAL
FEV1/FVC %PRED-POST: NORMAL
FEV1/FVC %PRED-PRE: NORMAL
FEV1/FVC PRED: NORMAL
FEV1/FVC-POST: NORMAL
FEV1/FVC-PRE: NORMAL
FVC %PRED-POST: NORMAL
FVC %PRED-PRE: NORMAL
FVC PRED: NORMAL
FVC-POST: NORMAL
FVC-PRE: NORMAL
GAW %PRED: NORMAL
GAW PRED: NORMAL
GAW: NORMAL
IC %PRED: NORMAL
IC PRED: NORMAL
IC: NORMAL
MEP: NORMAL
MIP: NORMAL
MVV %PRED-PRE: NORMAL
MVV PRED: NORMAL
MVV-PRE: NORMAL
PEF %PRED-POST: NORMAL
PEF %PRED-PRE: NORMAL
PEF PRED: NORMAL
PEF%CHNG: NORMAL
PEF-POST: NORMAL
PEF-PRE: NORMAL
RAW %PRED: NORMAL
RAW PRED: NORMAL
RAW: NORMAL
RV %PRED: NORMAL
RV PRED: NORMAL
RV: NORMAL
SVC %PRED: NORMAL
SVC PRED: NORMAL
SVC: NORMAL
TLC %PRED: NORMAL
TLC PRED: NORMAL
TLC: NORMAL
VA %PRED: NORMAL
VA PRED: NORMAL
VA: NORMAL
VTG %PRED: NORMAL
VTG PRED: NORMAL
VTG: NORMAL

## 2022-12-20 PROCEDURE — 94729 DIFFUSING CAPACITY: CPT

## 2022-12-20 PROCEDURE — 94060 EVALUATION OF WHEEZING: CPT

## 2022-12-20 PROCEDURE — 6370000000 HC RX 637 (ALT 250 FOR IP): Performed by: NURSE PRACTITIONER

## 2022-12-20 PROCEDURE — 94726 PLETHYSMOGRAPHY LUNG VOLUMES: CPT

## 2022-12-20 RX ORDER — ALBUTEROL SULFATE 90 UG/1
2 AEROSOL, METERED RESPIRATORY (INHALATION) ONCE
Status: COMPLETED | OUTPATIENT
Start: 2022-12-20 | End: 2022-12-20

## 2022-12-20 RX ORDER — HYDROXYZINE HYDROCHLORIDE 25 MG/1
25 TABLET, FILM COATED ORAL EVERY 6 HOURS PRN
Qty: 90 TABLET | Refills: 1 | Status: SHIPPED | OUTPATIENT
Start: 2022-12-20

## 2022-12-20 RX ORDER — EPINEPHRINE 0.3 MG/.3ML
0.3 INJECTION SUBCUTANEOUS
Qty: 1 EACH | Refills: 0 | Status: SHIPPED | OUTPATIENT
Start: 2022-12-20 | End: 2022-12-20

## 2022-12-20 RX ORDER — LORATADINE 10 MG/1
10 TABLET ORAL DAILY
Qty: 90 TABLET | Refills: 3 | Status: SHIPPED | OUTPATIENT
Start: 2022-12-20

## 2022-12-20 RX ADMIN — ALBUTEROL SULFATE 2 PUFF: 90 AEROSOL, METERED RESPIRATORY (INHALATION) at 09:16

## 2022-12-20 NOTE — TELEPHONE ENCOUNTER
Patient would like to know if she can be prescribed a epi pen? . Patient was prescribed a epi pen in the past.

## 2022-12-20 NOTE — TELEPHONE ENCOUNTER
Mariluz Fitzgerald is calling to request a refill on the following medication(s):    Medication Request:  Requested Prescriptions     Pending Prescriptions Disp Refills    hydrOXYzine HCl (ATARAX) 25 MG tablet 60 tablet 5     Sig: Take 1 tablet by mouth every 6 hours as needed for Itching or Anxiety    loratadine (CLARITIN) 10 MG tablet       Sig: daily       Last Visit Date (If Applicable):  70/6/0218    Next Visit Date:    6/1/2023

## 2022-12-20 NOTE — PROCEDURES
Impression :     Air trapping without obstruction or restriction and increased diffusion. No significant response to bronchodilators. Clinical correlation is recommended.         Alexx Erickson MD   Pulmonary Critical Care and Sleep

## 2022-12-22 ENCOUNTER — OFFICE VISIT (OUTPATIENT)
Dept: PODIATRY | Age: 62
End: 2022-12-22
Payer: MEDICARE

## 2022-12-22 VITALS — BODY MASS INDEX: 49.19 KG/M2 | HEIGHT: 59 IN | WEIGHT: 244 LBS

## 2022-12-22 DIAGNOSIS — M79.672 PAIN IN BOTH FEET: ICD-10-CM

## 2022-12-22 DIAGNOSIS — I73.9 PVD (PERIPHERAL VASCULAR DISEASE) (HCC): ICD-10-CM

## 2022-12-22 DIAGNOSIS — M79.671 PAIN IN BOTH FEET: ICD-10-CM

## 2022-12-22 DIAGNOSIS — B35.1 DERMATOPHYTOSIS OF NAIL: ICD-10-CM

## 2022-12-22 DIAGNOSIS — M25.572 CHRONIC PAIN OF LEFT ANKLE: ICD-10-CM

## 2022-12-22 DIAGNOSIS — R26.2 TROUBLE WALKING: Primary | ICD-10-CM

## 2022-12-22 DIAGNOSIS — M25.572 SINUS TARSITIS OF LEFT FOOT: ICD-10-CM

## 2022-12-22 DIAGNOSIS — G89.29 CHRONIC PAIN OF LEFT ANKLE: ICD-10-CM

## 2022-12-22 PROCEDURE — G8484 FLU IMMUNIZE NO ADMIN: HCPCS | Performed by: PODIATRIST

## 2022-12-22 PROCEDURE — 99213 OFFICE O/P EST LOW 20 MIN: CPT | Performed by: PODIATRIST

## 2022-12-22 PROCEDURE — 11721 DEBRIDE NAIL 6 OR MORE: CPT | Performed by: PODIATRIST

## 2022-12-22 PROCEDURE — G8427 DOCREV CUR MEDS BY ELIG CLIN: HCPCS | Performed by: PODIATRIST

## 2022-12-22 PROCEDURE — 3017F COLORECTAL CA SCREEN DOC REV: CPT | Performed by: PODIATRIST

## 2022-12-22 PROCEDURE — G8417 CALC BMI ABV UP PARAM F/U: HCPCS | Performed by: PODIATRIST

## 2022-12-22 PROCEDURE — 1036F TOBACCO NON-USER: CPT | Performed by: PODIATRIST

## 2022-12-22 NOTE — PATIENT INSTRUCTIONS
Schedule a Vaccine  When you qualify to receive the vaccine, call the [facility]) COVID-19 Vaccination Hotline to schedule your appointment or to get additional information about the [facility]) locations which are offering the COVID-19 vaccine. To be 94% effective, it's important that you receive two doses of one of the COVID-19 vaccines. -If you are receiving the George Peter vaccine, your second shot will be scheduled as close to 21 days after the first shot as possible. -If you are receiving the Moderna vaccine, your second shot will be scheduled as close to 28 days after the first shot as possible. [facility]) COVID-19 Vaccination Hotline: [phone]    Links to [facility]) website and [facility] website:    JosephineFriend[employer] ScoreToshia0xdata/mercy-[facility]-monitoring-coronavirus-covid-19/covid-19-vaccine/[place]/[email or web address]-vaccine    [email or web address]

## 2022-12-22 NOTE — PROGRESS NOTES
[address] [facility] PODIATRY [facility]  [address] [facility] [address]  Dept: [phone]  Dept Fax: [phone]     PAIN PROGRESS NOTE  Date of patient's visit: 12/22/2022  Patient's Name:  Lissette Shea YOB: 1960            Patient Care Team:  JJ Rose CNP as PCP - General (Family Medicine)  JJ Rose CNP as PCP - [facility] Empaneled Provider  Gail Johnson DPM as Physician (Podiatry)      Chief Complaint   Patient presents with    Foot Pain     Bilateral foot     Nail Problem     Toenail trim       Subjective: This Lissette Shea comes to clinic for foot and nail care. Pt currently has complaint of thickened, painful, elongated nails that he/she cannot manage by themselves. Pt. Relates pain to nails with shoe gear. Pt's primary care physician is JJ Rose CNP last seen 12/1/22. Pt has a new complaint of left ankle pain and pain to the outside fo the foot. 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 Past Medical History:   Diagnosis Date    Allergic rhinitis 12/04/2014    Anxiety 07/05/2018    Arthritis     Asthma     Chest pain     occurs every other day, pt states they occur when she doesn't eat right or forget to take her bp med, occurs left side of chest with no radiation, pt states \"it's new\" and has not had any testing done or seen anyone for it.     Depressive disorder 07/05/2018    Generalized osteoarthritis 06/26/2014    History of kidney stones     Hypertension     Obesity, morbid, BMI 40.0-49.9 ([facility] [address] 02/28/2017    PONV (postoperative nausea and vomiting)     Sleep apnea     does not use a machine       Allergies   Allergen Reactions    Latex Hives     Latex gloves    Asa [Aspirin] Hives    Codeine Hives    Lisinopril     Motrin [Ibuprofen] Hives    Other Hives and Swelling     Cherries, almonds    Pcn [Penicillins] Hives    Sulfa Antibiotics      Current Outpatient Medications on File Prior to Visit   Medication Sig Dispense Refill    hydrOXYzine HCl (ATARAX) 25 MG tablet Take 1 tablet by mouth every 6 hours as needed for Itching or Anxiety 90 tablet 1    loratadine (CLARITIN) 10 MG tablet Take 1 tablet by mouth daily 90 tablet 3    clobetasol (TEMOVATE) 0.05 % ointment Apply to rash twice daily until resolved (not face, armpit or groin) 60 g 5    traMADol (ULTRAM) 50 MG tablet Take 50 mg by mouth every 4 hours as needed for Pain. fluticasone-salmeterol (ADVAIR) 250-50 MCG/ACT AEPB diskus inhaler Inhale 1 puff into the lungs 2 times daily 180 each 1    amLODIPine (NORVASC) 5 MG tablet Take 1 tablet by mouth daily 90 tablet 1    losartan-hydroCHLOROthiazide (HYZAAR) 100-25 MG per tablet Take 1 tablet by mouth daily 90 tablet 1    albuterol sulfate HFA (PROVENTIL;VENTOLIN;PROAIR) 108 (90 Base) MCG/ACT inhaler Inhale 2 puffs into the lungs every 6 hours as needed for Wheezing or Shortness of Breath 1 each 2    fluticasone (FLONASE) 50 MCG/ACT nasal spray 2 sprays by Nasal route daily 16 g 5    EPINEPHrine (EPIPEN 2-GUNNAR) 0.3 MG/0.3ML SOAJ injection Inject 0.3 mLs into the skin once as needed (anaphylaxsis) 1 each 0    EPINEPHrine (EPIPEN) 0.3 MG/0.3ML SOAJ injection Inject 0.3 mLs into the muscle once for 1 dose Use as directed for allergic reaction 1 each 0     Current Facility-Administered Medications on File Prior to Visit   Medication Dose Route Frequency Provider Last Rate Last Admin    lidocaine 1 % injection 5 mg  5 mg IntraDERmal Once Pierce Matt PA-C         Review of Systems. Review of Systems:   History obtained from chart review and the patient  General ROS: negative for - chills, fatigue, fever, night sweats or weight gain  Constitutional: Negative for chills, diaphoresis, fatigue, fever and unexpected weight change. Musculoskeletal: Positive for arthralgias, gait problem and joint swelling.   Neurological ROS: negative for - behavioral changes, confusion, headaches or seizures. Negative for weakness and numbness. Dermatological ROS: negative for - mole changes, rash  Cardiovascular: Negative for leg swelling. Gastrointestinal: Negative for constipation, diarrhea, nausea and vomiting. Objective:  Dermatologic Exam:  Skin lesion/ulceration Absent . Skin No rashes or nodules noted. .   Skin is thin, with flaky sloughing skin as well as decreased hair growth to the lower leg  Small red hemosiderin deposits seen dorsal foot   Musculoskeletal:     1st MPJ ROM decreased, Bilateral.  Muscle strength 5/5, Bilateral.  Pain present upon palpation of toenails 1-5, Bilateral. decreased medial longitudinal arch, Bilateral.  Ankle ROM decreased,Bilateral.    Dorsally contracted digits present digits 2, Bilateral.     Vascular: DP pulses 1/4 bilateral.  PT pulses 0/4 bilateral.   CFT <5 seconds, Bilateral.  Hair growth absent to the level of the digits, Bilateral.  Edema present, Bilateral.  Varicosities absent, Bilateral. Erythema absent, Bilateral    Neurological: Sensation diminshed to light touch to level of digits, Bilateral.  Protective sensation intact 6/10 sites via 5.07/10g Bartow-Daniela Monofilament, Bilateral.  negative Tinel's, Bilateral.  negative Valleix sign, Bilateral.      Integument: Warm, dry, supple, Bilateral.  Open lesion absent, Bilateral.  Interdigital maceration absent to web spaces 4, Bilateral.  Nails 1-5 left and 1-5 right thickened > 3.0 mm, dystrophic and crumbly, discolored with subungual debris. Fissures absent, Bilateral.   General: AAO x 3 in NAD.     Derm  Toenail Description  Sites of Onychomycosis Involvement (Check affected area)  [x] [x] [x] [x] [x] [x] [x] [x] [x] [x]  5 4 3 2 1 1 2 3 4 5                          Right                                        Left    Thickness  [x] [x] [x] [x] [x] [x] [x] [x] [x] [x]  5 4 3 2 1 1 2 3 4 5                         Right Left    Dystrophic Changes   [x] [x] [x] [x] [x] [x] [x] [x] [x] [x]  5 4 3 2 1 1 2 3 4 5                         Right                                        Left    Color  [x] [x] [x] [x] [x] [x] [x] [x] [x] [x]  5 4 3 2 1 1 2 3 4 5                          Right                                        Left    Incurvation/Ingrowin   [] [] [] [] [] [] [] [] [] []  5 4 3 2 1 1 2 3 4 5                         Right                                        Left    Inflammation/Pain   [x] [x] [x] [x] [x] [x] [x] [x] [x] [x]  5 4 3  2 1 1 2 3 4 5                         Right                                        Left       Nails are painful 1-10 with direct palpation. Q7   []Yes  []No                Q8   [x]Yes  []No                     Q9   []Yes    []No  Assessment:  63 y.o. female with:    Diagnosis Orders   1. Trouble walking  82669 - DE DEBRIDEMENT OF NAILS, 6 OR MORE      2. Dermatophytosis of nail  85748 - DE DEBRIDEMENT OF NAILS, 6 OR MORE      3. Pain in both feet  20016 - DE DEBRIDEMENT OF NAILS, 6 OR MORE      4. PVD (peripheral vascular disease) ([address]  36953 - DE DEBRIDEMENT OF NAILS, 6 OR MORE      5. Chronic pain of left ankle        6. Sinus tarsitis of left foot                Plan:   Pt was evaluated and examined. Patient was given personalized discharge instructions. For the left ankle pt I do not recoommend an injection at this time      X rays reviewed labs reviewed  Recommend OTC anti inflammatories. RICE therapy if pain worsens after activity   Recommend proper shoe gear with supportive archs. Nails 1-10 were debrided in length and thickness sharply with a nail nipper and  without incident. Pt will follow up in 9 weeks or sooner if any problems arise. Diagnosis was discussed with the pt and all of their questions were answered in detail. Proper foot hygiene and care was discussed with the pt.  Patient to check feet daily and contact the office with any questions/problems/concerns. Other comorbidity noted and will be managed by PCP. Pain waiver discussed with patient and confirmed. All labs were reviewed and all imagining including the above findings were reviewed PRIOR to the patients arrival and with the patient today. Previous patient encounter was reviewed. Encounters from the patients other medical providers were reviewed and noted. Time was spent educating the patient and their families/caregivers on proper care of the feet and ankles. All the above diagnosis were addressed at todays visit and all questions were answered.   A total of 20 minutes was spent with this patients encounter which included charting after the patients visit    12/22/2022      Electronically signed by Chiara Hernandez DPM on 12/22/2022 at 10:21 AM  12/22/2022

## 2022-12-23 ENCOUNTER — HOSPITAL ENCOUNTER (OUTPATIENT)
Dept: MAMMOGRAPHY | Age: 62
Discharge: HOME | End: 2022-12-23
Payer: MEDICARE

## 2022-12-23 DIAGNOSIS — Z12.31 ENCOUNTER FOR SCREENING MAMMOGRAM FOR MALIGNANT NEOPLASM OF BREAST: ICD-10-CM

## 2022-12-23 PROCEDURE — 77063 BREAST TOMOSYNTHESIS BI: CPT

## 2023-01-24 ENCOUNTER — HOSPITAL ENCOUNTER (OUTPATIENT)
Dept: GENERAL RADIOLOGY | Age: 63
Discharge: HOME | End: 2023-01-26
Payer: MEDICARE

## 2023-01-24 ENCOUNTER — HOSPITAL ENCOUNTER (OUTPATIENT)
Age: 63
Discharge: HOME | End: 2023-01-26
Payer: MEDICARE

## 2023-01-24 DIAGNOSIS — J45.40 MODERATE PERSISTENT ASTHMA WITHOUT COMPLICATION: ICD-10-CM

## 2023-01-24 PROCEDURE — 71046 X-RAY EXAM CHEST 2 VIEWS: CPT

## 2023-02-17 ENCOUNTER — HOSPITAL ENCOUNTER (OUTPATIENT)
Dept: CT IMAGING | Age: 63
End: 2023-02-17
Payer: MEDICARE

## 2023-02-17 ENCOUNTER — OFFICE VISIT (OUTPATIENT)
Dept: FAMILY MEDICINE CLINIC | Age: 63
End: 2023-02-17

## 2023-02-17 ENCOUNTER — HOSPITAL ENCOUNTER (OUTPATIENT)
Age: 63
Setting detail: SPECIMEN
Discharge: HOME | End: 2023-02-17

## 2023-02-17 VITALS
BODY MASS INDEX: 48.88 KG/M2 | OXYGEN SATURATION: 97 % | HEART RATE: 69 BPM | WEIGHT: 242 LBS | DIASTOLIC BLOOD PRESSURE: 78 MMHG | TEMPERATURE: 98.2 F | SYSTOLIC BLOOD PRESSURE: 110 MMHG

## 2023-02-17 DIAGNOSIS — R30.0 DYSURIA: ICD-10-CM

## 2023-02-17 DIAGNOSIS — M15.9 GENERALIZED OSTEOARTHRITIS: ICD-10-CM

## 2023-02-17 DIAGNOSIS — R30.0 DYSURIA: Primary | ICD-10-CM

## 2023-02-17 DIAGNOSIS — K59.01 SLOW TRANSIT CONSTIPATION: ICD-10-CM

## 2023-02-17 LAB
BILIRUBIN, POC: NORMAL
BLOOD URINE, POC: NORMAL
CLARITY, POC: CLEAR
COLOR, POC: YELLOW
EGFR, POC: >60 ML/MIN/1.73M2
GLUCOSE URINE, POC: NORMAL
KETONES, POC: NORMAL
LEUKOCYTE EST, POC: NORMAL
NITRITE, POC: NORMAL
PH, POC: 7.5
POC CREATININE: 0.54 MG/DL (ref 0.51–1.19)
PROTEIN, POC: NORMAL
SPECIFIC GRAVITY, POC: 1.02
UROBILINOGEN, POC: 0.2

## 2023-02-17 PROCEDURE — 2580000003 HC RX 258: Performed by: NURSE PRACTITIONER

## 2023-02-17 PROCEDURE — 74178 CT ABD&PLV WO CNTR FLWD CNTR: CPT | Performed by: NURSE PRACTITIONER

## 2023-02-17 PROCEDURE — 82565 ASSAY OF CREATININE: CPT

## 2023-02-17 PROCEDURE — 6360000004 HC RX CONTRAST MEDICATION: Performed by: NURSE PRACTITIONER

## 2023-02-17 RX ORDER — METHYLPREDNISOLONE SODIUM SUCCINATE 125 MG/2ML
125 INJECTION, POWDER, LYOPHILIZED, FOR SOLUTION INTRAMUSCULAR; INTRAVENOUS ONCE
Status: COMPLETED | OUTPATIENT
Start: 2023-02-17 | End: 2023-02-17

## 2023-02-17 RX ORDER — SODIUM CHLORIDE 0.9 % (FLUSH) 0.9 %
10 SYRINGE (ML) INJECTION PRN
Status: DISCONTINUED | OUTPATIENT
Start: 2023-02-17 | End: 2023-02-20 | Stop reason: HOSPADM

## 2023-02-17 RX ORDER — 0.9 % SODIUM CHLORIDE 0.9 %
100 INTRAVENOUS SOLUTION INTRAVENOUS ONCE
Status: COMPLETED | OUTPATIENT
Start: 2023-02-17 | End: 2023-02-17

## 2023-02-17 RX ORDER — DOCUSATE SODIUM 100 MG/1
100 CAPSULE, LIQUID FILLED ORAL 2 TIMES DAILY
Qty: 60 CAPSULE | Refills: 0 | Status: SHIPPED | OUTPATIENT
Start: 2023-02-17 | End: 2023-03-19

## 2023-02-17 RX ADMIN — IOPAMIDOL 120 ML: 755 INJECTION, SOLUTION INTRAVENOUS at 13:31

## 2023-02-17 RX ADMIN — SODIUM CHLORIDE, PRESERVATIVE FREE 10 ML: 5 INJECTION INTRAVENOUS at 13:31

## 2023-02-17 RX ADMIN — SODIUM CHLORIDE 100 ML: 9 INJECTION, SOLUTION INTRAVENOUS at 13:31

## 2023-02-17 RX ADMIN — METHYLPREDNISOLONE SODIUM SUCCINATE 125 MG: 125 INJECTION, POWDER, LYOPHILIZED, FOR SOLUTION INTRAMUSCULAR; INTRAVENOUS at 11:55

## 2023-02-17 SDOH — ECONOMIC STABILITY: FOOD INSECURITY: WITHIN THE PAST 12 MONTHS, YOU WORRIED THAT YOUR FOOD WOULD RUN OUT BEFORE YOU GOT MONEY TO BUY MORE.: NEVER TRUE

## 2023-02-17 SDOH — ECONOMIC STABILITY: INCOME INSECURITY: HOW HARD IS IT FOR YOU TO PAY FOR THE VERY BASICS LIKE FOOD, HOUSING, MEDICAL CARE, AND HEATING?: NOT HARD AT ALL

## 2023-02-17 SDOH — ECONOMIC STABILITY: HOUSING INSECURITY
IN THE LAST 12 MONTHS, WAS THERE A TIME WHEN YOU DID NOT HAVE A STEADY PLACE TO SLEEP OR SLEPT IN A SHELTER (INCLUDING NOW)?: NO

## 2023-02-17 SDOH — ECONOMIC STABILITY: FOOD INSECURITY: WITHIN THE PAST 12 MONTHS, THE FOOD YOU BOUGHT JUST DIDN'T LAST AND YOU DIDN'T HAVE MONEY TO GET MORE.: NEVER TRUE

## 2023-02-17 ASSESSMENT — PATIENT HEALTH QUESTIONNAIRE - PHQ9
SUM OF ALL RESPONSES TO PHQ QUESTIONS 1-9: 0
SUM OF ALL RESPONSES TO PHQ9 QUESTIONS 1 & 2: 0
2. FEELING DOWN, DEPRESSED OR HOPELESS: 0
1. LITTLE INTEREST OR PLEASURE IN DOING THINGS: 0
SUM OF ALL RESPONSES TO PHQ QUESTIONS 1-9: 0

## 2023-02-17 ASSESSMENT — ENCOUNTER SYMPTOMS
BACK PAIN: 1
ABDOMINAL PAIN: 1
BLOATING: 1
CONSTIPATION: 1

## 2023-02-17 NOTE — PROGRESS NOTES
[facility]  [id number] [facility] [facility]  [facility] [address]  [phone]  [phone]    Tiana Moyer is a 63 y.o. female who is here with c/o of:    Chief Complaint: Urinary Tract Infection (Burning when urinating, strong odor, pain in side, started 2 weeks ago)      Patient Accompanied by: n/a    HPI - Tiana Moyer is here today with c/o:    Urinary Tract Infection  This is a new problem. The current episode started 1 to 4 weeks ago. The problem has been gradually worsening since onset. Associated symptoms include pain. The pain is present in the back and bladder. Her pain is at a severity of 3/10. Risk factors include kidney stones. Constipation  This is a chronic problem. The current episode started more than 1 year ago. The problem has been gradually worsening since onset. Her stool frequency is 1 time per day. The stool is described as firm. The patient is not on a high fiber diet (Says she eats a lot of cheese). She Does not exercise regularly. There has Been adequate water intake. Associated symptoms include abdominal pain, back pain and bloating. Risk factors include dietary change. She has tried manual disimpaction and stool softeners for the symptoms. The treatment provided mild relief. Arthritis  Presents for follow-up visit. She complains of pain. The symptoms have been worsening. Affected locations include the left hip. Associated symptoms include pain at night and pain while resting.        Health Maintenance Due   Topic Date Due    Pneumococcal 0-64 years Vaccine (1 - PCV) Never done    Shingles vaccine (1 of 2) Never done    COVID-19 Vaccine (4 - Booster for Pfizer series) 12/22/2021    Cervical cancer screen  02/08/2022    Annual Wellness Visit (AWV)  10/13/2022        Patient Active Problem List:     Essential hypertension     YAMEL (obstructive sleep apnea)     Allergic asthma     Obesity, morbid, BMI 40.0-49.9 (HCC)     Chronic pain of left ankle Spontaneous rupture of flexor tendon of left lower leg     Allergic rhinitis     Anxiety     Cyst of ovary     Generalized osteoarthritis     Food allergy     Rupture of posterior tibialis tendon     Vitamin D deficiency     Morbid obesity with BMI of 50.0-59.9, adult ([facility] [address]     Status post left foot surgery     Mild intermittent asthma without complication     IFG (impaired fasting glucose)     Past Medical History:   Diagnosis Date    Allergic rhinitis 12/04/2014    Anxiety 07/05/2018    Arthritis     Asthma     Chest pain     occurs every other day, pt states they occur when she doesn't eat right or forget to take her bp med, occurs left side of chest with no radiation, pt states \"it's new\" and has not had any testing done or seen anyone for it.     Depressive disorder 07/05/2018    Generalized osteoarthritis 06/26/2014    History of kidney stones     Hypertension     Obesity, morbid, BMI 40.0-49.9 ([facility] [address] 02/28/2017    PONV (postoperative nausea and vomiting)     Sleep apnea     does not use a machine      Past Surgical History:   Procedure Laterality Date    CHOLECYSTECTOMY      FOOT TENDON SURGERY Left 09/08/2017    posterior repair    FOOT TENDON SURGERY Left 06/29/2018    LEFT POSTERIOR  TENDON REPAIR FLEXOR TENDON TRANSFER (Left Foot)    LITHOTRIPSY      AR OFFICE/OUTPT VISIT,PROCEDURE ONLY Left 6/29/2018    LEFT POSTERIOR  TENDON REPAIR FLEXOR TENDON TRANSFER performed by Chadwick Mcfadden DPM at [address] [place] [address] Left 9/8/2017    LEFT POSTERIOR TIBIAL TENDON REPAIR WITH LEFT TENOLYSIS  performed by Chadwick Mcfadden DPM at [address] [facility]     Family History   Problem Relation Age of Onset    Breast Cancer Mother 35    Depression Mother     Diabetes Mother     High Blood Pressure Mother     Mental Illness Mother     Substance Abuse Father     Depression Sister     Diabetes Sister     High Blood Pressure Sister     Mental Illness Sister     Depression Brother     Mental Illness Brother     Substance Abuse Brother      Social History     Tobacco Use    Smoking status: Former     Packs/day: 1.00     Years: 8.00     Pack years: 8.00     Types: Cigarettes     Start date: 1981     Quit date: 1998     Years since quittin.1    Smokeless tobacco: Never    Tobacco comments:     quit smoking 15-20 yrs ago   Substance Use Topics    Alcohol use: No     ALLERGIES:    Allergies   Allergen Reactions    Latex Hives     Latex gloves    Asa [Aspirin] Hives    Codeine Hives    Lisinopril     Motrin [Ibuprofen] Hives    Other Hives and Swelling     Cherries, almonds    Pcn [Penicillins] Hives    Sulfa Antibiotics           Subjective   Review of Systems   Gastrointestinal:  Positive for abdominal pain, bloating and constipation. Musculoskeletal:  Positive for arthritis and back pain. Objective   Physical Exam   PHYSICAL EXAM:   Constitutional: Suha Fenton is oriented to person, place, and time. Vital signs are normal. Appears well-developed and well-nourished. She is obese  Head: Normocephalic and atraumatic. Eyes:PERRL, EOMI, Conjunctiva normal, No discharge. Neck: Full passive range of motion. Non-tender on palpation. Neck supple. No thyromegaly present. Trachea normal.  Cardiovascular: Normal rate, regular rhythm, S1, S2, no murmur, no gallop, no friction rub, intact distal pulses. Pulmonary/Chest: Breath sounds are clear throughout, No respiratory distress, No wheezing, No chest tenderness. Effort normal  Abdominal: Soft. Normal appearance, bowel sounds are present and normoactive. There is no hepatosplenomegaly. There is no tenderness. There is no CVA tenderness. Musculoskeletal: Extremities appear regular and symmetric. No evident masses, lesions, foreign bodies, or other abnormalities. No edema. No tenderness on palpation. Joints are stable. Full ROM, strength and tone are within normal limits. Neurological: Alert and oriented to person, place, and time.  Normal motor function, Normal sensory function, No focal deficits noted. He has normal strength. Skin: Skin is warm, dry and intact. No obvious lesions on exposed skin  Psychiatric: Normal mood and affect. Speech is normal and behavior is normal.     Nursing note and vitals reviewed. Blood pressure 110/78, pulse 69, temperature 98.2 °F (36.8 °C), temperature source Temporal, weight 242 lb (109.8 kg), SpO2 97 %, not currently breastfeeding. Body mass index is 48.88 kg/m². Wt Readings from Last 3 Encounters:   02/17/23 242 lb (109.8 kg)   12/22/22 244 lb (110.7 kg)   12/16/22 244 lb (110.7 kg)     BP Readings from Last 3 Encounters:   02/17/23 110/78   12/09/22 130/79   12/01/22 130/76       Hospital Outpatient Visit on 12/12/2022   Component Date Value Ref Range Status    Dermatology Pathology Report 12/09/2022    Final                    Value:-- Diagnosis --  SKIN, LEFT FOREARM, PUNCH BIOPSY:       -  DERMATOFIBROMA. Amanda Vázquez M.D., [address] [place] [address]. AP/CP, Dermatopathology  **Electronically Signed Out**  jet/12/14/2022       Clinical Information  Pre-op Diagnosis:  NEOPLASM OF UNCERTAIN BEHAVIOR    Operative Findings:  LT FOREARM  Operation Performed:  PUNCH    Source of Specimen  A: JAR # 1 LT FOREARM    Gross Description  \"PALAK JOSEPH, LT FOREARM\" 0.5 cm long x 0.6 cm in diameter  brown-tan punch. Inked and bisected 1cs. tm      Microscopic Description  The sections show a nodular growth in the dermis of fibroblasts  arranged in a haphazard array. The collagen at the periphery has been  thickened. The overlying epidermis is hyperplastic. SURGICAL PATHOLOGY CONSULTATION       Patient Name: Ethan Vela: [id number]  Path Number: [id number]    [facility]   [place], [address]  [phone]  Fax: [phone]       Results for POC orders placed in visit on 02/17/23   POCT Urinalysis No Micro (Auto)   Result Value Ref Range    Color, UA yellow     Clarity, UA clear     Glucose, UA POC neg     Bilirubin, UA neg     Ketones, UA neg     Spec Grav, UA 1.020     Blood, UA POC trace     pH, UA 7.5     Protein, UA POC neg     Urobilinogen, UA 0.2     Leukocytes, UA neg     Nitrite, UA neg        Completed Orders/Prescriptions   Orders Placed This Encounter   Medications    docusate sodium (COLACE) 100 MG capsule     Sig: Take 1 capsule by mouth 2 times daily     Dispense:  60 capsule     Refill:  0    methylPREDNISolone sodium (SOLU-MEDROL) injection 125 mg                 AssessmentPlan/Medical Decision Making     1. Dysuria    - Increase fluids  - Culture, Urine; Future  - POCT Urinalysis No Micro (Auto)  - CT UROGRAM; Future    2. Slow transit constipation    - Increase fluids, decrease dairy intake, increase fiber intake  - docusate sodium (COLACE) 100 MG capsule; Take 1 capsule by mouth 2 times daily  Dispense: 60 capsule; Refill: 0    3. Generalized osteoarthritis    - methylPREDNISolone sodium (SOLU-MEDROL) injection 125 mg    Return if symptoms worsen or fail to improve. 1.  Ev received counseling on the following healthy behaviors: nutrition, exercise, and medication adherence  2. Patient given educational materials - see patient instructions  3. Was a self-tracking handout given in paper form or via [email or web address]? No  If yes, see orders or list here. 4.  Discussed use, benefit, and side effects of prescribed medications. Barriers to medication compliance addressed. All patient questions answered. Pt voiced understanding. 5.  Reviewed prior labs, imaging, consultation, follow up, and health maintenance  6. Continue current medications, diet and exercise. 7. Discussed use, benefit, and side effects of prescribed medications. Barriers to medication compliance addressed. All her questions were answered. Pt voiced understanding. Lourdes Aviles will continue current medications, diet and exercise.     Of the  30  minute duration appointment visit, Christy Bernal CNP spent at least 50% of the face-to-face time in counseling, explanation of diagnosis, planning of further management, and answering all questions.           Signed:  Christy Bernal CNP

## 2023-02-18 LAB
MICROORGANISM SPEC CULT: NO GROWTH
SPECIMEN DESCRIPTION: NORMAL

## 2023-02-21 ENCOUNTER — TELEPHONE (OUTPATIENT)
Dept: UROLOGY | Age: 63
End: 2023-02-21

## 2023-02-28 ENCOUNTER — OFFICE VISIT (OUTPATIENT)
Dept: UROLOGY | Age: 63
End: 2023-02-28
Payer: MEDICARE

## 2023-02-28 VITALS
WEIGHT: 242 LBS | SYSTOLIC BLOOD PRESSURE: 128 MMHG | BODY MASS INDEX: 48.79 KG/M2 | HEART RATE: 60 BPM | HEIGHT: 59 IN | DIASTOLIC BLOOD PRESSURE: 72 MMHG

## 2023-02-28 DIAGNOSIS — Z87.442 HISTORY OF KIDNEY STONES: ICD-10-CM

## 2023-02-28 DIAGNOSIS — R10.9 LEFT FLANK PAIN: ICD-10-CM

## 2023-02-28 DIAGNOSIS — Z87.448 HISTORY OF HEMATURIA: Primary | ICD-10-CM

## 2023-02-28 PROCEDURE — 99214 OFFICE O/P EST MOD 30 MIN: CPT | Performed by: NURSE PRACTITIONER

## 2023-02-28 PROCEDURE — G8484 FLU IMMUNIZE NO ADMIN: HCPCS | Performed by: NURSE PRACTITIONER

## 2023-02-28 PROCEDURE — 1036F TOBACCO NON-USER: CPT | Performed by: NURSE PRACTITIONER

## 2023-02-28 PROCEDURE — 3074F SYST BP LT 130 MM HG: CPT | Performed by: NURSE PRACTITIONER

## 2023-02-28 PROCEDURE — G8417 CALC BMI ABV UP PARAM F/U: HCPCS | Performed by: NURSE PRACTITIONER

## 2023-02-28 PROCEDURE — 3017F COLORECTAL CA SCREEN DOC REV: CPT | Performed by: NURSE PRACTITIONER

## 2023-02-28 PROCEDURE — 3078F DIAST BP <80 MM HG: CPT | Performed by: NURSE PRACTITIONER

## 2023-02-28 PROCEDURE — G8427 DOCREV CUR MEDS BY ELIG CLIN: HCPCS | Performed by: NURSE PRACTITIONER

## 2023-02-28 ASSESSMENT — ENCOUNTER SYMPTOMS
ABDOMINAL PAIN: 0
GASTROINTESTINAL NEGATIVE: 1
RESPIRATORY NEGATIVE: 1
VOMITING: 0
CONSTIPATION: 0
BACK PAIN: 0
NAUSEA: 0
WHEEZING: 0
DIARRHEA: 0
EYE PAIN: 0
SHORTNESS OF BREATH: 0
EYE REDNESS: 0
EYES NEGATIVE: 1
COUGH: 0

## 2023-02-28 NOTE — PROGRESS NOTES
[address] [address]  Dept: [facility] Urology Office Note - Established    Patient:  Yulia Reed  YOB: 1960  Date: 2023    The patient is a 63 y.o. female whopresents today for evaluation of the following problems:   Chief Complaint   Patient presents with    1 Year Follow Up       HPI  Patient is presenting for yearly follow-up. She has a history of hematuria and received a full workup in . At that time, she was found to have a 3mm stone. She had a negative cysto and cytology at that time. Patient denies any other episodes of gross hematuria. She did recently see her PCP for c/o left flank pain and dysuria. Urine dip was completed and showed trace blood, culture negative. The CTU showed left renal cyst and peripelvic cysts, no stones or hydro. She denies any hematuria, dysuria, recent  infections, fever or chills. Former smoker, heavy- 7ppd for many years. She is concerned her bladder has dropped, reports symptoms are chronic since her last  decades ago (3 abortions in the past). She denies significant urinary incontinence, she does not see gynecology on a regular basis. Summary of old records: N/A    Additional History: N/A    Procedures Today: N/A    Urinalysis today:  No results found for this visit on 23.     Imaging Reviewed during this Office Visit: 23 CT Urogram  (results were independently reviewed by physician and radiology report verified)      Last BUN and creatinine:  Lab Results   Component Value Date    BUN 15 2022     Lab Results   Component Value Date    CREATININE 0.54 2023       Additional Lab/Culture results:   23 urine dipstick  23 urine culture    PAST MEDICAL, FAMILY AND SOCIAL HISTORY UPDATE:  Past Medical History:   Diagnosis Date    Allergic rhinitis 2014    Anxiety 2018 Arthritis     Asthma     Chest pain     occurs every other day, pt states they occur when she doesn't eat right or forget to take her bp med, occurs left side of chest with no radiation, pt states \"it's new\" and has not had any testing done or seen anyone for it.     Depressive disorder 07/05/2018    Generalized osteoarthritis 06/26/2014    History of kidney stones     Hypertension     Obesity, morbid, BMI 40.0-49.9 ([address] 02/28/2017    PONV (postoperative nausea and vomiting)     Sleep apnea     does not use a machine     Past Surgical History:   Procedure Laterality Date    CHOLECYSTECTOMY      FOOT TENDON SURGERY Left 09/08/2017    posterior repair    FOOT TENDON SURGERY Left 06/29/2018    LEFT POSTERIOR  TENDON REPAIR FLEXOR TENDON TRANSFER (Left Foot)    LITHOTRIPSY      GA OFFICE/OUTPT VISIT,PROCEDURE ONLY Left 6/29/2018    LEFT POSTERIOR  TENDON REPAIR FLEXOR TENDON TRANSFER performed by Amparo Mixon DPM at [address] [place] [address] Left 9/8/2017    LEFT POSTERIOR TIBIAL TENDON REPAIR WITH LEFT TENOLYSIS  performed by Amparo Mixon DPM at [address] [place] [address] History   Problem Relation Age of Onset    Breast Cancer Mother 35    Depression Mother     Diabetes Mother     High Blood Pressure Mother     Mental Illness Mother     Substance Abuse Father     Depression Sister     Diabetes Sister     High Blood Pressure Sister     Mental Illness Sister     Depression Brother     Mental Illness Brother     Substance Abuse Brother      Outpatient Medications Marked as Taking for the 2/28/23 encounter (Office Visit) with JJ Thompson - CNP   Medication Sig Dispense Refill    docusate sodium (COLACE) 100 MG capsule Take 1 capsule by mouth 2 times daily 60 capsule 0    hydrOXYzine HCl (ATARAX) 25 MG tablet Take 1 tablet by mouth every 6 hours as needed for Itching or Anxiety 90 tablet 1    loratadine (CLARITIN) 10 MG tablet Take 1 tablet by mouth daily 90 tablet 3    clobetasol (TEMOVATE) 0.05 % ointment Apply to rash twice daily until resolved (not face, armpit or groin) 60 g 5    traMADol (ULTRAM) 50 MG tablet Take 50 mg by mouth every 4 hours as needed for Pain. fluticasone-salmeterol (ADVAIR) 250-50 MCG/ACT AEPB diskus inhaler Inhale 1 puff into the lungs 2 times daily 180 each 1    amLODIPine (NORVASC) 5 MG tablet Take 1 tablet by mouth daily 90 tablet 1    losartan-hydroCHLOROthiazide (HYZAAR) 100-25 MG per tablet Take 1 tablet by mouth daily 90 tablet 1    albuterol sulfate HFA (PROVENTIL;VENTOLIN;PROAIR) 108 (90 Base) MCG/ACT inhaler Inhale 2 puffs into the lungs every 6 hours as needed for Wheezing or Shortness of Breath 1 each 2    fluticasone (FLONASE) 50 MCG/ACT nasal spray 2 sprays by Nasal route daily 16 g 5      (All medications reviewed and updated by provider sincelast office visit or hospitalization)   Latex, Asa [aspirin], Codeine, Lisinopril, Motrin [ibuprofen], Other, Pcn [penicillins], and Sulfa antibiotics  Social History     Tobacco Use   Smoking Status Former    Packs/day: 1.00    Years: 8.00    Pack years: 8.00    Types: Cigarettes    Start date: 1981    Quit date: 1998    Years since quittin.1   Smokeless Tobacco Never   Tobacco Comments    quit smoking 15-20 yrs ago      (If patient a smoker, smoking cessation counseling offered)     Social History     Substance and Sexual Activity   Alcohol Use No       REVIEW OF SYSTEMS:  Review of Systems      Physical Exam:      Vitals:    23 1059   BP: 128/72   Pulse: 60     Body mass index is 48.88 kg/m². Patient is a 63 y.o. female in noacute distress and alert and oriented to person, place and time. Physical Exam  Constitutional: Patient in no acute distress. Neuro: Alert andoriented to person, place and time.   Psych: Mood normal, affect normal  Skin: No rash noted  Lungs: Respiratory effort is normal  Cardiovascular: Warm & Pink  Abdomen: Soft, non-tender, non-distended with no CVA,  No flank tenderness  Bladder non-tender and not distended. Musculoskeletal: Normal gait and station      and Plan      1. History of hematuria    2. History of kidney stones    3. Left flank pain           Plan:    Flank pain unlikely to be urological cause, advise fu pcp. Collect ua with micro given trace blood on dipstick. If abnormal, discuss repeat cysto with Dr. Lala Ash with hx smoking. Otherwise, will f/u 1 year with KUB. Encouraged f/u with gynecology for concerns of cystocele. She does not have significant UI, hold off on UDS. Return in about 1 year (around 2/28/2024) for KUB. Prescriptions Ordered:  No orders of the defined types were placed in this encounter. Orders Placed:  Orders Placed This Encounter   Procedures    XR ABDOMEN (KUB) (SINGLE AP VIEW)     Standing Status:   Future     Standing Expiration Date:   8/28/2024    Urinalysis with Microscopic     Standing Status:   Future     Standing Expiration Date:   2/28/2024     Order Specific Question:   SPECIFY(EX-CATH,MIDSTREAM,CYSTO,ETC)? Answer:   mid stream            JJ Strauss CNP    Reviewed and agree with the ROS entered by the MA.

## 2023-03-09 ENCOUNTER — TELEPHONE (OUTPATIENT)
Dept: PODIATRY | Age: 63
End: 2023-03-09

## 2023-03-09 DIAGNOSIS — M19.071 DEGENERATIVE JOINT DISEASE OF BOTH ANKLES AND FEET: Primary | ICD-10-CM

## 2023-03-09 DIAGNOSIS — R26.2 TROUBLE WALKING: ICD-10-CM

## 2023-03-09 DIAGNOSIS — M19.072 DEGENERATIVE JOINT DISEASE OF BOTH ANKLES AND FEET: Primary | ICD-10-CM

## 2023-03-09 DIAGNOSIS — M25.372 LEFT ANKLE INSTABILITY: ICD-10-CM

## 2023-03-09 NOTE — TELEPHONE ENCOUNTER
Patient called in requesting a renewal on her handicap card as she received a letter from the 2025 [facility]. Please advise thank you!

## 2023-04-18 ENCOUNTER — OFFICE VISIT (OUTPATIENT)
Dept: PODIATRY | Age: 63
End: 2023-04-18
Payer: MEDICARE

## 2023-04-18 VITALS — HEIGHT: 59 IN | BODY MASS INDEX: 48.79 KG/M2 | WEIGHT: 242 LBS

## 2023-04-18 DIAGNOSIS — I73.9 PVD (PERIPHERAL VASCULAR DISEASE) (HCC): ICD-10-CM

## 2023-04-18 DIAGNOSIS — M79.672 PAIN IN BOTH FEET: ICD-10-CM

## 2023-04-18 DIAGNOSIS — G89.29 CHRONIC PAIN OF LEFT ANKLE: ICD-10-CM

## 2023-04-18 DIAGNOSIS — R26.2 TROUBLE WALKING: ICD-10-CM

## 2023-04-18 DIAGNOSIS — B35.1 DERMATOPHYTOSIS OF NAIL: Primary | ICD-10-CM

## 2023-04-18 DIAGNOSIS — M79.671 PAIN IN BOTH FEET: ICD-10-CM

## 2023-04-18 DIAGNOSIS — M25.572 CHRONIC PAIN OF LEFT ANKLE: ICD-10-CM

## 2023-04-18 PROBLEM — R21 RASH: Status: ACTIVE | Noted: 2020-09-24

## 2023-04-18 PROCEDURE — 3017F COLORECTAL CA SCREEN DOC REV: CPT | Performed by: PODIATRIST

## 2023-04-18 PROCEDURE — 11721 DEBRIDE NAIL 6 OR MORE: CPT | Performed by: PODIATRIST

## 2023-04-18 PROCEDURE — 99213 OFFICE O/P EST LOW 20 MIN: CPT | Performed by: PODIATRIST

## 2023-04-18 PROCEDURE — G8427 DOCREV CUR MEDS BY ELIG CLIN: HCPCS | Performed by: PODIATRIST

## 2023-04-18 PROCEDURE — G8417 CALC BMI ABV UP PARAM F/U: HCPCS | Performed by: PODIATRIST

## 2023-04-18 PROCEDURE — 1036F TOBACCO NON-USER: CPT | Performed by: PODIATRIST

## 2023-04-20 RX ORDER — TRAMADOL HYDROCHLORIDE 50 MG/1
50 TABLET ORAL EVERY 4 HOURS PRN
Qty: 42 TABLET | OUTPATIENT
Start: 2023-04-20

## 2023-04-24 ENCOUNTER — TELEPHONE (OUTPATIENT)
Dept: UROLOGY | Age: 63
End: 2023-04-24

## 2023-04-25 ENCOUNTER — HOSPITAL ENCOUNTER (OUTPATIENT)
Dept: GENERAL RADIOLOGY | Age: 63
Discharge: HOME | End: 2023-04-27
Payer: MEDICARE

## 2023-04-25 ENCOUNTER — HOSPITAL ENCOUNTER (OUTPATIENT)
Age: 63
Discharge: HOME | End: 2023-04-27
Payer: MEDICARE

## 2023-04-25 DIAGNOSIS — Z87.442 HISTORY OF KIDNEY STONES: ICD-10-CM

## 2023-04-25 PROCEDURE — 74018 RADEX ABDOMEN 1 VIEW: CPT

## 2023-04-25 NOTE — PROGRESS NOTES
[facility] PODIATRY [facility]  [address] [place] [id number]  Dept: [phone]  Dept Fax: [phone]     PAIN PROGRESS NOTE  Date of patient's visit: 4/25/2023  Patient's Name:  Jorge Gibson YOB: 1960            Patient Care Team:  JJ Mackay CNP as PCP - General (Family Medicine)  JJ Mackay CNP as PCP - Empaneled Provider  Will Bynum DPM as Physician (Podiatry)      Chief Complaint   Patient presents with    Foot Pain     Bl feet   Right great toe dropped can good on 4/1/2023     Nail Problem     Toenail trim        Subjective: This Jorge Gibson comes to clinic for foot and nail care. Pt currently has complaint of thickened, painful, elongated nails that he/she cannot manage by themselves. Pt. Relates pain to nails with shoe gear. Pt's primary care physician is JJ Mackay CNP last seen 12/1/22. Pt has a new complaint of right great toe pain after a can dropped on her foot 3 weeks ago. States that she is having some pain still but that she did not get a bruise on the toenail   Past Medical History:   Diagnosis Date    Allergic rhinitis 12/04/2014    Anxiety 07/05/2018    Arthritis     Asthma     Chest pain     occurs every other day, pt states they occur when she doesn't eat right or forget to take her bp med, occurs left side of chest with no radiation, pt states \"it's new\" and has not had any testing done or seen anyone for it.     Depressive disorder 07/05/2018    Generalized osteoarthritis 06/26/2014    History of kidney stones     Hypertension     Obesity, morbid, BMI 40.0-49.9 ([facility] 02/28/2017    PONV (postoperative nausea and vomiting)     Sleep apnea     does not use a machine       Allergies   Allergen Reactions    Latex Hives     Latex gloves    Asa [Aspirin] Hives    Codeine Hives    Lisinopril     Motrin [Ibuprofen] Hives    Other Hives and Swelling

## 2023-05-03 ENCOUNTER — OFFICE VISIT (OUTPATIENT)
Dept: UROLOGY | Age: 63
End: 2023-05-03
Payer: MEDICARE

## 2023-05-03 VITALS
WEIGHT: 241 LBS | DIASTOLIC BLOOD PRESSURE: 94 MMHG | TEMPERATURE: 97.1 F | HEART RATE: 80 BPM | SYSTOLIC BLOOD PRESSURE: 137 MMHG | BODY MASS INDEX: 48.59 KG/M2 | HEIGHT: 59 IN

## 2023-05-03 DIAGNOSIS — Z87.442 HISTORY OF KIDNEY STONES: Primary | ICD-10-CM

## 2023-05-03 PROCEDURE — 3017F COLORECTAL CA SCREEN DOC REV: CPT | Performed by: NURSE PRACTITIONER

## 2023-05-03 PROCEDURE — G8417 CALC BMI ABV UP PARAM F/U: HCPCS | Performed by: NURSE PRACTITIONER

## 2023-05-03 PROCEDURE — 99213 OFFICE O/P EST LOW 20 MIN: CPT | Performed by: NURSE PRACTITIONER

## 2023-05-03 PROCEDURE — 3075F SYST BP GE 130 - 139MM HG: CPT | Performed by: NURSE PRACTITIONER

## 2023-05-03 PROCEDURE — G8427 DOCREV CUR MEDS BY ELIG CLIN: HCPCS | Performed by: NURSE PRACTITIONER

## 2023-05-03 PROCEDURE — 1036F TOBACCO NON-USER: CPT | Performed by: NURSE PRACTITIONER

## 2023-05-03 PROCEDURE — 3080F DIAST BP >= 90 MM HG: CPT | Performed by: NURSE PRACTITIONER

## 2023-05-03 ASSESSMENT — ENCOUNTER SYMPTOMS
COUGH: 0
WHEEZING: 0
VOMITING: 0
EYE REDNESS: 0
ABDOMINAL PAIN: 0
BACK PAIN: 0
EYE PAIN: 0
NAUSEA: 0
DIARRHEA: 0
SHORTNESS OF BREATH: 0
CONSTIPATION: 0

## 2023-05-03 NOTE — PROGRESS NOTES
Review of Systems   Constitutional:  Negative for chills, fatigue and fever. Eyes:  Negative for pain, redness and visual disturbance. Respiratory:  Negative for cough, shortness of breath and wheezing. Cardiovascular:  Negative for chest pain and leg swelling. Gastrointestinal:  Negative for abdominal pain, constipation, diarrhea, nausea and vomiting. Genitourinary:  Negative for difficulty urinating, dysuria, flank pain, frequency, hematuria and urgency. Musculoskeletal:  Negative for back pain, joint swelling and myalgias. Skin:  Negative for rash and wound. Neurological:  Negative for dizziness, tremors, weakness and numbness. Hematological:  Does not bruise/bleed easily.
Past Surgical History:   Procedure Laterality Date    CHOLECYSTECTOMY      FOOT TENDON SURGERY Left 09/08/2017    posterior repair    FOOT TENDON SURGERY Left 06/29/2018    LEFT POSTERIOR  TENDON REPAIR FLEXOR TENDON TRANSFER (Left Foot)    LITHOTRIPSY      ND OFFICE/OUTPT VISIT,PROCEDURE ONLY Left 6/29/2018    LEFT POSTERIOR  TENDON REPAIR FLEXOR TENDON TRANSFER performed by Marilyn Grider DPM at [address] [facility] Left 9/8/2017    LEFT POSTERIOR TIBIAL TENDON REPAIR WITH LEFT TENOLYSIS  performed by Marilyn Grider DPM at [address] [place] [address] History   Problem Relation Age of Onset    Breast Cancer Mother 35    Depression Mother     Diabetes Mother     High Blood Pressure Mother     Mental Illness Mother     Substance Abuse Father     Depression Sister     Diabetes Sister     High Blood Pressure Sister     Mental Illness Sister     Depression Brother     Mental Illness Brother     Substance Abuse Brother      Outpatient Medications Marked as Taking for the 5/3/23 encounter (Office Visit) with JJ Buckley - CNP   Medication Sig Dispense Refill    Handicap [address] by Does not apply route Temporary use- not to exceed more than 5 years  Patient unable to walk more than 200 feet without needing to stop to rest 1 each 0    hydrOXYzine HCl (ATARAX) 25 MG tablet Take 1 tablet by mouth every 6 hours as needed for Itching or Anxiety 90 tablet 1    loratadine (CLARITIN) 10 MG tablet Take 1 tablet by mouth daily 90 tablet 3    clobetasol (TEMOVATE) 0.05 % ointment Apply to rash twice daily until resolved (not face, armpit or groin) 60 g 5    traMADol (ULTRAM) 50 MG tablet Take 1 tablet by mouth every 4 hours as needed for Pain.       fluticasone-salmeterol (ADVAIR) 250-50 MCG/ACT AEPB diskus inhaler Inhale 1 puff into the lungs 2 times daily 180 each 1    amLODIPine (NORVASC) 5 MG tablet Take 1 tablet by mouth daily 90 tablet 1    losartan-hydroCHLOROthiazide (HYZAAR) 100-25 MG per tablet

## 2023-05-18 ENCOUNTER — OFFICE VISIT (OUTPATIENT)
Dept: FAMILY MEDICINE CLINIC | Age: 63
End: 2023-05-18
Payer: MEDICARE

## 2023-05-18 VITALS
BODY MASS INDEX: 49.16 KG/M2 | WEIGHT: 243.4 LBS | OXYGEN SATURATION: 97 % | SYSTOLIC BLOOD PRESSURE: 122 MMHG | TEMPERATURE: 97.9 F | DIASTOLIC BLOOD PRESSURE: 78 MMHG | HEART RATE: 79 BPM

## 2023-05-18 DIAGNOSIS — R73.01 IFG (IMPAIRED FASTING GLUCOSE): ICD-10-CM

## 2023-05-18 DIAGNOSIS — J30.9 ALLERGIC RHINITIS, UNSPECIFIED SEASONALITY, UNSPECIFIED TRIGGER: ICD-10-CM

## 2023-05-18 DIAGNOSIS — G89.29 CHRONIC LEFT-SIDED LOW BACK PAIN WITH LEFT-SIDED SCIATICA: ICD-10-CM

## 2023-05-18 DIAGNOSIS — M54.42 CHRONIC LEFT-SIDED LOW BACK PAIN WITH LEFT-SIDED SCIATICA: ICD-10-CM

## 2023-05-18 DIAGNOSIS — I10 ESSENTIAL HYPERTENSION: Primary | ICD-10-CM

## 2023-05-18 PROCEDURE — 1036F TOBACCO NON-USER: CPT | Performed by: NURSE PRACTITIONER

## 2023-05-18 PROCEDURE — 96372 THER/PROPH/DIAG INJ SC/IM: CPT | Performed by: NURSE PRACTITIONER

## 2023-05-18 PROCEDURE — 96374 THER/PROPH/DIAG INJ IV PUSH: CPT | Performed by: NURSE PRACTITIONER

## 2023-05-18 PROCEDURE — G8427 DOCREV CUR MEDS BY ELIG CLIN: HCPCS | Performed by: NURSE PRACTITIONER

## 2023-05-18 PROCEDURE — 99214 OFFICE O/P EST MOD 30 MIN: CPT | Performed by: NURSE PRACTITIONER

## 2023-05-18 PROCEDURE — G8417 CALC BMI ABV UP PARAM F/U: HCPCS | Performed by: NURSE PRACTITIONER

## 2023-05-18 PROCEDURE — 3017F COLORECTAL CA SCREEN DOC REV: CPT | Performed by: NURSE PRACTITIONER

## 2023-05-18 PROCEDURE — 3078F DIAST BP <80 MM HG: CPT | Performed by: NURSE PRACTITIONER

## 2023-05-18 PROCEDURE — 3074F SYST BP LT 130 MM HG: CPT | Performed by: NURSE PRACTITIONER

## 2023-05-18 RX ORDER — METHYLPREDNISOLONE SODIUM SUCCINATE 125 MG/2ML
125 INJECTION, POWDER, LYOPHILIZED, FOR SOLUTION INTRAMUSCULAR; INTRAVENOUS ONCE
Status: COMPLETED | OUTPATIENT
Start: 2023-05-18 | End: 2023-05-18

## 2023-05-18 RX ADMIN — METHYLPREDNISOLONE SODIUM SUCCINATE 125 MG: 125 INJECTION, POWDER, LYOPHILIZED, FOR SOLUTION INTRAMUSCULAR; INTRAVENOUS at 10:30

## 2023-05-18 ASSESSMENT — ENCOUNTER SYMPTOMS
ALLERGIC/IMMUNOLOGIC NEGATIVE: 1
CHOKING: 0
VOMITING: 0
GASTROINTESTINAL NEGATIVE: 1
COLOR CHANGE: 0
BACK PAIN: 1
EYES NEGATIVE: 1
NAUSEA: 0
DIARRHEA: 0
STRIDOR: 0
CHEST TIGHTNESS: 0

## 2023-05-18 ASSESSMENT — PATIENT HEALTH QUESTIONNAIRE - PHQ9
1. LITTLE INTEREST OR PLEASURE IN DOING THINGS: 0
SUM OF ALL RESPONSES TO PHQ QUESTIONS 1-9: 0
2. FEELING DOWN, DEPRESSED OR HOPELESS: 0
SUM OF ALL RESPONSES TO PHQ9 QUESTIONS 1 & 2: 0

## 2023-05-18 NOTE — PROGRESS NOTES
[facility]  [address] [facility]  Dept: [phone]    Bernadine Long is a 63 y.o. female who presents today for her medical conditions/complaintsas noted below. Bernadine Long is here today c/o Hypertension, Discuss Medications (Wants Rx Tramadol), and Hip Pain (left)      Past Medical History:   Diagnosis Date    Allergic rhinitis 12/04/2014    Anxiety 07/05/2018    Arthritis     Asthma     Chest pain     occurs every other day, pt states they occur when she doesn't eat right or forget to take her bp med, occurs left side of chest with no radiation, pt states \"it's new\" and has not had any testing done or seen anyone for it.     Depressive disorder 07/05/2018    Generalized osteoarthritis 06/26/2014    History of kidney stones     Hypertension     Obesity, morbid, BMI 40.0-49.9 ([facility] [address] 02/28/2017    PONV (postoperative nausea and vomiting)     Sleep apnea     does not use a machine      Past Surgical History:   Procedure Laterality Date    CHOLECYSTECTOMY      FOOT TENDON SURGERY Left 09/08/2017    posterior repair    FOOT TENDON SURGERY Left 06/29/2018    LEFT POSTERIOR  TENDON REPAIR FLEXOR TENDON TRANSFER (Left Foot)    LITHOTRIPSY      UT OFFICE/OUTPT VISIT,PROCEDURE ONLY Left 6/29/2018    LEFT POSTERIOR  TENDON REPAIR FLEXOR TENDON TRANSFER performed by Tariq Lopez DPM at [address] [facility] Left 9/8/2017    LEFT POSTERIOR TIBIAL TENDON REPAIR WITH LEFT TENOLYSIS  performed by Tariq Lopez DPM at [address] History   Problem Relation Age of Onset    Breast Cancer Mother 35    Depression Mother     Diabetes Mother     High Blood Pressure Mother     Mental Illness Mother     Substance Abuse Father     Depression Sister     Diabetes Sister     High Blood Pressure Sister     Mental Illness Sister     Depression Brother     Mental Illness Brother     Substance Abuse Brother        Social History     Tobacco Use    Smoking status: Former

## 2023-05-27 DIAGNOSIS — J45.40 MODERATE PERSISTENT ASTHMA WITHOUT COMPLICATION: ICD-10-CM

## 2023-05-27 DIAGNOSIS — I10 ESSENTIAL HYPERTENSION: ICD-10-CM

## 2023-05-30 DIAGNOSIS — L29.9 GENERALIZED PRURITUS: ICD-10-CM

## 2023-05-30 RX ORDER — LOSARTAN POTASSIUM AND HYDROCHLOROTHIAZIDE 25; 100 MG/1; MG/1
TABLET ORAL
Qty: 90 TABLET | Refills: 1 | Status: SHIPPED | OUTPATIENT
Start: 2023-05-30

## 2023-05-30 RX ORDER — AMLODIPINE BESYLATE 5 MG/1
TABLET ORAL
Qty: 90 TABLET | Refills: 1 | Status: SHIPPED | OUTPATIENT
Start: 2023-05-30

## 2023-05-30 RX ORDER — FLUTICASONE PROPIONATE AND SALMETEROL 250; 50 UG/1; UG/1
POWDER RESPIRATORY (INHALATION)
Qty: 1 EACH | Refills: 5 | Status: SHIPPED | OUTPATIENT
Start: 2023-05-30

## 2023-05-30 NOTE — TELEPHONE ENCOUNTER
Sean Wong is calling to request a refill on the following medication(s):    Medication Request:  Requested Prescriptions     Pending Prescriptions Disp Refills    amLODIPine (NORVASC) 5 MG tablet [Pharmacy Med Name: AMLODIPINE BESYLATE 5 MG TAB] 90 tablet 1     Sig: take 1 tablet by mouth once daily    losartan-hydroCHLOROthiazide (HYZAAR) 100-25 MG per tablet [Pharmacy Med Name: LOSARTAN-HCTZ 100-25 MG TAB] 90 tablet 1     Sig: take 1 tablet by mouth once daily    fluticasone-salmeterol (ADVAIR) 250-50 MCG/ACT AEPB diskus inhaler [Pharmacy Med Name: FLUTICASONE-SALMETEROL 250-50]       Sig: inhale 1 puff by mouth and INTO THE LUNGS twice a day Rinse mouth after use       Last Visit Date (If Applicable):  3/53/2491    Next Visit Date:    6/1/2023

## 2023-05-31 ENCOUNTER — HOSPITAL ENCOUNTER (OUTPATIENT)
Dept: GENERAL RADIOLOGY | Age: 63
Discharge: HOME | End: 2023-06-02
Payer: MEDICARE

## 2023-05-31 ENCOUNTER — HOSPITAL ENCOUNTER (OUTPATIENT)
Age: 63
Discharge: HOME | End: 2023-06-02
Payer: MEDICARE

## 2023-05-31 DIAGNOSIS — M54.42 CHRONIC LEFT-SIDED LOW BACK PAIN WITH LEFT-SIDED SCIATICA: ICD-10-CM

## 2023-05-31 DIAGNOSIS — G89.29 CHRONIC LEFT-SIDED LOW BACK PAIN WITH LEFT-SIDED SCIATICA: ICD-10-CM

## 2023-05-31 PROCEDURE — 72100 X-RAY EXAM L-S SPINE 2/3 VWS: CPT

## 2023-05-31 PROCEDURE — 73502 X-RAY EXAM HIP UNI 2-3 VIEWS: CPT

## 2023-05-31 NOTE — TELEPHONE ENCOUNTER
Agueda Maria is calling to request a refill on the following medication(s):    Medication Request:  Requested Prescriptions     Pending Prescriptions Disp Refills    hydrOXYzine HCl (ATARAX) 25 MG tablet [Pharmacy Med Name: HYDROXYZINE HCL 25 MG TABLET] 90 tablet 1     Sig: take 1 tablet by mouth every 6 hours if needed for itching or anxiety       Last Visit Date (If Applicable):  1/37/3956    Next Visit Date:    6/1/2023

## 2023-06-01 ENCOUNTER — HOSPITAL ENCOUNTER (OUTPATIENT)
Age: 63
Setting detail: SPECIMEN
Discharge: HOME | End: 2023-06-01

## 2023-06-01 ENCOUNTER — OFFICE VISIT (OUTPATIENT)
Dept: FAMILY MEDICINE CLINIC | Age: 63
End: 2023-06-01
Payer: MEDICARE

## 2023-06-01 VITALS
BODY MASS INDEX: 49.35 KG/M2 | TEMPERATURE: 98.2 F | WEIGHT: 244.8 LBS | SYSTOLIC BLOOD PRESSURE: 126 MMHG | HEIGHT: 59 IN | OXYGEN SATURATION: 97 % | HEART RATE: 67 BPM | DIASTOLIC BLOOD PRESSURE: 74 MMHG

## 2023-06-01 DIAGNOSIS — M79.672 CHRONIC FOOT PAIN, LEFT: ICD-10-CM

## 2023-06-01 DIAGNOSIS — R73.01 IFG (IMPAIRED FASTING GLUCOSE): ICD-10-CM

## 2023-06-01 DIAGNOSIS — G89.29 CHRONIC FOOT PAIN, LEFT: ICD-10-CM

## 2023-06-01 DIAGNOSIS — R73.03 PRE-DIABETES: ICD-10-CM

## 2023-06-01 DIAGNOSIS — Z87.448 HISTORY OF HEMATURIA: ICD-10-CM

## 2023-06-01 DIAGNOSIS — I10 ESSENTIAL HYPERTENSION: ICD-10-CM

## 2023-06-01 DIAGNOSIS — M54.42 CHRONIC LEFT-SIDED LOW BACK PAIN WITH LEFT-SIDED SCIATICA: Primary | ICD-10-CM

## 2023-06-01 DIAGNOSIS — Z71.3 DIETARY COUNSELING: ICD-10-CM

## 2023-06-01 DIAGNOSIS — G89.29 CHRONIC LEFT-SIDED LOW BACK PAIN WITH LEFT-SIDED SCIATICA: Primary | ICD-10-CM

## 2023-06-01 LAB
ALBUMIN SERPL-MCNC: 4.2 G/DL (ref 3.5–5.2)
ALBUMIN/GLOB SERPL: 1.3 {RATIO} (ref 1–2.5)
ALP SERPL-CCNC: 90 U/L (ref 35–104)
ALT SERPL-CCNC: 17 U/L (ref 5–33)
ANION GAP SERPL CALCULATED.3IONS-SCNC: 20 MMOL/L (ref 9–17)
AST SERPL-CCNC: 18 U/L
BASOPHILS # BLD: 0.04 K/UL (ref 0–0.2)
BASOPHILS NFR BLD: 1 % (ref 0–2)
BILIRUB SERPL-MCNC: 0.4 MG/DL (ref 0.3–1.2)
BILIRUB UR QL STRIP: NEGATIVE
BUN SERPL-MCNC: 12 MG/DL (ref 8–23)
CALCIUM SERPL-MCNC: 10.1 MG/DL (ref 8.6–10.4)
CHLORIDE SERPL-SCNC: 101 MMOL/L (ref 98–107)
CHOLEST SERPL-MCNC: 193 MG/DL
CHOLESTEROL/HDL RATIO: 2.6
CLARITY UR: CLEAR
CO2 SERPL-SCNC: 22 MMOL/L (ref 20–31)
COLOR UR: YELLOW
CREAT SERPL-MCNC: 0.54 MG/DL (ref 0.5–0.9)
EOSINOPHIL # BLD: 0.21 K/UL (ref 0–0.44)
EOSINOPHILS RELATIVE PERCENT: 2 % (ref 1–4)
EPI CELLS #/AREA URNS HPF: NORMAL /HPF (ref 0–5)
ERYTHROCYTE [DISTWIDTH] IN BLOOD BY AUTOMATED COUNT: 15.1 % (ref 11.8–14.4)
EST. AVERAGE GLUCOSE BLD GHB EST-MCNC: 126 MG/DL
GFR SERPL CREATININE-BSD FRML MDRD: >60 ML/MIN/1.73M2
GLUCOSE SERPL-MCNC: 118 MG/DL (ref 70–99)
GLUCOSE UR STRIP-MCNC: NEGATIVE MG/DL
HBA1C MFR BLD: 6 % (ref 4–6)
HCT VFR BLD AUTO: 45.2 % (ref 36.3–47.1)
HDLC SERPL-MCNC: 74 MG/DL
HGB BLD-MCNC: 14.3 G/DL (ref 11.9–15.1)
HGB UR QL STRIP.AUTO: NEGATIVE
IMM GRANULOCYTES # BLD AUTO: <0.03 K/UL (ref 0–0.3)
IMM GRANULOCYTES NFR BLD: 0 %
KETONES UR STRIP-MCNC: NEGATIVE MG/DL
LDLC SERPL CALC-MCNC: 100 MG/DL (ref 0–130)
LEUKOCYTE ESTERASE UR QL STRIP: NEGATIVE
LYMPHOCYTES # BLD: 23 % (ref 24–43)
LYMPHOCYTES NFR BLD: 2.04 K/UL (ref 1.1–3.7)
MAGNESIUM SERPL-MCNC: 2.4 MG/DL (ref 1.6–2.6)
MCH RBC QN AUTO: 27.6 PG (ref 25.2–33.5)
MCHC RBC AUTO-ENTMCNC: 31.6 G/DL (ref 28.4–34.8)
MCV RBC AUTO: 87.3 FL (ref 82.6–102.9)
MONOCYTES NFR BLD: 0.45 K/UL (ref 0.1–1.2)
MONOCYTES NFR BLD: 5 % (ref 3–12)
NEUTROPHILS NFR BLD: 69 % (ref 36–65)
NEUTS SEG NFR BLD: 5.94 K/UL (ref 1.5–8.1)
NITRITE UR QL STRIP: NEGATIVE
NRBC AUTOMATED: 0 PER 100 WBC
PH UR STRIP: 7.5 [PH] (ref 5–8)
PLATELET # BLD AUTO: 319 K/UL (ref 138–453)
PMV BLD AUTO: 9.4 FL (ref 8.1–13.5)
POTASSIUM SERPL-SCNC: 3.7 MMOL/L (ref 3.7–5.3)
PROT SERPL-MCNC: 7.4 G/DL (ref 6.4–8.3)
PROT UR STRIP-MCNC: NEGATIVE MG/DL
RBC # BLD AUTO: 5.18 M/UL (ref 3.95–5.11)
RBC # BLD: ABNORMAL 10*6/UL
RBC #/AREA URNS HPF: NORMAL /HPF (ref 0–4)
SODIUM SERPL-SCNC: 143 MMOL/L (ref 135–144)
SP GR UR STRIP: 1.01 (ref 1–1.03)
TRIGL SERPL-MCNC: 96 MG/DL
TSH SERPL-MCNC: 1.15 UIU/ML (ref 0.3–5)
UROBILINOGEN UR STRIP-ACNC: NORMAL
WBC #/AREA URNS HPF: NORMAL /HPF (ref 0–5)
WBC OTHER # BLD: 8.7 K/UL (ref 3.5–11.3)

## 2023-06-01 PROCEDURE — G8417 CALC BMI ABV UP PARAM F/U: HCPCS | Performed by: NURSE PRACTITIONER

## 2023-06-01 PROCEDURE — 1036F TOBACCO NON-USER: CPT | Performed by: NURSE PRACTITIONER

## 2023-06-01 PROCEDURE — 99214 OFFICE O/P EST MOD 30 MIN: CPT | Performed by: NURSE PRACTITIONER

## 2023-06-01 PROCEDURE — 3074F SYST BP LT 130 MM HG: CPT | Performed by: NURSE PRACTITIONER

## 2023-06-01 PROCEDURE — 3078F DIAST BP <80 MM HG: CPT | Performed by: NURSE PRACTITIONER

## 2023-06-01 PROCEDURE — 3017F COLORECTAL CA SCREEN DOC REV: CPT | Performed by: NURSE PRACTITIONER

## 2023-06-01 PROCEDURE — G8427 DOCREV CUR MEDS BY ELIG CLIN: HCPCS | Performed by: NURSE PRACTITIONER

## 2023-06-01 RX ORDER — HYDROXYZINE HYDROCHLORIDE 25 MG/1
TABLET, FILM COATED ORAL
Qty: 90 TABLET | Refills: 1 | Status: SHIPPED | OUTPATIENT
Start: 2023-06-01

## 2023-06-01 ASSESSMENT — ENCOUNTER SYMPTOMS
VOMITING: 0
BACK PAIN: 1
NAUSEA: 0
WHEEZING: 0
RESPIRATORY NEGATIVE: 1
COLOR CHANGE: 0
DIARRHEA: 0
EYES NEGATIVE: 1
SHORTNESS OF BREATH: 0
CHEST TIGHTNESS: 0
GASTROINTESTINAL NEGATIVE: 1
ALLERGIC/IMMUNOLOGIC NEGATIVE: 1
COUGH: 0

## 2023-06-01 ASSESSMENT — PATIENT HEALTH QUESTIONNAIRE - PHQ9
SUM OF ALL RESPONSES TO PHQ QUESTIONS 1-9: 0
SUM OF ALL RESPONSES TO PHQ QUESTIONS 1-9: 0
1. LITTLE INTEREST OR PLEASURE IN DOING THINGS: 0
SUM OF ALL RESPONSES TO PHQ QUESTIONS 1-9: 0
2. FEELING DOWN, DEPRESSED OR HOPELESS: 0
SUM OF ALL RESPONSES TO PHQ9 QUESTIONS 1 & 2: 0
SUM OF ALL RESPONSES TO PHQ QUESTIONS 1-9: 0

## 2023-06-01 NOTE — PROGRESS NOTES
Behavior: Behavior normal.         Thought Content: Thought content normal.         Judgment: Judgment normal.         Assessment:         1. Chronic left-sided low back pain with left-sided sciatica    2. Chronic foot pain, left    3. Body mass index (BMI) 45.0-49.9, adult ([facility]    4. Pre-diabetes    5. Dietary counseling        Plan:     1. Chronic left-sided low back pain with left-sided sciatica    XR pending  Discussed I will not RX Tramadol for long term pain control  Highly encouraged weight loss  Offered referral to PT / PM / Ortho , pt declined at this time     2. Chronic foot pain, left    Follows w/ Podiatry     3. Body mass index (BMI) 45.0-49.9, adult ([facility]      4. Pre-diabetes    Await labs, will RX Ozempic if new onset DM2 or try for [facility] if not  Highly encouraged weight loss, routine exercise & healthy diet   I offered referral to Weight Management & patient declined at this time    5. Dietary counseling    -  BMI ABOVE NORMAL F/U    BMI was elevated today, and weight loss plan recommended is : conventional weight loss and daily exercise regimen. Discussed use, benefit, and side effects of prescribed medications. All patient questions answered. Pt voiced understanding. Reviewed health maintenance. Instructed to continue current medications, diet and exercise. Patient agreedwith treatment plan. Follow up as directed.      Electronically signed by JJ Zepeda CNP on 6/1/2023

## 2023-06-01 NOTE — PATIENT INSTRUCTIONS
Learning About Obesity  What is obesity? Obesity means having an unhealthy amount of body fat. This puts your health in danger. It can lead to other health problems, such as type 2 diabetes and high blood pressure. How do you know if your weight is in the obesity range? To know if your weight is in the obesity range, your doctor looks at your body mass index (BMI) and waist size. BMI is a number that is calculated from your weight and your height. To figure out your BMI for yourself, you can use an online tool, such as http://www.wagner.com/ on the [employer]. If your BMI is 30.0 or higher, it falls within the obesity range. Keep in mind that BMI and waist size are only guides. They are not tools to determine your ideal body weight. What causes obesity? When you take in more calories than you burn off, you gain weight. How you eat, how active you are, and other things affect how your body uses calories and whether you gain weight. If you have family members who have too much body fat, you may have inherited a tendency to gain weight. And your family also helps form your eating and lifestyle habits, which can lead to obesity. Also, our busy lives make it harder to plan and cook healthy meals. For many of us, it's easier to reach for prepared foods, go out to eat, or go to the drive-through. But these foods are often high in saturated fat and calories. Portions are often too large. What can you do to reach a healthy weight? Focus on health, not diets. Diets are hard to stay on and don't work in the long run. It is very hard to stay with a diet that includes lots of big changes in your eating habits. Instead of a diet, focus on lifestyle changes that will improve your health and achieve the right balance of energy and calories. To lose weight, you need to burn more calories than you take in.  You can do it by eating healthy foods

## 2023-06-08 DIAGNOSIS — K59.01 SLOW TRANSIT CONSTIPATION: ICD-10-CM

## 2023-06-08 RX ORDER — DOCUSATE SODIUM 100 MG/1
CAPSULE, LIQUID FILLED ORAL
Qty: 60 CAPSULE | Refills: 5 | Status: SHIPPED | OUTPATIENT
Start: 2023-06-08

## 2023-06-08 NOTE — TELEPHONE ENCOUNTER
Anastasiia Longoria is calling to request a refill on the following medication(s):    Medication Request:  Requested Prescriptions     Pending Prescriptions Disp Refills    docusate sodium (COLACE) 100 MG capsule [Pharmacy Med Name: RA COL-RITE 100 MG CAPSULE] 60 capsule 5     Sig: take 1 capsule by mouth twice a day if needed       Last Visit Date (If Applicable):  8/50/6133    Next Visit Date:    Visit date not found

## 2023-06-22 ENCOUNTER — OFFICE VISIT (OUTPATIENT)
Dept: PODIATRY | Age: 63
End: 2023-06-22
Payer: MEDICARE

## 2023-06-22 VITALS — HEIGHT: 59 IN | WEIGHT: 242 LBS | BODY MASS INDEX: 48.79 KG/M2

## 2023-06-22 DIAGNOSIS — R26.2 TROUBLE WALKING: ICD-10-CM

## 2023-06-22 DIAGNOSIS — M19.072 DEGENERATIVE JOINT DISEASE OF BOTH ANKLES AND FEET: ICD-10-CM

## 2023-06-22 DIAGNOSIS — R26.9 GAIT ABNORMALITY: Primary | ICD-10-CM

## 2023-06-22 DIAGNOSIS — B35.1 DERMATOPHYTOSIS OF NAIL: ICD-10-CM

## 2023-06-22 DIAGNOSIS — M19.071 DEGENERATIVE JOINT DISEASE OF BOTH ANKLES AND FEET: ICD-10-CM

## 2023-06-22 DIAGNOSIS — I89.0 LYMPHATIC EDEMA: ICD-10-CM

## 2023-06-22 DIAGNOSIS — M79.671 PAIN IN BOTH FEET: ICD-10-CM

## 2023-06-22 DIAGNOSIS — I73.9 PVD (PERIPHERAL VASCULAR DISEASE) (HCC): ICD-10-CM

## 2023-06-22 DIAGNOSIS — M79.672 PAIN IN BOTH FEET: ICD-10-CM

## 2023-06-22 PROCEDURE — 1036F TOBACCO NON-USER: CPT | Performed by: PODIATRIST

## 2023-06-22 PROCEDURE — 99213 OFFICE O/P EST LOW 20 MIN: CPT | Performed by: PODIATRIST

## 2023-06-22 PROCEDURE — 3017F COLORECTAL CA SCREEN DOC REV: CPT | Performed by: PODIATRIST

## 2023-06-22 PROCEDURE — G8428 CUR MEDS NOT DOCUMENT: HCPCS | Performed by: PODIATRIST

## 2023-06-22 PROCEDURE — G8417 CALC BMI ABV UP PARAM F/U: HCPCS | Performed by: PODIATRIST

## 2023-06-22 PROCEDURE — 11721 DEBRIDE NAIL 6 OR MORE: CPT | Performed by: PODIATRIST

## 2023-06-23 DIAGNOSIS — M79.672 BILATERAL FOOT PAIN: Primary | ICD-10-CM

## 2023-06-23 DIAGNOSIS — M25.571 BILATERAL ANKLE PAIN, UNSPECIFIED CHRONICITY: ICD-10-CM

## 2023-06-23 DIAGNOSIS — M79.671 BILATERAL FOOT PAIN: Primary | ICD-10-CM

## 2023-06-23 DIAGNOSIS — M25.572 BILATERAL ANKLE PAIN, UNSPECIFIED CHRONICITY: ICD-10-CM

## 2023-06-26 ENCOUNTER — OFFICE VISIT (OUTPATIENT)
Dept: ORTHOPEDIC SURGERY | Age: 63
End: 2023-06-26

## 2023-06-26 VITALS — HEIGHT: 59 IN | BODY MASS INDEX: 48.79 KG/M2 | WEIGHT: 242 LBS | OXYGEN SATURATION: 100 % | RESPIRATION RATE: 16 BRPM

## 2023-06-26 DIAGNOSIS — M79.671 BILATERAL FOOT PAIN: Primary | ICD-10-CM

## 2023-06-26 DIAGNOSIS — M21.42 PES PLANUS OF BOTH FEET: ICD-10-CM

## 2023-06-26 DIAGNOSIS — M79.672 BILATERAL FOOT PAIN: Primary | ICD-10-CM

## 2023-06-26 DIAGNOSIS — M21.41 PES PLANUS OF BOTH FEET: ICD-10-CM

## 2023-06-26 DIAGNOSIS — M54.50 LOW BACK PAIN, UNSPECIFIED BACK PAIN LATERALITY, UNSPECIFIED CHRONICITY, UNSPECIFIED WHETHER SCIATICA PRESENT: ICD-10-CM

## 2023-07-18 NOTE — PROGRESS NOTES
[address] [facility]  [facility]  Dept: [phone]    Chief complaint:   Chief Complaint   Patient presents with    New Patient    Gynecologic Exam     Last pap 2/8/19 WNL Last lisbeth 12/23/22       History Present Illness: Shonda Mcneil is a 62 yo female who presents for her annual exam, and to establish care. She says she has a cyst on her ovary, but her recent CT doesn't mention any cysts on the ovaries. She is not sexually active and hasn't been in 10 years. Pt says that she stopped having periods in her 35s. She says she was on OCPs and then depo, but even after stopping them she didn't have periods. She says she didn't have any hot flushes or anything. She denies any vaginal discharge or irritation. She says that her bladder has dropped. She says she is on a water pill and that makes her have to void, but she generally doesn't have incontinence. She has hip pain from falling multiple times, but denies any pelvic pain. She denies any bowel issues except for constipation. Current Medications (OTC/Herbal):   Current Outpatient Medications   Medication Sig Dispense Refill    docusate sodium (COLACE) 100 MG capsule take 1 capsule by mouth twice a day if needed 60 capsule 5    hydrOXYzine HCl (ATARAX) 25 MG tablet take 1 tablet by mouth every 6 hours if needed for itching or anxiety 90 tablet 1    amLODIPine (NORVASC) 5 MG tablet take 1 tablet by mouth once daily 90 tablet 1    losartan-hydroCHLOROthiazide (HYZAAR) 100-25 MG per tablet take 1 tablet by mouth once daily 90 tablet 1    loratadine (CLARITIN) 10 MG tablet Take 1 tablet by mouth daily 90 tablet 3    clobetasol (TEMOVATE) 0.05 % ointment Apply to rash twice daily until resolved (not face, armpit or groin) 60 g 5    traMADol (ULTRAM) 50 MG tablet Take 1 tablet by mouth every 4 hours as needed for Pain.       albuterol sulfate HFA

## 2023-07-19 ENCOUNTER — OFFICE VISIT (OUTPATIENT)
Dept: OBGYN CLINIC | Age: 63
End: 2023-07-19
Payer: MEDICARE

## 2023-07-19 ENCOUNTER — HOSPITAL ENCOUNTER (OUTPATIENT)
Age: 63
Setting detail: SPECIMEN
Discharge: HOME | End: 2023-07-19

## 2023-07-19 VITALS
SYSTOLIC BLOOD PRESSURE: 128 MMHG | HEIGHT: 59 IN | BODY MASS INDEX: 50 KG/M2 | WEIGHT: 248 LBS | HEART RATE: 62 BPM | DIASTOLIC BLOOD PRESSURE: 78 MMHG

## 2023-07-19 DIAGNOSIS — Z76.89 ENCOUNTER TO ESTABLISH CARE WITH NEW DOCTOR: ICD-10-CM

## 2023-07-19 DIAGNOSIS — Z01.419 WELL WOMAN EXAM WITH ROUTINE GYNECOLOGICAL EXAM: Primary | ICD-10-CM

## 2023-07-19 DIAGNOSIS — Z12.31 SCREENING MAMMOGRAM FOR BREAST CANCER: ICD-10-CM

## 2023-07-19 PROCEDURE — 99386 PREV VISIT NEW AGE 40-64: CPT | Performed by: OBSTETRICS & GYNECOLOGY

## 2023-07-19 PROCEDURE — 3078F DIAST BP <80 MM HG: CPT | Performed by: OBSTETRICS & GYNECOLOGY

## 2023-07-19 PROCEDURE — 3074F SYST BP LT 130 MM HG: CPT | Performed by: OBSTETRICS & GYNECOLOGY

## 2023-07-19 ASSESSMENT — ENCOUNTER SYMPTOMS
COUGH: 0
ABDOMINAL PAIN: 0
BACK PAIN: 1
SHORTNESS OF BREATH: 0

## 2023-07-22 LAB
HPV I/H RISK 4 DNA CVX QL NAA+PROBE: NOT DETECTED
HPV SAMPLE: NORMAL
HPV, INTERPRETATION: NORMAL
HPV16 DNA CVX QL NAA+PROBE: NOT DETECTED
HPV18 DNA CVX QL NAA+PROBE: NOT DETECTED
SPECIMEN DESCRIPTION: NORMAL

## 2023-07-24 LAB — CYTOLOGY REPORT: NORMAL

## 2023-08-01 ENCOUNTER — OFFICE VISIT (OUTPATIENT)
Dept: ORTHOPEDIC SURGERY | Age: 63
End: 2023-08-01
Payer: MEDICARE

## 2023-08-01 VITALS — HEIGHT: 59 IN | BODY MASS INDEX: 50 KG/M2 | RESPIRATION RATE: 14 BRPM | WEIGHT: 248 LBS

## 2023-08-01 DIAGNOSIS — M51.36 LUMBAR DEGENERATIVE DISC DISEASE: Primary | ICD-10-CM

## 2023-08-01 DIAGNOSIS — M79.18 LEFT BUTTOCK PAIN: ICD-10-CM

## 2023-08-01 PROCEDURE — 3017F COLORECTAL CA SCREEN DOC REV: CPT | Performed by: ORTHOPAEDIC SURGERY

## 2023-08-01 PROCEDURE — G8427 DOCREV CUR MEDS BY ELIG CLIN: HCPCS | Performed by: ORTHOPAEDIC SURGERY

## 2023-08-01 PROCEDURE — 1036F TOBACCO NON-USER: CPT | Performed by: ORTHOPAEDIC SURGERY

## 2023-08-01 PROCEDURE — 99213 OFFICE O/P EST LOW 20 MIN: CPT | Performed by: ORTHOPAEDIC SURGERY

## 2023-08-01 PROCEDURE — G8417 CALC BMI ABV UP PARAM F/U: HCPCS | Performed by: ORTHOPAEDIC SURGERY

## 2023-08-01 NOTE — PROGRESS NOTES
Patient ID: Batsheva Mars is a 63 y.o. female    Chief Compliant:  Chief Complaint   Patient presents with    Lower Back Pain        Diagnostic imaging:  AP lateral lumbar spine age-appropriate some collapse at the 5 1 disc     AP lateral left hip normal      Assessment and Plan:  1. BMI 50.0-59.9, adult ([address])    2. Left buttock pain    3. Lumbar degenerative disc disease        Pain management for CATRACHITA    PT for lower back    Follow up6 weeks    HPI:  This is a 63 y.o. female who presents to the clinic today as a new patient for low back evaluation. Patient reports pain in her left buttock, which has been ongoing for 4 years. She notes that she has been receiving generalized steroid injections by her PCP which she notes has improved her pain. Patient has also been taking Tramadol for her pain. She reports that she has had series of falls recently. Review of Systems   All other systems reviewed and are negative.       Past History:    Current Outpatient Medications:     docusate sodium (COLACE) 100 MG capsule, take 1 capsule by mouth twice a day if needed, Disp: 60 capsule, Rfl: 5    hydrOXYzine HCl (ATARAX) 25 MG tablet, take 1 tablet by mouth every 6 hours if needed for itching or anxiety, Disp: 90 tablet, Rfl: 1    amLODIPine (NORVASC) 5 MG tablet, take 1 tablet by mouth once daily, Disp: 90 tablet, Rfl: 1    losartan-hydroCHLOROthiazide (HYZAAR) 100-25 MG per tablet, take 1 tablet by mouth once daily, Disp: 90 tablet, Rfl: 1    fluticasone-salmeterol (ADVAIR) 250-50 MCG/ACT AEPB diskus inhaler, inhale 1 puff by mouth and INTO THE LUNGS twice a day Rinse mouth after use, Disp: 1 each, Rfl: 5    Handicap Placard MISC, by Does not apply route Temporary use- not to exceed more than 5 years Patient unable to walk more than 200 feet without needing to stop to rest, Disp: 1 each, Rfl: 0    loratadine (CLARITIN) 10 MG tablet, Take 1 tablet by mouth daily, Disp: 90 tablet, Rfl: 3    EPINEPHrine (EPIPEN

## 2023-08-16 ENCOUNTER — OFFICE VISIT (OUTPATIENT)
Dept: PULMONOLOGY | Age: 63
End: 2023-08-16
Payer: MEDICARE

## 2023-08-16 VITALS
SYSTOLIC BLOOD PRESSURE: 135 MMHG | DIASTOLIC BLOOD PRESSURE: 75 MMHG | BODY MASS INDEX: 49.39 KG/M2 | RESPIRATION RATE: 14 BRPM | OXYGEN SATURATION: 98 % | HEART RATE: 74 BPM | WEIGHT: 245 LBS | HEIGHT: 59 IN

## 2023-08-16 DIAGNOSIS — J45.40 MODERATE PERSISTENT ASTHMA WITHOUT COMPLICATION: Primary | ICD-10-CM

## 2023-08-16 DIAGNOSIS — I10 ESSENTIAL HYPERTENSION: ICD-10-CM

## 2023-08-16 DIAGNOSIS — Z87.891 PERSONAL HISTORY OF TOBACCO USE: ICD-10-CM

## 2023-08-16 DIAGNOSIS — G47.33 OSA (OBSTRUCTIVE SLEEP APNEA): ICD-10-CM

## 2023-08-16 DIAGNOSIS — E66.01 MORBID OBESITY DUE TO EXCESS CALORIES (HCC): ICD-10-CM

## 2023-08-16 DIAGNOSIS — R09.82 POST-NASAL DRIP: ICD-10-CM

## 2023-08-16 DIAGNOSIS — J30.2 SEASONAL ALLERGIES: ICD-10-CM

## 2023-08-16 PROCEDURE — G8417 CALC BMI ABV UP PARAM F/U: HCPCS | Performed by: INTERNAL MEDICINE

## 2023-08-16 PROCEDURE — G8428 CUR MEDS NOT DOCUMENT: HCPCS | Performed by: INTERNAL MEDICINE

## 2023-08-16 PROCEDURE — 1036F TOBACCO NON-USER: CPT | Performed by: INTERNAL MEDICINE

## 2023-08-16 PROCEDURE — 3017F COLORECTAL CA SCREEN DOC REV: CPT | Performed by: INTERNAL MEDICINE

## 2023-08-16 PROCEDURE — 3078F DIAST BP <80 MM HG: CPT | Performed by: INTERNAL MEDICINE

## 2023-08-16 PROCEDURE — 3074F SYST BP LT 130 MM HG: CPT | Performed by: INTERNAL MEDICINE

## 2023-08-16 PROCEDURE — 99204 OFFICE O/P NEW MOD 45 MIN: CPT | Performed by: INTERNAL MEDICINE

## 2023-08-16 RX ORDER — FLUTICASONE PROPIONATE AND SALMETEROL 250; 50 UG/1; UG/1
POWDER RESPIRATORY (INHALATION)
Qty: 1 EACH | Refills: 5 | Status: SHIPPED | OUTPATIENT
Start: 2023-08-16

## 2023-08-16 RX ORDER — FLUTICASONE PROPIONATE 50 MCG
2 SPRAY, SUSPENSION (ML) NASAL DAILY
Qty: 16 G | Refills: 5 | Status: SHIPPED | OUTPATIENT
Start: 2023-08-16

## 2023-08-16 RX ORDER — ALBUTEROL SULFATE 90 UG/1
2 AEROSOL, METERED RESPIRATORY (INHALATION) EVERY 6 HOURS PRN
Qty: 1 EACH | Refills: 2 | Status: SHIPPED | OUTPATIENT
Start: 2023-08-16

## 2023-08-16 NOTE — PROGRESS NOTES
[address] [facility],[address] RESPIRATORY [place], [place].  [address] [place] [id number]  Dept: [phone]  Dept Fax: [phone]      8/16/23    Patient: Eric Fenton  YOB: 1960    Dear JJ Pollard - CNP,    I had the pleasure of seeing one of your patients, Eric Fenton today in the office today. Please find attached my note with the assessment and plan of care. Thank you for allowing me to participate in the care of this patient. I will keep you updated on this patient's follow up and I look forward to serving you and your patients again in the future.     Tien Ortega MD  8/16/2023 9:09 AM
pneumococcal vaccinations. Patient had the COVID-19 vaccination. Recommend the Bi Valent booster  Patient is not uptodate with vaccinations from pulmonary perspective. Maintain an active lifestyle. continue smoking cessation. Patient was educated on how to use the respiratory medications. All the questions that the patient has had were answered to   her satisfaction. Home O2 evaluation was done. Supplemental oxygen was not indicated  After reviewing the patient's smoking history and her age patient does not meet the criteria for lung cancer screening due to less than 20-pack-year history of smoking and having quit smoking more than 15 years ago. Auto CPAP 6 to 12 cm of water to be used for at least 4 hours every night. Use humidifier if needed. Patient is not using as recommended and not benefiting from using the PAP machine. Weight loss was recommended and discussed. Recommended following good sleep hygiene instructions. Explained importance of compliance with treatment of sleep apnea. Pt is not to drive if sleepy. We'll see the patient back in 6 months or earlier if needed. Patient will call us if she is sick, so she can be seen sooner. Thank you for having us involved in the care of your patient. Please call us if you have any questions or concerns.               Buck Castle MD MD  8/16/2023 9:24 AM
regular

## 2023-08-16 NOTE — PATIENT INSTRUCTIONS
Office faxing Cpap order to [facility] -  [address]. [phone]   Alejandrina Chapman

## 2023-08-24 ENCOUNTER — OFFICE VISIT (OUTPATIENT)
Dept: PODIATRY | Age: 63
End: 2023-08-24

## 2023-08-24 VITALS — BODY MASS INDEX: 49.39 KG/M2 | HEIGHT: 59 IN | WEIGHT: 245 LBS

## 2023-08-24 DIAGNOSIS — B35.1 DERMATOPHYTOSIS OF NAIL: ICD-10-CM

## 2023-08-24 DIAGNOSIS — M25.572 SINUS TARSITIS OF LEFT FOOT: ICD-10-CM

## 2023-08-24 DIAGNOSIS — M79.671 PAIN IN BOTH FEET: ICD-10-CM

## 2023-08-24 DIAGNOSIS — R26.2 TROUBLE WALKING: ICD-10-CM

## 2023-08-24 DIAGNOSIS — M79.672 LEFT FOOT PAIN: Primary | ICD-10-CM

## 2023-08-24 DIAGNOSIS — M25.372 LEFT ANKLE INSTABILITY: ICD-10-CM

## 2023-08-24 DIAGNOSIS — I73.9 PVD (PERIPHERAL VASCULAR DISEASE) (HCC): ICD-10-CM

## 2023-08-24 DIAGNOSIS — M25.572 CHRONIC PAIN OF LEFT ANKLE: ICD-10-CM

## 2023-08-24 DIAGNOSIS — M79.672 PAIN IN BOTH FEET: ICD-10-CM

## 2023-08-24 DIAGNOSIS — R26.9 GAIT ABNORMALITY: ICD-10-CM

## 2023-08-24 DIAGNOSIS — I89.0 LYMPHATIC EDEMA: ICD-10-CM

## 2023-08-24 DIAGNOSIS — G89.29 CHRONIC PAIN OF LEFT ANKLE: ICD-10-CM

## 2023-08-24 NOTE — PROGRESS NOTES
can no longer turn your foot outward. Hold this position for 15 seconds. Return to neutral position. The Alphabet   A great way physical therapists help their patients gain ankle mobility in all directions is to perform the ankle alphabet. This can get your ankle moving in all directions. Here is how to do the exercise:   Sit on a chair with your foot dangling in the air or on a bed with your foot hanging off the edge. Draw the alphabet one letter at a time by moving the injured ankle and using the great toe as your \"pencil. \"  Eversion Isometrics     Strengthening exercises are usually started with isometric contractions--no motion occurs around your ankle joint during the muscle contraction. They may be done early after injury or surgery to start to gently--and safely--add force to the muscles that support your ankle. To do the exericse: While seated, place the outside of the injured foot against a table leg or closed door. Push outward with your foot into the object your foot is against (your ankle joint should not move) causing a contraction of your muscles. Hold this muscle contraction for 15 seconds. Relax for 10 seconds. Inversion Isometrics     This isometric exercise focuses on inversion:   While seated, place the inside of the injured foot against a table leg or closed door. Push inward with your foot into the object your foot is against (your ankle joint should not move) causing a contraction of your muscles. Hold this muscle contraction for 15 seconds. Relax for 10 seconds. Resisted Strengthening Dorsiflexion     Resisted strengthening exercises should be performed with a Theraband providing resistance to your movements. These exercises will also work to strengthen the muscles around your ankle. This will provide added support to the joint. Perform each exercise 10 to 15 times in a row.    Never tie a Theraband (or anything else) around your foot, ankle, or leg in a way that would

## 2023-08-25 ENCOUNTER — TELEPHONE (OUTPATIENT)
Dept: PULMONOLOGY | Age: 63
End: 2023-08-25

## 2023-08-25 DIAGNOSIS — G47.33 OSA (OBSTRUCTIVE SLEEP APNEA): Primary | ICD-10-CM

## 2023-08-25 NOTE — TELEPHONE ENCOUNTER
Per [address] patient needs a updated PSG. Per her insurance guidelines must have a PSG on file no older then one year old. Please order.

## 2023-08-25 NOTE — PROGRESS NOTES
Patient's insurance would not cover for an auto CPAP machine until the patient has a new sleep study. Ordered a baseline sleep study.   Thank you

## 2023-09-07 ENCOUNTER — HOSPITAL ENCOUNTER (OUTPATIENT)
Dept: PHYSICAL THERAPY | Age: 63
Setting detail: THERAPIES SERIES
Discharge: HOME | End: 2023-09-07
Payer: MEDICARE

## 2023-09-07 PROCEDURE — 97162 PT EVAL MOD COMPLEX 30 MIN: CPT

## 2023-09-07 NOTE — CONSULTS
Lilian Rocha, PT  Date: 9/7/2023         Comments:    Assessment: Patient presents with limited back mobility, positive neural tension testing, and with tolerance of sitting and walking due to back pain and radicular leg pain. This presents as possible lumbar disc derangement. Patient with limited ankle mobility, strength following multiple ankle surgeries. Patient would continue to benefit from skilled physical therapy services in order to: work on lumbar mobility, postural education/decompression; ankle strength, ROM, function. Problem list, as detailed above:   [x] ? Back Pain:  9/10; ankle pain 9/10  [x] ? ROM:   Limited ankle mobility, toe mobility, lumbar mobility  [x] ? Strength:  Limited ankle strength  [x] ? Function: limited with standing more than 10 minutes with back pain, limited with sitting more than 10 minutes with back and radicular leg pain, limited with functional tolerance of prolonged ambulation, limited with balance during gait       STG: (to be met in 8 treatments)  ? Pain: 3/10 in back, ankle to help generally improve function  ? ROM: DF to neutral; PF 55 degrees, INV 20 degrees, EV 15 degrees to help with gait, flexion to 1/2 distal from patella, extension to 10 degrees to help with tying shoes  ? Strength: 4/5 with ankle testing to help with general function  ? Function: standing tolerance improved to 20 minutes, sitting tolerance 25 minutes with improved back pain, ambulates up to 10 minutes, tandem balance to 10 seconds with (B) testing to allow improved gait  Independent with Home Exercise Programs  Demonstrate Knowledge of fall prevention  LTG: (to be met in 12 treatments)  ? Pain:0-1/10 in back, ankle to help generally improve function  ? ROM: DF to 5; PF 55 degrees, INV 30 degrees, EV 15 degrees to help with gait, flexion to 2/3 distal from patella, extension to 10 degrees to help with tying shoes  ? Strength: 4+/5 with ankle testing to help with general function  ?  Function:

## 2023-09-08 ENCOUNTER — INITIAL CONSULT (OUTPATIENT)
Dept: PAIN MANAGEMENT | Age: 63
End: 2023-09-08
Payer: MEDICARE

## 2023-09-08 VITALS — BODY MASS INDEX: 49.39 KG/M2 | HEIGHT: 59 IN | WEIGHT: 245 LBS

## 2023-09-08 DIAGNOSIS — M47.817 LUMBOSACRAL SPONDYLOSIS WITHOUT MYELOPATHY: Primary | ICD-10-CM

## 2023-09-08 DIAGNOSIS — M54.17 LUMBOSACRAL NEURITIS: ICD-10-CM

## 2023-09-08 PROCEDURE — G8417 CALC BMI ABV UP PARAM F/U: HCPCS | Performed by: PAIN MEDICINE

## 2023-09-08 PROCEDURE — 3017F COLORECTAL CA SCREEN DOC REV: CPT | Performed by: PAIN MEDICINE

## 2023-09-08 PROCEDURE — 99204 OFFICE O/P NEW MOD 45 MIN: CPT | Performed by: PAIN MEDICINE

## 2023-09-08 PROCEDURE — G8427 DOCREV CUR MEDS BY ELIG CLIN: HCPCS | Performed by: PAIN MEDICINE

## 2023-09-08 PROCEDURE — 1036F TOBACCO NON-USER: CPT | Performed by: PAIN MEDICINE

## 2023-09-08 ASSESSMENT — ENCOUNTER SYMPTOMS
BOWEL INCONTINENCE: 0
BACK PAIN: 1

## 2023-09-08 NOTE — PROGRESS NOTES
[facility], by Does not apply route Temporary use- not to exceed more than 5 years Patient unable to walk more than 200 feet without needing to stop to rest, Disp: 1 each, Rfl: 0    loratadine (CLARITIN) 10 MG tablet, Take 1 tablet by mouth daily, Disp: 90 tablet, Rfl: 3    EPINEPHrine (EPIPEN 2-GUNNAR) 0.3 MG/0.3ML SOAJ injection, Inject 0.3 mLs into the skin once as needed (anaphylaxsis), Disp: 1 each, Rfl: 0    clobetasol (TEMOVATE) 0.05 % ointment, Apply to rash twice daily until resolved (not face, armpit or groin), Disp: 60 g, Rfl: 5    traMADol (ULTRAM) 50 MG tablet, Take 1 tablet by mouth every 4 hours as needed for Pain., Disp: , Rfl:     EPINEPHrine (EPIPEN) 0.3 MG/0.3ML SOAJ injection, Inject 0.3 mLs into the muscle once for 1 dose Use as directed for allergic reaction, Disp: 1 each, Rfl: 0    Family History   Problem Relation Age of Onset    Breast Cancer Mother 35    Depression Mother     Diabetes Mother     High Blood Pressure Mother     Mental Illness Mother     Substance Abuse Father     Depression Sister     Diabetes Sister     High Blood Pressure Sister     Mental Illness Sister     Depression Brother     Mental Illness Brother     Substance Abuse Brother        Social History     Socioeconomic History    Marital status: Single     Spouse name: Not on file    Number of children: Not on file    Years of education: Not on file    Highest education level: Not on file   Occupational History    Not on file   Tobacco Use    Smoking status: Former     Packs/day: 1.00     Years: 8.00     Pack years: 8.00     Types: Cigarettes     Start date: 1981     Quit date: 1998     Years since quittin.7    Smokeless tobacco: Never    Tobacco comments:     quit smoking 15-20 yrs ago   Vaping Use    Vaping Use: Never used   Substance and Sexual Activity    Alcohol use: No    Drug use: No    Sexual activity: Not Currently   Other Topics Concern    Not on file   Social History Narrative    Not on file

## 2023-09-13 ENCOUNTER — HOSPITAL ENCOUNTER (OUTPATIENT)
Dept: SLEEP CENTER | Age: 63
Discharge: HOME | End: 2023-09-15
Payer: MEDICARE

## 2023-09-13 DIAGNOSIS — G47.33 OSA (OBSTRUCTIVE SLEEP APNEA): ICD-10-CM

## 2023-09-13 PROCEDURE — 95810 POLYSOM 6/> YRS 4/> PARAM: CPT

## 2023-09-14 VITALS
OXYGEN SATURATION: 94 % | HEIGHT: 59 IN | WEIGHT: 249 LBS | HEART RATE: 57 BPM | RESPIRATION RATE: 16 BRPM | BODY MASS INDEX: 50.2 KG/M2

## 2023-09-21 ENCOUNTER — HOSPITAL ENCOUNTER (OUTPATIENT)
Dept: MRI IMAGING | Age: 63
Discharge: HOME | End: 2023-09-23
Attending: PAIN MEDICINE
Payer: MEDICARE

## 2023-09-21 DIAGNOSIS — M47.817 LUMBOSACRAL SPONDYLOSIS WITHOUT MYELOPATHY: ICD-10-CM

## 2023-09-21 DIAGNOSIS — M54.17 LUMBOSACRAL NEURITIS: ICD-10-CM

## 2023-09-21 PROCEDURE — 72148 MRI LUMBAR SPINE W/O DYE: CPT

## 2023-09-27 DIAGNOSIS — G47.33 OSA (OBSTRUCTIVE SLEEP APNEA): Primary | ICD-10-CM

## 2023-09-27 LAB — STATUS: NORMAL

## 2023-09-28 ENCOUNTER — INITIAL CONSULT (OUTPATIENT)
Dept: PHYSICAL MEDICINE AND REHAB | Age: 63
End: 2023-09-28
Payer: MEDICARE

## 2023-09-28 VITALS
WEIGHT: 242 LBS | HEART RATE: 61 BPM | BODY MASS INDEX: 48.79 KG/M2 | HEIGHT: 59 IN | DIASTOLIC BLOOD PRESSURE: 76 MMHG | TEMPERATURE: 97.8 F | SYSTOLIC BLOOD PRESSURE: 124 MMHG

## 2023-09-28 DIAGNOSIS — M25.572 CHRONIC PAIN OF LEFT ANKLE: Primary | ICD-10-CM

## 2023-09-28 DIAGNOSIS — M25.552 CHRONIC LEFT HIP PAIN: ICD-10-CM

## 2023-09-28 DIAGNOSIS — G89.29 CHRONIC PAIN OF LEFT ANKLE: Primary | ICD-10-CM

## 2023-09-28 DIAGNOSIS — R29.6 RECURRENT FALLS: ICD-10-CM

## 2023-09-28 DIAGNOSIS — G89.29 CHRONIC LEFT HIP PAIN: ICD-10-CM

## 2023-09-28 PROCEDURE — 99204 OFFICE O/P NEW MOD 45 MIN: CPT | Performed by: STUDENT IN AN ORGANIZED HEALTH CARE EDUCATION/TRAINING PROGRAM

## 2023-09-28 PROCEDURE — 3078F DIAST BP <80 MM HG: CPT | Performed by: STUDENT IN AN ORGANIZED HEALTH CARE EDUCATION/TRAINING PROGRAM

## 2023-09-28 PROCEDURE — G8417 CALC BMI ABV UP PARAM F/U: HCPCS | Performed by: STUDENT IN AN ORGANIZED HEALTH CARE EDUCATION/TRAINING PROGRAM

## 2023-09-28 PROCEDURE — 3074F SYST BP LT 130 MM HG: CPT | Performed by: STUDENT IN AN ORGANIZED HEALTH CARE EDUCATION/TRAINING PROGRAM

## 2023-09-28 PROCEDURE — G8427 DOCREV CUR MEDS BY ELIG CLIN: HCPCS | Performed by: STUDENT IN AN ORGANIZED HEALTH CARE EDUCATION/TRAINING PROGRAM

## 2023-09-29 ENCOUNTER — HOSPITAL ENCOUNTER (OUTPATIENT)
Dept: PHYSICAL THERAPY | Age: 63
Setting detail: THERAPIES SERIES
Discharge: HOME | End: 2023-09-29
Payer: MEDICARE

## 2023-09-29 PROCEDURE — G0283 ELEC STIM OTHER THAN WOUND: HCPCS

## 2023-09-29 PROCEDURE — 97110 THERAPEUTIC EXERCISES: CPT

## 2023-09-29 NOTE — FLOWSHEET NOTE
[x] [facility]  [address]  [place]: [phone]   F: [phone]    Physical Therapy Daily Treatment Note      Date:  2023  Patient Name:  Shelbie Christopher    :  1960  MRN: [id number]  Physician: Romeo May MD                                     Insurance: [facility] Medicare / [place] $3 copay visits based on medical necessity. Medical Diagnosis: (L) buttock pain M79.18; lumbar degenerative disc disease M51.36 add (L) ankle pain M25.572               Rehab Codes: M54.5 low back pain, M25.572  Onset Date:  23 referral              Next 's appt.: 23 follow up with orthopedic physician   Visit Count:                                 Cancel/No Show: 0/0    Subjective:  Patient reporting to therapy stating she feels like her hip is broke. Patient reporting she wants US because it helped her foot. Pain:  [x] Yes  [] No Location: L hip  Pain Rating: (0-10 scale) 8-9/10  Pain altered Tx:  [] No  [] Yes  Action:  Comments:    Objective:  Modalities: IFC 15mins in R side lying chronic pain 4 pads 'X' formation 32.5mA>30.0mA- pt has very low pain tolerance. ** prefers at the end of session. Precautions:  Exercises:  Exercise Reps/ Time Weight/ Level Completed Comments   Manual       STM   x  attempted but held due to increased pain                 Postural education      Sitting, laying          NuStep  5' 3   x     SKTC semi reclined 3 x 30\"   x     Modified piriformis semi reclined 3 x 10\"   x      Active hamstring stretch  10x2 3\"   x     LTR 10x3\"  x           Seated       LAQ   ADD                  Other:    Specific Instructions for next treatment:      Assessment: [] Progressing toward goals. [] No change. [x] Other: Initial session post eval-- Began on NuStep to improve BLE strength with focus on core engagement to improve trunk stability.   Attempted to add manual but due to pt's inc sensitivity, held this session

## 2023-10-04 ENCOUNTER — OFFICE VISIT (OUTPATIENT)
Dept: PAIN MANAGEMENT | Age: 63
End: 2023-10-04
Payer: MEDICARE

## 2023-10-04 VITALS — BODY MASS INDEX: 48.79 KG/M2 | WEIGHT: 242 LBS | HEIGHT: 59 IN

## 2023-10-04 DIAGNOSIS — M47.817 LUMBOSACRAL SPONDYLOSIS WITHOUT MYELOPATHY: Primary | ICD-10-CM

## 2023-10-04 DIAGNOSIS — M54.17 LUMBOSACRAL NEURITIS: ICD-10-CM

## 2023-10-04 PROCEDURE — G8417 CALC BMI ABV UP PARAM F/U: HCPCS | Performed by: NURSE PRACTITIONER

## 2023-10-04 PROCEDURE — G8484 FLU IMMUNIZE NO ADMIN: HCPCS | Performed by: NURSE PRACTITIONER

## 2023-10-04 PROCEDURE — 3017F COLORECTAL CA SCREEN DOC REV: CPT | Performed by: NURSE PRACTITIONER

## 2023-10-04 PROCEDURE — 99213 OFFICE O/P EST LOW 20 MIN: CPT | Performed by: NURSE PRACTITIONER

## 2023-10-04 PROCEDURE — G8427 DOCREV CUR MEDS BY ELIG CLIN: HCPCS | Performed by: NURSE PRACTITIONER

## 2023-10-04 PROCEDURE — 1036F TOBACCO NON-USER: CPT | Performed by: NURSE PRACTITIONER

## 2023-10-04 ASSESSMENT — ENCOUNTER SYMPTOMS
SHORTNESS OF BREATH: 0
COUGH: 0
CONSTIPATION: 0
BACK PAIN: 1

## 2023-10-06 ENCOUNTER — HOSPITAL ENCOUNTER (OUTPATIENT)
Dept: PHYSICAL THERAPY | Age: 63
Setting detail: THERAPIES SERIES
Discharge: HOME | End: 2023-10-06
Payer: MEDICARE

## 2023-10-06 ENCOUNTER — TELEPHONE (OUTPATIENT)
Dept: PHYSICAL MEDICINE AND REHAB | Age: 63
End: 2023-10-06

## 2023-10-06 PROCEDURE — G0283 ELEC STIM OTHER THAN WOUND: HCPCS

## 2023-10-06 PROCEDURE — 97110 THERAPEUTIC EXERCISES: CPT

## 2023-10-06 NOTE — FLOWSHEET NOTE
[x] [facility]  [address]  [facility] [phone]   F: [phone]    Physical Therapy Daily Treatment Note      Date:  10/6/2023  Patient Name:  Della Gann    :  1960  MRN: [id number]  Physician: Loi Cheek MD                                     Insurance: [facility] Medicare / [facility] $1 copay visits based on medical necessity. Medical Diagnosis: (L) buttock pain M79.18; lumbar degenerative disc disease M51.36 add (L) ankle pain M25.572               Rehab Codes: M54.5 low back pain, M25.572  Onset Date:  23 referral              Next 's appt.: 23 follow up with orthopedic physician   Visit Count: 3/12                                Cancel/No Show: 0/0    Subjective:  Patient still noting significant hip pain- very painful and unable to walk on that leg- again states that her hip feels like it is \"broken\"  Pain:  [x] Yes  [] No Location: L hip  Pain Rating: (0-10 scale) 8-9/10  Pain altered Tx:  [] No  [] Yes  Action:  Comments:    Objective:  Modalities: IFC 15mins in R side lying chronic pain 4 pads 'X' formation 32.5mA>30.0mA- pt has very low pain tolerance. ** prefers at the end of session. Precautions:  Exercises:  Exercise Reps/ Time Weight/ Level Completed Comments   Manual       STM   x  attempted but held due to increased pain                 Postural education      Sitting, laying          NuStep  5' 3   x     SKTC semi reclined 3 x 30\"   x     Modified piriformis semi reclined 3 x 10\"   x      Active hamstring stretch  10x2 3\"   x     LTR 10x3\"  x           Seated       LAQ   ADD                  Other:    Specific Instructions for next treatment:      Assessment: [] Progressing toward goals. [] No change. [x] Other: Reviewed with the patient MRI and low back and hip findings, which showed disc issues in the low back and hip OA without fracture.   As her pain is in the lateral hip and glut, presents more as

## 2023-10-06 NOTE — TELEPHONE ENCOUNTER
Pt states that you were going to write a script for a walker for her at her last appt. She is asking if she can have one with a seat for when she goes shopping. She would like it to go to [address] when ready.

## 2023-10-10 ENCOUNTER — OFFICE VISIT (OUTPATIENT)
Dept: FAMILY MEDICINE CLINIC | Age: 63
End: 2023-10-10
Payer: MEDICARE

## 2023-10-10 VITALS
SYSTOLIC BLOOD PRESSURE: 136 MMHG | DIASTOLIC BLOOD PRESSURE: 74 MMHG | HEART RATE: 74 BPM | OXYGEN SATURATION: 96 % | BODY MASS INDEX: 49.23 KG/M2 | WEIGHT: 244.2 LBS | TEMPERATURE: 97.8 F | HEIGHT: 59 IN

## 2023-10-10 DIAGNOSIS — Z00.00 MEDICARE ANNUAL WELLNESS VISIT, SUBSEQUENT: Primary | ICD-10-CM

## 2023-10-10 DIAGNOSIS — Z12.11 SCREENING FOR COLON CANCER: ICD-10-CM

## 2023-10-10 DIAGNOSIS — Z12.31 ENCOUNTER FOR SCREENING MAMMOGRAM FOR MALIGNANT NEOPLASM OF BREAST: ICD-10-CM

## 2023-10-10 PROBLEM — E66.01 MORBID OBESITY WITH BMI OF 50.0-59.9, ADULT (HCC): Status: RESOLVED | Noted: 2018-12-04 | Resolved: 2023-10-10

## 2023-10-10 PROBLEM — R21 RASH: Status: RESOLVED | Noted: 2020-09-24 | Resolved: 2023-10-10

## 2023-10-10 PROBLEM — R73.01 IFG (IMPAIRED FASTING GLUCOSE): Status: RESOLVED | Noted: 2022-12-01 | Resolved: 2023-10-10

## 2023-10-10 PROCEDURE — G0439 PPPS, SUBSEQ VISIT: HCPCS | Performed by: NURSE PRACTITIONER

## 2023-10-10 PROCEDURE — 3078F DIAST BP <80 MM HG: CPT | Performed by: NURSE PRACTITIONER

## 2023-10-10 PROCEDURE — 3075F SYST BP GE 130 - 139MM HG: CPT | Performed by: NURSE PRACTITIONER

## 2023-10-10 PROCEDURE — 3017F COLORECTAL CA SCREEN DOC REV: CPT | Performed by: NURSE PRACTITIONER

## 2023-10-10 PROCEDURE — G8484 FLU IMMUNIZE NO ADMIN: HCPCS | Performed by: NURSE PRACTITIONER

## 2023-10-10 ASSESSMENT — PATIENT HEALTH QUESTIONNAIRE - PHQ9
SUM OF ALL RESPONSES TO PHQ QUESTIONS 1-9: 8
2. FEELING DOWN, DEPRESSED OR HOPELESS: 3
7. TROUBLE CONCENTRATING ON THINGS, SUCH AS READING THE NEWSPAPER OR WATCHING TELEVISION: 0
8. MOVING OR SPEAKING SO SLOWLY THAT OTHER PEOPLE COULD HAVE NOTICED. OR THE OPPOSITE, BEING SO FIGETY OR RESTLESS THAT YOU HAVE BEEN MOVING AROUND A LOT MORE THAN USUAL: 0
9. THOUGHTS THAT YOU WOULD BE BETTER OFF DEAD, OR OF HURTING YOURSELF: 0
SUM OF ALL RESPONSES TO PHQ QUESTIONS 1-9: 8
4. FEELING TIRED OR HAVING LITTLE ENERGY: 1
SUM OF ALL RESPONSES TO PHQ QUESTIONS 1-9: 8
10. IF YOU CHECKED OFF ANY PROBLEMS, HOW DIFFICULT HAVE THESE PROBLEMS MADE IT FOR YOU TO DO YOUR WORK, TAKE CARE OF THINGS AT HOME, OR GET ALONG WITH OTHER PEOPLE: 1
1. LITTLE INTEREST OR PLEASURE IN DOING THINGS: 1
5. POOR APPETITE OR OVEREATING: 0
6. FEELING BAD ABOUT YOURSELF - OR THAT YOU ARE A FAILURE OR HAVE LET YOURSELF OR YOUR FAMILY DOWN: 0
SUM OF ALL RESPONSES TO PHQ9 QUESTIONS 1 & 2: 4
SUM OF ALL RESPONSES TO PHQ QUESTIONS 1-9: 8
3. TROUBLE FALLING OR STAYING ASLEEP: 3

## 2023-10-10 ASSESSMENT — LIFESTYLE VARIABLES
HOW MANY STANDARD DRINKS CONTAINING ALCOHOL DO YOU HAVE ON A TYPICAL DAY: PATIENT DOES NOT DRINK
HOW OFTEN DO YOU HAVE A DRINK CONTAINING ALCOHOL: NEVER

## 2023-10-10 NOTE — PROGRESS NOTES
Medicare Annual Wellness Visit    Rosana Bravo is here for Medicare AWV    Assessment & Plan   Medicare annual wellness visit, subsequent  Questionnaire reviewed & discussed  Preventative care reviewed  Immunizations discussed & encouraged   Screening for colon cancer  -     Fecal DNA Colorectal cancer screening (Cologuard)  Encounter for screening mammogram for malignant neoplasm of breast  -     JUSTUS VLAD DIGITAL SCREEN BILATERAL; Future  Recommendations for Preventive Services Due: see orders and patient instructions/AVS.  Recommended screening schedule for the next 5-10 years is provided to the patient in written form: see Patient Instructions/AVS.     Return in about 6 months (around 4/10/2024), or if symptoms worsen or fail to improve, for HTN - 40 min. .     Subjective       Patient's complete Health Risk Assessment and screening values have been reviewed and are found in Flowsheets. The following problems were reviewed today and where indicated follow up appointments were made and/or referrals ordered. Positive Risk Factor Screenings with Interventions:    Fall Risk:  Do you feel unsteady or are you worried about falling? : (!) yes  2 or more falls in past year?: no  Fall with injury in past year?: no     Interventions:    See AVS for additional education material     Depression:  PHQ-2 Score: 4  PHQ-9 Total Score: 8    Interpretation:   1-4 = minimal  5-9 = mild  10-14 = moderate  15-19 = moderately severe  20-27 = severe  Interventions:  Patient declines any further evaluation or treatment          General HRA Questions:  Select all that apply: (!) New or Increased Pain    Pain Interventions:   Following with PM, highly encouraged follow-up appointment        Weight and Activity:  Physical Activity: Insufficiently Active (10/10/2023)    Exercise Vital Sign     Days of Exercise per Week: 1 day     Minutes of Exercise per Session: 60 min     On average, how many days per week do you engage in moderate to

## 2023-10-10 NOTE — PATIENT INSTRUCTIONS
one thing and you pay close attention to that one thing. For example, you may sit quietly and notice your emotions or how your food tastes and smells. When you're present, you focus on the things that are happening right now. You let go of your thoughts about the past and the future. When you dwell on the past or the future, you miss moments that can heal and strengthen you. You may miss moments like hearing a child laugh or seeing a friendly face when you think you're all alone. When you're accepting, you don't  the present moment. Instead you accept your thoughts and feelings as they come. You can practice anytime, anywhere, and in any way you choose. You can practice in many ways. Here are a few ideas:  While doing your chores, like washing the dishes, let your mind focus on what's in your hand. What does the dish feel like? Is the water warm or cold? Go outside and take a few deep breaths. What is the air like? Is it warm or cold? When you can, take some time at the start of your day to sit alone and think. Take a slow walk by yourself. Count your steps while you breathe in and out. Try yoga breathing exercises, stretches, and poses to strengthen and relax your muscles. At work, if you can, try to stop for a few moments each hour. Note how your body feels. Let yourself regroup and let your mind settle before you return to what you were doing. If you struggle with anxiety or \"worry thoughts,\" imagine your mind as a blue erasmo and your worry thoughts as clouds. Now imagine those worry thoughts floating across your mind's erasmo. Just let them pass by as you watch. Follow-up care is a key part of your treatment and safety. Be sure to make and go to all appointments, and call your doctor if you are having problems. It's also a good idea to know your test results and keep a list of the medicines you take. Where can you learn more?   Go to [email or web address].[email or web address]/ and enter M676 to learn more

## 2023-10-13 ENCOUNTER — HOSPITAL ENCOUNTER (OUTPATIENT)
Dept: PHYSICAL THERAPY | Age: 63
Setting detail: THERAPIES SERIES
Discharge: HOME | End: 2023-10-13
Payer: MEDICARE

## 2023-10-13 PROCEDURE — 97110 THERAPEUTIC EXERCISES: CPT

## 2023-10-13 PROCEDURE — G0283 ELEC STIM OTHER THAN WOUND: HCPCS

## 2023-10-13 NOTE — FLOWSHEET NOTE
[x] [facility]  [address]  [place]: [phone]   F: [phone]    Physical Therapy Daily Treatment Note      Date:  10/13/2023  Patient Name:  Olena Marion    :  1960  MRN: [id number]  Physician: Teresa Larson MD                                     Insurance: [facility] Medicare / [employer] $2 copay visits based on medical necessity. Medical Diagnosis: (L) buttock pain M79.18; lumbar degenerative disc disease M51.36 add (L) ankle pain M25.572               Rehab Codes: M54.5 low back pain, M25.572  Onset Date:  23 referral              Next 's appt.: 23 follow up with orthopedic physician   Visit Count:                                 Cancel/No Show: 0/0    Subjective:  Patient with very high pain levels and antalgic gait- stated that she \"couldn't more\" after last treatment session and thought the (R) sided stretching bothered her (L) side. Pain:  [x] Yes  [] No Location: L hip  Pain Rating: (0-10 scale) 8-9/10  Pain altered Tx:  [] No  [] Yes  Action:  Comments:    Objective:  Modalities: IFC 15mins in R side lying chronic pain 4 pads 'X' formation 32.5mA>30.0mA- pt has very low pain tolerance. ** prefers at the end of session. Precautions:  Exercises:  Exercise Reps/ Time Weight/ Level Completed Comments   Manual       STM   x  attempted but held due to increased pain                 Postural education      Sitting, laying          NuStep  5' 3   x     SKTC semi reclined 3 x 30\"   x     Modified piriformis semi reclined 3 x 10\"   x      Active hamstring stretch  10x2 3\"   x     LTR 10x3\"  x           Seated       LAQ   ADD                  Other:    Specific Instructions for next treatment:      Assessment: [] Progressing toward goals. [] No change. [x] Other: Limited with any rotational exercises or (R) sided stretching with irritation of the (L) side with these after last treatment session.   As a result, we held

## 2023-10-16 ASSESSMENT — ENCOUNTER SYMPTOMS: BACK PAIN: 0

## 2023-10-16 NOTE — PROGRESS NOTES
[address] [facility],[address] PHYSICAL MEDICINE AND REHABILITATION  [place]  [place] [address]  Dept: [phone]  Dept Fax: [phone]    New Patient Note    Mary Ibarra is a 66y.o.-year-old female presenting with left foot pain and left hip pain. HPI:     She reports that pain initially started in the left foot. She has a history of left posterior tibial tendon repair with tenolysis on 9/8/17 as well as left posterior tibial tendon repair and left flexor digitorum longus tendon transfer on 6/29/18. She states that she has been having recurrent falls after these surgeries, most recently about 1 month ago. She notes having a single-point cane at home but feels like it \"gets away\" from her sometimes. She also reports having a rollator but states that it is broken (the wheels are stuck). She feels like she has to take stairs slowly so she does not fall. In regard to left foot pain, she describes a stabbing and throbbing pain at the medial heel. She also notes numbness in the dorsum of the left toes. She states that she notices weakness in the left lower limb with walking too long; she feels like it is going to [place] out. \"  She reports that she is unable to bend the left foot at the midfoot. She also describes sometimes aching and sometimes sharp left lateral hip pain for several years. She denies any low back pain and any radiation of pain through the left lower limb. Overall, she states that pain can fluctuate in intensity and can wake her up at night. She reports that she has difficulty sleeping at times. She notes that tylenol, sylvia schwartz, and lidocaine patches/cream help temporarily with pain. She states that she tried a custom foot orthosis in the past but was unable to wear it due to pain. She has had outpatient physical therapy previously and is starting another course of PT now. She feels like tramadol helps with pain.   She No

## 2023-10-16 NOTE — TELEPHONE ENCOUNTER
Order and notes  was faxed to [facility] on [facility] and left voicemail for patient that this was done.

## 2023-10-20 ENCOUNTER — HOSPITAL ENCOUNTER (OUTPATIENT)
Dept: PHYSICAL THERAPY | Age: 63
Setting detail: THERAPIES SERIES
Discharge: HOME | End: 2023-10-20
Payer: MEDICARE

## 2023-10-26 ENCOUNTER — HOSPITAL ENCOUNTER (OUTPATIENT)
Dept: SLEEP CENTER | Age: 63
Discharge: HOME | End: 2023-10-28
Payer: MEDICARE

## 2023-10-26 DIAGNOSIS — G47.33 OSA (OBSTRUCTIVE SLEEP APNEA): ICD-10-CM

## 2023-10-26 PROCEDURE — 95811 POLYSOM 6/>YRS CPAP 4/> PARM: CPT

## 2023-10-27 ENCOUNTER — HOSPITAL ENCOUNTER (OUTPATIENT)
Dept: PHYSICAL THERAPY | Age: 63
Setting detail: THERAPIES SERIES
Discharge: HOME | End: 2023-10-27
Payer: MEDICARE

## 2023-10-27 VITALS
WEIGHT: 244 LBS | OXYGEN SATURATION: 96 % | RESPIRATION RATE: 16 BRPM | BODY MASS INDEX: 49.19 KG/M2 | HEIGHT: 59 IN | HEART RATE: 94 BPM

## 2023-10-27 NOTE — FLOWSHEET NOTE
[x] [facility]  [address]  [place]: [phone]   F: [phone]    Physical Therapy CXL/NS      Date:  10/27/2023  Patient Name:  Kalli Donahue    :  1960  MRN: [id number]  Physician: Roseann Bence MD                                     Insurance: [facility] Medicare / [facility] $1 copay visits based on medical necessity.    Medical Diagnosis: (L) buttock pain M79.18; lumbar degenerative disc disease M51.36 add (L) ankle pain M25.572               Rehab Codes: M54.5 low back pain, M25.572  Onset Date:  23 referral              Next 's appt.: 23 follow up with orthopedic physician   Visit Count:                                 Cancel/No Show: 2/0    Patient cancelled today- \"will call back\" to schedule    Electronically signed by:  Lashonda Fisher, PT

## 2023-10-30 LAB — NONINV COLON CA DNA+OCC BLD SCRN STL QL: NEGATIVE

## 2023-10-31 LAB — STATUS: NORMAL

## 2023-11-03 ENCOUNTER — HOSPITAL ENCOUNTER (OUTPATIENT)
Dept: PHYSICAL THERAPY | Age: 63
Setting detail: THERAPIES SERIES
Discharge: HOME | End: 2023-11-03
Payer: MEDICARE

## 2023-11-03 PROCEDURE — 97110 THERAPEUTIC EXERCISES: CPT

## 2023-11-06 ENCOUNTER — OFFICE VISIT (OUTPATIENT)
Dept: PAIN MANAGEMENT | Age: 63
End: 2023-11-06
Payer: MEDICARE

## 2023-11-06 VITALS — BODY MASS INDEX: 49.19 KG/M2 | HEIGHT: 59 IN | WEIGHT: 244 LBS

## 2023-11-06 DIAGNOSIS — Z79.891 ENCOUNTER FOR LONG-TERM OPIATE ANALGESIC USE: ICD-10-CM

## 2023-11-06 DIAGNOSIS — M79.672 LEFT FOOT PAIN: ICD-10-CM

## 2023-11-06 DIAGNOSIS — M47.817 LUMBOSACRAL SPONDYLOSIS WITHOUT MYELOPATHY: Primary | ICD-10-CM

## 2023-11-06 DIAGNOSIS — Z98.890 STATUS POST LEFT FOOT SURGERY: ICD-10-CM

## 2023-11-06 PROCEDURE — G8417 CALC BMI ABV UP PARAM F/U: HCPCS | Performed by: PAIN MEDICINE

## 2023-11-06 PROCEDURE — 99214 OFFICE O/P EST MOD 30 MIN: CPT | Performed by: PAIN MEDICINE

## 2023-11-06 PROCEDURE — 3017F COLORECTAL CA SCREEN DOC REV: CPT | Performed by: PAIN MEDICINE

## 2023-11-06 PROCEDURE — G8427 DOCREV CUR MEDS BY ELIG CLIN: HCPCS | Performed by: PAIN MEDICINE

## 2023-11-06 PROCEDURE — 1036F TOBACCO NON-USER: CPT | Performed by: PAIN MEDICINE

## 2023-11-06 PROCEDURE — G8484 FLU IMMUNIZE NO ADMIN: HCPCS | Performed by: PAIN MEDICINE

## 2023-11-06 ASSESSMENT — ENCOUNTER SYMPTOMS
BOWEL INCONTINENCE: 0
BACK PAIN: 1

## 2023-11-06 NOTE — PROGRESS NOTES
interested in chronic opioid management at this time. Vanessa Shell M.D. I have reviewed the chief complaint and history of present illness (including ROS and PFSH) and vital documentation by my staff and I agree with their documentation and have added where applicable.

## 2023-11-07 ENCOUNTER — OFFICE VISIT (OUTPATIENT)
Dept: PODIATRY | Age: 63
End: 2023-11-07
Payer: MEDICARE

## 2023-11-07 ENCOUNTER — TELEPHONE (OUTPATIENT)
Dept: FAMILY MEDICINE CLINIC | Age: 63
End: 2023-11-07

## 2023-11-07 VITALS — HEIGHT: 59 IN | WEIGHT: 244 LBS | BODY MASS INDEX: 49.19 KG/M2

## 2023-11-07 DIAGNOSIS — G89.29 CHRONIC LEFT-SIDED LOW BACK PAIN WITH SCIATICA, SCIATICA LATERALITY UNSPECIFIED: Primary | ICD-10-CM

## 2023-11-07 DIAGNOSIS — B35.1 DERMATOPHYTOSIS OF NAIL: ICD-10-CM

## 2023-11-07 DIAGNOSIS — M54.40 CHRONIC LEFT-SIDED LOW BACK PAIN WITH SCIATICA, SCIATICA LATERALITY UNSPECIFIED: Primary | ICD-10-CM

## 2023-11-07 DIAGNOSIS — I73.9 PVD (PERIPHERAL VASCULAR DISEASE) (HCC): ICD-10-CM

## 2023-11-07 DIAGNOSIS — M79.672 LEFT FOOT PAIN: Primary | ICD-10-CM

## 2023-11-07 DIAGNOSIS — M79.671 PAIN IN BOTH FEET: ICD-10-CM

## 2023-11-07 DIAGNOSIS — M79.672 PAIN IN BOTH FEET: ICD-10-CM

## 2023-11-07 DIAGNOSIS — M25.572 CHRONIC PAIN OF LEFT ANKLE: ICD-10-CM

## 2023-11-07 DIAGNOSIS — G89.29 CHRONIC PAIN OF LEFT ANKLE: ICD-10-CM

## 2023-11-07 PROCEDURE — 3017F COLORECTAL CA SCREEN DOC REV: CPT | Performed by: PODIATRIST

## 2023-11-07 PROCEDURE — G8417 CALC BMI ABV UP PARAM F/U: HCPCS | Performed by: PODIATRIST

## 2023-11-07 PROCEDURE — 99213 OFFICE O/P EST LOW 20 MIN: CPT | Performed by: PODIATRIST

## 2023-11-07 PROCEDURE — G8484 FLU IMMUNIZE NO ADMIN: HCPCS | Performed by: PODIATRIST

## 2023-11-07 PROCEDURE — G8427 DOCREV CUR MEDS BY ELIG CLIN: HCPCS | Performed by: PODIATRIST

## 2023-11-07 PROCEDURE — 1036F TOBACCO NON-USER: CPT | Performed by: PODIATRIST

## 2023-11-07 PROCEDURE — 11721 DEBRIDE NAIL 6 OR MORE: CPT | Performed by: PODIATRIST

## 2023-11-07 RX ORDER — TRAMADOL HYDROCHLORIDE 50 MG/1
50 TABLET ORAL EVERY 4 HOURS PRN
Qty: 42 TABLET | Refills: 0 | Status: SHIPPED | OUTPATIENT
Start: 2023-11-07 | End: 2023-11-14

## 2023-11-07 NOTE — TELEPHONE ENCOUNTER
----- Message from Davina Reynoso sent at 11/7/2023 11:44 AM EST -----  Subject: Referral Request    Reason for referral request? Patient needs a referral to [facility] who is   a spine doctor/ Pain Doctor at the [facility]. Please call patient to   advise that the referral has been sent. Provider patient wants to be referred to(if known):     Provider Phone Number(if known):     Additional Information for Provider?   ---------------------------------------------------------------------------  --------------  Bev Rover Marissa    [phone]; OK to leave message on voicemail  ---------------------------------------------------------------------------  --------------

## 2023-11-07 NOTE — PROGRESS NOTES
changes, rash  Cardiovascular: Negative for leg swelling. Gastrointestinal: Negative for constipation, diarrhea, nausea and vomiting. Objective:  Dermatologic Exam:  Skin lesion/ulceration Absent . Skin No rashes or nodules noted. .   Skin is thin, with flaky sloughing skin as well as decreased hair growth to the lower leg  Small red hemosiderin deposits seen dorsal foot   Musculoskeletal:     1st MPJ ROM decreased, Bilateral.  Muscle strength 5/5, Bilateral.  Pain present upon palpation of toenails 1-5, Bilateral. decreased medial longitudinal arch, Bilateral.  Ankle ROM decreased,Bilateral.    Dorsally contracted digits present digits 2, Bilateral.     Vascular: DP pulses 1/4 bilateral.  PT pulses 0/4 bilateral.   CFT <5 seconds, Bilateral.  Hair growth absent to the level of the digits, Bilateral.  Edema present, Bilateral.  Varicosities absent, Bilateral. Erythema absent, Bilateral    Neurological: Sensation diminshed to light touch to level of digits, Bilateral.  Protective sensation intact 6/10 sites via 5.07/10g Snyder-Daniela Monofilament, Bilateral.  negative Tinel's, Bilateral.  negative Valleix sign, Bilateral.      Integument: Warm, dry, supple, Bilateral.  Open lesion absent, Bilateral.  Interdigital maceration absent to web spaces 4, Bilateral.  Nails 1-5 left and 1-5 right thickened > 3.0 mm, dystrophic and crumbly, discolored with subungual debris. Fissures absent, Bilateral.   General: AAO x 3 in NAD.     Derm  Toenail Description  Sites of Onychomycosis Involvement (Check affected area)  [x] [x] [x] [x] [x] [x] [x] [x] [x] [x]  5 4 3 2 1 1 2 3 4 5                          Right                                        Left    Thickness  [x] [x] [x] [x] [x] [x] [x] [x] [x] [x]  5 4 3 2 1 1 2 3 4 5                         Right                                        Left    Dystrophic Changes   [x] [x] [x] [x] [x] [x] [x] [x] [x] [x]  5 4 3 2 1 1 2 3 4 5                         Right

## 2023-11-10 ENCOUNTER — HOSPITAL ENCOUNTER (OUTPATIENT)
Dept: PHYSICAL THERAPY | Age: 63
Setting detail: THERAPIES SERIES
Discharge: HOME | End: 2023-11-10
Payer: MEDICARE

## 2023-11-10 PROCEDURE — 97110 THERAPEUTIC EXERCISES: CPT

## 2023-11-10 NOTE — FLOWSHEET NOTE
mobility. Patient continues to report 8/10 pain everywhere and continues to amb with slow sam. Patient required frequent cueing to stay on task but was able to complete the exercises as charted above without increasing her pain symptoms. Pt claims she tries to perform her HEP at home and was educated on the importance of compliance. [] No change. [] Other:     [] Patient would continue to benefit from skilled physical therapy services in order to: work on lumbar mobility, postural education/decompression; ankle strength, ROM, function. STG: (to be met in 8 treatments)  ? Pain: 3/10 in back, ankle to help generally improve function  ? ROM: DF to neutral; PF 55 degrees, INV 20 degrees, EV 15 degrees to help with gait, flexion to 1/2 distal from patella, extension to 10 degrees to help with tying shoes  ? Strength: 4/5 with ankle testing to help with general function  ? Function: standing tolerance improved to 20 minutes, sitting tolerance 25 minutes with improved back pain, ambulates up to 10 minutes, tandem balance to 10 seconds with (B) testing to allow improved gait  Independent with Home Exercise Programs  Demonstrate Knowledge of fall prevention  LTG: (to be met in 12 treatments)  ? Pain:0-1/10 in back, ankle to help generally improve function  ? ROM: DF to 5; PF 55 degrees, INV 30 degrees, EV 15 degrees to help with gait, flexion to 2/3 distal from patella, extension to 10 degrees to help with tying shoes  ? Strength: 4+/5 with ankle testing to help with general function  ? Function: standing tolerance improved to 30 minutes, sitting tolerance 30 minutes with improved back pain, ambulates up to 20 minutes, tandem balance to 10 seconds with (B) testing to allow improved gait  Independent with Home Exercise Programs  Demonstrate Knowledge of fall prevention     Patient goals: generally improved function       Pt.  Education:  [] Yes  [] No  [] Reviewed Prior HEP/Ed  Method of Education: [] Verbal

## 2023-11-17 ENCOUNTER — HOSPITAL ENCOUNTER (OUTPATIENT)
Dept: PHYSICAL THERAPY | Age: 63
Setting detail: THERAPIES SERIES
Discharge: HOME | End: 2023-11-17
Payer: MEDICARE

## 2023-11-17 DIAGNOSIS — I10 ESSENTIAL HYPERTENSION: ICD-10-CM

## 2023-11-17 PROCEDURE — 97110 THERAPEUTIC EXERCISES: CPT

## 2023-11-17 RX ORDER — LOSARTAN POTASSIUM AND HYDROCHLOROTHIAZIDE 25; 100 MG/1; MG/1
TABLET ORAL
Qty: 90 TABLET | Refills: 1 | Status: SHIPPED | OUTPATIENT
Start: 2023-11-17

## 2023-11-17 RX ORDER — AMLODIPINE BESYLATE 5 MG/1
5 TABLET ORAL DAILY
Qty: 90 TABLET | Refills: 1 | Status: SHIPPED | OUTPATIENT
Start: 2023-11-17

## 2023-11-17 NOTE — FLOWSHEET NOTE
[x] [facility]  [address]  [facility]: [phone]   F: [phone]    Physical Therapy Daily Treatment Note      Date:  2023  Patient Name:  Edison Monzon    :  1960  MRN: [id number]  Physician: Mariel Soto MD                                     Insurance: [facility] Medicare / [employer] $2 copay visits based on medical necessity. Medical Diagnosis: (L) buttock pain M79.18; lumbar degenerative disc disease M51.36 add (L) ankle pain M25.572               Rehab Codes: M54.5 low back pain, M25.572  Onset Date:  23 referral              Next 's appt. :   Visit Count:                                 Cancel/No Show: 2/0    Subjective:  Patient still noting significant complaints of pain in the hip, lateral hip, ankle, and SI region. Patient frustrated with high pain levels. Stated she thought that the weather was bothering her. Wanted to decline the IFC today because she feels like it irritates her symptoms. Pain:  [x] Yes  [] No  L foot-7-8 /10  L hip 8/10  Pain altered Tx:  [] No  [] Yes  Action:  Comments:    Objective:  Modalities: IFC 15mins in R side lying chronic pain 4 pads 'X' formation 32.5mA>30.0mA- pt has very low pain tolerance. held per pt's request  Precautions:  Exercises:  Exercise Reps/ Time Weight/ Level Completed Comments   Manual       STM     attempted but held due to increased pain                 Postural education      Sitting, laying          NuStep  5'  3 x     SKTC semi reclined 3 x 30\"   x     Gastroc stretch 3x 30\"  x    Modified piriformis semi reclined 3 x 10\"   X     Abdominal bracing  20x 5\"  x     Active hamstring stretch  10x2 3\"        LTR 10x3\"  x           Seated       LAQ (B) 10x2 3\"  x    Hamstring stretch 3x 30\" 6in step x           Other:HP- 10' semi reclined at end of treatment    Specific Instructions for next treatment:      Assessment: [x] Progressing toward goals.   Worked on basic

## 2023-11-17 NOTE — TELEPHONE ENCOUNTER
Kermit Cuevas is calling to request a refill on the following medication(s):    Medication Request:  Requested Prescriptions     Pending Prescriptions Disp Refills    amLODIPine (NORVASC) 5 MG tablet [Pharmacy Med Name: AMLODIPINE BESYLATE 5 MG TAB] 90 tablet 1     Sig: Take 1 tablet by mouth daily    losartan-hydroCHLOROthiazide (HYZAAR) 100-25 MG per tablet [Pharmacy Med Name: LOSARTAN-HCTZ 100-25 MG TAB] 90 tablet 1     Sig: take 1 tablet by mouth once daily       Last Visit Date (If Applicable):  92/13/7799    Next Visit Date:    4/12/2024

## 2023-12-01 ENCOUNTER — HOSPITAL ENCOUNTER (OUTPATIENT)
Dept: PHYSICAL THERAPY | Age: 63
Setting detail: THERAPIES SERIES
Discharge: HOME | End: 2023-12-01
Payer: MEDICARE

## 2023-12-01 PROCEDURE — 97110 THERAPEUTIC EXERCISES: CPT

## 2023-12-01 NOTE — FLOWSHEET NOTE
ABCs  Exercise Reps/ Time Weight/ Level Comments   Postural education     Sitting, laying             SKTC semi reclined 3 x 10\"       Modified piriformis semi reclined 3 x 10\"                         Comprehension of Education:  [] Verbalizes understanding. [] Demonstrates understanding. [x] Needs review. [] Demonstrates/verbalizes HEP/Ed previously given. Plan: [x] Continue per plan of care.    [] Other:      Treatment Charges: Mins Units   []  Modalities stim     [x]  Ther Exercise 41 3   []  Manual Therapy     []  Ther Activities     []  Aquatics     []  Neuromuscular     [] Vasocompression     [] Gait Training     [] Dry needling        [] 1 or 2 muscles        [] 3 or more muscles     []  Other     Total Treatment time 41 3     Time In:1244   Time Out: 2684    Electronically signed by:  Omid Valiente PTA

## 2023-12-29 ENCOUNTER — APPOINTMENT (OUTPATIENT)
Dept: PHYSICAL THERAPY | Age: 63
End: 2023-12-29
Payer: MEDICARE

## 2024-02-21 ENCOUNTER — OFFICE VISIT (OUTPATIENT)
Dept: PULMONOLOGY | Age: 64
End: 2024-02-21
Payer: MEDICARE

## 2024-02-21 VITALS
WEIGHT: 244 LBS | OXYGEN SATURATION: 95 % | HEIGHT: 59 IN | DIASTOLIC BLOOD PRESSURE: 66 MMHG | TEMPERATURE: 97 F | BODY MASS INDEX: 49.19 KG/M2 | SYSTOLIC BLOOD PRESSURE: 117 MMHG | HEART RATE: 64 BPM | RESPIRATION RATE: 16 BRPM

## 2024-02-21 DIAGNOSIS — E66.01 MORBID OBESITY DUE TO EXCESS CALORIES (HCC): ICD-10-CM

## 2024-02-21 DIAGNOSIS — Z87.891 PERSONAL HISTORY OF TOBACCO USE: ICD-10-CM

## 2024-02-21 DIAGNOSIS — G47.33 OSA (OBSTRUCTIVE SLEEP APNEA): ICD-10-CM

## 2024-02-21 DIAGNOSIS — I10 ESSENTIAL HYPERTENSION: ICD-10-CM

## 2024-02-21 DIAGNOSIS — R09.82 POST-NASAL DRIP: ICD-10-CM

## 2024-02-21 DIAGNOSIS — J45.40 MODERATE PERSISTENT ASTHMA WITHOUT COMPLICATION: Primary | ICD-10-CM

## 2024-02-21 DIAGNOSIS — J30.2 SEASONAL ALLERGIES: ICD-10-CM

## 2024-02-21 PROCEDURE — 3078F DIAST BP <80 MM HG: CPT | Performed by: INTERNAL MEDICINE

## 2024-02-21 PROCEDURE — 1036F TOBACCO NON-USER: CPT | Performed by: INTERNAL MEDICINE

## 2024-02-21 PROCEDURE — G8484 FLU IMMUNIZE NO ADMIN: HCPCS | Performed by: INTERNAL MEDICINE

## 2024-02-21 PROCEDURE — 3017F COLORECTAL CA SCREEN DOC REV: CPT | Performed by: INTERNAL MEDICINE

## 2024-02-21 PROCEDURE — 3074F SYST BP LT 130 MM HG: CPT | Performed by: INTERNAL MEDICINE

## 2024-02-21 PROCEDURE — 99214 OFFICE O/P EST MOD 30 MIN: CPT | Performed by: INTERNAL MEDICINE

## 2024-02-21 PROCEDURE — G8427 DOCREV CUR MEDS BY ELIG CLIN: HCPCS | Performed by: INTERNAL MEDICINE

## 2024-02-21 PROCEDURE — G8417 CALC BMI ABV UP PARAM F/U: HCPCS | Performed by: INTERNAL MEDICINE

## 2024-02-21 RX ORDER — FLUTICASONE PROPIONATE 50 MCG
2 SPRAY, SUSPENSION (ML) NASAL DAILY
Qty: 16 G | Refills: 5 | Status: SHIPPED | OUTPATIENT
Start: 2024-02-21

## 2024-02-21 RX ORDER — TRAMADOL HYDROCHLORIDE 50 MG/1
50 TABLET ORAL EVERY 6 HOURS PRN
COMMUNITY

## 2024-02-21 RX ORDER — ALBUTEROL SULFATE 90 UG/1
2 AEROSOL, METERED RESPIRATORY (INHALATION) EVERY 6 HOURS PRN
Qty: 1 EACH | Refills: 5 | Status: SHIPPED | OUTPATIENT
Start: 2024-02-21

## 2024-02-21 RX ORDER — FLUTICASONE PROPIONATE AND SALMETEROL 250; 50 UG/1; UG/1
POWDER RESPIRATORY (INHALATION)
Qty: 1 EACH | Refills: 5 | Status: SHIPPED | OUTPATIENT
Start: 2024-02-21

## 2024-02-21 ASSESSMENT — SLEEP AND FATIGUE QUESTIONNAIRES
HOW LIKELY ARE YOU TO NOD OFF OR FALL ASLEEP WHILE SITTING INACTIVE IN A PUBLIC PLACE: 0
HOW LIKELY ARE YOU TO NOD OFF OR FALL ASLEEP WHILE SITTING QUIETLY AFTER LUNCH WITHOUT ALCOHOL: 0
HOW LIKELY ARE YOU TO NOD OFF OR FALL ASLEEP WHEN YOU ARE A PASSENGER IN A CAR FOR AN HOUR WITHOUT A BREAK: 0
HOW LIKELY ARE YOU TO NOD OFF OR FALL ASLEEP WHILE SITTING AND READING: 1
HOW LIKELY ARE YOU TO NOD OFF OR FALL ASLEEP WHILE LYING DOWN TO REST IN THE AFTERNOON WHEN CIRCUMSTANCES PERMIT: 0
HOW LIKELY ARE YOU TO NOD OFF OR FALL ASLEEP WHILE SITTING AND TALKING TO SOMEONE: 0
ESS TOTAL SCORE: 2
HOW LIKELY ARE YOU TO NOD OFF OR FALL ASLEEP IN A CAR, WHILE STOPPED FOR A FEW MINUTES IN TRAFFIC: 0
HOW LIKELY ARE YOU TO NOD OFF OR FALL ASLEEP WHILE WATCHING TV: 1

## 2024-02-21 NOTE — PROGRESS NOTES
PULMONARY outpatient progress note        REFERRED BY: Ana Luisa Hatfield, APRN - CNP    REASON FOR CONSULTATION: Asthma and sleep apnea    Patient is being seen in follow-up for-  1. Moderate persistent asthma without complication    2. Body mass index (BMI) 45.0-49.9, adult ([facility])    3. YAMEL (obstructive sleep apnea)    4. Morbid obesity due to excess calories (HCC)    5. Post-nasal drip    6. Essential hypertension    7. Seasonal allergies    8. Personal history of tobacco use          HISTORY OF PRESENT ILLNESS:    Ev An is a 63 y.o. year old female here for evaluation of above problems  Regarding bronchial asthma, patient had a recent pulmonary function test that showed extra pulmonic restrictive ventilatory defect  Currently denied any shortness of breath or wheezing  No cough or sputum production  No hemoptysis  No orthopnea, PND or pedal edema  No chest pain or pressure  Has postnasal discharge  Denied any acid reflux symptoms currently  Regarding sleep apnea, patient has been diagnosed with sleep apnea in the past but she does not use CPAP as she is afraid of falling asleep and being robbed in her home  She claims her neighborhood is not the most safest  In view of not using the CPAP therapy patient has daytime fatigue and tiredness and sleepiness  No problem with leg movements during sleep    Interval history:    Patient had a sleep study done since last visit that showed the patient to have mild sleep apnea that improved with CPAP of 8 cmH2O  The sleep studies will be reported under laboratory section  Patient has a dry throat from using the CPAP machine  She will work with the vendor to get the humidification adjusted  When she uses the machine, patient has significant improvement in the sleep apnea  This was informed to the patient  Denied any shortness of breath or wheezing  No cough or sputum production  No postnasal discharge  Blood pressure has been good      PAST MEDICAL HISTORY:

## 2024-03-05 ENCOUNTER — TELEPHONE (OUTPATIENT)
Dept: PULMONOLOGY | Age: 64
End: 2024-03-05

## 2024-03-05 NOTE — TELEPHONE ENCOUNTER
Patient called, states [employer] is taking the CPAP machine back due to non-compliance. Letter is in the chart also. Veto was informed that we can re-start the process, return the machine, or pay out of pocket for it.     Patient wants to re-start the process. She was informed that she would need to come into the office for an up to date face to face visit, then get another sleep study in order to re-start the process.     Patients states will return the machine or investigate how much it will cost her to keep it.

## 2024-03-21 ENCOUNTER — OFFICE VISIT (OUTPATIENT)
Dept: FAMILY MEDICINE CLINIC | Age: 64
End: 2024-03-21
Payer: MEDICARE

## 2024-03-21 VITALS
SYSTOLIC BLOOD PRESSURE: 138 MMHG | BODY MASS INDEX: 49.34 KG/M2 | DIASTOLIC BLOOD PRESSURE: 80 MMHG | WEIGHT: 244.4 LBS | TEMPERATURE: 98 F | HEART RATE: 90 BPM | OXYGEN SATURATION: 98 %

## 2024-03-21 DIAGNOSIS — I10 ESSENTIAL HYPERTENSION: Primary | ICD-10-CM

## 2024-03-21 DIAGNOSIS — G47.33 OSA (OBSTRUCTIVE SLEEP APNEA): ICD-10-CM

## 2024-03-21 DIAGNOSIS — R73.03 PRE-DIABETES: ICD-10-CM

## 2024-03-21 DIAGNOSIS — E78.2 MIXED HYPERLIPIDEMIA: ICD-10-CM

## 2024-03-21 DIAGNOSIS — E66.01 CLASS 3 SEVERE OBESITY DUE TO EXCESS CALORIES WITH BODY MASS INDEX (BMI) OF 45.0 TO 49.9 IN ADULT, UNSPECIFIED WHETHER SERIOUS COMORBIDITY PRESENT (HCC): ICD-10-CM

## 2024-03-21 DIAGNOSIS — Z71.3 DIETARY COUNSELING: ICD-10-CM

## 2024-03-21 PROCEDURE — G8417 CALC BMI ABV UP PARAM F/U: HCPCS | Performed by: NURSE PRACTITIONER

## 2024-03-21 PROCEDURE — G8427 DOCREV CUR MEDS BY ELIG CLIN: HCPCS | Performed by: NURSE PRACTITIONER

## 2024-03-21 PROCEDURE — G8484 FLU IMMUNIZE NO ADMIN: HCPCS | Performed by: NURSE PRACTITIONER

## 2024-03-21 PROCEDURE — 99214 OFFICE O/P EST MOD 30 MIN: CPT | Performed by: NURSE PRACTITIONER

## 2024-03-21 PROCEDURE — 3017F COLORECTAL CA SCREEN DOC REV: CPT | Performed by: NURSE PRACTITIONER

## 2024-03-21 PROCEDURE — 3079F DIAST BP 80-89 MM HG: CPT | Performed by: NURSE PRACTITIONER

## 2024-03-21 PROCEDURE — 3075F SYST BP GE 130 - 139MM HG: CPT | Performed by: NURSE PRACTITIONER

## 2024-03-21 PROCEDURE — 1036F TOBACCO NON-USER: CPT | Performed by: NURSE PRACTITIONER

## 2024-03-21 RX ORDER — LOSARTAN POTASSIUM AND HYDROCHLOROTHIAZIDE 25; 100 MG/1; MG/1
1 TABLET ORAL DAILY
Qty: 90 TABLET | Refills: 1 | Status: SHIPPED | OUTPATIENT
Start: 2024-03-21

## 2024-03-21 RX ORDER — AMLODIPINE BESYLATE 5 MG/1
5 TABLET ORAL DAILY
Qty: 90 TABLET | Refills: 1 | Status: SHIPPED | OUTPATIENT
Start: 2024-03-21

## 2024-03-21 SDOH — ECONOMIC STABILITY: INCOME INSECURITY: HOW HARD IS IT FOR YOU TO PAY FOR THE VERY BASICS LIKE FOOD, HOUSING, MEDICAL CARE, AND HEATING?: NOT HARD AT ALL

## 2024-03-21 SDOH — ECONOMIC STABILITY: FOOD INSECURITY: WITHIN THE PAST 12 MONTHS, THE FOOD YOU BOUGHT JUST DIDN'T LAST AND YOU DIDN'T HAVE MONEY TO GET MORE.: NEVER TRUE

## 2024-03-21 SDOH — ECONOMIC STABILITY: FOOD INSECURITY: WITHIN THE PAST 12 MONTHS, YOU WORRIED THAT YOUR FOOD WOULD RUN OUT BEFORE YOU GOT MONEY TO BUY MORE.: NEVER TRUE

## 2024-03-21 ASSESSMENT — PATIENT HEALTH QUESTIONNAIRE - PHQ9
1. LITTLE INTEREST OR PLEASURE IN DOING THINGS: NOT AT ALL
2. FEELING DOWN, DEPRESSED OR HOPELESS: NOT AT ALL
SUM OF ALL RESPONSES TO PHQ9 QUESTIONS 1 & 2: 0
SUM OF ALL RESPONSES TO PHQ QUESTIONS 1-9: 0

## 2024-03-21 ASSESSMENT — ENCOUNTER SYMPTOMS
VOMITING: 0
EYES NEGATIVE: 1
CONSTIPATION: 0
COUGH: 0
ALLERGIC/IMMUNOLOGIC NEGATIVE: 1
GASTROINTESTINAL NEGATIVE: 1
NAUSEA: 0
COLOR CHANGE: 0
SHORTNESS OF BREATH: 0
RESPIRATORY NEGATIVE: 1
DIARRHEA: 0
CHEST TIGHTNESS: 0

## 2024-03-21 NOTE — PATIENT INSTRUCTIONS
Learning About Obesity  What is obesity?     Obesity is having an excess amount of body fat for your height. It raises your risk for serious health problems. These include type 2 diabetes and high blood pressure.  How do you know if your weight is in the obesity range?  To know if your weight is in the obesity range, your doctor looks at your body mass index (BMI). BMI is a number that is calculated from your weight and your height. If your BMI is 30.0 or higher, it falls within the obesity range. Talk with your doctor about what a healthy weight is for you.  To figure out your BMI for yourself, you can use an online tool, such as www.nhlbi.nih.gov/health/educational/lose_wt/BMI/bmicalc.htm on the National Institutes of Health website.  What causes it?  Obesity is complex. There is no single known cause. But many things increase the risk of obesity, including your genes, what and how you eat, your activity level, and the rate at which your body burns calories (basal metabolic rate). The medicines you take and health conditions you have can also affect your weight.  What can you do to reach a weight that's healthy for you?  Focus on health, not diets. Diets are hard to stay on and usually don't work in the long run. It can be hard to stay with a diet that includes lots of big changes in your eating habits.  Instead, focus on making small changes to help improve your health. To lose weight, you need to burn more calories than you take in. Eating healthy foods and being aware of when you are hungry or full can help. And being more active can help improve your health and help you keep off the weight you lose.  Make a plan for change. Many people have found that naming their reasons for change and staying focused on their plan can make a big difference. Work with your doctor to create a plan that is right for you.  Ask yourself: \"What are my personal, most powerful reasons for wanting this change? What will my life

## 2024-03-21 NOTE — PROGRESS NOTES
[facility]  [address] [place] [address]  Dept: [phone]      Ev An is a 63 y.o. female who presents today for her medical conditions/complaintsas noted below.      Ev An is here today c/o Forms (Needs clearance to . Had Physical at [facility] yesterday) and Hypertension    Past Medical History:   Diagnosis Date    Allergic rhinitis 2014    Anxiety 2018    Asthma     Generalized osteoarthritis 2014    History of kidney stones     Hypertension     Kidney stone     Lumbosacral neuritis 10/04/2023    Obesity, morbid, BMI 40.0-49.9 ([facility]) 2017    PONV (postoperative nausea and vomiting)     Sleep apnea     does not use a machine      Past Surgical History:   Procedure Laterality Date    CHOLECYSTECTOMY      FOOT TENDON SURGERY Left 2017    posterior repair    FOOT TENDON SURGERY Left 2018    LEFT POSTERIOR  TENDON REPAIR FLEXOR TENDON TRANSFER (Left Foot)    LITHOTRIPSY      RI OFFICE/OUTPT VISIT,PROCEDURE ONLY Left 2018    LEFT POSTERIOR  TENDON REPAIR FLEXOR TENDON TRANSFER performed by Noble Bangura DPM at [facility] OR    REPAIR TENDONS LEG Left 2017    LEFT POSTERIOR TIBIAL TENDON REPAIR WITH LEFT TENOLYSIS  performed by Noble Bangura DPM at [facility] OR       Family History   Problem Relation Age of Onset    Breast Cancer Mother 33    Depression Mother     Diabetes Mother     High Blood Pressure Mother     Mental Illness Mother     Substance Abuse Father     Depression Sister     Diabetes Sister     High Blood Pressure Sister     Mental Illness Sister     Depression Brother     Mental Illness Brother     Substance Abuse Brother        Social History     Tobacco Use    Smoking status: Former     Current packs/day: 0.00     Average packs/day: 1 pack/day for 16.1 years (16.1 ttl pk-yrs)     Types: Cigarettes     Start date: 1981     Quit date: 1998     Years since quittin.2    Smokeless tobacco:

## 2024-03-22 ENCOUNTER — TELEPHONE (OUTPATIENT)
Dept: FAMILY MEDICINE CLINIC | Age: 64
End: 2024-03-22

## 2024-03-22 ENCOUNTER — HOSPITAL ENCOUNTER (OUTPATIENT)
Age: 64
Setting detail: SPECIMEN
Discharge: HOME | End: 2024-03-22

## 2024-03-22 DIAGNOSIS — E78.2 MIXED HYPERLIPIDEMIA: ICD-10-CM

## 2024-03-22 DIAGNOSIS — I10 ESSENTIAL HYPERTENSION: ICD-10-CM

## 2024-03-22 DIAGNOSIS — E87.6 DIURETIC-INDUCED HYPOKALEMIA: Primary | ICD-10-CM

## 2024-03-22 DIAGNOSIS — R73.03 PRE-DIABETES: ICD-10-CM

## 2024-03-22 DIAGNOSIS — T50.2X5A DIURETIC-INDUCED HYPOKALEMIA: Primary | ICD-10-CM

## 2024-03-22 LAB
ALBUMIN SERPL-MCNC: 4.1 G/DL (ref 3.5–5.2)
ALBUMIN/GLOB SERPL: 2 {RATIO} (ref 1–2.5)
ALP SERPL-CCNC: 83 U/L (ref 35–104)
ALT SERPL-CCNC: 19 U/L (ref 10–35)
ANION GAP SERPL CALCULATED.3IONS-SCNC: 10 MMOL/L (ref 9–16)
AST SERPL-CCNC: 25 U/L (ref 10–35)
BASOPHILS # BLD: 0.03 K/UL (ref 0–0.2)
BASOPHILS NFR BLD: 0 % (ref 0–2)
BILIRUB SERPL-MCNC: 0.3 MG/DL (ref 0–1.2)
BUN SERPL-MCNC: 16 MG/DL (ref 8–23)
CALCIUM SERPL-MCNC: 9.3 MG/DL (ref 8.6–10.4)
CHLORIDE SERPL-SCNC: 100 MMOL/L (ref 98–107)
CHOLEST SERPL-MCNC: 182 MG/DL (ref 0–199)
CHOLESTEROL/HDL RATIO: 3
CO2 SERPL-SCNC: 28 MMOL/L (ref 20–31)
CREAT SERPL-MCNC: 0.6 MG/DL (ref 0.5–0.9)
EOSINOPHIL # BLD: 0.34 K/UL (ref 0–0.44)
EOSINOPHILS RELATIVE PERCENT: 5 % (ref 1–4)
ERYTHROCYTE [DISTWIDTH] IN BLOOD BY AUTOMATED COUNT: 15.3 % (ref 11.8–14.4)
EST. AVERAGE GLUCOSE BLD GHB EST-MCNC: 117 MG/DL
GFR SERPL CREATININE-BSD FRML MDRD: >60 ML/MIN/1.73M2
GLUCOSE SERPL-MCNC: 112 MG/DL (ref 74–99)
HBA1C MFR BLD: 5.7 % (ref 4–6)
HCT VFR BLD AUTO: 44.6 % (ref 36.3–47.1)
HDLC SERPL-MCNC: 68 MG/DL
HGB BLD-MCNC: 14 G/DL (ref 11.9–15.1)
IMM GRANULOCYTES # BLD AUTO: <0.03 K/UL (ref 0–0.3)
IMM GRANULOCYTES NFR BLD: 0 %
LDLC SERPL CALC-MCNC: 95 MG/DL (ref 0–100)
LYMPHOCYTES NFR BLD: 2.45 K/UL (ref 1.1–3.7)
LYMPHOCYTES RELATIVE PERCENT: 34 % (ref 24–43)
MAGNESIUM SERPL-MCNC: 2.3 MG/DL (ref 1.6–2.4)
MCH RBC QN AUTO: 27.7 PG (ref 25.2–33.5)
MCHC RBC AUTO-ENTMCNC: 31.4 G/DL (ref 28.4–34.8)
MCV RBC AUTO: 88.1 FL (ref 82.6–102.9)
MONOCYTES NFR BLD: 0.42 K/UL (ref 0.1–1.2)
MONOCYTES NFR BLD: 6 % (ref 3–12)
NEUTROPHILS NFR BLD: 55 % (ref 36–65)
NEUTS SEG NFR BLD: 3.88 K/UL (ref 1.5–8.1)
NRBC BLD-RTO: 0 PER 100 WBC
PLATELET # BLD AUTO: 332 K/UL (ref 138–453)
PMV BLD AUTO: 9.4 FL (ref 8.1–13.5)
POTASSIUM SERPL-SCNC: 3.5 MMOL/L (ref 3.7–5.3)
PROT SERPL-MCNC: 6.8 G/DL (ref 6.6–8.7)
RBC # BLD AUTO: 5.06 M/UL (ref 3.95–5.11)
RBC # BLD: ABNORMAL 10*6/UL
SODIUM SERPL-SCNC: 138 MMOL/L (ref 136–145)
TRIGL SERPL-MCNC: 96 MG/DL (ref 0–149)
VLDLC SERPL CALC-MCNC: 19 MG/DL
WBC OTHER # BLD: 7.1 K/UL (ref 3.5–11.3)

## 2024-03-22 RX ORDER — SEMAGLUTIDE 0.25 MG/.5ML
0.25 INJECTION, SOLUTION SUBCUTANEOUS
Qty: 2 ML | Refills: 0 | Status: SHIPPED | OUTPATIENT
Start: 2024-03-22 | End: 2024-04-19

## 2024-03-22 NOTE — TELEPHONE ENCOUNTER
I placed an order into Epic for starting dose. It can take up to 1 week for PA approval. She needs to ask the pharmacist for injection teaching when filling if she's unfamiliar. She will need to see me 1 month after starting RX for weight / medication follow-up or sooner if problems arise.

## 2024-03-22 NOTE — TELEPHONE ENCOUNTER
SUBJECT: Wegovy    PATIENT/CALLER: Patient    CURRENT SYMPTOMS: Called insurance company and they do cove Wegovy. It will need a PA

## 2024-03-24 ENCOUNTER — HOSPITAL ENCOUNTER (EMERGENCY)
Age: 64
Discharge: HOME | End: 2024-03-24
Attending: STUDENT IN AN ORGANIZED HEALTH CARE EDUCATION/TRAINING PROGRAM
Payer: MEDICARE

## 2024-03-24 ENCOUNTER — APPOINTMENT (OUTPATIENT)
Dept: GENERAL RADIOLOGY | Age: 64
End: 2024-03-24
Payer: MEDICARE

## 2024-03-24 VITALS
BODY MASS INDEX: 48.18 KG/M2 | TEMPERATURE: 97.9 F | WEIGHT: 239 LBS | OXYGEN SATURATION: 93 % | HEART RATE: 83 BPM | HEIGHT: 59 IN | SYSTOLIC BLOOD PRESSURE: 141 MMHG | RESPIRATION RATE: 18 BRPM | DIASTOLIC BLOOD PRESSURE: 86 MMHG

## 2024-03-24 DIAGNOSIS — R07.9 CHEST PAIN, UNSPECIFIED TYPE: Primary | ICD-10-CM

## 2024-03-24 LAB
ANION GAP SERPL CALCULATED.3IONS-SCNC: 10 MMOL/L (ref 9–17)
BASOPHILS # BLD: 0.03 K/UL (ref 0–0.2)
BASOPHILS NFR BLD: 0 % (ref 0–2)
BNP SERPL-MCNC: <36 PG/ML
BUN SERPL-MCNC: 20 MG/DL (ref 8–23)
BUN/CREAT SERPL: 40 (ref 9–20)
CALCIUM SERPL-MCNC: 9.6 MG/DL (ref 8.6–10.4)
CHLORIDE SERPL-SCNC: 102 MMOL/L (ref 98–107)
CO2 SERPL-SCNC: 29 MMOL/L (ref 20–31)
CREAT SERPL-MCNC: 0.5 MG/DL (ref 0.5–0.9)
EOSINOPHIL # BLD: 0.15 K/UL (ref 0–0.44)
EOSINOPHILS RELATIVE PERCENT: 2 % (ref 1–4)
ERYTHROCYTE [DISTWIDTH] IN BLOOD BY AUTOMATED COUNT: 14.7 % (ref 11.8–14.4)
GFR SERPL CREATININE-BSD FRML MDRD: >60 ML/MIN/1.73M2
GLUCOSE SERPL-MCNC: 87 MG/DL (ref 70–99)
HCT VFR BLD AUTO: 44.2 % (ref 36.3–47.1)
HGB BLD-MCNC: 14.3 G/DL (ref 11.9–15.1)
IMM GRANULOCYTES # BLD AUTO: 0.02 K/UL (ref 0–0.3)
IMM GRANULOCYTES NFR BLD: 0 %
LYMPHOCYTES NFR BLD: 2.98 K/UL (ref 1.1–3.7)
LYMPHOCYTES RELATIVE PERCENT: 30 % (ref 24–43)
MCH RBC QN AUTO: 27.6 PG (ref 25.2–33.5)
MCHC RBC AUTO-ENTMCNC: 32.4 G/DL (ref 28.4–34.8)
MCV RBC AUTO: 85.3 FL (ref 82.6–102.9)
MONOCYTES NFR BLD: 0.52 K/UL (ref 0.1–1.2)
MONOCYTES NFR BLD: 5 % (ref 3–12)
NEUTROPHILS NFR BLD: 63 % (ref 36–65)
NEUTS SEG NFR BLD: 6.33 K/UL (ref 1.5–8.1)
NRBC BLD-RTO: 0 PER 100 WBC
PLATELET # BLD AUTO: 312 K/UL (ref 138–453)
PMV BLD AUTO: 8.9 FL (ref 8.1–13.5)
POTASSIUM SERPL-SCNC: 3.6 MMOL/L (ref 3.7–5.3)
RBC # BLD AUTO: 5.18 M/UL (ref 3.95–5.11)
RBC # BLD: ABNORMAL 10*6/UL
SODIUM SERPL-SCNC: 141 MMOL/L (ref 135–144)
TROPONIN I SERPL HS-MCNC: 9 NG/L (ref 0–14)
WBC OTHER # BLD: 10 K/UL (ref 3.5–11.3)

## 2024-03-24 PROCEDURE — 93005 ELECTROCARDIOGRAM TRACING: CPT | Performed by: STUDENT IN AN ORGANIZED HEALTH CARE EDUCATION/TRAINING PROGRAM

## 2024-03-24 PROCEDURE — 85025 COMPLETE CBC W/AUTO DIFF WBC: CPT

## 2024-03-24 PROCEDURE — 83880 ASSAY OF NATRIURETIC PEPTIDE: CPT

## 2024-03-24 PROCEDURE — 99285 EMERGENCY DEPT VISIT HI MDM: CPT

## 2024-03-24 PROCEDURE — 84484 ASSAY OF TROPONIN QUANT: CPT

## 2024-03-24 PROCEDURE — 36415 COLL VENOUS BLD VENIPUNCTURE: CPT

## 2024-03-24 PROCEDURE — 6370000000 HC RX 637 (ALT 250 FOR IP): Performed by: STUDENT IN AN ORGANIZED HEALTH CARE EDUCATION/TRAINING PROGRAM

## 2024-03-24 PROCEDURE — 80048 BASIC METABOLIC PNL TOTAL CA: CPT

## 2024-03-24 PROCEDURE — 71045 X-RAY EXAM CHEST 1 VIEW: CPT

## 2024-03-24 RX ORDER — LORAZEPAM 0.5 MG/1
0.5 TABLET ORAL ONCE
Status: COMPLETED | OUTPATIENT
Start: 2024-03-24 | End: 2024-03-24

## 2024-03-24 RX ORDER — KETOROLAC TROMETHAMINE 15 MG/ML
15 INJECTION, SOLUTION INTRAMUSCULAR; INTRAVENOUS ONCE
Status: DISCONTINUED | OUTPATIENT
Start: 2024-03-24 | End: 2024-03-24

## 2024-03-24 RX ADMIN — LORAZEPAM 0.5 MG: 0.5 TABLET ORAL at 20:33

## 2024-03-24 ASSESSMENT — PAIN - FUNCTIONAL ASSESSMENT: PAIN_FUNCTIONAL_ASSESSMENT: 0-10

## 2024-03-24 ASSESSMENT — HEART SCORE: ECG: NORMAL

## 2024-03-25 ENCOUNTER — TELEPHONE (OUTPATIENT)
Dept: PULMONOLOGY | Age: 64
End: 2024-03-25

## 2024-03-25 ENCOUNTER — TELEPHONE (OUTPATIENT)
Dept: FAMILY MEDICINE CLINIC | Age: 64
End: 2024-03-25

## 2024-03-25 ENCOUNTER — TELEPHONE (OUTPATIENT)
Dept: PRIMARY CARE CLINIC | Age: 64
End: 2024-03-25

## 2024-03-25 DIAGNOSIS — R07.9 CHEST PAIN, UNSPECIFIED TYPE: Primary | ICD-10-CM

## 2024-03-25 NOTE — TELEPHONE ENCOUNTER
Ana Luisa [facility]    I called her a bit ago and asked her if she had called Cardiology she said no. I told her to look for the referral and she couldn't find it so I told her it is Dr. Hair and his name and phone number and told her to call him today she said ok.

## 2024-03-25 NOTE — TELEPHONE ENCOUNTER
Patient calling requesting a letter stating her health issues because her employer is trying to fire her. Also would like a referral to a cardiologist please.

## 2024-03-25 NOTE — ED PROVIDER NOTES
PHYSICIAN CONSULTS ORDERED THIS ENCOUNTER:  None    ED Course Notes From Epic Narrator:         CRITICAL CARE:   0    PROCEDURES:  none    FINAL IMPRESSION      1. Chest pain, unspecified type          DISPOSITION/PLAN   DISPOSITION Decision To Discharge 03/24/2024 09:45:48 PM      OUTPATIENT FOLLOW UP THE PATIENT:  Quin Arreguin MD  [address]  [place] [id number] [phone]    Schedule an appointment as soon as possible for a visit in 1 week  As needed      DISCHARGE MEDICATIONS:  Discharge Medication List as of 3/24/2024  9:55 PM          Dre Tsai DO  EmergencyMedicine Attending    (Please note that portions of this note were completed with a voice recognition program.  Efforts were made to edit the dictations but occasionally words are mis-transcribed.)     Dre Tsai DO  03/26/24 0647       Dre Tsai DO  03/26/24 0648

## 2024-03-25 NOTE — TELEPHONE ENCOUNTER
This message doesn't really make sense to me. She needs a letter just stating her medical diagnoses & that's all ?? She should be able to provide a copy of her last AVS stating these. I have placed a referral to Cardiology in the system, she needs to call for an appointment.

## 2024-03-25 NOTE — TELEPHONE ENCOUNTER
Patient called, wants us to release to her current employer her medical conditions. She was informed that she would need to sign a release allowing us to do that for specific conditions. Patient will come to the office to do that.

## 2024-03-26 ENCOUNTER — TELEPHONE (OUTPATIENT)
Dept: FAMILY MEDICINE CLINIC | Age: 64
End: 2024-03-26

## 2024-03-26 LAB
EKG ATRIAL RATE: 79 BPM
EKG P AXIS: 22 DEGREES
EKG P-R INTERVAL: 150 MS
EKG Q-T INTERVAL: 378 MS
EKG QRS DURATION: 90 MS
EKG QTC CALCULATION (BAZETT): 433 MS
EKG R AXIS: -10 DEGREES
EKG T AXIS: 3 DEGREES
EKG VENTRICULAR RATE: 79 BPM

## 2024-03-26 NOTE — TELEPHONE ENCOUNTER
She needs to exercise at least 30 minutes on a daily basis, I would recommend following weight watchers & downloading a free calorie tracker benedict. I can also refer her to [facility] program again if desired.

## 2024-04-01 ENCOUNTER — TELEPHONE (OUTPATIENT)
Dept: PULMONOLOGY | Age: 64
End: 2024-04-01

## 2024-04-01 ENCOUNTER — HOSPITAL ENCOUNTER (OUTPATIENT)
Age: 64
Setting detail: SPECIMEN
Discharge: HOME | End: 2024-04-01

## 2024-04-01 DIAGNOSIS — T50.2X5A DIURETIC-INDUCED HYPOKALEMIA: ICD-10-CM

## 2024-04-01 DIAGNOSIS — E87.6 DIURETIC-INDUCED HYPOKALEMIA: ICD-10-CM

## 2024-04-01 LAB — POTASSIUM SERPL-SCNC: 4.2 MMOL/L (ref 3.7–5.3)

## 2024-04-01 NOTE — TELEPHONE ENCOUNTER
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 Writer told the patient to have them fax us a job duties form and forms regarding what they are able to accommodate. We will see if the doctor can fill it out. A current GUNNAR is on file to be able to release her PHI to her employer.

## 2024-04-05 ENCOUNTER — TELEPHONE (OUTPATIENT)
Dept: FAMILY MEDICINE CLINIC | Age: 64
End: 2024-04-05

## 2024-04-05 NOTE — TELEPHONE ENCOUNTER
[facility] calling wanting a referral to GI for a colonoscopy for patient for routine care.

## 2024-04-12 ENCOUNTER — OFFICE VISIT (OUTPATIENT)
Age: 64
End: 2024-04-12
Payer: MEDICARE

## 2024-04-12 ENCOUNTER — OFFICE VISIT (OUTPATIENT)
Dept: FAMILY MEDICINE CLINIC | Age: 64
End: 2024-04-12

## 2024-04-12 VITALS
WEIGHT: 250.6 LBS | SYSTOLIC BLOOD PRESSURE: 134 MMHG | HEART RATE: 70 BPM | HEIGHT: 59 IN | TEMPERATURE: 97.9 F | DIASTOLIC BLOOD PRESSURE: 76 MMHG | BODY MASS INDEX: 50.52 KG/M2 | OXYGEN SATURATION: 97 %

## 2024-04-12 VITALS
DIASTOLIC BLOOD PRESSURE: 80 MMHG | BODY MASS INDEX: 50.4 KG/M2 | SYSTOLIC BLOOD PRESSURE: 131 MMHG | HEART RATE: 65 BPM | HEIGHT: 59 IN | OXYGEN SATURATION: 98 % | WEIGHT: 250 LBS

## 2024-04-12 DIAGNOSIS — Z02.89 ENCOUNTER FOR COMPLETION OF FORM WITH PATIENT: ICD-10-CM

## 2024-04-12 DIAGNOSIS — F43.0 STRESS REACTION: Primary | ICD-10-CM

## 2024-04-12 DIAGNOSIS — G47.00 INSOMNIA, UNSPECIFIED TYPE: ICD-10-CM

## 2024-04-12 DIAGNOSIS — G47.33 OSA (OBSTRUCTIVE SLEEP APNEA): ICD-10-CM

## 2024-04-12 DIAGNOSIS — Z09 HOSPITAL DISCHARGE FOLLOW-UP: ICD-10-CM

## 2024-04-12 DIAGNOSIS — R07.9 CHEST PAIN, UNSPECIFIED TYPE: ICD-10-CM

## 2024-04-12 DIAGNOSIS — R07.9 CHEST PAIN, UNSPECIFIED TYPE: Primary | ICD-10-CM

## 2024-04-12 PROCEDURE — 1036F TOBACCO NON-USER: CPT | Performed by: INTERNAL MEDICINE

## 2024-04-12 PROCEDURE — 99204 OFFICE O/P NEW MOD 45 MIN: CPT | Performed by: INTERNAL MEDICINE

## 2024-04-12 PROCEDURE — 3017F COLORECTAL CA SCREEN DOC REV: CPT | Performed by: INTERNAL MEDICINE

## 2024-04-12 PROCEDURE — 3075F SYST BP GE 130 - 139MM HG: CPT | Performed by: INTERNAL MEDICINE

## 2024-04-12 PROCEDURE — G8417 CALC BMI ABV UP PARAM F/U: HCPCS | Performed by: INTERNAL MEDICINE

## 2024-04-12 PROCEDURE — G8427 DOCREV CUR MEDS BY ELIG CLIN: HCPCS | Performed by: INTERNAL MEDICINE

## 2024-04-12 PROCEDURE — 3079F DIAST BP 80-89 MM HG: CPT | Performed by: INTERNAL MEDICINE

## 2024-04-12 ASSESSMENT — ENCOUNTER SYMPTOMS
EYES NEGATIVE: 1
DIARRHEA: 0
GASTROINTESTINAL NEGATIVE: 1
VOMITING: 0
NAUSEA: 0
CHEST TIGHTNESS: 1
STRIDOR: 0
ALLERGIC/IMMUNOLOGIC NEGATIVE: 1
COLOR CHANGE: 0
CHOKING: 0
COUGH: 0
WHEEZING: 0

## 2024-04-12 NOTE — PATIENT INSTRUCTIONS
Sleep hygiene -- Sleep hygiene refers to actions that tend to improve and maintain good sleep     ?Sleep as long as necessary to feel rested (usually seven to eight hours for adults) and then get out of bed  ?Maintain a regular sleep schedule, particularly a regular wake-up time in the morning  ?Try not to force sleep  ?Avoid caffeinated beverages after lunch  ?Avoid alcohol near bedtime (eg, late afternoon and evening)  ?Avoid smoking or other nicotine intake, particularly during the evening  ?Adjust the bedroom environment as needed to decrease stimuli (eg, reduce ambient light, turn off the television or radio)  ?Avoid prolonged use of light-emitting screens (laptops, tablets, smartphones, [employer]) before bedtime   ?Resolve concerns or worries before bedtime  ?Exercise regularly for at least 20 minutes, preferably more than four to five hours prior to bedtime  ?Avoid daytime naps, especially if they are longer than 20 to 30 minutes or occur late in the day    Sleep hygiene: Basic rules for a good night's sleep   Sleep only as much as you need to feel rested and then get out of bed   Keep a regular sleep schedule   Do not try to sleep unless you feel sleepy   Exercise regularly, preferably at least 4 to 5 hours before bedtime   Avoid caffeinated beverages after lunch   Avoid alcohol near bedtime: no \"night cap\"   Avoid smoking, especially in the evening   Do not go to bed hungry   Make the bedroom environment conducive to sleep   Avoid prolonged use of light-emitting screens before bedtime    Deal with your worries before bedtime

## 2024-04-12 NOTE — PROGRESS NOTES
paperwork expires   Discussed anti anxiety medications if needed     2. Insomnia, unspecified type    Likely multifactorial in nature   Sleep hygiene handouts given  Therapy encouraged     3. YAMEL (obstructive sleep apnea)    Encouraged sleep medicine follow-up     4. Encounter for completion of form with patient      5. Chest pain, unspecified type    Has cardiology f/u today , defer testing to specialist   Could also be related to anxiety  Medications discussed if needed   Therapy encouraged     6. Hospital discharge follow-up    Records reviewed       Discussed use, benefit, and side effects of prescribed medications.All patient questions answered.  Pt voiced understanding. Reviewed health maintenance.Instructed to continue current medications, diet and exercise.  Patient agreedwith treatment plan. Follow up as directed.     Electronically signed by JJ Valenzuela CNP on 4/12/2024

## 2024-04-12 NOTE — PROGRESS NOTES
Cardiology Office Consultation           CC: Patient is here to establish cardiac care for chest pain.     NICOLLE An is here to establish cardiac care.  She reports having left-sided chest pain and pressure which is usually precipitated by stress at her job.  This has been going on over few months.  No increase in frequency or intensity as such.  Denies any significant dyspnea on exertion.  Functionally she is not very active due to recent left ankle surgery.  Reports lower extremity edema which is stable.  No orthopnea, palpitations or dizziness.      Past Medical:  Past Medical History:   Diagnosis Date    Allergic rhinitis 12/04/2014    Anxiety 07/05/2018    Asthma     Generalized osteoarthritis 06/26/2014    History of kidney stones     Hypertension     Kidney stone     Lumbosacral neuritis 10/04/2023    Obesity, morbid, BMI 40.0-49.9 ([facility]) 02/28/2017    PONV (postoperative nausea and vomiting)     Sleep apnea     does not use a machine       Past Surgical:  Past Surgical History:   Procedure Laterality Date    CHOLECYSTECTOMY      FOOT TENDON SURGERY Left 09/08/2017    posterior repair    FOOT TENDON SURGERY Left 06/29/2018    LEFT POSTERIOR  TENDON REPAIR FLEXOR TENDON TRANSFER (Left Foot)    LITHOTRIPSY      MI OFFICE/OUTPT VISIT,PROCEDURE ONLY Left 6/29/2018    LEFT POSTERIOR  TENDON REPAIR FLEXOR TENDON TRANSFER performed by Noble Bangura DPM at [facility] OR    REPAIR TENDONS LEG Left 9/8/2017    LEFT POSTERIOR TIBIAL TENDON REPAIR WITH LEFT TENOLYSIS  performed by Noble Bangura DPM at [facility] OR       Family History:  Family History   Problem Relation Age of Onset    Breast Cancer Mother 33    Depression Mother     Diabetes Mother     High Blood Pressure Mother     Mental Illness Mother     Substance Abuse Father     Depression Sister     Diabetes Sister     High Blood Pressure Sister     Mental Illness Sister     Depression Brother     Mental Illness Brother     Substance Abuse Brother

## 2024-04-16 NOTE — PROGRESS NOTES
examined  Nose - normal and patent, no erythema, discharge or polyps  Mouth - mucous membranes moist, pharynx normal without lesions  Neck - supple, no significant adenopathy  Chest - clear to auscultation, no wheezes, rales or rhonchi, symmetric air entry  Heart -normal rate, regular rhythm, normal S1, S2, no murmurs, rubs, clicks or gallops  Abdomen - soft, nontender, nondistended, no masses or organomegaly  Neuro- alert, oriented, normal speech, no focal findings or movement disorder noted}  Extremities - peripheral pulses normal, no pedal edema, no clubbing or cyanosis  Skin - normal coloration and turgor, no rashes, no suspicious skin lesions noted     Wt Readings from Last 3 Encounters:   04/29/24 110.2 kg (243 lb)   04/12/24 113.4 kg (250 lb)   04/12/24 113.7 kg (250 lb 9.6 oz)       Results for orders placed or performed during the hospital encounter of 04/01/24   Potassium   Result Value Ref Range    Potassium 4.2 3.7 - 5.3 mmol/L       XR CHEST PORTABLE    Result Date: 3/24/2024  EXAMINATION: ONE XRAY VIEW OF THE CHEST 3/24/2024 5:48 pm COMPARISON: 01/24/2023 HISTORY: ORDERING SYSTEM PROVIDED HISTORY: CP TECHNOLOGIST PROVIDED HISTORY: CP Reason for Exam: Chest pain FINDINGS: The lungs are without acute focal process.  There is no effusion or pneumothorax. The cardiomediastinal silhouette is stable. The osseous structures are stable.     No acute process.       Assessment:      1. Moderate persistent asthma without complication    2. Body mass index (BMI) 45.0-49.9, adult (HCC)    3. YAMEL (obstructive sleep apnea)    4. Personal history of tobacco use          Plan:      Lengthy discussion with patient regarding the importance of compliance with CPAP.  Patient acknowledges that she feels better on CPAP.  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  Patient counseled at length that she is required to sleep with

## 2024-04-18 ENCOUNTER — HOSPITAL ENCOUNTER (OUTPATIENT)
Dept: MAMMOGRAPHY | Age: 64
Discharge: HOME | End: 2024-04-20
Payer: MEDICARE

## 2024-04-18 DIAGNOSIS — Z12.31 ENCOUNTER FOR SCREENING MAMMOGRAM FOR MALIGNANT NEOPLASM OF BREAST: ICD-10-CM

## 2024-04-18 PROCEDURE — 77063 BREAST TOMOSYNTHESIS BI: CPT

## 2024-04-29 ENCOUNTER — TELEPHONE (OUTPATIENT)
Dept: PULMONOLOGY | Age: 64
End: 2024-04-29

## 2024-04-29 ENCOUNTER — OFFICE VISIT (OUTPATIENT)
Dept: PULMONOLOGY | Age: 64
End: 2024-04-29
Payer: MEDICARE

## 2024-04-29 VITALS
HEIGHT: 59 IN | OXYGEN SATURATION: 94 % | DIASTOLIC BLOOD PRESSURE: 73 MMHG | WEIGHT: 243 LBS | SYSTOLIC BLOOD PRESSURE: 128 MMHG | RESPIRATION RATE: 16 BRPM | HEART RATE: 76 BPM | BODY MASS INDEX: 48.99 KG/M2

## 2024-04-29 DIAGNOSIS — G47.33 OSA (OBSTRUCTIVE SLEEP APNEA): ICD-10-CM

## 2024-04-29 DIAGNOSIS — Z87.891 PERSONAL HISTORY OF TOBACCO USE: ICD-10-CM

## 2024-04-29 DIAGNOSIS — J45.40 MODERATE PERSISTENT ASTHMA WITHOUT COMPLICATION: Primary | ICD-10-CM

## 2024-04-29 PROCEDURE — 99214 OFFICE O/P EST MOD 30 MIN: CPT | Performed by: NURSE PRACTITIONER

## 2024-04-29 PROCEDURE — 3074F SYST BP LT 130 MM HG: CPT | Performed by: NURSE PRACTITIONER

## 2024-04-29 PROCEDURE — 3078F DIAST BP <80 MM HG: CPT | Performed by: NURSE PRACTITIONER

## 2024-04-29 PROCEDURE — G8427 DOCREV CUR MEDS BY ELIG CLIN: HCPCS | Performed by: NURSE PRACTITIONER

## 2024-04-29 PROCEDURE — G8417 CALC BMI ABV UP PARAM F/U: HCPCS | Performed by: NURSE PRACTITIONER

## 2024-04-29 PROCEDURE — 1036F TOBACCO NON-USER: CPT | Performed by: NURSE PRACTITIONER

## 2024-04-29 PROCEDURE — 3017F COLORECTAL CA SCREEN DOC REV: CPT | Performed by: NURSE PRACTITIONER

## 2024-04-29 ASSESSMENT — ENCOUNTER SYMPTOMS
SHORTNESS OF BREATH: 0
SINUS PRESSURE: 0
DIARRHEA: 0
CONSTIPATION: 0
STRIDOR: 0
COUGH: 0
SINUS PAIN: 0
VOMITING: 0
CHEST TIGHTNESS: 0
WHEEZING: 0
NAUSEA: 0
RHINORRHEA: 0

## 2024-04-29 NOTE — TELEPHONE ENCOUNTER
Patient has signed a GUNNAR for her potential employer to be able to disclose her PHI to them to be able to drive. Faxed the compliance report to [employer] #[phone] Attn: Dahlia at their request, with patients' permission.

## 2024-05-07 ENCOUNTER — HOSPITAL ENCOUNTER (OUTPATIENT)
Dept: NUCLEAR MEDICINE | Age: 64
Discharge: HOME | End: 2024-05-09
Attending: INTERNAL MEDICINE
Payer: MEDICARE

## 2024-05-07 ENCOUNTER — HOSPITAL ENCOUNTER (OUTPATIENT)
Age: 64
Discharge: HOME | End: 2024-05-09
Attending: INTERNAL MEDICINE
Payer: MEDICARE

## 2024-05-07 VITALS — HEIGHT: 59 IN | BODY MASS INDEX: 48.99 KG/M2 | WEIGHT: 243 LBS

## 2024-05-07 DIAGNOSIS — R07.9 CHEST PAIN, UNSPECIFIED TYPE: ICD-10-CM

## 2024-05-07 LAB
ECHO AO ROOT DIAM: 2.7 CM
ECHO AO ROOT INDEX: 1.35 CM/M2
ECHO AV AREA PEAK VELOCITY: 2.4 CM2
ECHO AV AREA VTI: 2.6 CM2
ECHO AV AREA/BSA PEAK VELOCITY: 1.2 CM2/M2
ECHO AV AREA/BSA VTI: 1.3 CM2/M2
ECHO AV MEAN GRADIENT: 4 MMHG
ECHO AV MEAN VELOCITY: 1 M/S
ECHO AV PEAK GRADIENT: 8 MMHG
ECHO AV PEAK VELOCITY: 1.4 M/S
ECHO AV VELOCITY RATIO: 0.79
ECHO AV VTI: 33.2 CM
ECHO BSA: 2.14 M2
ECHO EST RA PRESSURE: 3 MMHG
ECHO IVC EXP: 1.5 CM
ECHO IVC INSP: 0.6 CM
ECHO LA AREA 2C: 19 CM2
ECHO LA AREA 4C: 19.4 CM2
ECHO LA DIAMETER INDEX: 1.95 CM/M2
ECHO LA DIAMETER: 3.9 CM
ECHO LA MAJOR AXIS: 5 CM
ECHO LA MINOR AXIS: 5.3 CM
ECHO LA TO AORTIC ROOT RATIO: 1.44
ECHO LA VOL BP: 61 ML (ref 22–52)
ECHO LA VOL MOD A2C: 57 ML (ref 22–52)
ECHO LA VOL MOD A4C: 62 ML (ref 22–52)
ECHO LA VOL/BSA BIPLANE: 31 ML/M2 (ref 16–34)
ECHO LA VOLUME INDEX MOD A2C: 29 ML/M2 (ref 16–34)
ECHO LA VOLUME INDEX MOD A4C: 31 ML/M2 (ref 16–34)
ECHO LV E' LATERAL VELOCITY: 13 CM/S
ECHO LV E' SEPTAL VELOCITY: 8 CM/S
ECHO LV FRACTIONAL SHORTENING: 37 % (ref 28–44)
ECHO LV INTERNAL DIMENSION DIASTOLE INDEX: 2.15 CM/M2
ECHO LV INTERNAL DIMENSION DIASTOLIC: 4.3 CM (ref 3.9–5.3)
ECHO LV INTERNAL DIMENSION SYSTOLIC INDEX: 1.35 CM/M2
ECHO LV INTERNAL DIMENSION SYSTOLIC: 2.7 CM
ECHO LV IVSD: 0.9 CM (ref 0.6–0.9)
ECHO LV MASS 2D: 123.3 G (ref 67–162)
ECHO LV MASS INDEX 2D: 61.6 G/M2 (ref 43–95)
ECHO LV POSTERIOR WALL DIASTOLIC: 0.9 CM (ref 0.6–0.9)
ECHO LV RELATIVE WALL THICKNESS RATIO: 0.42
ECHO LVOT AREA: 3.1 CM2
ECHO LVOT AV VTI INDEX: 0.82
ECHO LVOT DIAM: 2 CM
ECHO LVOT MEAN GRADIENT: 3 MMHG
ECHO LVOT PEAK GRADIENT: 5 MMHG
ECHO LVOT PEAK VELOCITY: 1.1 M/S
ECHO LVOT STROKE VOLUME INDEX: 42.7 ML/M2
ECHO LVOT SV: 85.4 ML
ECHO LVOT VTI: 27.2 CM
ECHO MV A VELOCITY: 0.58 M/S
ECHO MV E DECELERATION TIME (DT): 127 MS
ECHO MV E VELOCITY: 0.86 M/S
ECHO MV E/A RATIO: 1.48
ECHO MV E/E' LATERAL: 6.62
ECHO MV E/E' RATIO (AVERAGED): 8.68
ECHO RIGHT VENTRICULAR SYSTOLIC PRESSURE (RVSP): 26 MMHG
ECHO RV BASAL DIMENSION: 3.5 CM
ECHO RV FREE WALL PEAK S': 10 CM/S
ECHO TV REGURGITANT MAX VELOCITY: 2.4 M/S
ECHO TV REGURGITANT PEAK GRADIENT: 23 MMHG

## 2024-05-07 PROCEDURE — 6360000002 HC RX W HCPCS: Performed by: INTERNAL MEDICINE

## 2024-05-07 PROCEDURE — 93306 TTE W/DOPPLER COMPLETE: CPT | Performed by: INTERNAL MEDICINE

## 2024-05-07 PROCEDURE — 93016 CV STRESS TEST SUPVJ ONLY: CPT | Performed by: RADIOLOGY

## 2024-05-07 PROCEDURE — A9500 TC99M SESTAMIBI: HCPCS | Performed by: INTERNAL MEDICINE

## 2024-05-07 PROCEDURE — 2580000003 HC RX 258: Performed by: INTERNAL MEDICINE

## 2024-05-07 PROCEDURE — 3430000000 HC RX DIAGNOSTIC RADIOPHARMACEUTICAL: Performed by: INTERNAL MEDICINE

## 2024-05-07 PROCEDURE — 93017 CV STRESS TEST TRACING ONLY: CPT

## 2024-05-07 PROCEDURE — 93018 CV STRESS TEST I&R ONLY: CPT | Performed by: RADIOLOGY

## 2024-05-07 PROCEDURE — 78452 HT MUSCLE IMAGE SPECT MULT: CPT

## 2024-05-07 PROCEDURE — 93306 TTE W/DOPPLER COMPLETE: CPT

## 2024-05-07 RX ORDER — SODIUM CHLORIDE 0.9 % (FLUSH) 0.9 %
10 SYRINGE (ML) INJECTION ONCE
Status: COMPLETED | OUTPATIENT
Start: 2024-05-07 | End: 2024-05-07

## 2024-05-07 RX ORDER — ATROPINE SULFATE 0.1 MG/ML
0.5 INJECTION INTRAVENOUS EVERY 5 MIN PRN
Status: DISCONTINUED | OUTPATIENT
Start: 2024-05-07 | End: 2024-05-07

## 2024-05-07 RX ORDER — REGADENOSON 0.08 MG/ML
0.4 INJECTION, SOLUTION INTRAVENOUS
Status: COMPLETED | OUTPATIENT
Start: 2024-05-07 | End: 2024-05-07

## 2024-05-07 RX ORDER — ALBUTEROL SULFATE 90 UG/1
2 AEROSOL, METERED RESPIRATORY (INHALATION) PRN
Status: DISCONTINUED | OUTPATIENT
Start: 2024-05-07 | End: 2024-05-07

## 2024-05-07 RX ORDER — TETRAKIS(2-METHOXYISOBUTYLISOCYANIDE)COPPER(I) TETRAFLUOROBORATE 1 MG/ML
12 INJECTION, POWDER, LYOPHILIZED, FOR SOLUTION INTRAVENOUS
Status: COMPLETED | OUTPATIENT
Start: 2024-05-07 | End: 2024-05-07

## 2024-05-07 RX ORDER — NITROGLYCERIN 0.4 MG/1
0.4 TABLET SUBLINGUAL EVERY 5 MIN PRN
Status: DISCONTINUED | OUTPATIENT
Start: 2024-05-07 | End: 2024-05-07

## 2024-05-07 RX ORDER — SODIUM CHLORIDE 9 MG/ML
500 INJECTION, SOLUTION INTRAVENOUS CONTINUOUS PRN
Status: DISCONTINUED | OUTPATIENT
Start: 2024-05-07 | End: 2024-05-07

## 2024-05-07 RX ORDER — METOPROLOL TARTRATE 1 MG/ML
5 INJECTION, SOLUTION INTRAVENOUS EVERY 5 MIN PRN
Status: DISCONTINUED | OUTPATIENT
Start: 2024-05-07 | End: 2024-05-07

## 2024-05-07 RX ORDER — TETRAKIS(2-METHOXYISOBUTYLISOCYANIDE)COPPER(I) TETRAFLUOROBORATE 1 MG/ML
36 INJECTION, POWDER, LYOPHILIZED, FOR SOLUTION INTRAVENOUS
Status: COMPLETED | OUTPATIENT
Start: 2024-05-07 | End: 2024-05-07

## 2024-05-07 RX ORDER — SODIUM CHLORIDE 0.9 % (FLUSH) 0.9 %
5-40 SYRINGE (ML) INJECTION PRN
Status: DISCONTINUED | OUTPATIENT
Start: 2024-05-07 | End: 2024-05-07

## 2024-05-07 RX ORDER — AMINOPHYLLINE 25 MG/ML
50 INJECTION, SOLUTION INTRAVENOUS PRN
Status: DISCONTINUED | OUTPATIENT
Start: 2024-05-07 | End: 2024-05-07

## 2024-05-07 RX ADMIN — SODIUM CHLORIDE, PRESERVATIVE FREE 10 ML: 5 INJECTION INTRAVENOUS at 08:59

## 2024-05-07 RX ADMIN — SODIUM CHLORIDE, PRESERVATIVE FREE 10 ML: 5 INJECTION INTRAVENOUS at 07:40

## 2024-05-07 RX ADMIN — TETRAKIS(2-METHOXYISOBUTYLISOCYANIDE)COPPER(I) TETRAFLUOROBORATE 11.5 MILLICURIE: 1 INJECTION, POWDER, LYOPHILIZED, FOR SOLUTION INTRAVENOUS at 07:40

## 2024-05-07 RX ADMIN — SODIUM CHLORIDE, PRESERVATIVE FREE 10 ML: 5 INJECTION INTRAVENOUS at 09:00

## 2024-05-07 RX ADMIN — REGADENOSON 0.4 MG: 0.08 INJECTION, SOLUTION INTRAVENOUS at 08:59

## 2024-05-07 RX ADMIN — TETRAKIS(2-METHOXYISOBUTYLISOCYANIDE)COPPER(I) TETRAFLUOROBORATE 37.9 MILLICURIE: 1 INJECTION, POWDER, LYOPHILIZED, FOR SOLUTION INTRAVENOUS at 09:00

## 2024-05-08 LAB
ECHO BSA: 2.14 M2
STRESS BASELINE DIAS BP: 72 MMHG
STRESS BASELINE HR: 56 BPM
STRESS BASELINE SYS BP: 145 MMHG
STRESS ESTIMATED WORKLOAD: 1 METS
STRESS PEAK DIAS BP: 70 MMHG
STRESS PEAK SYS BP: 148 MMHG
STRESS PERCENT HR ACHIEVED: 61 %
STRESS POST PEAK HR: 97 BPM
STRESS RATE PRESSURE PRODUCT: NORMAL BPM*MMHG
STRESS TARGET HR: 159 BPM

## 2024-05-09 ENCOUNTER — OFFICE VISIT (OUTPATIENT)
Dept: PODIATRY | Age: 64
End: 2024-05-09
Payer: MEDICARE

## 2024-05-09 ENCOUNTER — TELEPHONE (OUTPATIENT)
Dept: SURGERY | Age: 64
End: 2024-05-09

## 2024-05-09 VITALS — BODY MASS INDEX: 48.99 KG/M2 | WEIGHT: 243 LBS | HEIGHT: 59 IN

## 2024-05-09 DIAGNOSIS — M25.572 CHRONIC PAIN OF LEFT ANKLE: ICD-10-CM

## 2024-05-09 DIAGNOSIS — M79.671 PAIN IN BOTH FEET: ICD-10-CM

## 2024-05-09 DIAGNOSIS — G89.29 CHRONIC PAIN OF LEFT ANKLE: ICD-10-CM

## 2024-05-09 DIAGNOSIS — M79.672 PAIN IN BOTH FEET: ICD-10-CM

## 2024-05-09 DIAGNOSIS — B35.1 DERMATOPHYTOSIS OF NAIL: ICD-10-CM

## 2024-05-09 DIAGNOSIS — I73.9 PVD (PERIPHERAL VASCULAR DISEASE) (HCC): ICD-10-CM

## 2024-05-09 DIAGNOSIS — M79.672 LEFT FOOT PAIN: Primary | ICD-10-CM

## 2024-05-09 DIAGNOSIS — R26.9 GAIT ABNORMALITY: ICD-10-CM

## 2024-05-09 PROCEDURE — G8417 CALC BMI ABV UP PARAM F/U: HCPCS | Performed by: PODIATRIST

## 2024-05-09 PROCEDURE — 3017F COLORECTAL CA SCREEN DOC REV: CPT | Performed by: PODIATRIST

## 2024-05-09 PROCEDURE — 11721 DEBRIDE NAIL 6 OR MORE: CPT | Performed by: PODIATRIST

## 2024-05-09 PROCEDURE — G8427 DOCREV CUR MEDS BY ELIG CLIN: HCPCS | Performed by: PODIATRIST

## 2024-05-09 PROCEDURE — 99213 OFFICE O/P EST LOW 20 MIN: CPT | Performed by: PODIATRIST

## 2024-05-09 PROCEDURE — 1036F TOBACCO NON-USER: CPT | Performed by: PODIATRIST

## 2024-05-09 NOTE — PROGRESS NOTES
[facility] [address]  [address]  [facility] [address]  Dept: [phone]  Dept Fax: [phone]     PAIN PROGRESS NOTE  Date of patient's visit: 5/9/2024  Patient's Name:  Ev An YOB: 1960            Patient Care Team:  Ana Luisa Hatfield APRN - CNP as PCP - General (Family Medicine)  Ana Luisa Hatfield APRN - CNP as PCP - Empaneled Provider  Noble Bangura DPM as Physician (Podiatry)  Phuc Morin MD as Consulting Physician (Pulmonary Disease)  Mejia Ceballos MD as Anesthesiologist (Pain Management)      Chief Complaint   Patient presents with    Nail Problem     Toenail trim    Foot Pain     Bilateral feet       Subjective:   This Ev An comes to clinic for foot and nail care.  Pt currently has complaint of thickened, painful, elongated nails that he/she cannot manage by themselves.  Pt. Relates pain to nails with shoe gear.  Pt's primary care physician is Ana Luisa Hatfield APRN - CNP last seen 04/12/2024.  Pt has a new complaint of left foot pain.  She states her tramadol is helping but she has to start working now and she states she needs a note saying she can work while being on tramadol for her PRN pain .    Past Medical History:   Diagnosis Date    Allergic rhinitis 12/04/2014    Anxiety 07/05/2018    Asthma     Generalized osteoarthritis 06/26/2014    History of kidney stones     Hypertension     Kidney stone     Lumbosacral neuritis 10/04/2023    Obesity, morbid, BMI 40.0-49.9 ([facility]) 02/28/2017    PONV (postoperative nausea and vomiting)     Sleep apnea     does not use a machine       Allergies   Allergen Reactions    Latex Hives     Latex gloves    Asa [Aspirin] Hives    Codeine Hives    Lisinopril     Motrin [Ibuprofen] Hives    Other Hives and Swelling     Cherries, almonds    Pcn [Penicillins] Hives    Sulfa Antibiotics      Current Outpatient Medications on File Prior to Visit

## 2024-05-09 NOTE — TELEPHONE ENCOUNTER
Patient called office regarding visit with Dr. Hair on 04/12/2024. Patient stated she completed tests per Dr. Hair's recommendation and was informed the results were normal. Patient is requesting a letter from Dr. Hair stating that patient is cleared from a cardiac standpoint to work. Patient requested a letter be faxed to [facility]. Writer informed patient to contact [employer] for paperwork and have the paperwork faxed to Dr. Hair. Patient verbalized understanding and writer provided office fax number.

## 2024-05-12 DIAGNOSIS — L29.9 GENERALIZED PRURITUS: ICD-10-CM

## 2024-05-13 ENCOUNTER — OFFICE VISIT (OUTPATIENT)
Dept: FAMILY MEDICINE CLINIC | Age: 64
End: 2024-05-13
Payer: MEDICARE

## 2024-05-13 VITALS
SYSTOLIC BLOOD PRESSURE: 142 MMHG | HEART RATE: 93 BPM | DIASTOLIC BLOOD PRESSURE: 70 MMHG | OXYGEN SATURATION: 97 % | WEIGHT: 253.8 LBS | TEMPERATURE: 98.7 F | HEIGHT: 59 IN | BODY MASS INDEX: 51.16 KG/M2

## 2024-05-13 DIAGNOSIS — G47.33 OSA (OBSTRUCTIVE SLEEP APNEA): ICD-10-CM

## 2024-05-13 DIAGNOSIS — G47.26 SLEEP DISORDER, SHIFT WORK: ICD-10-CM

## 2024-05-13 DIAGNOSIS — F43.9 STRESS: Primary | ICD-10-CM

## 2024-05-13 DIAGNOSIS — I10 ESSENTIAL HYPERTENSION: ICD-10-CM

## 2024-05-13 PROCEDURE — 3017F COLORECTAL CA SCREEN DOC REV: CPT | Performed by: NURSE PRACTITIONER

## 2024-05-13 PROCEDURE — G8417 CALC BMI ABV UP PARAM F/U: HCPCS | Performed by: NURSE PRACTITIONER

## 2024-05-13 PROCEDURE — 1036F TOBACCO NON-USER: CPT | Performed by: NURSE PRACTITIONER

## 2024-05-13 PROCEDURE — 3077F SYST BP >= 140 MM HG: CPT | Performed by: NURSE PRACTITIONER

## 2024-05-13 PROCEDURE — 3078F DIAST BP <80 MM HG: CPT | Performed by: NURSE PRACTITIONER

## 2024-05-13 PROCEDURE — G8427 DOCREV CUR MEDS BY ELIG CLIN: HCPCS | Performed by: NURSE PRACTITIONER

## 2024-05-13 PROCEDURE — 99214 OFFICE O/P EST MOD 30 MIN: CPT | Performed by: NURSE PRACTITIONER

## 2024-05-13 RX ORDER — HYDROXYZINE HYDROCHLORIDE 25 MG/1
TABLET, FILM COATED ORAL
Qty: 90 TABLET | Refills: 1 | Status: SHIPPED | OUTPATIENT
Start: 2024-05-13

## 2024-05-13 ASSESSMENT — PATIENT HEALTH QUESTIONNAIRE - PHQ9
1. LITTLE INTEREST OR PLEASURE IN DOING THINGS: NOT AT ALL
SUM OF ALL RESPONSES TO PHQ QUESTIONS 1-9: 0
SUM OF ALL RESPONSES TO PHQ QUESTIONS 1-9: 0
SUM OF ALL RESPONSES TO PHQ9 QUESTIONS 1 & 2: 0
SUM OF ALL RESPONSES TO PHQ QUESTIONS 1-9: 0
2. FEELING DOWN, DEPRESSED OR HOPELESS: NOT AT ALL
SUM OF ALL RESPONSES TO PHQ QUESTIONS 1-9: 0

## 2024-05-13 ASSESSMENT — ENCOUNTER SYMPTOMS
ALLERGIC/IMMUNOLOGIC NEGATIVE: 1
VOMITING: 0
COUGH: 0
DIARRHEA: 0
NAUSEA: 0
SHORTNESS OF BREATH: 0
RESPIRATORY NEGATIVE: 1
GASTROINTESTINAL NEGATIVE: 1
COLOR CHANGE: 0
CHEST TIGHTNESS: 0

## 2024-05-13 NOTE — TELEPHONE ENCOUNTER
Ev An is calling to request a refill on the following medication(s):    Medication Request:  Requested Prescriptions     Pending Prescriptions Disp Refills    hydrOXYzine HCl (ATARAX) 25 MG tablet [Pharmacy Med Name: HYDROXYZINE HCL 25 MG TABLET] 90 tablet 1     Sig: take 1 tablet by mouth every 6 hours if needed for itching or anxiety       Last Visit Date (If Applicable):  4/12/2024    Next Visit Date:    5/13/2024

## 2024-05-13 NOTE — PATIENT INSTRUCTIONS
Please check your blood pressure several times per week for the next month & bring to your next follow-up appointment

## 2024-05-13 NOTE — PROGRESS NOTES
[facility]  [address] [place] [address]  Dept: [phone]    Ev An is a 63 y.o. female who presents today for her medical conditions/complaintsas noted below.      Ev An is here today c/o Stress Reaction (F/u for FMLA)    Past Medical History:   Diagnosis Date    Allergic rhinitis 2014    Anxiety 2018    Asthma     Generalized osteoarthritis 2014    History of kidney stones     Hypertension     Kidney stone     Lumbosacral neuritis 10/04/2023    Obesity, morbid, BMI 40.0-49.9 ([facility]) 2017    PONV (postoperative nausea and vomiting)     Sleep apnea     does not use a machine      Past Surgical History:   Procedure Laterality Date    CHOLECYSTECTOMY      FOOT TENDON SURGERY Left 2017    posterior repair    FOOT TENDON SURGERY Left 2018    LEFT POSTERIOR  TENDON REPAIR FLEXOR TENDON TRANSFER (Left Foot)    LITHOTRIPSY      RI OFFICE/OUTPT VISIT,PROCEDURE ONLY Left 2018    LEFT POSTERIOR  TENDON REPAIR FLEXOR TENDON TRANSFER performed by Noble Bangura DPM at [facility] OR    REPAIR TENDONS LEG Left 2017    LEFT POSTERIOR TIBIAL TENDON REPAIR WITH LEFT TENOLYSIS  performed by Noble Bangura DPM at [facility] OR       Family History   Problem Relation Age of Onset    Breast Cancer Mother 33    Depression Mother     Diabetes Mother     High Blood Pressure Mother     Mental Illness Mother     Substance Abuse Father     Depression Sister     Diabetes Sister     High Blood Pressure Sister     Mental Illness Sister     Depression Brother     Mental Illness Brother     Substance Abuse Brother        Social History     Tobacco Use    Smoking status: Former     Current packs/day: 0.00     Average packs/day: 1 pack/day for 16.1 years (16.1 ttl pk-yrs)     Types: Cigarettes     Start date: 1981     Quit date: 1998     Years since quittin.3    Smokeless tobacco: Never    Tobacco comments:     quit smoking 15-20 yrs ago

## 2024-05-15 ENCOUNTER — TELEPHONE (OUTPATIENT)
Dept: INFECTIOUS DISEASES | Age: 64
End: 2024-05-15

## 2024-05-15 NOTE — TELEPHONE ENCOUNTER
Patient called and stated that she needs a new script sent to her insurance to cover her machine and when asked again she stated [facility] wanted it.  Patient was told to have [facility] contact us on what it is that they are asking for

## 2024-06-20 ENCOUNTER — OFFICE VISIT (OUTPATIENT)
Dept: FAMILY MEDICINE CLINIC | Age: 64
End: 2024-06-20
Payer: MEDICARE

## 2024-06-20 VITALS
HEIGHT: 59 IN | DIASTOLIC BLOOD PRESSURE: 64 MMHG | SYSTOLIC BLOOD PRESSURE: 118 MMHG | TEMPERATURE: 97.9 F | OXYGEN SATURATION: 97 % | BODY MASS INDEX: 50.6 KG/M2 | HEART RATE: 68 BPM | WEIGHT: 251 LBS

## 2024-06-20 DIAGNOSIS — E66.01 CLASS 3 SEVERE OBESITY DUE TO EXCESS CALORIES WITH SERIOUS COMORBIDITY AND BODY MASS INDEX (BMI) OF 50.0 TO 59.9 IN ADULT (HCC): ICD-10-CM

## 2024-06-20 DIAGNOSIS — Z00.00 MEDICARE ANNUAL WELLNESS VISIT, SUBSEQUENT: Primary | ICD-10-CM

## 2024-06-20 PROCEDURE — 3078F DIAST BP <80 MM HG: CPT | Performed by: NURSE PRACTITIONER

## 2024-06-20 PROCEDURE — 3017F COLORECTAL CA SCREEN DOC REV: CPT | Performed by: NURSE PRACTITIONER

## 2024-06-20 PROCEDURE — 3074F SYST BP LT 130 MM HG: CPT | Performed by: NURSE PRACTITIONER

## 2024-06-20 PROCEDURE — G0439 PPPS, SUBSEQ VISIT: HCPCS | Performed by: NURSE PRACTITIONER

## 2024-06-20 ASSESSMENT — LIFESTYLE VARIABLES
HOW OFTEN DO YOU HAVE A DRINK CONTAINING ALCOHOL: NEVER
HOW MANY STANDARD DRINKS CONTAINING ALCOHOL DO YOU HAVE ON A TYPICAL DAY: PATIENT DOES NOT DRINK

## 2024-06-20 ASSESSMENT — PATIENT HEALTH QUESTIONNAIRE - PHQ9
SUM OF ALL RESPONSES TO PHQ QUESTIONS 1-9: 0
1. LITTLE INTEREST OR PLEASURE IN DOING THINGS: NOT AT ALL
2. FEELING DOWN, DEPRESSED OR HOPELESS: NOT AT ALL
SUM OF ALL RESPONSES TO PHQ QUESTIONS 1-9: 0
SUM OF ALL RESPONSES TO PHQ QUESTIONS 1-9: 0
SUM OF ALL RESPONSES TO PHQ9 QUESTIONS 1 & 2: 0
SUM OF ALL RESPONSES TO PHQ QUESTIONS 1-9: 0

## 2024-06-20 NOTE — PROGRESS NOTES
found.    Interventions:   Patient encouraged to make appointment with their eye specialist     ADL's:   Patient reports needing help with:  Select all that apply: (!) Shopping (help carrying in the groceries)  Interventions:  Patient declined any further interventions or treatment    Advanced Directives:  Do you have a Living Will?: (!) No    Intervention:  has NO advanced directive - information provided            Objective   Vitals:    06/20/24 1304   BP: 118/64   Pulse: 68   Temp: 97.9 °F (36.6 °C)   TempSrc: Temporal   SpO2: 97%   Weight: 113.9 kg (251 lb)   Height: 1.499 m (4' 11\")      Body mass index is 50.7 kg/m².          Allergies   Allergen Reactions    Latex Hives     Latex gloves    Asa [Aspirin] Hives    Codeine Hives    Lisinopril     Motrin [Ibuprofen] Hives    Other Hives and Swelling     Cherries, almonds    Pcn [Penicillins] Hives    Sulfa Antibiotics      Prior to Visit Medications    Medication Sig Taking? Authorizing Provider   hydrOXYzine HCl (ATARAX) 25 MG tablet take 1 tablet by mouth every 6 hours if needed for itching or anxiety  Ana Luisa Hatfield APRN - CNP   amLODIPine (NORVASC) 5 MG tablet Take 1 tablet by mouth daily  Ana Luisa Hatfield APRN - CNP   losartan-hydroCHLOROthiazide (HYZAAR) 100-25 MG per tablet Take 1 tablet by mouth daily  Ana Luisa Hatfield APRN - CNP   traMADol (ULTRAM) 50 MG tablet Take 1 tablet by mouth every 6 hours as needed for Pain.  Provider, MD Racquel   albuterol sulfate HFA (PROVENTIL;VENTOLIN;PROAIR) 108 (90 Base) MCG/ACT inhaler Inhale 2 puffs into the lungs every 6 hours as needed for Wheezing or Shortness of Breath  Phuc Morin MD   fluticasone (FLONASE) 50 MCG/ACT nasal spray 2 sprays by Nasal route daily  Phuc Morin MD   fluticasone-salmeterol (ADVAIR) 250-50 MCG/ACT AEPB diskus inhaler inhale 1 puff by mouth and INTO THE LUNGS twice a day and rinse mouth after use  Phuc Morin MD   ciclopirox (PENLAC) 8 % solution Apply

## 2024-08-08 ENCOUNTER — OFFICE VISIT (OUTPATIENT)
Dept: PODIATRY | Age: 64
End: 2024-08-08
Payer: MEDICARE

## 2024-08-08 VITALS — BODY MASS INDEX: 50.6 KG/M2 | WEIGHT: 251 LBS | HEIGHT: 59 IN

## 2024-08-08 DIAGNOSIS — M79.671 PAIN IN BOTH FEET: ICD-10-CM

## 2024-08-08 DIAGNOSIS — I73.9 PVD (PERIPHERAL VASCULAR DISEASE) (HCC): ICD-10-CM

## 2024-08-08 DIAGNOSIS — R26.9 GAIT ABNORMALITY: ICD-10-CM

## 2024-08-08 DIAGNOSIS — M25.572 CHRONIC PAIN OF LEFT ANKLE: ICD-10-CM

## 2024-08-08 DIAGNOSIS — B35.1 DERMATOPHYTOSIS OF NAIL: Primary | ICD-10-CM

## 2024-08-08 DIAGNOSIS — G89.29 CHRONIC PAIN OF LEFT ANKLE: ICD-10-CM

## 2024-08-08 DIAGNOSIS — M25.372 LEFT ANKLE INSTABILITY: ICD-10-CM

## 2024-08-08 DIAGNOSIS — M25.572 SINUS TARSITIS OF LEFT FOOT: ICD-10-CM

## 2024-08-08 DIAGNOSIS — M79.672 PAIN IN BOTH FEET: ICD-10-CM

## 2024-08-08 PROCEDURE — 1036F TOBACCO NON-USER: CPT | Performed by: PODIATRIST

## 2024-08-08 PROCEDURE — G8417 CALC BMI ABV UP PARAM F/U: HCPCS | Performed by: PODIATRIST

## 2024-08-08 PROCEDURE — 99214 OFFICE O/P EST MOD 30 MIN: CPT | Performed by: PODIATRIST

## 2024-08-08 PROCEDURE — 3017F COLORECTAL CA SCREEN DOC REV: CPT | Performed by: PODIATRIST

## 2024-08-08 PROCEDURE — 11721 DEBRIDE NAIL 6 OR MORE: CPT | Performed by: PODIATRIST

## 2024-08-08 PROCEDURE — G8427 DOCREV CUR MEDS BY ELIG CLIN: HCPCS | Performed by: PODIATRIST

## 2024-08-08 NOTE — PROGRESS NOTES
Involvement (Check affected area)  [x] [x] [x] [x] [x] [x] [x] [x] [x] [x]  5 4 3 2 1 1 2 3 4 5                          Right                                        Left    Thickness  [x] [x] [x] [x] [x] [x] [x] [x] [x] [x]  5 4 3 2 1 1 2 3 4 5                         Right                                        Left    Dystrophic Changes   [x] [x] [x] [x] [x] [x] [x] [x] [x] [x]  5 4 3 2 1 1 2 3 4 5                         Right                                        Left    Color  [x] [x] [x] [x] [x] [x] [x] [x] [x] [x]  5 4 3 2 1 1 2 3 4 5                          Right                                        Left    Incurvation/Ingrowin   [] [] [] [] [] [] [] [] [] []  5 4 3 2 1 1 2 3 4 5                         Right                                        Left    Inflammation/Pain   [x] [x] [x] [x] [x] [x] [x] [x] [x] [x]  5 4 3  2 1 1 2 3 4 5                         Right                                        Left       Nails are painful 1-10 with direct palpation.      Q7   []Yes  []No                Q8   [x]Yes  []No                     Q9   []Yes    []No  Assessment:  63 y.o. female with:    Diagnosis Orders   1. Dermatophytosis of nail  63477 - DEBRIDEMENT OF NAILS, 6 OR MORE      2. Pain in both feet  63282 - DEBRIDEMENT OF NAILS, 6 OR MORE      3. Chronic pain of left ankle        4. Gait abnormality  44965 - DEBRIDEMENT OF NAILS, 6 OR MORE      5. Sinus tarsitis of left foot        6. Left ankle instability        7. PVD (peripheral vascular disease) ([facility])  12192 - DEBRIDEMENT OF NAILS, 6 OR MORE            Plan:   Pt was evaluated and examined. Patient was given personalized discharge instructions.        Discussed her Sinus syndrome, and the root cause of it likely due to her knee replacements and also her overweight. Patient is to continue to stretch her calf muscle to alleviate some stress on her foot. We discussed injection therapy a patient denies any injection therapy today. Discussed the

## 2024-09-10 NOTE — FLOWSHEET NOTE
[x] [facility]  [address]  [place]: [phone]   [place]: [phone]    Physical Therapy Daily Treatment Note      Date:  11/3/2023  Patient Name:  Becca Franks    :  1960  MRN: [id number]  Physician: Stacey Johnson MD                                     Insurance: [facility] Medicare / [employer] $9 copay visits based on medical necessity. Medical Diagnosis: (L) buttock pain M79.18; lumbar degenerative disc disease M51.36 add (L) ankle pain M25.572               Rehab Codes: M54.5 low back pain, M25.572  Onset Date:  23 referral              Next 's appt. :   Visit Count:                                 Cancel/No Show: 2/0    Subjective:  Patient continues to report to therapy with increased pain stating she missed last session due to having a sleep study. Patient reporting she has not been compliant with HEP and has not noticed any improvement since beginning therapy. Pain:  [x] Yes  [] No  L foot- 9/10  L hip 7/10  Pain altered Tx:  [] No  [] Yes  Action:  Comments:    Objective:  Modalities: IFC 15mins in R side lying chronic pain 4 pads 'X' formation 32.5mA>30.0mA- pt has very low pain tolerance. 11/3/2023-held per pt's request  Precautions:  Exercises:  Exercise Reps/ Time Weight/ Level Completed Comments   Manual       STM     attempted but held due to increased pain                 Postural education      Sitting, laying          NuStep  5'  3 x     SKTC semi reclined 3 x 30\"   x     Gastroc stretch 3x 30\"  x    Modified piriformis semi reclined 3 x 10\"        Abdominal bracing  20x 5\"  x     Active hamstring stretch  10x2 3\"        LTR 10x3\"             Seated       LAQ LLE only 10x2 3\"  x    Hamstring stretch 3x 30\" 6in step x           Other:    Specific Instructions for next treatment:      Assessment: [] Progressing toward goals. [x] No change.   11/3  Patient could not tolerate semi reclined position the whole session due Satisfactory

## 2024-10-03 DIAGNOSIS — J45.40 MODERATE PERSISTENT ASTHMA WITHOUT COMPLICATION: ICD-10-CM

## 2024-10-03 NOTE — TELEPHONE ENCOUNTER
Last visit: 4/29/24  Next visit: 4/30/25    Refill request for Wixela. Per last dictation, patient taking Advair. Please see pended script and advise.

## 2024-10-04 RX ORDER — FLUTICASONE PROPIONATE AND SALMETEROL 250; 50 UG/1; UG/1
POWDER RESPIRATORY (INHALATION)
Qty: 60 EACH | Refills: 5 | Status: SHIPPED | OUTPATIENT
Start: 2024-10-04

## 2024-10-28 DIAGNOSIS — L29.9 GENERALIZED PRURITUS: ICD-10-CM

## 2024-10-29 RX ORDER — HYDROXYZINE HYDROCHLORIDE 25 MG/1
TABLET, FILM COATED ORAL
Qty: 90 TABLET | Refills: 1 | Status: SHIPPED | OUTPATIENT
Start: 2024-10-29

## 2024-10-29 NOTE — TELEPHONE ENCOUNTER
Ev An is calling to request a refill on the following medication(s):    Medication Request:  Requested Prescriptions     Pending Prescriptions Disp Refills    hydrOXYzine HCl (ATARAX) 25 MG tablet [Pharmacy Med Name: HYDROXYZINE HCL 25MG TABS (WHITE)] 90 tablet 1     Sig: TAKE 1 TABLET BY MOUTH EVERY 6 HOURS IF NEEDED FOR ITCHING OR ANXIETY       Last Visit Date (If Applicable):  6/20/2024    Next Visit Date:    11/21/2024

## 2024-11-14 ENCOUNTER — OFFICE VISIT (OUTPATIENT)
Dept: PODIATRY | Age: 64
End: 2024-11-14
Payer: MEDICARE

## 2024-11-14 VITALS — HEIGHT: 59 IN | BODY MASS INDEX: 50.6 KG/M2 | WEIGHT: 251 LBS

## 2024-11-14 DIAGNOSIS — M79.672 PAIN IN BOTH FEET: ICD-10-CM

## 2024-11-14 DIAGNOSIS — R26.2 TROUBLE WALKING: ICD-10-CM

## 2024-11-14 DIAGNOSIS — I89.0 LYMPHATIC EDEMA: ICD-10-CM

## 2024-11-14 DIAGNOSIS — M25.372 LEFT ANKLE INSTABILITY: ICD-10-CM

## 2024-11-14 DIAGNOSIS — M79.671 PAIN IN BOTH FEET: ICD-10-CM

## 2024-11-14 DIAGNOSIS — B35.1 DERMATOPHYTOSIS OF NAIL: Primary | ICD-10-CM

## 2024-11-14 DIAGNOSIS — I73.9 PVD (PERIPHERAL VASCULAR DISEASE) (HCC): ICD-10-CM

## 2024-11-14 DIAGNOSIS — G89.29 CHRONIC PAIN OF LEFT ANKLE: ICD-10-CM

## 2024-11-14 DIAGNOSIS — M19.072 DEGENERATIVE JOINT DISEASE OF BOTH ANKLES AND FEET: ICD-10-CM

## 2024-11-14 DIAGNOSIS — M25.572 SINUS TARSITIS OF LEFT FOOT: ICD-10-CM

## 2024-11-14 DIAGNOSIS — R26.9 GAIT ABNORMALITY: ICD-10-CM

## 2024-11-14 DIAGNOSIS — M79.672 LEFT FOOT PAIN: ICD-10-CM

## 2024-11-14 DIAGNOSIS — M19.071 DEGENERATIVE JOINT DISEASE OF BOTH ANKLES AND FEET: ICD-10-CM

## 2024-11-14 DIAGNOSIS — M25.572 CHRONIC PAIN OF LEFT ANKLE: ICD-10-CM

## 2024-11-14 PROCEDURE — 11721 DEBRIDE NAIL 6 OR MORE: CPT | Performed by: PODIATRIST

## 2024-11-14 NOTE — PROGRESS NOTES
[facility] [address]  [address]  [facility] [address]  Dept: [phone]  Dept Fax: [phone]     FOOT PAIN & BENIGN NEOPLASM PROGRESS NOTE  Date of patient's visit: 11/14/2024  Patient's Name:  Ev An YOB: 1960            Patient Care Team:  Ana Luisa Hatfield APRN - CNP as PCP - General (Family Medicine)  Ana Luisa Hatfield APRN - CNP as PCP - Empaneled Provider  Noble Bangura DPM as Physician (Podiatry)  Phuc Morin MD as Consulting Physician (Pulmonary Disease)  Mejia Ceballos MD as Anesthesiologist (Pain Management)          Chief Complaint   Patient presents with    Foot Pain     Bilateral foot    Peripheral Neuropathy     Bilateral foot     Benign Neoplasm     Bilateral foot        Subjective:   This Ev An comes to clinic for foot and nail care.  Pt currently has complaint of thickened, painful, elongated nails that he/she cannot manage by themselves.  Pt. Relates pain to nails with shoe gear.  Pt's primary care physician is Ana Luisa Hatfield APRN - CNPlast seen 6/20/24.  Past Medical History:   Diagnosis Date    Allergic rhinitis 12/04/2014    Anxiety 07/05/2018    Asthma     Generalized osteoarthritis 06/26/2014    Hypertension     IFG (impaired fasting glucose)     Kidney stone     Lumbosacral neuritis 10/04/2023    Obesity     PONV (postoperative nausea and vomiting)     Sleep apnea     does not use a machine   Patient has no new complaints she has been working        Allergies   Allergen Reactions    Latex Hives     Latex gloves    Asa [Aspirin] Hives    Codeine Hives    Lisinopril     Motrin [Ibuprofen] Hives    Other Hives and Swelling     Cherries, almonds    Pcn [Penicillins] Hives    Sulfa Antibiotics      Current Outpatient Medications on File Prior to Visit   Medication Sig Dispense Refill    hydrOXYzine HCl (ATARAX) 25 MG tablet TAKE 1 TABLET BY MOUTH EVERY 6 HOURS IF

## 2024-11-21 ENCOUNTER — OFFICE VISIT (OUTPATIENT)
Dept: FAMILY MEDICINE CLINIC | Age: 64
End: 2024-11-21
Payer: MEDICARE

## 2024-11-21 ENCOUNTER — HOSPITAL ENCOUNTER (OUTPATIENT)
Age: 64
Setting detail: SPECIMEN
Discharge: HOME | End: 2024-11-21

## 2024-11-21 VITALS
OXYGEN SATURATION: 96 % | DIASTOLIC BLOOD PRESSURE: 78 MMHG | SYSTOLIC BLOOD PRESSURE: 136 MMHG | BODY MASS INDEX: 49.39 KG/M2 | HEART RATE: 67 BPM | TEMPERATURE: 98.7 F | HEIGHT: 59 IN | WEIGHT: 245 LBS

## 2024-11-21 DIAGNOSIS — I10 ESSENTIAL HYPERTENSION: ICD-10-CM

## 2024-11-21 DIAGNOSIS — R73.03 PRE-DIABETES: ICD-10-CM

## 2024-11-21 DIAGNOSIS — M15.0 PRIMARY OSTEOARTHRITIS INVOLVING MULTIPLE JOINTS: ICD-10-CM

## 2024-11-21 DIAGNOSIS — I10 ESSENTIAL HYPERTENSION: Primary | ICD-10-CM

## 2024-11-21 DIAGNOSIS — E66.01 CLASS 3 SEVERE OBESITY DUE TO EXCESS CALORIES WITH SERIOUS COMORBIDITY AND BODY MASS INDEX (BMI) OF 45.0 TO 49.9 IN ADULT: ICD-10-CM

## 2024-11-21 DIAGNOSIS — E66.813 CLASS 3 SEVERE OBESITY DUE TO EXCESS CALORIES WITH SERIOUS COMORBIDITY AND BODY MASS INDEX (BMI) OF 45.0 TO 49.9 IN ADULT: ICD-10-CM

## 2024-11-21 LAB
ALBUMIN SERPL-MCNC: 4.4 G/DL (ref 3.5–5.2)
ALBUMIN/GLOB SERPL: 1.7 {RATIO} (ref 1–2.5)
ALP SERPL-CCNC: 90 U/L (ref 35–104)
ALT SERPL-CCNC: 19 U/L (ref 10–35)
ANION GAP SERPL CALCULATED.3IONS-SCNC: 9 MMOL/L (ref 9–16)
AST SERPL-CCNC: 21 U/L (ref 10–35)
BASOPHILS # BLD: 0.04 K/UL (ref 0–0.2)
BASOPHILS NFR BLD: 1 % (ref 0–2)
BILIRUB SERPL-MCNC: 0.5 MG/DL (ref 0–1.2)
BUN SERPL-MCNC: 11 MG/DL (ref 8–23)
CALCIUM SERPL-MCNC: 9.7 MG/DL (ref 8.6–10.4)
CHLORIDE SERPL-SCNC: 101 MMOL/L (ref 98–107)
CHOLEST SERPL-MCNC: 183 MG/DL (ref 0–199)
CHOLESTEROL/HDL RATIO: 2.7
CO2 SERPL-SCNC: 30 MMOL/L (ref 20–31)
CREAT SERPL-MCNC: 0.6 MG/DL (ref 0.6–0.9)
EOSINOPHIL # BLD: 0.14 K/UL (ref 0–0.44)
EOSINOPHILS RELATIVE PERCENT: 2 % (ref 1–4)
ERYTHROCYTE [DISTWIDTH] IN BLOOD BY AUTOMATED COUNT: 15.1 % (ref 11.8–14.4)
EST. AVERAGE GLUCOSE BLD GHB EST-MCNC: 123 MG/DL
GFR, ESTIMATED: >90 ML/MIN/1.73M2
GLUCOSE SERPL-MCNC: 101 MG/DL (ref 74–99)
HBA1C MFR BLD: 5.9 % (ref 4–6)
HCT VFR BLD AUTO: 44.9 % (ref 36.3–47.1)
HDLC SERPL-MCNC: 68 MG/DL
HGB BLD-MCNC: 14.3 G/DL (ref 11.9–15.1)
IMM GRANULOCYTES # BLD AUTO: <0.03 K/UL (ref 0–0.3)
IMM GRANULOCYTES NFR BLD: 0 %
LDLC SERPL CALC-MCNC: 97 MG/DL (ref 0–100)
LYMPHOCYTES NFR BLD: 2.07 K/UL (ref 1.1–3.7)
LYMPHOCYTES RELATIVE PERCENT: 30 % (ref 24–43)
MAGNESIUM SERPL-MCNC: 2.3 MG/DL (ref 1.6–2.4)
MCH RBC QN AUTO: 27.7 PG (ref 25.2–33.5)
MCHC RBC AUTO-ENTMCNC: 31.8 G/DL (ref 28.4–34.8)
MCV RBC AUTO: 86.8 FL (ref 82.6–102.9)
MONOCYTES NFR BLD: 0.4 K/UL (ref 0.1–1.2)
MONOCYTES NFR BLD: 6 % (ref 3–12)
NEUTROPHILS NFR BLD: 61 % (ref 36–65)
NEUTS SEG NFR BLD: 4.26 K/UL (ref 1.5–8.1)
NRBC BLD-RTO: 0 PER 100 WBC
PLATELET # BLD AUTO: 334 K/UL (ref 138–453)
PMV BLD AUTO: 9.6 FL (ref 8.1–13.5)
POTASSIUM SERPL-SCNC: 3.5 MMOL/L (ref 3.7–5.3)
PROT SERPL-MCNC: 7 G/DL (ref 6.6–8.7)
RBC # BLD AUTO: 5.17 M/UL (ref 3.95–5.11)
RBC # BLD: ABNORMAL 10*6/UL
SODIUM SERPL-SCNC: 140 MMOL/L (ref 136–145)
TRIGL SERPL-MCNC: 89 MG/DL (ref 0–149)
VLDLC SERPL CALC-MCNC: 18 MG/DL (ref 1–30)
WBC OTHER # BLD: 6.9 K/UL (ref 3.5–11.3)

## 2024-11-21 PROCEDURE — 1036F TOBACCO NON-USER: CPT | Performed by: NURSE PRACTITIONER

## 2024-11-21 PROCEDURE — G8417 CALC BMI ABV UP PARAM F/U: HCPCS | Performed by: NURSE PRACTITIONER

## 2024-11-21 PROCEDURE — 3078F DIAST BP <80 MM HG: CPT | Performed by: NURSE PRACTITIONER

## 2024-11-21 PROCEDURE — G8484 FLU IMMUNIZE NO ADMIN: HCPCS | Performed by: NURSE PRACTITIONER

## 2024-11-21 PROCEDURE — 3075F SYST BP GE 130 - 139MM HG: CPT | Performed by: NURSE PRACTITIONER

## 2024-11-21 PROCEDURE — 3017F COLORECTAL CA SCREEN DOC REV: CPT | Performed by: NURSE PRACTITIONER

## 2024-11-21 PROCEDURE — 99214 OFFICE O/P EST MOD 30 MIN: CPT | Performed by: NURSE PRACTITIONER

## 2024-11-21 PROCEDURE — G8427 DOCREV CUR MEDS BY ELIG CLIN: HCPCS | Performed by: NURSE PRACTITIONER

## 2024-11-21 RX ORDER — AMLODIPINE BESYLATE 5 MG/1
5 TABLET ORAL DAILY
Qty: 90 TABLET | Refills: 2 | Status: SHIPPED | OUTPATIENT
Start: 2024-11-21

## 2024-11-21 RX ORDER — LOSARTAN POTASSIUM AND HYDROCHLOROTHIAZIDE 25; 100 MG/1; MG/1
1 TABLET ORAL DAILY
Qty: 90 TABLET | Refills: 2 | Status: SHIPPED | OUTPATIENT
Start: 2024-11-21

## 2024-11-21 ASSESSMENT — ENCOUNTER SYMPTOMS
NAUSEA: 0
DIARRHEA: 0
CHEST TIGHTNESS: 0
COLOR CHANGE: 0
VOMITING: 0
RESPIRATORY NEGATIVE: 1
EYES NEGATIVE: 1
SHORTNESS OF BREATH: 0
GASTROINTESTINAL NEGATIVE: 1
ALLERGIC/IMMUNOLOGIC NEGATIVE: 1
COUGH: 0

## 2024-11-21 NOTE — PROGRESS NOTES
pressure @ home   Diet is normal, trying to eat more fruits & vegetables   Exercise includes walking but is limited due to arthritis    H/o ankle surgery x2   H/o arthritis  Reports limited activity due to this   Following with Podiatry     H/o pre-diabetes    H/o arthritis   Not interested in seeing Orthopedics     H/o YAMEL & asthma  Following with [facility]     Health Maintenance:      Subjective:     Review of Systems   Constitutional: Negative.  Negative for appetite change, chills, diaphoresis, fatigue, fever and unexpected weight change.   HENT: Negative.     Eyes: Negative.    Respiratory: Negative.  Negative for cough, chest tightness and shortness of breath.    Cardiovascular: Negative.  Negative for chest pain and leg swelling.   Gastrointestinal: Negative.  Negative for diarrhea, nausea and vomiting.   Endocrine: Negative.    Genitourinary: Negative.  Negative for difficulty urinating and dysuria.   Musculoskeletal:  Positive for arthralgias, gait problem and joint swelling.   Skin: Negative.  Negative for color change, pallor, rash and wound.   Allergic/Immunologic: Negative.    Neurological:  Negative for seizures, syncope and facial asymmetry.   Hematological: Negative.    Psychiatric/Behavioral: Negative.  Negative for dysphoric mood. The patient is not nervous/anxious.        Objective:     Vitals:    11/21/24 1101 11/21/24 1117   BP: 138/64 136/78   Site: Right Upper Arm    Position: Sitting    Cuff Size: Thigh    Pulse: 67    Temp: 98.7 °F (37.1 °C)    TempSrc: Temporal    SpO2: 96%    Weight: 111.1 kg (245 lb)    Height: 1.499 m (4' 11.02\")         Body mass index is 49.46 kg/m².      Physical Exam  Constitutional:       General: She is not in acute distress.     Appearance: Normal appearance. She is well-developed. She is obese. She is not ill-appearing, toxic-appearing or diaphoretic.   HENT:      Head: Normocephalic and atraumatic.      Right Ear: External ear normal.      Left Ear:

## 2024-11-22 DIAGNOSIS — E87.6 HYPOKALEMIA: Primary | ICD-10-CM

## 2024-12-26 ENCOUNTER — OFFICE VISIT (OUTPATIENT)
Dept: PRIMARY CARE CLINIC | Age: 64
End: 2024-12-26
Payer: MEDICARE

## 2024-12-26 ENCOUNTER — HOSPITAL ENCOUNTER (OUTPATIENT)
Age: 64
Setting detail: SPECIMEN
Discharge: HOME | End: 2024-12-26

## 2024-12-26 VITALS
HEART RATE: 62 BPM | OXYGEN SATURATION: 97 % | DIASTOLIC BLOOD PRESSURE: 81 MMHG | TEMPERATURE: 97.9 F | SYSTOLIC BLOOD PRESSURE: 145 MMHG

## 2024-12-26 DIAGNOSIS — R39.9 UTI SYMPTOMS: ICD-10-CM

## 2024-12-26 DIAGNOSIS — N30.01 ACUTE CYSTITIS WITH HEMATURIA: Primary | ICD-10-CM

## 2024-12-26 DIAGNOSIS — N30.01 ACUTE CYSTITIS WITH HEMATURIA: ICD-10-CM

## 2024-12-26 LAB
BILIRUBIN, POC: ABNORMAL
BLOOD URINE, POC: ABNORMAL
CLARITY, POC: CLEAR
COLOR, POC: YELLOW
GLUCOSE URINE, POC: ABNORMAL MG/DL
KETONES, POC: ABNORMAL MG/DL
LEUKOCYTE EST, POC: ABNORMAL
NITRITE, POC: ABNORMAL
PH, POC: 7
PROTEIN, POC: 30 MG/DL
SPECIFIC GRAVITY, POC: 1.02
UROBILINOGEN, POC: 0.2 MG/DL

## 2024-12-26 PROCEDURE — G8417 CALC BMI ABV UP PARAM F/U: HCPCS | Performed by: NURSE PRACTITIONER

## 2024-12-26 PROCEDURE — 99213 OFFICE O/P EST LOW 20 MIN: CPT | Performed by: NURSE PRACTITIONER

## 2024-12-26 PROCEDURE — 81003 URINALYSIS AUTO W/O SCOPE: CPT | Performed by: NURSE PRACTITIONER

## 2024-12-26 PROCEDURE — 3077F SYST BP >= 140 MM HG: CPT | Performed by: NURSE PRACTITIONER

## 2024-12-26 PROCEDURE — G8484 FLU IMMUNIZE NO ADMIN: HCPCS | Performed by: NURSE PRACTITIONER

## 2024-12-26 PROCEDURE — 1036F TOBACCO NON-USER: CPT | Performed by: NURSE PRACTITIONER

## 2024-12-26 PROCEDURE — G8427 DOCREV CUR MEDS BY ELIG CLIN: HCPCS | Performed by: NURSE PRACTITIONER

## 2024-12-26 PROCEDURE — 3079F DIAST BP 80-89 MM HG: CPT | Performed by: NURSE PRACTITIONER

## 2024-12-26 PROCEDURE — 3017F COLORECTAL CA SCREEN DOC REV: CPT | Performed by: NURSE PRACTITIONER

## 2024-12-26 RX ORDER — CEPHALEXIN 500 MG/1
500 CAPSULE ORAL 2 TIMES DAILY
Qty: 14 CAPSULE | Refills: 0 | Status: SHIPPED | OUTPATIENT
Start: 2024-12-26 | End: 2025-01-02

## 2024-12-26 RX ORDER — PHENAZOPYRIDINE HYDROCHLORIDE 200 MG/1
200 TABLET, FILM COATED ORAL 3 TIMES DAILY PRN
Qty: 9 TABLET | Refills: 0 | Status: SHIPPED | OUTPATIENT
Start: 2024-12-26 | End: 2024-12-29

## 2024-12-26 ASSESSMENT — ENCOUNTER SYMPTOMS
SHORTNESS OF BREATH: 0
VOMITING: 0
COUGH: 0
NAUSEA: 0
CHEST TIGHTNESS: 0
ABDOMINAL PAIN: 0
CONSTIPATION: 1
DIARRHEA: 0
RESPIRATORY NEGATIVE: 1
WHEEZING: 0

## 2024-12-26 NOTE — PROGRESS NOTES
USE 60 each 5    traMADol (ULTRAM) 50 MG tablet Take 1 tablet by mouth every 6 hours as needed for Pain.      albuterol sulfate HFA (PROVENTIL;VENTOLIN;PROAIR) 108 (90 Base) MCG/ACT inhaler Inhale 2 puffs into the lungs every 6 hours as needed for Wheezing or Shortness of Breath 1 each 5    fluticasone (FLONASE) 50 MCG/ACT nasal spray 2 sprays by Nasal route daily 16 g 5    ciclopirox (PENLAC) 8 % solution Apply topically nightly. Remove once weekly with alcohol or nail polish remover. 6 mL 1    loratadine (CLARITIN) 10 MG tablet Take 1 tablet by mouth daily 90 tablet 3    clobetasol (TEMOVATE) 0.05 % ointment Apply to rash twice daily until resolved (not face, armpit or groin) 60 g 5    EPINEPHrine (EPIPEN 2-GUNNAR) 0.3 MG/0.3ML SOAJ injection Inject 0.3 mLs into the skin once as needed (anaphylaxsis) 1 each 0     Current Facility-Administered Medications   Medication Dose Route Frequency Provider Last Rate Last Admin    lidocaine 1 % injection 5 mg  5 mg IntraDERmal Once Daniel Mendez PA-C         Allergies   Allergen Reactions    Latex Hives     Latex gloves    Asa [Aspirin] Hives    Codeine Hives    Lisinopril     Motrin [Ibuprofen] Hives    Other Hives and Swelling     Cherries, almonds    Pcn [Penicillins] Hives    Sulfa Antibiotics        Subjective:      Review of Systems   Constitutional:  Positive for chills. Negative for fatigue and fever.   Respiratory: Negative.  Negative for cough, chest tightness, shortness of breath and wheezing.    Cardiovascular: Negative.  Negative for chest pain.   Gastrointestinal:  Positive for constipation (chronic). Negative for abdominal pain, diarrhea, nausea and vomiting.   Genitourinary:  Positive for frequency, pelvic pain (pressure) and urgency. Negative for difficulty urinating, dysuria, flank pain, hematuria and vaginal discharge.   Skin:  Negative for rash.   All other systems reviewed and are negative.    :     Physical Exam  Vitals and nursing note reviewed.

## 2024-12-28 LAB
MICROORGANISM SPEC CULT: ABNORMAL
SERVICE CMNT-IMP: ABNORMAL
SPECIMEN DESCRIPTION: ABNORMAL

## 2025-01-03 ENCOUNTER — TELEPHONE (OUTPATIENT)
Dept: FAMILY MEDICINE CLINIC | Age: 65
End: 2025-01-03

## 2025-01-03 RX ORDER — LEVOFLOXACIN 750 MG/1
750 TABLET, FILM COATED ORAL DAILY
Qty: 7 TABLET | Refills: 0 | Status: SHIPPED | OUTPATIENT
Start: 2025-01-03 | End: 2025-01-10

## 2025-01-03 NOTE — TELEPHONE ENCOUNTER
Stronger antibiotic called levaquin was sent to the pharmacy, which will be taken once daily for seven days. If symptoms do not improve/resolve with this treatment, would recommend following up with PCP.

## 2025-01-03 NOTE — TELEPHONE ENCOUNTER
Patient called trying to get a hold of [facility]. Stated she finished the medications but sx only cleared up for 2-3 days. Her urine is currently still strong and still has the urgency to go. She would like a stronger antibiotic sent in. Good call back for her is [phone]

## 2025-01-04 ENCOUNTER — HOSPITAL ENCOUNTER (EMERGENCY)
Age: 65
Discharge: HOME | End: 2025-01-04
Attending: EMERGENCY MEDICINE
Payer: MEDICARE

## 2025-01-04 VITALS
TEMPERATURE: 97.5 F | RESPIRATION RATE: 18 BRPM | HEART RATE: 66 BPM | WEIGHT: 253 LBS | SYSTOLIC BLOOD PRESSURE: 148 MMHG | OXYGEN SATURATION: 95 % | BODY MASS INDEX: 51 KG/M2 | DIASTOLIC BLOOD PRESSURE: 92 MMHG | HEIGHT: 59 IN

## 2025-01-04 DIAGNOSIS — N39.0 URINARY TRACT INFECTION WITHOUT HEMATURIA, SITE UNSPECIFIED: Primary | ICD-10-CM

## 2025-01-04 LAB
BILIRUB UR QL STRIP: NEGATIVE
CLARITY UR: ABNORMAL
COLOR UR: YELLOW
EPI CELLS #/AREA URNS HPF: ABNORMAL /HPF (ref 0–5)
GLUCOSE UR STRIP-MCNC: NEGATIVE MG/DL
HGB UR QL STRIP.AUTO: ABNORMAL
KETONES UR STRIP-MCNC: ABNORMAL MG/DL
LEUKOCYTE ESTERASE UR QL STRIP: ABNORMAL
NITRITE UR QL STRIP: NEGATIVE
PH UR STRIP: 6 [PH] (ref 5–8)
PROT UR STRIP-MCNC: ABNORMAL MG/DL
RBC #/AREA URNS HPF: ABNORMAL /HPF (ref 0–2)
SP GR UR STRIP: 1.02 (ref 1–1.03)
UROBILINOGEN UR STRIP-ACNC: NORMAL EU/DL (ref 0–1)
WBC #/AREA URNS HPF: ABNORMAL /HPF (ref 0–5)

## 2025-01-04 PROCEDURE — 2500000003 HC RX 250 WO HCPCS: Performed by: PHYSICIAN ASSISTANT

## 2025-01-04 PROCEDURE — 87186 SC STD MICRODIL/AGAR DIL: CPT

## 2025-01-04 PROCEDURE — 87077 CULTURE AEROBIC IDENTIFY: CPT

## 2025-01-04 PROCEDURE — 81001 URINALYSIS AUTO W/SCOPE: CPT

## 2025-01-04 PROCEDURE — 99284 EMERGENCY DEPT VISIT MOD MDM: CPT

## 2025-01-04 PROCEDURE — 87086 URINE CULTURE/COLONY COUNT: CPT

## 2025-01-04 PROCEDURE — 96372 THER/PROPH/DIAG INJ SC/IM: CPT

## 2025-01-04 PROCEDURE — 6360000002 HC RX W HCPCS: Performed by: PHYSICIAN ASSISTANT

## 2025-01-04 RX ORDER — CEPHALEXIN 500 MG/1
500 CAPSULE ORAL ONCE
Status: DISCONTINUED | OUTPATIENT
Start: 2025-01-04 | End: 2025-01-04

## 2025-01-04 RX ORDER — PHENAZOPYRIDINE HYDROCHLORIDE 200 MG/1
200 TABLET, FILM COATED ORAL 3 TIMES DAILY PRN
Qty: 6 TABLET | Refills: 0 | Status: SHIPPED | OUTPATIENT
Start: 2025-01-04 | End: 2025-01-04

## 2025-01-04 RX ORDER — NITROFURANTOIN 25; 75 MG/1; MG/1
100 CAPSULE ORAL 2 TIMES DAILY
Qty: 14 CAPSULE | Refills: 0 | Status: SHIPPED | OUTPATIENT
Start: 2025-01-04 | End: 2025-01-11

## 2025-01-04 RX ORDER — PHENAZOPYRIDINE HYDROCHLORIDE 200 MG/1
200 TABLET, FILM COATED ORAL 3 TIMES DAILY PRN
Qty: 6 TABLET | Refills: 0 | Status: SHIPPED | OUTPATIENT
Start: 2025-01-04 | End: 2025-01-07

## 2025-01-04 RX ORDER — NITROFURANTOIN 25; 75 MG/1; MG/1
100 CAPSULE ORAL 2 TIMES DAILY
Qty: 14 CAPSULE | Refills: 0 | Status: SHIPPED | OUTPATIENT
Start: 2025-01-04 | End: 2025-01-04

## 2025-01-04 RX ADMIN — WATER 1000 MG: 1 INJECTION INTRAMUSCULAR; INTRAVENOUS; SUBCUTANEOUS at 13:26

## 2025-01-04 ASSESSMENT — PAIN DESCRIPTION - LOCATION: LOCATION: ABDOMEN

## 2025-01-04 ASSESSMENT — PAIN DESCRIPTION - ORIENTATION: ORIENTATION: LOWER

## 2025-01-04 ASSESSMENT — PAIN - FUNCTIONAL ASSESSMENT: PAIN_FUNCTIONAL_ASSESSMENT: 0-10

## 2025-01-04 ASSESSMENT — PAIN SCALES - GENERAL: PAINLEVEL_OUTOF10: 9

## 2025-01-04 NOTE — DISCHARGE INSTRUCTIONS
Take meds as prescribed.  Follow up with doctor  in 3 -4 days.  Return to ER immediately if symptoms worsen or persist.    Pyridium will turn your urine orangish/red, this is normal.

## 2025-01-05 LAB
MICROORGANISM SPEC CULT: NORMAL
SERVICE CMNT-IMP: NORMAL
SPECIMEN DESCRIPTION: NORMAL

## 2025-01-05 NOTE — ED PROVIDER NOTES
[facility] EMERGENCY DEPARTMENT  eMERGENCY dEPARTMENTeNCOUnter      Pt Name: Ev An  MRN: [id number]  Birthdate 1960  Date ofevaluation: 1/4/2025  Provider: Dre Matthews PA-C    CHIEF COMPLAINT       Chief Complaint   Patient presents with    Urinary Tract Infection     Pt c/o increased urinary frequency and dysuria.  Pt reports associated lower abdominal pain.  Pt reports that symptoms have been ongoing x 2 weeks, states that she went to urgent care.  Pt states that she took one week course of keflex.  Pt states that symptoms have been persistent so she called urgent care when they called in levaquin for her.  Pt states that she does not trust the provider at urgent care and states that she was reading information about levaquin and is not comfortable taking it          HISTORY OF PRESENT ILLNESS  (Location/Symptom, Timing/Onset, Context/Setting, Quality, Duration, Modifying Factors, Severity.)   Ev An is a 64 y.o. female who presents to the emergency department with concerns for UTI.  Patient denies any fevers or chills.  No nausea or vomiting.  Recently treated with Keflex.  She self and she finished his medication.  He was given for 7 days.  She was recently called and left a blood prescription which she has not started.  She was unsure about [facility]ness medication due to a chronic tendon injury and questionable side effects of the medication.      Nursing Notes were reviewed.    ALLERGIES     Latex, Asa [aspirin], Codeine, Lisinopril, Motrin [ibuprofen], Other, Pcn [penicillins], and Sulfa antibiotics    CURRENT MEDICATIONS       Discharge Medication List as of 1/4/2025  1:36 PM        CONTINUE these medications which have NOT CHANGED    Details   levoFLOXacin (LEVAQUIN) 750 MG tablet Take 1 tablet by mouth daily for 7 days, Disp-7 tablet, R-0Normal      amLODIPine (NORVASC) 5 MG tablet Take 1 tablet by mouth daily, Disp-90 tablet, R-2Normal      losartan-hydroCHLOROthiazide

## 2025-01-07 LAB
MICROORGANISM SPEC CULT: ABNORMAL
SERVICE CMNT-IMP: ABNORMAL
SPECIMEN DESCRIPTION: ABNORMAL

## 2025-02-10 DIAGNOSIS — J45.40 MODERATE PERSISTENT ASTHMA WITHOUT COMPLICATION: ICD-10-CM

## 2025-02-10 RX ORDER — ALBUTEROL SULFATE 90 UG/1
2 INHALANT RESPIRATORY (INHALATION) EVERY 6 HOURS PRN
Qty: 1 EACH | Refills: 3 | Status: SHIPPED | OUTPATIENT
Start: 2025-02-10

## 2025-02-10 NOTE — TELEPHONE ENCOUNTER
LAST VISIT: 4/29/24 with LUIS DANIEL Chery  NEXT VISIT: 5/14/25 with Dr Morin (our office cancelled last appointment)    Per last dictation patient is on this medication. Please sign for refill if ok. Thank you.

## 2025-04-04 ENCOUNTER — OFFICE VISIT (OUTPATIENT)
Dept: FAMILY MEDICINE CLINIC | Age: 65
End: 2025-04-04
Payer: MEDICARE

## 2025-04-04 ENCOUNTER — TELEPHONE (OUTPATIENT)
Dept: FAMILY MEDICINE CLINIC | Age: 65
End: 2025-04-04

## 2025-04-04 VITALS
HEIGHT: 59 IN | SYSTOLIC BLOOD PRESSURE: 126 MMHG | RESPIRATION RATE: 16 BRPM | BODY MASS INDEX: 48.67 KG/M2 | TEMPERATURE: 97.3 F | OXYGEN SATURATION: 98 % | HEART RATE: 75 BPM | WEIGHT: 241.4 LBS | DIASTOLIC BLOOD PRESSURE: 62 MMHG

## 2025-04-04 DIAGNOSIS — F41.1 GAD (GENERALIZED ANXIETY DISORDER): Primary | ICD-10-CM

## 2025-04-04 DIAGNOSIS — R26.2 DIFFICULTY WALKING: ICD-10-CM

## 2025-04-04 DIAGNOSIS — Z12.31 ENCOUNTER FOR SCREENING MAMMOGRAM FOR MALIGNANT NEOPLASM OF BREAST: ICD-10-CM

## 2025-04-04 DIAGNOSIS — G89.29 CHRONIC PAIN OF LEFT ANKLE: ICD-10-CM

## 2025-04-04 DIAGNOSIS — Z59.9 FINANCIAL DIFFICULTY: ICD-10-CM

## 2025-04-04 DIAGNOSIS — M25.572 CHRONIC PAIN OF LEFT ANKLE: ICD-10-CM

## 2025-04-04 DIAGNOSIS — F51.04 PSYCHOPHYSIOLOGICAL INSOMNIA: ICD-10-CM

## 2025-04-04 DIAGNOSIS — Z91.81 AT RISK FOR FALLS: ICD-10-CM

## 2025-04-04 PROCEDURE — 1036F TOBACCO NON-USER: CPT | Performed by: NURSE PRACTITIONER

## 2025-04-04 PROCEDURE — 99214 OFFICE O/P EST MOD 30 MIN: CPT | Performed by: NURSE PRACTITIONER

## 2025-04-04 PROCEDURE — 3074F SYST BP LT 130 MM HG: CPT | Performed by: NURSE PRACTITIONER

## 2025-04-04 PROCEDURE — 3078F DIAST BP <80 MM HG: CPT | Performed by: NURSE PRACTITIONER

## 2025-04-04 PROCEDURE — G8417 CALC BMI ABV UP PARAM F/U: HCPCS | Performed by: NURSE PRACTITIONER

## 2025-04-04 PROCEDURE — G8427 DOCREV CUR MEDS BY ELIG CLIN: HCPCS | Performed by: NURSE PRACTITIONER

## 2025-04-04 PROCEDURE — 3017F COLORECTAL CA SCREEN DOC REV: CPT | Performed by: NURSE PRACTITIONER

## 2025-04-04 RX ORDER — ESCITALOPRAM OXALATE 5 MG/1
5 TABLET ORAL DAILY
Qty: 30 TABLET | Refills: 3 | Status: SHIPPED | OUTPATIENT
Start: 2025-04-04

## 2025-04-04 RX ORDER — HYDROXYZINE HYDROCHLORIDE 25 MG/1
25 TABLET, FILM COATED ORAL EVERY 8 HOURS PRN
Qty: 90 TABLET | Refills: 1 | Status: SHIPPED | OUTPATIENT
Start: 2025-04-04

## 2025-04-04 SDOH — ECONOMIC STABILITY - INCOME SECURITY: PROBLEM RELATED TO HOUSING AND ECONOMIC CIRCUMSTANCES, UNSPECIFIED: Z59.9

## 2025-04-04 SDOH — ECONOMIC STABILITY: FOOD INSECURITY: WITHIN THE PAST 12 MONTHS, YOU WORRIED THAT YOUR FOOD WOULD RUN OUT BEFORE YOU GOT MONEY TO BUY MORE.: NEVER TRUE

## 2025-04-04 SDOH — ECONOMIC STABILITY: FOOD INSECURITY: WITHIN THE PAST 12 MONTHS, THE FOOD YOU BOUGHT JUST DIDN'T LAST AND YOU DIDN'T HAVE MONEY TO GET MORE.: NEVER TRUE

## 2025-04-04 ASSESSMENT — ANXIETY QUESTIONNAIRES
7. FEELING AFRAID AS IF SOMETHING AWFUL MIGHT HAPPEN: NEARLY EVERY DAY
3. WORRYING TOO MUCH ABOUT DIFFERENT THINGS: NEARLY EVERY DAY
1. FEELING NERVOUS, ANXIOUS, OR ON EDGE: NEARLY EVERY DAY
IF YOU CHECKED OFF ANY PROBLEMS ON THIS QUESTIONNAIRE, HOW DIFFICULT HAVE THESE PROBLEMS MADE IT FOR YOU TO DO YOUR WORK, TAKE CARE OF THINGS AT HOME, OR GET ALONG WITH OTHER PEOPLE: SOMEWHAT DIFFICULT
2. NOT BEING ABLE TO STOP OR CONTROL WORRYING: NEARLY EVERY DAY
5. BEING SO RESTLESS THAT IT IS HARD TO SIT STILL: NEARLY EVERY DAY
4. TROUBLE RELAXING: SEVERAL DAYS
GAD7 TOTAL SCORE: 19
6. BECOMING EASILY ANNOYED OR IRRITABLE: NEARLY EVERY DAY

## 2025-04-04 ASSESSMENT — PATIENT HEALTH QUESTIONNAIRE - PHQ9
SUM OF ALL RESPONSES TO PHQ QUESTIONS 1-9: 2
1. LITTLE INTEREST OR PLEASURE IN DOING THINGS: NOT AT ALL
2. FEELING DOWN, DEPRESSED OR HOPELESS: MORE THAN HALF THE DAYS
SUM OF ALL RESPONSES TO PHQ QUESTIONS 1-9: 2

## 2025-04-04 ASSESSMENT — ENCOUNTER SYMPTOMS
ALLERGIC/IMMUNOLOGIC NEGATIVE: 1
VOMITING: 0
COLOR CHANGE: 0
DIARRHEA: 0
COUGH: 0
SHORTNESS OF BREATH: 0
CHEST TIGHTNESS: 0
GASTROINTESTINAL NEGATIVE: 1
EYES NEGATIVE: 1
RESPIRATORY NEGATIVE: 1
NAUSEA: 0

## 2025-04-04 NOTE — TELEPHONE ENCOUNTER
Ev An was contacted by a Community Health Navigator to discuss a referral for SDOH related needs.     Writer spoke with: Patient and explained the services and assistance that can be provided by a Community Health Navigator.     Patient agreeable to receiving resources and support from writer.     Intake Notes: Writer met with patient in office for warm hand off.   Patient stated since she has gained employment her Medicaid was terminated.     Patient states that she was told by Social Security Disability if she does not regain Medicaid by September, her Medicare will end.    Patient stated she applied for SRC insurance though [place] and it was denied by her PCP. Patient spoke with PCP who referred patient to see Psych for approval. Patient was made aware the the process for SRC is lengthy.    Writer had patient complete GUNNAR for Social Security to see if she will lose coverage in September.      Actions to be completed by writer:  see above    Actions to be completed by patient: none      Yara Melendez MA

## 2025-04-04 NOTE — PROGRESS NOTES
[facility]  [address] [place] [address]  Dept: [phone]    Ev An is a 64 y.o. female who presents today for her medical conditions/complaintsas noted below.      Ev An is here today c/o Health Maintenance (Patient is due for AWV), Immunizations (Patient is due for Covid vaccine ), Sleep Problem, Foot Pain (Left foot pain at a level 8 today), and Anxiety    Past Medical History:   Diagnosis Date    Allergic rhinitis 12/04/2014    Anxiety 07/05/2018    Asthma     Generalized osteoarthritis 06/26/2014    Hypertension     IFG (impaired fasting glucose)     Kidney stone     Lumbosacral neuritis 10/04/2023    Obesity     PONV (postoperative nausea and vomiting)     Sleep apnea     does not use a machine      Past Surgical History:   Procedure Laterality Date    CHOLECYSTECTOMY      FOOT TENDON SURGERY Left 09/08/2017    posterior repair    FOOT TENDON SURGERY Left 06/29/2018    LEFT POSTERIOR  TENDON REPAIR FLEXOR TENDON TRANSFER (Left Foot)    LITHOTRIPSY      ND OFFICE/OUTPT VISIT,PROCEDURE ONLY Left 6/29/2018    LEFT POSTERIOR  TENDON REPAIR FLEXOR TENDON TRANSFER performed by Noble Bangura DPM at [facility] [place]    REPAIR TENDONS LEG Left 9/8/2017    LEFT POSTERIOR TIBIAL TENDON REPAIR WITH LEFT TENOLYSIS  performed by Noble Bangura DPM at [facility] [place]       Family History   Problem Relation Age of Onset    Breast Cancer Mother 33    Depression Mother     Diabetes Mother     High Blood Pressure Mother     Mental Illness Mother     Substance Abuse Father     Depression Sister     Diabetes Sister     High Blood Pressure Sister     Mental Illness Sister     Depression Brother     Mental Illness Brother     Substance Abuse Brother        Social History     Tobacco Use    Smoking status: Former     Current packs/day: 0.00     Average packs/day: 1 pack/day for 16.1 years (16.1 ttl pk-yrs)     Types: Cigarettes     Start date: 11/26/1981     Quit date: 1/1/1998     Years

## 2025-04-10 NOTE — TELEPHONE ENCOUNTER
Ev An was contacted by Yara Melendez MA, a Community Health Navigator, regarding a Social Determinants of Health referral.     A message was left with the writer's contact information.    Follow-up attempt.

## 2025-04-10 NOTE — TELEPHONE ENCOUNTER
Ev An was contacted by Yara Melendez MA, a Community Health Navigator, regarding a Social Determinants of Health referral.     Patient states she spoke with [employer] and informed the office that she is currently working. She states no one asked her to provide documentation of employment. She states that she switched from Social Security Disability to Social Security FDC and is currently having over payment taken out of her check now.    Patient states she is currently working but it is not enough income to survive. Patient states she currently has a medical disability and looking to apply for additional insurance coverage.    Writer sent email for additional information regarding patient statement of informing [employer] that she is currently paying and working.

## 2025-04-21 ENCOUNTER — HOSPITAL ENCOUNTER (OUTPATIENT)
Dept: MAMMOGRAPHY | Age: 65
Discharge: HOME | End: 2025-04-23
Payer: MEDICARE

## 2025-04-21 ENCOUNTER — RESULTS FOLLOW-UP (OUTPATIENT)
Dept: FAMILY MEDICINE CLINIC | Age: 65
End: 2025-04-21

## 2025-04-21 VITALS — WEIGHT: 250 LBS | BODY MASS INDEX: 50.4 KG/M2 | HEIGHT: 59 IN

## 2025-04-21 DIAGNOSIS — Z12.31 ENCOUNTER FOR SCREENING MAMMOGRAM FOR MALIGNANT NEOPLASM OF BREAST: ICD-10-CM

## 2025-04-21 PROCEDURE — 77063 BREAST TOMOSYNTHESIS BI: CPT

## 2025-05-07 NOTE — TELEPHONE ENCOUNTER
Ev Hallson was contacted by Yara Melendez MA, a Community Health Navigator, regarding a Social Determinants of Health referral.     A message was left with the writer's contact information.    Follow-up attempt.Writer placed phone call to patient regarding social security issues with Medicare and Medicaid. Writer is unable at this time to provide any further information. Patient will need to followup with Social Security.

## 2025-05-12 NOTE — TELEPHONE ENCOUNTER
Ev An was contacted by a Community Health Navigator for follow-up regarding SDOH-related needs.     Writer spoke with: Patient    Progress Notes: Patient is currently seeing psychologist regarding her mental health going every two weeks.    Patient will followup with Medicaid regarding applying for mental health assistance insurance through [employer].    Patient will followup with social security regarding assistance. Patient has no further needs at this time.    Actions to be completed by writer: Close referral.    Actions to be completed by patient: Close referral.    Yara Melendez MA

## 2025-05-14 ENCOUNTER — OFFICE VISIT (OUTPATIENT)
Dept: PULMONOLOGY | Age: 65
End: 2025-05-14
Payer: MEDICARE

## 2025-05-14 VITALS
SYSTOLIC BLOOD PRESSURE: 132 MMHG | WEIGHT: 238 LBS | OXYGEN SATURATION: 98 % | HEART RATE: 65 BPM | HEIGHT: 59 IN | DIASTOLIC BLOOD PRESSURE: 70 MMHG | RESPIRATION RATE: 12 BRPM | BODY MASS INDEX: 47.98 KG/M2

## 2025-05-14 DIAGNOSIS — Z87.891 PERSONAL HISTORY OF TOBACCO USE: ICD-10-CM

## 2025-05-14 DIAGNOSIS — G47.33 OSA (OBSTRUCTIVE SLEEP APNEA): ICD-10-CM

## 2025-05-14 DIAGNOSIS — J45.40 MODERATE PERSISTENT ASTHMA WITHOUT COMPLICATION: Primary | ICD-10-CM

## 2025-05-14 DIAGNOSIS — R09.82 POST-NASAL DRIP: ICD-10-CM

## 2025-05-14 DIAGNOSIS — E66.01 MORBID OBESITY DUE TO EXCESS CALORIES (HCC): ICD-10-CM

## 2025-05-14 DIAGNOSIS — I10 ESSENTIAL HYPERTENSION: ICD-10-CM

## 2025-05-14 DIAGNOSIS — J30.2 SEASONAL ALLERGIES: ICD-10-CM

## 2025-05-14 PROCEDURE — 3017F COLORECTAL CA SCREEN DOC REV: CPT | Performed by: INTERNAL MEDICINE

## 2025-05-14 PROCEDURE — 99214 OFFICE O/P EST MOD 30 MIN: CPT | Performed by: INTERNAL MEDICINE

## 2025-05-14 PROCEDURE — 3078F DIAST BP <80 MM HG: CPT | Performed by: INTERNAL MEDICINE

## 2025-05-14 PROCEDURE — G8427 DOCREV CUR MEDS BY ELIG CLIN: HCPCS | Performed by: INTERNAL MEDICINE

## 2025-05-14 PROCEDURE — 1036F TOBACCO NON-USER: CPT | Performed by: INTERNAL MEDICINE

## 2025-05-14 PROCEDURE — G8417 CALC BMI ABV UP PARAM F/U: HCPCS | Performed by: INTERNAL MEDICINE

## 2025-05-14 PROCEDURE — 3075F SYST BP GE 130 - 139MM HG: CPT | Performed by: INTERNAL MEDICINE

## 2025-05-14 RX ORDER — FLUTICASONE PROPIONATE AND SALMETEROL 250; 50 UG/1; UG/1
POWDER RESPIRATORY (INHALATION)
Qty: 60 EACH | Refills: 5 | Status: SHIPPED | OUTPATIENT
Start: 2025-05-14

## 2025-05-14 RX ORDER — ALBUTEROL SULFATE 90 UG/1
2 INHALANT RESPIRATORY (INHALATION) EVERY 6 HOURS PRN
Qty: 1 EACH | Refills: 5 | Status: SHIPPED | OUTPATIENT
Start: 2025-05-14

## 2025-05-14 ASSESSMENT — SLEEP AND FATIGUE QUESTIONNAIRES
HOW LIKELY ARE YOU TO NOD OFF OR FALL ASLEEP WHILE SITTING INACTIVE IN A PUBLIC PLACE: WOULD NEVER DOZE
HOW LIKELY ARE YOU TO NOD OFF OR FALL ASLEEP WHILE WATCHING TV: SLIGHT CHANCE OF DOZING
HOW LIKELY ARE YOU TO NOD OFF OR FALL ASLEEP IN A CAR, WHILE STOPPED FOR A FEW MINUTES IN TRAFFIC: WOULD NEVER DOZE
ESS TOTAL SCORE: 2
HOW LIKELY ARE YOU TO NOD OFF OR FALL ASLEEP WHILE SITTING AND READING: SLIGHT CHANCE OF DOZING
HOW LIKELY ARE YOU TO NOD OFF OR FALL ASLEEP WHILE SITTING AND TALKING TO SOMEONE: WOULD NEVER DOZE
HOW LIKELY ARE YOU TO NOD OFF OR FALL ASLEEP WHILE LYING DOWN TO REST IN THE AFTERNOON WHEN CIRCUMSTANCES PERMIT: WOULD NEVER DOZE
HOW LIKELY ARE YOU TO NOD OFF OR FALL ASLEEP WHILE SITTING QUIETLY AFTER LUNCH WITHOUT ALCOHOL: WOULD NEVER DOZE
HOW LIKELY ARE YOU TO NOD OFF OR FALL ASLEEP WHEN YOU ARE A PASSENGER IN A CAR FOR AN HOUR WITHOUT A BREAK: WOULD NEVER DOZE

## 2025-05-14 NOTE — PROGRESS NOTES
PULMONARY OP  PROGRESS NOTE      Patient:  Ev An  YOB: 1960    MRN: [id number]     Acct:        Pt seen and Chart reviewed.  Ev An is here in followup for   1. Moderate persistent asthma without complication    2. Body mass index (BMI) 45.0-49.9, adult (HCC)    3. YAMEL (obstructive sleep apnea)    4. Morbid obesity due to excess calories (HCC)    5. Post-nasal drip    6. Essential hypertension    7. Seasonal allergies    8. Personal history of tobacco use          History of Present Illness  The patient is a 64-year-old female who presents for follow-up of moderate persistent asthma, morbid obesity with a BMI of 45-49.9, YAEML, postnasal drip, essential hypertension, seasonal allergies, and a personal history of tobacco use.    She reports experiencing significant discomfort due to the current humid weather conditions, which have exacerbated her sinus and allergy symptoms. She has not required hospitalization for asthma since her last visit over a year ago. She does not experience shortness of breath but acknowledges wheezing and coughing, attributing these symptoms to her allergies. She also reports wheezing at night, for which she uses albuterol. Her current treatment regimen includes daily use of albuterol and Wixela, which she finds effective and wishes to continue. She expresses reluctance to introduce new medications into her regimen.    She continues to use her CPAP machine and is curious about the duration of its necessity. She reports that her foot pain disrupts her sleep, regardless of whether she is using the CPAP machine or not.    She has lost about 3 pounds since April 2025.    She experiences severe foot pain, rated between 8 and 10 on the pain scale, which is present during both walking and sitting activities. This pain is severe enough to disrupt her sleep. She has been advised to undergo a third surgical procedure on her foot, which would involve bone fusion and necessitate a

## 2025-06-05 ENCOUNTER — HOSPITAL ENCOUNTER (OUTPATIENT)
Age: 65
Setting detail: SPECIMEN
Discharge: HOME | End: 2025-06-05

## 2025-06-05 ENCOUNTER — OFFICE VISIT (OUTPATIENT)
Dept: FAMILY MEDICINE CLINIC | Age: 65
End: 2025-06-05
Payer: MEDICARE

## 2025-06-05 VITALS
OXYGEN SATURATION: 96 % | SYSTOLIC BLOOD PRESSURE: 133 MMHG | HEART RATE: 63 BPM | BODY MASS INDEX: 49.59 KG/M2 | WEIGHT: 246 LBS | DIASTOLIC BLOOD PRESSURE: 72 MMHG | TEMPERATURE: 98.6 F | HEIGHT: 59 IN

## 2025-06-05 DIAGNOSIS — I10 ESSENTIAL HYPERTENSION: ICD-10-CM

## 2025-06-05 DIAGNOSIS — Z00.00 MEDICARE ANNUAL WELLNESS VISIT, SUBSEQUENT: Primary | ICD-10-CM

## 2025-06-05 DIAGNOSIS — T50.2X5A DIURETIC-INDUCED HYPOKALEMIA: ICD-10-CM

## 2025-06-05 DIAGNOSIS — R35.0 URINARY FREQUENCY: ICD-10-CM

## 2025-06-05 DIAGNOSIS — E87.6 DIURETIC-INDUCED HYPOKALEMIA: ICD-10-CM

## 2025-06-05 DIAGNOSIS — Z88.9 MULTIPLE ALLERGIES: ICD-10-CM

## 2025-06-05 DIAGNOSIS — R73.03 PRE-DIABETES: ICD-10-CM

## 2025-06-05 DIAGNOSIS — G89.29 OTHER CHRONIC PAIN: ICD-10-CM

## 2025-06-05 DIAGNOSIS — N32.81 OAB (OVERACTIVE BLADDER): ICD-10-CM

## 2025-06-05 DIAGNOSIS — F32.A ANXIETY AND DEPRESSION: ICD-10-CM

## 2025-06-05 DIAGNOSIS — F41.9 ANXIETY AND DEPRESSION: ICD-10-CM

## 2025-06-05 LAB
ALBUMIN SERPL-MCNC: 4.1 G/DL (ref 3.5–5.2)
ALBUMIN/GLOB SERPL: 1.4 {RATIO} (ref 1–2.5)
ALP SERPL-CCNC: 90 U/L (ref 35–104)
ALT SERPL-CCNC: 19 U/L (ref 10–35)
ANION GAP SERPL CALCULATED.3IONS-SCNC: 9 MMOL/L (ref 9–16)
AST SERPL-CCNC: 19 U/L (ref 10–35)
BASOPHILS # BLD: 0.03 K/UL (ref 0–0.2)
BASOPHILS NFR BLD: 0 % (ref 0–2)
BILIRUB SERPL-MCNC: 0.3 MG/DL (ref 0–1.2)
BILIRUB UR QL STRIP: NEGATIVE
BUN SERPL-MCNC: 13 MG/DL (ref 8–23)
CALCIUM SERPL-MCNC: 9.6 MG/DL (ref 8.6–10.4)
CHLORIDE SERPL-SCNC: 102 MMOL/L (ref 98–107)
CHOLEST SERPL-MCNC: 179 MG/DL (ref 0–199)
CHOLESTEROL/HDL RATIO: 2.3
CLARITY UR: CLEAR
CO2 SERPL-SCNC: 28 MMOL/L (ref 20–31)
COLOR UR: YELLOW
COMMENT: NORMAL
CREAT SERPL-MCNC: 0.6 MG/DL (ref 0.6–0.9)
EOSINOPHIL # BLD: 0.23 K/UL (ref 0–0.44)
EOSINOPHILS RELATIVE PERCENT: 3 % (ref 1–4)
ERYTHROCYTE [DISTWIDTH] IN BLOOD BY AUTOMATED COUNT: 14.9 % (ref 11.8–14.4)
EST. AVERAGE GLUCOSE BLD GHB EST-MCNC: 120 MG/DL
GFR, ESTIMATED: >90 ML/MIN/1.73M2
GLUCOSE SERPL-MCNC: 101 MG/DL (ref 74–99)
GLUCOSE UR STRIP-MCNC: NEGATIVE MG/DL
HBA1C MFR BLD: 5.8 % (ref 4–6)
HCT VFR BLD AUTO: 42.5 % (ref 36.3–47.1)
HDLC SERPL-MCNC: 77 MG/DL
HGB BLD-MCNC: 13.8 G/DL (ref 11.9–15.1)
HGB UR QL STRIP.AUTO: NEGATIVE
IMM GRANULOCYTES # BLD AUTO: <0.03 K/UL (ref 0–0.3)
IMM GRANULOCYTES NFR BLD: 0 %
KETONES UR STRIP-MCNC: NEGATIVE MG/DL
LDLC SERPL CALC-MCNC: 88 MG/DL (ref 0–100)
LEUKOCYTE ESTERASE UR QL STRIP: NEGATIVE
LYMPHOCYTES NFR BLD: 2.75 K/UL (ref 1.1–3.7)
LYMPHOCYTES RELATIVE PERCENT: 37 % (ref 24–43)
MAGNESIUM SERPL-MCNC: 2.2 MG/DL (ref 1.6–2.4)
MCH RBC QN AUTO: 27.8 PG (ref 25.2–33.5)
MCHC RBC AUTO-ENTMCNC: 32.5 G/DL (ref 28.4–34.8)
MCV RBC AUTO: 85.7 FL (ref 82.6–102.9)
MONOCYTES NFR BLD: 0.44 K/UL (ref 0.1–1.2)
MONOCYTES NFR BLD: 6 % (ref 3–12)
NEUTROPHILS NFR BLD: 54 % (ref 36–65)
NEUTS SEG NFR BLD: 3.89 K/UL (ref 1.5–8.1)
NITRITE UR QL STRIP: NEGATIVE
NRBC BLD-RTO: 0 PER 100 WBC
PH UR STRIP: 8 [PH] (ref 5–8)
PLATELET # BLD AUTO: 321 K/UL (ref 138–453)
PMV BLD AUTO: 9.3 FL (ref 8.1–13.5)
POTASSIUM SERPL-SCNC: 3.9 MMOL/L (ref 3.7–5.3)
PROT SERPL-MCNC: 7 G/DL (ref 6.6–8.7)
PROT UR STRIP-MCNC: NEGATIVE MG/DL
RBC # BLD AUTO: 4.96 M/UL (ref 3.95–5.11)
RBC # BLD: ABNORMAL 10*6/UL
SODIUM SERPL-SCNC: 139 MMOL/L (ref 136–145)
SP GR UR STRIP: 1.01 (ref 1–1.03)
TRIGL SERPL-MCNC: 71 MG/DL (ref 0–149)
UROBILINOGEN UR STRIP-ACNC: NORMAL EU/DL (ref 0–1)
VLDLC SERPL CALC-MCNC: 14 MG/DL (ref 1–30)
WBC OTHER # BLD: 7.4 K/UL (ref 3.5–11.3)

## 2025-06-05 PROCEDURE — 99214 OFFICE O/P EST MOD 30 MIN: CPT | Performed by: NURSE PRACTITIONER

## 2025-06-05 PROCEDURE — 3075F SYST BP GE 130 - 139MM HG: CPT | Performed by: NURSE PRACTITIONER

## 2025-06-05 PROCEDURE — G8417 CALC BMI ABV UP PARAM F/U: HCPCS | Performed by: NURSE PRACTITIONER

## 2025-06-05 PROCEDURE — G0439 PPPS, SUBSEQ VISIT: HCPCS | Performed by: NURSE PRACTITIONER

## 2025-06-05 PROCEDURE — G8427 DOCREV CUR MEDS BY ELIG CLIN: HCPCS | Performed by: NURSE PRACTITIONER

## 2025-06-05 PROCEDURE — 3078F DIAST BP <80 MM HG: CPT | Performed by: NURSE PRACTITIONER

## 2025-06-05 PROCEDURE — 1036F TOBACCO NON-USER: CPT | Performed by: NURSE PRACTITIONER

## 2025-06-05 PROCEDURE — 3017F COLORECTAL CA SCREEN DOC REV: CPT | Performed by: NURSE PRACTITIONER

## 2025-06-05 RX ORDER — EPINEPHRINE 0.3 MG/.3ML
0.3 INJECTION SUBCUTANEOUS
Qty: 1 EACH | Refills: 1 | Status: SHIPPED | OUTPATIENT
Start: 2025-06-05

## 2025-06-05 ASSESSMENT — ENCOUNTER SYMPTOMS
GASTROINTESTINAL NEGATIVE: 1
DIARRHEA: 0
RESPIRATORY NEGATIVE: 1
EYES NEGATIVE: 1
SHORTNESS OF BREATH: 0
COLOR CHANGE: 0
VOMITING: 0
CHEST TIGHTNESS: 0
WHEEZING: 0
NAUSEA: 0
COUGH: 0

## 2025-06-05 ASSESSMENT — PATIENT HEALTH QUESTIONNAIRE - PHQ9
SUM OF ALL RESPONSES TO PHQ QUESTIONS 1-9: 2
2. FEELING DOWN, DEPRESSED OR HOPELESS: SEVERAL DAYS
SUM OF ALL RESPONSES TO PHQ QUESTIONS 1-9: 2
1. LITTLE INTEREST OR PLEASURE IN DOING THINGS: SEVERAL DAYS

## 2025-06-05 NOTE — PROGRESS NOTES
Medicare Annual Wellness Visit    Ev An is here for Medicare AWV and Medication Refill (Epi pen)    Assessment & Plan   Medicare annual wellness visit, subsequent  Questionnaire reviewed & discussed  Preventive care reviewed  Immunizations discussed & encouraged   DEXA offered & declined  Urinary frequency  -     Urinalysis; Future  -     Culture, Urine; Future  -     Carolee Mendoza CNP, Urogynecology, Jose A  OAB (overactive bladder)  -     Urinalysis; Future  -     Culture, Urine; Future  -     Carolee Mendoza CNP, Urogynecology, oJse A  Essential hypertension  -     CBC with Auto Differential; Future  -     Comprehensive Metabolic Panel; Future  -     Lipid, Fasting; Future  -     Magnesium; Future  Pre-diabetes  -     Hemoglobin A1C; Future  Diuretic-induced hypokalemia  -     Comprehensive Metabolic Panel; Future  Multiple allergies  -     EPINEPHrine (EPIPEN 2-GUNNAR) 0.3 MG/0.3ML SOAJ injection; Inject 0.3 mLs into the skin once as needed (anaphylaxsis), Disp-1 each, R-1Normal  Other chronic pain  Anxiety and depression     Return in about 6 months (around 12/5/2025), or if symptoms worsen or fail to improve, for HTN, HLD.     Subjective   The following acute and/or chronic problems were also addressed today:  HTN, HLD, anxiety, chronic pain, OAB, urinary freq.     Patient's complete Health Risk Assessment and screening values have been reviewed and are found in Flowsheets. The following problems were reviewed today and where indicated follow up appointments were made and/or referrals ordered.    Positive Risk Factor Screenings with Interventions:    Fall Risk:  Do you feel unsteady or are you worried about falling? : (!) yes  2 or more falls in past year?: (!) yes  Fall with injury in past year?: (!) yes     Interventions:    Reviewed medications, home hazards, visual acuity, and co-morbidities that can increase risk for falls  Patient declines any further evaluation or treatment

## 2025-06-05 NOTE — PROGRESS NOTES
[facility]  [address] [place] [address]  Dept: [phone]    Ev An is a 64 y.o. female who presents today for her medical conditions/complaintsas noted below.      Ev An is here today c/o Medicare AWV and Medication Refill (Epi pen)    Past Medical History:   Diagnosis Date    Allergic rhinitis 2014    Anxiety 2018    Asthma     Generalized osteoarthritis 2014    Hypertension     IFG (impaired fasting glucose)     Kidney stone     Lumbosacral neuritis 10/04/2023    Obesity     PONV (postoperative nausea and vomiting)     Sleep apnea     does not use a machine      Past Surgical History:   Procedure Laterality Date    CHOLECYSTECTOMY      FOOT TENDON SURGERY Left 2017    posterior repair    FOOT TENDON SURGERY Left 2018    LEFT POSTERIOR  TENDON REPAIR FLEXOR TENDON TRANSFER (Left Foot)    LITHOTRIPSY      TX OFFICE/OUTPT VISIT,PROCEDURE ONLY Left 2018    LEFT POSTERIOR  TENDON REPAIR FLEXOR TENDON TRANSFER performed by Noble Bangura DPM at [facility] [place]    REPAIR TENDONS LEG Left 2017    LEFT POSTERIOR TIBIAL TENDON REPAIR WITH LEFT TENOLYSIS  performed by Noble Bangura DPM at [facility] [place]       Family History   Problem Relation Age of Onset    Breast Cancer Mother 33    Depression Mother     Diabetes Mother     High Blood Pressure Mother     Mental Illness Mother     Substance Abuse Father     Depression Sister     Diabetes Sister     High Blood Pressure Sister     Mental Illness Sister     Depression Brother     Mental Illness Brother     Substance Abuse Brother        Social History     Tobacco Use    Smoking status: Former     Current packs/day: 0.00     Average packs/day: 1 pack/day for 16.1 years (16.1 ttl pk-yrs)     Types: Cigarettes     Start date: 1981     Quit date: 1998     Years since quittin.4    Smokeless tobacco: Never    Tobacco comments:     quit smoking 15-20 yrs ago   Substance Use Topics

## 2025-06-06 ENCOUNTER — RESULTS FOLLOW-UP (OUTPATIENT)
Dept: FAMILY MEDICINE CLINIC | Age: 65
End: 2025-06-06

## 2025-06-06 LAB
MICROORGANISM SPEC CULT: NO GROWTH
SERVICE CMNT-IMP: NORMAL
SPECIMEN DESCRIPTION: NORMAL

## 2025-06-11 ENCOUNTER — TELEPHONE (OUTPATIENT)
Dept: FAMILY MEDICINE CLINIC | Age: 65
End: 2025-06-11

## 2025-06-11 NOTE — TELEPHONE ENCOUNTER
Left a voicemail for [employer] representative as I have been trying to fax patient paperwork with the fax number on the paperwork, but it is unable to go through. Need an alternative fax number.

## 2025-07-01 RX ORDER — ACETAMINOPHEN 500 MG
500 TABLET ORAL DAILY
COMMUNITY

## 2025-07-01 RX ORDER — LIDOCAINE 40 MG/G
CREAM TOPICAL PRN
COMMUNITY

## 2025-07-07 ENCOUNTER — OFFICE VISIT (OUTPATIENT)
Age: 65
End: 2025-07-07
Payer: MEDICARE

## 2025-07-07 VITALS
HEART RATE: 68 BPM | BODY MASS INDEX: 49.08 KG/M2 | SYSTOLIC BLOOD PRESSURE: 147 MMHG | DIASTOLIC BLOOD PRESSURE: 81 MMHG | TEMPERATURE: 97.6 F | OXYGEN SATURATION: 96 % | WEIGHT: 243 LBS

## 2025-07-07 DIAGNOSIS — N81.6 RECTOCELE: ICD-10-CM

## 2025-07-07 DIAGNOSIS — N31.8 FREQUENCY-URGENCY SYNDROME: ICD-10-CM

## 2025-07-07 DIAGNOSIS — E66.813 CLASS 3 SEVERE OBESITY DUE TO EXCESS CALORIES WITH SERIOUS COMORBIDITY AND BODY MASS INDEX (BMI) OF 45.0 TO 49.9 IN ADULT (HCC): ICD-10-CM

## 2025-07-07 DIAGNOSIS — R15.9 INCONTINENCE OF FECES, UNSPECIFIED FECAL INCONTINENCE TYPE: ICD-10-CM

## 2025-07-07 DIAGNOSIS — R33.9 RETENTION OF URINE: Primary | ICD-10-CM

## 2025-07-07 DIAGNOSIS — K59.04 CHRONIC IDIOPATHIC CONSTIPATION: ICD-10-CM

## 2025-07-07 LAB
BILIRUBIN, POC: NORMAL
BLOOD URINE, POC: NORMAL
CLARITY, POC: CLEAR
COLOR, POC: YELLOW
GLUCOSE URINE, POC: NORMAL MG/DL
KETONES, POC: NORMAL MG/DL
LEUKOCYTE EST, POC: NORMAL
NITRITE, POC: NORMAL
PH, POC: 6
PROTEIN, POC: NORMAL MG/DL
SPECIFIC GRAVITY, POC: 1.03
UROBILINOGEN, POC: NORMAL MG/DL

## 2025-07-07 PROCEDURE — 99204 OFFICE O/P NEW MOD 45 MIN: CPT | Performed by: NURSE PRACTITIONER

## 2025-07-07 PROCEDURE — 3017F COLORECTAL CA SCREEN DOC REV: CPT | Performed by: NURSE PRACTITIONER

## 2025-07-07 PROCEDURE — 1036F TOBACCO NON-USER: CPT | Performed by: NURSE PRACTITIONER

## 2025-07-07 PROCEDURE — 3077F SYST BP >= 140 MM HG: CPT | Performed by: NURSE PRACTITIONER

## 2025-07-07 PROCEDURE — G8417 CALC BMI ABV UP PARAM F/U: HCPCS | Performed by: NURSE PRACTITIONER

## 2025-07-07 PROCEDURE — 3079F DIAST BP 80-89 MM HG: CPT | Performed by: NURSE PRACTITIONER

## 2025-07-07 PROCEDURE — 81003 URINALYSIS AUTO W/O SCOPE: CPT | Performed by: NURSE PRACTITIONER

## 2025-07-07 PROCEDURE — G8427 DOCREV CUR MEDS BY ELIG CLIN: HCPCS | Performed by: NURSE PRACTITIONER

## 2025-07-07 PROCEDURE — 51798 US URINE CAPACITY MEASURE: CPT | Performed by: NURSE PRACTITIONER

## 2025-07-07 ASSESSMENT — ENCOUNTER SYMPTOMS: CONSTIPATION: 1

## 2025-07-07 NOTE — PROGRESS NOTES
[facility]   [address] [place] [address]  Dept: [phone]  Date: 7/7/2025  Patient Name: Ev An    VISIT - NEW PATIENT VISIT     CC: had concerns including New Patient (Frequent UTI's, frequent urination, ).    HPI: Pt referred as new patient by PCP for UI. Leaks primarily with urgency, less with coughing/sneezing. Present for 6 years, getting worse. No prior treatments.  No longer SA, no vaginal bleeding/dysuria.  Constipation, relates to diet, takes OTC laxative to try and go daily, strains. She reports probiotics/fiber/stool softeners not helpful. Declines colonoscopy.  AUASS 20, QOL 4. Frequency, urgency, intermittent difficult emptying bladder. Does not wear pads. Notes vaginal pressure, notes bulge from vaginal opening.  Hx of kidney stones.   History of Present Illness         Topics of Prolapse, Pain, Pressure were reviewed including type, period of onset, level of severity, quality and quantity, associations, trends, exacerbators, alleviators, bleeding, prolapse to reduce, splinting, and prior treatment / surgery.    Topics of Urinary Leakage were reviewed including type, period of onset, level of severity, quality and quantity, associations, trends, exacerbators, alleviators, urgency, frequency, nocturia ,urge incontinence / stress incontinence triggers, hesitancy, obstruction with need to catheterize, frequent UTI\"s >3 in a year diagnosed by culture, hematuria (gross or microscopic), urinary stones, and prior treatment / surgery.    Topics of Fecal Incontinence were reviewed including type, period of onset, level of severity, quality and quantity, associations, trends, exacerbators, alleviators, times per day/week, stool quality / quantity, rectal bleeding, hemorrhoids, constipation / Anismus / Proctalgia fugax, laxative abuse, and prior treatment / surgery.    Topics of

## 2025-07-07 NOTE — PATIENT INSTRUCTIONS
Benefiber daily, adequate water, decreased dairy  Laxative three times weekly if needed to prevent straining  If constipation persists, follow up with GI  If desire pelvic therapy for urinary incontinence, mild prolapse, constipation, call for referral

## (undated) DEVICE — GLOVE SURG SZ 65 L12IN FNGR THK87MIL WHT LTX FREE

## (undated) DEVICE — GOWN,SIRUS,NON REINFRCD,LARGE,SET IN SL: Brand: MEDLINE

## (undated) DEVICE — GLOVE SURG SZ 75 L12IN FNGR THK87MIL WHT LTX FREE

## (undated) DEVICE — GLOVE SURG SZ 75 L12IN FNGR THK87MIL DK GRN LTX FREE ISOLEX

## (undated) DEVICE — TUBING, SUCTION, 1/4" X 12', STRAIGHT: Brand: MEDLINE

## (undated) DEVICE — DISCONTINUED USE 405792 GLOVE SURG SENSICARE ALOE LT LF PF ST GRN SZ 7

## (undated) DEVICE — SUTURE MCRYL SZ 4-0 L18IN ABSRB UD P-3 L13MM 3/8 CIR PRIM Y494G

## (undated) DEVICE — 3M™ WARMING BLANKET, UPPER BODY, 10 PER CASE, 42268: Brand: BAIR HUGGER™

## (undated) DEVICE — MEDI-VAC NON-CONDUCTIVE SUCTION TUBING: Brand: CARDINAL HEALTH

## (undated) DEVICE — PADDING CAST W4INXL4YD ST COT COHESIVE HND TEARABLE SPEC

## (undated) DEVICE — CUSHION PRONEVIEW L HD NK FOAM

## (undated) DEVICE — GOWN,AURORA,NONRNF,XL,30/CS: Brand: MEDLINE

## (undated) DEVICE — PADDING CAST W4INXL4YD NONSTERILE COT COHESIVE HND TEARABLE

## (undated) DEVICE — GLOVE SURG SZ 7 L12IN FNGR THK87MIL WHT LTX FREE

## (undated) DEVICE — PADDING UNDERCAST W4INXL4YD COT FBR LO LINTING WYTEX

## (undated) DEVICE — ZIMMER® STERILE DISPOSABLE TOURNIQUET CUFF WITH PROTECTIVE SLEEVE AND PLC, DUAL PORT, SINGLE BLADDER, 18 IN. (46 CM)

## (undated) DEVICE — BANDAGE COBAN 4 IN COMPR W4INXL5YD FOAM COHESIVE QUIK STK SELF ADH SFT

## (undated) DEVICE — Device

## (undated) DEVICE — GOWN,SIRUS,NONRNF,SETINSLV,XL,20/CS: Brand: MEDLINE

## (undated) DEVICE — MEDI-VAC SUCTION HANDLE REGULAR CAPACITY: Brand: CARDINAL HEALTH

## (undated) DEVICE — SKIN PREP TRAY W/CHG: Brand: MEDLINE INDUSTRIES, INC.

## (undated) DEVICE — YANKAUER,FLEXIBLE HANDLE,REGLR CAPACITY: Brand: MEDLINE INDUSTRIES, INC.

## (undated) DEVICE — ZIMMER® STERILE DISPOSABLE TOURNIQUET CUFF WITH PLC, DUAL PORT, SINGLE BLADDER, 30 IN. (76 CM)

## (undated) DEVICE — CATHETER ETER IV 22GA L1IN POLYUR STR RADPQ INTROCAN SFTY

## (undated) DEVICE — GARMENT,MEDLINE,DVT,INT,CALF,MED, GEN2: Brand: MEDLINE

## (undated) DEVICE — BANDAGE COMPR W4INXL5YD WHT BGE POLY COT M E WRP WV HK AND

## (undated) DEVICE — SUPER SHUTTLE SUTURE PASSING SYSTEM: Brand: SUPER SHUTTLE

## (undated) DEVICE — INTENDED FOR TISSUE SEPARATION, AND OTHER PROCEDURES THAT REQUIRE A SHARP SURGICAL BLADE TO PUNCTURE OR CUT.: Brand: BARD-PARKER ® CARBON RIB-BACK BLADES

## (undated) DEVICE — GLOVE SURG SZ 8 L12IN FNGR THK87MIL WHT LTX FREE

## (undated) DEVICE — STRIP SKIN CLSR W0.25XL4IN WHT SPUNBOUND FBR NYL HI ADH

## (undated) DEVICE — CHLORAPREP 26ML ORANGE

## (undated) DEVICE — GOWN,AURORA,NONREINFORCED,LARGE: Brand: MEDLINE

## (undated) DEVICE — PADDING CAST W6INXL4YD COT COHESIVE HND TEARABLE SPEC 100

## (undated) DEVICE — Z CONVERTED USE 2271043 CONTAINER SPEC COLL 4OZ SCR ON LID PEEL PCH

## (undated) DEVICE — ANCHOR SUTURE BIOCOMP 4.75X19.1 MM SWIVELOCK C
Type: IMPLANTABLE DEVICE | Site: FOOT | Status: NON-FUNCTIONAL
Removed: 2018-06-29

## (undated) DEVICE — CONVERTED USE 248063 TOWELS OR BL ST

## (undated) DEVICE — SHUTTLE-RELAY SUTURE PASSING SYSTEM: Brand: SHUTTLE-RELAY

## (undated) DEVICE — PADDING UNDERCAST W6INXL4YD WYTEX 6 PER BG

## (undated) DEVICE — TAPE CAST W4INXL4YD WHT FBRGLS POLYUR RESIN LO TACK RIG

## (undated) DEVICE — KIT INSTR W/ 2.4MM GUIDEPIN SUT PASS WIRE NO2 FIBERWIRE

## (undated) DEVICE — ZIMMER® STERILE DISPOSABLE TOURNIQUET CUFF WITH PROTECTIVE SLEEVE AND PLC, DUAL PORT, SINGLE BLADDER, 34 IN. (86 CM)